# Patient Record
Sex: FEMALE | Race: WHITE | Employment: FULL TIME | URBAN - METROPOLITAN AREA
[De-identification: names, ages, dates, MRNs, and addresses within clinical notes are randomized per-mention and may not be internally consistent; named-entity substitution may affect disease eponyms.]

---

## 2019-09-17 ENCOUNTER — HOSPITAL ENCOUNTER (OUTPATIENT)
Dept: MRI IMAGING | Age: 49
Discharge: HOME | End: 2019-09-17
Attending: PODIATRIST
Payer: MEDICAID

## 2019-09-17 VITALS — WEIGHT: 172 LBS | BODY MASS INDEX: 27.76 KG/M2

## 2019-09-17 DIAGNOSIS — M79.672 LEFT FOOT PAIN: ICD-10-CM

## 2019-09-17 PROCEDURE — A9575 INJ GADOTERATE MEGLUMI 0.1ML: HCPCS | Performed by: PODIATRIST

## 2019-09-17 PROCEDURE — 74011250636 HC RX REV CODE- 250/636: Performed by: PODIATRIST

## 2019-09-17 PROCEDURE — 73720 MRI LWR EXTREMITY W/O&W/DYE: CPT

## 2019-09-17 RX ORDER — GADOTERATE MEGLUMINE 376.9 MG/ML
15 INJECTION INTRAVENOUS
Status: COMPLETED | OUTPATIENT
Start: 2019-09-17 | End: 2019-09-17

## 2019-09-17 RX ADMIN — GADOTERATE MEGLUMINE 15 ML: 376.9 INJECTION INTRAVENOUS at 20:06

## 2019-10-07 ENCOUNTER — APPOINTMENT (OUTPATIENT)
Dept: GENERAL RADIOLOGY | Age: 49
DRG: 314 | End: 2019-10-07
Attending: STUDENT IN AN ORGANIZED HEALTH CARE EDUCATION/TRAINING PROGRAM
Payer: MEDICAID

## 2019-10-07 ENCOUNTER — HOSPITAL ENCOUNTER (INPATIENT)
Age: 49
LOS: 5 days | Discharge: LEFT BEFORE DISCHARGE | DRG: 314 | End: 2019-10-12
Attending: EMERGENCY MEDICINE | Admitting: FAMILY MEDICINE
Payer: MEDICAID

## 2019-10-07 ENCOUNTER — APPOINTMENT (OUTPATIENT)
Dept: VASCULAR SURGERY | Age: 49
DRG: 314 | End: 2019-10-07
Attending: FAMILY MEDICINE
Payer: MEDICAID

## 2019-10-07 DIAGNOSIS — M86.372 CHRONIC MULTIFOCAL OSTEOMYELITIS OF LEFT FOOT (HCC): Primary | ICD-10-CM

## 2019-10-07 DIAGNOSIS — R73.9 HYPERGLYCEMIA: ICD-10-CM

## 2019-10-07 PROBLEM — M86.9 OSTEOMYELITIS OF SECOND TOE OF LEFT FOOT (HCC): Status: ACTIVE | Noted: 2019-10-07

## 2019-10-07 PROBLEM — E11.621: Status: ACTIVE | Noted: 2019-10-07

## 2019-10-07 PROBLEM — M86.172 ACUTE OSTEOMYELITIS OF METATARSAL BONE OF LEFT FOOT (HCC): Status: ACTIVE | Noted: 2019-10-07

## 2019-10-07 PROBLEM — L97.424: Status: ACTIVE | Noted: 2019-10-07

## 2019-10-07 PROBLEM — M86.172 ACUTE OSTEOMYELITIS OF LEFT FOOT (HCC): Status: ACTIVE | Noted: 2019-10-07

## 2019-10-07 PROBLEM — L02.612 CELLULITIS AND ABSCESS OF TOE OF LEFT FOOT: Status: ACTIVE | Noted: 2019-10-07

## 2019-10-07 PROBLEM — I10 HTN (HYPERTENSION): Status: ACTIVE | Noted: 2019-10-07

## 2019-10-07 PROBLEM — L03.032 CELLULITIS AND ABSCESS OF TOE OF LEFT FOOT: Status: ACTIVE | Noted: 2019-10-07

## 2019-10-07 PROBLEM — E11.9 DM TYPE 2 (DIABETES MELLITUS, TYPE 2) (HCC): Status: ACTIVE | Noted: 2019-10-07

## 2019-10-07 LAB
ALBUMIN SERPL-MCNC: 2.7 G/DL (ref 3.5–5)
ALBUMIN/GLOB SERPL: 0.5 {RATIO} (ref 1.1–2.2)
ALP SERPL-CCNC: 167 U/L (ref 45–117)
ALT SERPL-CCNC: 14 U/L (ref 12–78)
ANION GAP SERPL CALC-SCNC: 9 MMOL/L (ref 5–15)
APPEARANCE UR: CLEAR
AST SERPL-CCNC: 15 U/L (ref 15–37)
ATRIAL RATE: 88 BPM
BACTERIA URNS QL MICRO: NEGATIVE /HPF
BASOPHILS # BLD: 0.1 K/UL (ref 0–0.1)
BASOPHILS NFR BLD: 1 % (ref 0–1)
BILIRUB SERPL-MCNC: 0.2 MG/DL (ref 0.2–1)
BILIRUB UR QL: NEGATIVE
BUN SERPL-MCNC: 28 MG/DL (ref 6–20)
BUN/CREAT SERPL: 27 (ref 12–20)
CALCIUM SERPL-MCNC: 9.3 MG/DL (ref 8.5–10.1)
CALCULATED P AXIS, ECG09: 43 DEGREES
CALCULATED R AXIS, ECG10: -20 DEGREES
CALCULATED T AXIS, ECG11: 36 DEGREES
CHLORIDE SERPL-SCNC: 99 MMOL/L (ref 97–108)
CO2 SERPL-SCNC: 26 MMOL/L (ref 21–32)
COLOR UR: ABNORMAL
COMMENT, HOLDF: NORMAL
CREAT SERPL-MCNC: 1.05 MG/DL (ref 0.55–1.02)
DIAGNOSIS, 93000: NORMAL
DIFFERENTIAL METHOD BLD: ABNORMAL
EOSINOPHIL # BLD: 0.2 K/UL (ref 0–0.4)
EOSINOPHIL NFR BLD: 2 % (ref 0–7)
EPITH CASTS URNS QL MICRO: ABNORMAL /LPF
ERYTHROCYTE [DISTWIDTH] IN BLOOD BY AUTOMATED COUNT: 14.6 % (ref 11.5–14.5)
EST. AVERAGE GLUCOSE BLD GHB EST-MCNC: 303 MG/DL
GLOBULIN SER CALC-MCNC: 5.5 G/DL (ref 2–4)
GLUCOSE BLD STRIP.AUTO-MCNC: 239 MG/DL (ref 65–100)
GLUCOSE BLD STRIP.AUTO-MCNC: 353 MG/DL (ref 65–100)
GLUCOSE BLD STRIP.AUTO-MCNC: 408 MG/DL (ref 65–100)
GLUCOSE SERPL-MCNC: 312 MG/DL (ref 65–100)
GLUCOSE UR STRIP.AUTO-MCNC: >1000 MG/DL
HBA1C MFR BLD: 12.2 % (ref 4.2–6.3)
HCT VFR BLD AUTO: 37.6 % (ref 35–47)
HGB BLD-MCNC: 11.3 G/DL (ref 11.5–16)
HGB UR QL STRIP: ABNORMAL
HYALINE CASTS URNS QL MICRO: ABNORMAL /LPF (ref 0–5)
IMM GRANULOCYTES # BLD AUTO: 0 K/UL (ref 0–0.04)
IMM GRANULOCYTES NFR BLD AUTO: 0 % (ref 0–0.5)
KETONES UR QL STRIP.AUTO: NEGATIVE MG/DL
LEUKOCYTE ESTERASE UR QL STRIP.AUTO: NEGATIVE
LYMPHOCYTES # BLD: 1.4 K/UL (ref 0.8–3.5)
LYMPHOCYTES NFR BLD: 16 % (ref 12–49)
MCH RBC QN AUTO: 27 PG (ref 26–34)
MCHC RBC AUTO-ENTMCNC: 30.1 G/DL (ref 30–36.5)
MCV RBC AUTO: 89.7 FL (ref 80–99)
MONOCYTES # BLD: 0.8 K/UL (ref 0–1)
MONOCYTES NFR BLD: 9 % (ref 5–13)
NEUTS SEG # BLD: 6.1 K/UL (ref 1.8–8)
NEUTS SEG NFR BLD: 72 % (ref 32–75)
NITRITE UR QL STRIP.AUTO: NEGATIVE
NRBC # BLD: 0 K/UL (ref 0–0.01)
NRBC BLD-RTO: 0 PER 100 WBC
P-R INTERVAL, ECG05: 198 MS
PH UR STRIP: 6.5 [PH] (ref 5–8)
PLATELET # BLD AUTO: 390 K/UL (ref 150–400)
PMV BLD AUTO: 10.7 FL (ref 8.9–12.9)
POTASSIUM SERPL-SCNC: 4.4 MMOL/L (ref 3.5–5.1)
PROT SERPL-MCNC: 8.2 G/DL (ref 6.4–8.2)
PROT UR STRIP-MCNC: 300 MG/DL
Q-T INTERVAL, ECG07: 358 MS
QRS DURATION, ECG06: 80 MS
QTC CALCULATION (BEZET), ECG08: 433 MS
RBC # BLD AUTO: 4.19 M/UL (ref 3.8–5.2)
RBC #/AREA URNS HPF: ABNORMAL /HPF (ref 0–5)
SAMPLES BEING HELD,HOLD: NORMAL
SERVICE CMNT-IMP: ABNORMAL
SODIUM SERPL-SCNC: 134 MMOL/L (ref 136–145)
SP GR UR REFRACTOMETRY: 1.02 (ref 1–1.03)
UR CULT HOLD, URHOLD: NORMAL
UROBILINOGEN UR QL STRIP.AUTO: 1 EU/DL (ref 0.2–1)
VENTRICULAR RATE, ECG03: 88 BPM
WBC # BLD AUTO: 8.4 K/UL (ref 3.6–11)
WBC URNS QL MICRO: ABNORMAL /HPF (ref 0–4)

## 2019-10-07 PROCEDURE — 93971 EXTREMITY STUDY: CPT

## 2019-10-07 PROCEDURE — 74011636637 HC RX REV CODE- 636/637: Performed by: FAMILY MEDICINE

## 2019-10-07 PROCEDURE — 85025 COMPLETE CBC W/AUTO DIFF WBC: CPT

## 2019-10-07 PROCEDURE — 93005 ELECTROCARDIOGRAM TRACING: CPT

## 2019-10-07 PROCEDURE — 74011250637 HC RX REV CODE- 250/637: Performed by: FAMILY MEDICINE

## 2019-10-07 PROCEDURE — 81001 URINALYSIS AUTO W/SCOPE: CPT

## 2019-10-07 PROCEDURE — 65270000032 HC RM SEMIPRIVATE

## 2019-10-07 PROCEDURE — 80053 COMPREHEN METABOLIC PANEL: CPT

## 2019-10-07 PROCEDURE — 82962 GLUCOSE BLOOD TEST: CPT

## 2019-10-07 PROCEDURE — 74011250636 HC RX REV CODE- 250/636: Performed by: FAMILY MEDICINE

## 2019-10-07 PROCEDURE — 74011000258 HC RX REV CODE- 258: Performed by: FAMILY MEDICINE

## 2019-10-07 PROCEDURE — 83036 HEMOGLOBIN GLYCOSYLATED A1C: CPT

## 2019-10-07 PROCEDURE — 73630 X-RAY EXAM OF FOOT: CPT

## 2019-10-07 PROCEDURE — 99285 EMERGENCY DEPT VISIT HI MDM: CPT

## 2019-10-07 PROCEDURE — 74011250636 HC RX REV CODE- 250/636: Performed by: EMERGENCY MEDICINE

## 2019-10-07 RX ORDER — AMLODIPINE BESYLATE 5 MG/1
5 TABLET ORAL ONCE
Status: COMPLETED | OUTPATIENT
Start: 2019-10-07 | End: 2019-10-07

## 2019-10-07 RX ORDER — AMLODIPINE BESYLATE 5 MG/1
10 TABLET ORAL DAILY
Status: DISCONTINUED | OUTPATIENT
Start: 2019-10-08 | End: 2019-10-10

## 2019-10-07 RX ORDER — INSULIN LISPRO 100 [IU]/ML
20 INJECTION, SOLUTION INTRAVENOUS; SUBCUTANEOUS ONCE
Status: COMPLETED | OUTPATIENT
Start: 2019-10-07 | End: 2019-10-07

## 2019-10-07 RX ORDER — SODIUM CHLORIDE 0.9 % (FLUSH) 0.9 %
5-40 SYRINGE (ML) INJECTION EVERY 8 HOURS
Status: DISCONTINUED | OUTPATIENT
Start: 2019-10-07 | End: 2019-10-12 | Stop reason: HOSPADM

## 2019-10-07 RX ORDER — MORPHINE SULFATE 2 MG/ML
2 INJECTION, SOLUTION INTRAMUSCULAR; INTRAVENOUS
Status: DISCONTINUED | OUTPATIENT
Start: 2019-10-07 | End: 2019-10-12

## 2019-10-07 RX ORDER — OXYCODONE AND ACETAMINOPHEN 5; 325 MG/1; MG/1
1 TABLET ORAL
Status: DISCONTINUED | OUTPATIENT
Start: 2019-10-07 | End: 2019-10-09

## 2019-10-07 RX ORDER — INSULIN LISPRO 100 [IU]/ML
INJECTION, SOLUTION INTRAVENOUS; SUBCUTANEOUS
Status: DISCONTINUED | OUTPATIENT
Start: 2019-10-07 | End: 2019-10-12 | Stop reason: HOSPADM

## 2019-10-07 RX ORDER — SODIUM CHLORIDE 0.9 % (FLUSH) 0.9 %
5-40 SYRINGE (ML) INJECTION AS NEEDED
Status: DISCONTINUED | OUTPATIENT
Start: 2019-10-07 | End: 2019-10-12 | Stop reason: HOSPADM

## 2019-10-07 RX ORDER — HYDRALAZINE HYDROCHLORIDE 20 MG/ML
20 INJECTION INTRAMUSCULAR; INTRAVENOUS
Status: DISCONTINUED | OUTPATIENT
Start: 2019-10-07 | End: 2019-10-11

## 2019-10-07 RX ORDER — ACETAMINOPHEN 325 MG/1
650 TABLET ORAL
Status: DISCONTINUED | OUTPATIENT
Start: 2019-10-07 | End: 2019-10-12 | Stop reason: HOSPADM

## 2019-10-07 RX ORDER — VANCOMYCIN/0.9 % SOD CHLORIDE 1.5G/250ML
1500 PLASTIC BAG, INJECTION (ML) INTRAVENOUS
Status: COMPLETED | OUTPATIENT
Start: 2019-10-07 | End: 2019-10-07

## 2019-10-07 RX ORDER — HEPARIN SODIUM 5000 [USP'U]/ML
5000 INJECTION, SOLUTION INTRAVENOUS; SUBCUTANEOUS EVERY 12 HOURS
Status: DISCONTINUED | OUTPATIENT
Start: 2019-10-07 | End: 2019-10-12 | Stop reason: HOSPADM

## 2019-10-07 RX ORDER — MAGNESIUM SULFATE 100 %
4 CRYSTALS MISCELLANEOUS AS NEEDED
Status: DISCONTINUED | OUTPATIENT
Start: 2019-10-07 | End: 2019-10-12 | Stop reason: HOSPADM

## 2019-10-07 RX ORDER — INSULIN GLARGINE 100 [IU]/ML
25 INJECTION, SOLUTION SUBCUTANEOUS 2 TIMES DAILY
Status: DISCONTINUED | OUTPATIENT
Start: 2019-10-07 | End: 2019-10-08

## 2019-10-07 RX ADMIN — VANCOMYCIN HYDROCHLORIDE 1500 MG: 10 INJECTION, POWDER, LYOPHILIZED, FOR SOLUTION INTRAVENOUS at 11:03

## 2019-10-07 RX ADMIN — HYDRALAZINE HYDROCHLORIDE 20 MG: 20 INJECTION INTRAMUSCULAR; INTRAVENOUS at 12:51

## 2019-10-07 RX ADMIN — Medication 10 ML: at 19:12

## 2019-10-07 RX ADMIN — OXYCODONE HYDROCHLORIDE AND ACETAMINOPHEN 1 TABLET: 5; 325 TABLET ORAL at 18:21

## 2019-10-07 RX ADMIN — INSULIN LISPRO 10 UNITS: 100 INJECTION, SOLUTION INTRAVENOUS; SUBCUTANEOUS at 19:03

## 2019-10-07 RX ADMIN — INSULIN LISPRO 5 UNITS: 100 INJECTION, SOLUTION INTRAVENOUS; SUBCUTANEOUS at 22:06

## 2019-10-07 RX ADMIN — INSULIN LISPRO 4 UNITS: 100 INJECTION, SOLUTION INTRAVENOUS; SUBCUTANEOUS at 12:51

## 2019-10-07 RX ADMIN — VANCOMYCIN HYDROCHLORIDE 1000 MG: 1 INJECTION, POWDER, LYOPHILIZED, FOR SOLUTION INTRAVENOUS at 23:06

## 2019-10-07 RX ADMIN — AMLODIPINE BESYLATE 5 MG: 5 TABLET ORAL at 19:12

## 2019-10-07 RX ADMIN — PIPERACILLIN AND TAZOBACTAM 3.38 G: 3; .375 INJECTION, POWDER, FOR SOLUTION INTRAVENOUS at 19:16

## 2019-10-07 RX ADMIN — INSULIN GLARGINE 25 UNITS: 100 INJECTION, SOLUTION SUBCUTANEOUS at 22:12

## 2019-10-07 RX ADMIN — PIPERACILLIN AND TAZOBACTAM 3.38 G: 3; .375 INJECTION, POWDER, LYOPHILIZED, FOR SOLUTION INTRAVENOUS at 13:24

## 2019-10-07 RX ADMIN — HEPARIN SODIUM 5000 UNITS: 5000 INJECTION INTRAVENOUS; SUBCUTANEOUS at 12:50

## 2019-10-07 NOTE — PROGRESS NOTES
Pharmacist Note - Vancomycin Dosing    Consult provided for this 51 y.o. female for indication of diabetic foot infection with osteomyelitis. Antibiotic regimen(s): vancomycin + Zosyn     Recent Labs     10/07/19  0933   WBC 8.4   CREA 1.05*   BUN 28*     Frequency of BMP: daily x 3  Height: 167.6 cm  Weight: 78 kg  Est CrCl: 69 ml/min  Temp (24hrs), Av.7 °F (36.5 °C), Min:97.7 °F (36.5 °C), Max:97.7 °F (36.5 °C)    Goal trough = 15 - 20 mcg/mL    Therapy will be initiated with a loading dose of 1500 mg IV x 1 to be followed by a maintenance dose of 1000 mg IV every 12 hours. Pharmacy to follow patient daily and order levels / make dose adjustments as appropriate.

## 2019-10-07 NOTE — ROUTINE PROCESS
TRANSFER - OUT REPORT: 
 
Verbal report given to young(name) on Jonatan Mendez  being transferred to (unit) for routine progression of care Report consisted of patients Situation, Background, Assessment and  
Recommendations(SBAR). Information from the following report(s) SBAR was reviewed with the receiving nurse. Lines:  
Venous Access Device 10/07/14 Arm, right (Active) Peripheral IV 10/07/19 Right Hand (Active) Site Assessment Clean, dry, & intact 10/7/2019  9:35 AM  
Phlebitis Assessment 0 10/7/2019  9:35 AM  
Infiltration Assessment 0 10/7/2019  9:35 AM  
Dressing Status Clean, dry, & intact 10/7/2019  9:35 AM  
Dressing Type Transparent 10/7/2019  9:35 AM  
Hub Color/Line Status Patent; Flushed 10/7/2019  9:35 AM  
Action Taken Blood drawn 10/7/2019  9:35 AM  
  
 
Opportunity for questions and clarification was provided. Patient transported with: 
 [facility]

## 2019-10-07 NOTE — PROGRESS NOTES
Reason for Admission: Acute Osteomylitis of Left Foot; HTN; DM Type 2                   RRAT Score:  5- LOW                   Plan for utilizing home health: TBD                         Current Advanced Directive/Advance Care Plan: Pt does not have an AMD on file at this time. Transition of Care Plan: CM met with pt at bedside to discuss CM role and to assess pt needs. CM verified demographics including insurance and emergency contact information. Pt reports hse would liek to add daughterLeela, to emergency contact list; CM to update. Pt lives in a private residence with daughter and two grandchildren. There are 4 steps to enter home. Pt reports daughterLeo (ph#: [phone]), will provide transportation home after discharge. Pt reports a glucometer for DME use and IADLs prior to admission. Pt uses [address] [facility] and reports no concerns with getting medications. Pt verified she currently does not have a PCP; CM offered [facility] provider list- pt agreeable. Pt reports no concerns for discharge at this time. CM to follow and assist with disposition needs as they arise. Care Management Interventions  PCP Verified by CM: No  Palliative Care Criteria Met (RRAT>21 & CHF Dx)?: No  Mode of Transport at Discharge: Other (see comment)(DaughterLeo)  Transition of Care Consult (CM Consult):  Other(Initial Assessment)  MyChart Signup: No  Discharge Durable Medical Equipment: No  Physical Therapy Consult: No  Occupational Therapy Consult: No  Speech Therapy Consult: No  Current Support Network: Own Home, Other  Confirm Follow Up Transport: Family  Plan discussed with Pt/Family/Caregiver: Yes  Discharge Location  Discharge Placement: Home(Disposition TBD/subject to change pending care recommendations)    Melly Joy RN, BSN  Care Management Department

## 2019-10-07 NOTE — CONSULTS
Full consult to follow. Patient known to me from office. Recommended to come to hospital 2 weeks ago for I&D of abscess, 5th ray resection, and partial 2nd toe amputation--all of the left foot. Will plan for OR tomorrow pending preoperative clearance.

## 2019-10-07 NOTE — PROGRESS NOTES
Admission Medication Reconciliation:    Comments/Recommendations:   Updated PTA meds/reviewed patient's allergies. 1)  Medication history obtained from the patient - reliable historian. 2)  Medication changes (since last review): Added  - Novolin R sliding scale    Adjusted  - Levemir to BID    Removed  - Leonarda, Invanz, oxycodone  - Note: recently finished oxycodone and oral antibiotics. 3)  Allergy information confirmed. Please call  with any immediate questions regarding this medication reconciliation, or the main inpatient pharmacy at [facility] to be directed to the clinical pharmacist covering the patient's care after admitted. Thank you for allowing me to participate in this patient's care. Ubaldo Amanda, Pharmacist     [facility] benefit data reflects medications filled and processed through the patient's insurance, however   this data does NOT capture whether the medication was picked up or is currently being taken by the patient. Prior to Admission Medications:   Prior to Admission Medications   Prescriptions Last Dose Informant Patient Reported? Taking?   insulin detemir U-100 (LEVEMIR U-100 INSULIN) 100 unit/mL injection 10/7/2019 at AM  Yes Yes   Si Units by SubCUTAneous route two (2) times a day. insulin regular (NOVOLIN R REGULAR U-100 INSULN) 100 unit/mL injection 10/7/2019 at AM  Yes Yes   Sig: by SubCUTAneous route three (3) times daily (with meals). Sliding scale insulin      Facility-Administered Medications: None       Allergies:  Patient has no known allergies.   Chief Complaint for this Admission:    Chief Complaint   Patient presents with    Other     Significant PMH/Disease States:   Past Medical History:   Diagnosis Date    Cataract     Cataracts, bilateral     Diabetes ([facility] [address]     Osteomyelitis ([facility]

## 2019-10-07 NOTE — ED TRIAGE NOTES
Pt referred to ED by Dr. Ty Nuñez, [address] [facility]le for IV Abx for wound to left foot. Planned surgery for tomorrow r/t Osteomyelitis. Pt previously on Bactrim, stopped 3 weeks ago.

## 2019-10-07 NOTE — H&P
History and Physical  Primary Care Provider: None    Subjective:     51 y.o. female with past medical history significant for DM, HTN, previous osteomyelitis of left foot who was sent into the ER by Dr Tinnie Peabody- podiatry, for a non healing left foot diabetic wound with osteomyelitis. Pt has had issues with her L foot since 2011 including a chronic non healing diabetic ulcer and recurrent infections. She was admitted back in 10/2014 and had surgery on her left foot followed by a prolonged course of IV abx. She presents with a chief complaint of left foot pain with worsening L leg swelling and drainage from her left foot chronic ulcer. Pt was seen by Dr Tinnie Peabody outpatient and had an MRI of her left foot on 9/17/19 which showed \"Osteomyelitis of the postsurgical proximal fifth metatarsal. Osteomyelitis of the second distal phalanx. Surrounding cellulitis. 14 x 9 x 8 mm abscess plantar to the fifth metatarsal amputation stump. Adjacent skin breakdown and dorsal to plantar sinus tract. Patient was treated last month with a 2-3 week course of PO bactrim. She  is scheduled to have left foot surgery tomorrow with Dr. Tinnie Peabody at 3pm. She sent the patient to the ED to be admitted by the hospitalist service. Her only other symptoms are nausea, feeling tired, and a rash on the left lower leg. She reports no other acute medical concerns at this time. She reports that she had delayed addressing the left foot infection previously due to lack of insurance. Review of Systems:    A comprehensive review of systems was negative except for that written in the History of Present Illness.      Past Medical History:   Diagnosis Date    Cataract     Cataracts, bilateral     Diabetes ([facility]     Osteomyelitis ([facility] [address]       Past Surgical History:   Procedure Laterality Date    HX CATARACT REMOVAL  2/2011; 5-11    right eye; left    HX TONSIL AND ADENOIDECTOMY  1985    HX TONSILLECTOMY  1984    HX TUBAL LIGATION  1997     Prior to Admission medications    Medication Sig Start Date End Date Taking? Authorizing Provider   insulin regular (NOVOLIN R REGULAR U-100 INSULN) 100 unit/mL injection by SubCUTAneous route three (3) times daily (with meals). Sliding scale insulin   Yes Provider, Historical   insulin detemir U-100 (LEVEMIR U-100 INSULIN) 100 unit/mL injection 30 Units by SubCUTAneous route two (2) times a day. Yes Provider, Historical     No Known Allergies   Family History   Problem Relation Age of Onset    Hypertension Mother     Cancer Father         prostate    Diabetes Maternal Grandmother     Hypertension Maternal Grandmother         SOCIAL HISTORY:  Patient resides at Home with one of her daughters and 2 grandchildren  She is  with 4 children. Works in childcare. Patient ambulates independently  Smoking history: none  Alcohol history:none        Objective:       Physical Exam:   Visit Vitals  BP (!) 188/102   Pulse 93   Temp 97.7 °F (36.5 °C)   Resp 16   Ht 5' 6\" (1.676 m)   Wt 78 kg (172 lb)   LMP 05/01/2011   SpO2 97%   BMI 27.76 kg/m²     General:  Alert, cooperative, no distress, appears stated age. Head:  Normocephalic, without obvious abnormality, atraumatic. Eyes:  Conjunctivae/corneas clear. PERRL, EOMs intact. Fundi benign. Throat: Very poor dentition with multiple caries   Neck: Supple, symmetrical, trachea midline, no adenopathy,normal thyroid:    Back:   Symmetric, no curvature. ROM normal. No CVA tenderness. Lungs:   Clear to auscultation bilaterally. Chest wall:  No tenderness or deformity. Heart:  Regular rate and rhythm, S1, S2 normal, no murmur,    Abdomen:   Soft, non-tender. Bowel sounds normal. No masses,     Extremities: Extremities normal, atraumatic, no cyanosis or edema. Pulses: symmetric all extremities. Skin: Rash on left lower leg and foot- see pics below. Neurologic: CNII-XII intact. No focal motor or sensory losses.     pic of left leg and foot on admission 10/7/19 Data Review: All diagnostic labs and studies have been reviewed. Recent Results (from the past 24 hour(s))   SAMPLES BEING HELD    Collection Time: 10/07/19  9:33 AM   Result Value Ref Range    SAMPLES BEING HELD 1LAV 1BLU 1PST 1RED     COMMENT        Add-on orders for these samples will be processed based on acceptable specimen integrity and analyte stability, which may vary by analyte. CBC WITH AUTOMATED DIFF    Collection Time: 10/07/19  9:33 AM   Result Value Ref Range    WBC 8.4 3.6 - 11.0 K/uL    RBC 4.19 3.80 - 5.20 M/uL    HGB 11.3 (L) 11.5 - 16.0 g/dL    HCT 37.6 35.0 - 47.0 %    MCV 89.7 80.0 - 99.0 FL    MCH 27.0 26.0 - 34.0 PG    MCHC 30.1 30.0 - 36.5 g/dL    RDW 14.6 (H) 11.5 - 14.5 %    PLATELET 622 019 - 844 K/uL    MPV 10.7 8.9 - 12.9 FL    NRBC 0.0 0  WBC    ABSOLUTE NRBC 0.00 0.00 - 0.01 K/uL    NEUTROPHILS 72 32 - 75 %    LYMPHOCYTES 16 12 - 49 %    MONOCYTES 9 5 - 13 %    EOSINOPHILS 2 0 - 7 %    BASOPHILS 1 0 - 1 %    IMMATURE GRANULOCYTES 0 0.0 - 0.5 %    ABS. NEUTROPHILS 6.1 1.8 - 8.0 K/UL    ABS. LYMPHOCYTES 1.4 0.8 - 3.5 K/UL    ABS. MONOCYTES 0.8 0.0 - 1.0 K/UL    ABS. EOSINOPHILS 0.2 0.0 - 0.4 K/UL    ABS. BASOPHILS 0.1 0.0 - 0.1 K/UL    ABS. IMM. GRANS. 0.0 0.00 - 0.04 K/UL    DF AUTOMATED     METABOLIC PANEL, COMPREHENSIVE    Collection Time: 10/07/19  9:33 AM   Result Value Ref Range    Sodium 134 (L) 136 - 145 mmol/L    Potassium 4.4 3.5 - 5.1 mmol/L    Chloride 99 97 - 108 mmol/L    CO2 26 21 - 32 mmol/L    Anion gap 9 5 - 15 mmol/L    Glucose 312 (H) 65 - 100 mg/dL    BUN 28 (H) 6 - 20 MG/DL    Creatinine 1.05 (H) 0.55 - 1.02 MG/DL    BUN/Creatinine ratio 27 (H) 12 - 20      GFR est AA >60 >60 ml/min/1.73m2    GFR est non-AA 56 (L) >60 ml/min/1.73m2    Calcium 9.3 8.5 - 10.1 MG/DL    Bilirubin, total 0.2 0.2 - 1.0 MG/DL    ALT (SGPT) 14 12 - 78 U/L    AST (SGOT) 15 15 - 37 U/L    Alk.  phosphatase 167 (H) 45 - 117 U/L    Protein, total 8.2 6.4 - 8.2 g/dL Albumin 2.7 (L) 3.5 - 5.0 g/dL    Globulin 5.5 (H) 2.0 - 4.0 g/dL    A-G Ratio 0.5 (L) 1.1 - 2.2     HEMOGLOBIN A1C WITH EAG    Collection Time: 10/07/19  9:33 AM   Result Value Ref Range    Hemoglobin A1c 12.2 (H) 4.2 - 6.3 %    Est. average glucose 303 mg/dL   EKG, 12 LEAD, INITIAL    Collection Time: 10/07/19  9:51 AM   Result Value Ref Range    Ventricular Rate 88 BPM    Atrial Rate 88 BPM    P-R Interval 198 ms    QRS Duration 80 ms    Q-T Interval 358 ms    QTC Calculation (Bezet) 433 ms    Calculated P Axis 43 degrees    Calculated R Axis -20 degrees    Calculated T Axis 36 degrees    Diagnosis       Normal sinus rhythm  No previous ECGs available  Confirmed by Juan C Evans MD, [place] ([id number]) on 10/7/2019 12:26:41 PM     URINALYSIS W/ RFLX MICROSCOPIC    Collection Time: 10/07/19 10:47 AM   Result Value Ref Range    Color YELLOW/STRAW      Appearance CLEAR CLEAR      Specific gravity 1.017 1.003 - 1.030      pH (UA) 6.5 5.0 - 8.0      Protein 300 (A) NEG mg/dL    Glucose >1,000 (A) NEG mg/dL    Ketone NEGATIVE  NEG mg/dL    Bilirubin NEGATIVE  NEG      Blood LARGE (A) NEG      Urobilinogen 1.0 0.2 - 1.0 EU/dL    Nitrites NEGATIVE  NEG      Leukocyte Esterase NEGATIVE  NEG      WBC 0-4 0 - 4 /hpf    RBC 20-50 0 - 5 /hpf    Epithelial cells FEW FEW /lpf    Bacteria NEGATIVE  NEG /hpf    Hyaline cast 2-5 0 - 5 /lpf   URINE CULTURE HOLD SAMPLE    Collection Time: 10/07/19 10:47 AM   Result Value Ref Range    Urine culture hold        URINE ON HOLD IN MICROBIOLOGY DEPT FOR 3 DAYS. IF UNPRESERVED URINE IS SUBMITTED, IT CANNOT BE USED FOR ADDITIONAL TESTING AFTER 24 HRS, RECOLLECTION WILL BE REQUIRED.    GLUCOSE, POC    Collection Time: 10/07/19 11:24 AM   Result Value Ref Range    Glucose (POC) 239 (H) 65 - 100 mg/dL    Performed by Maxime Kilgore of left foot: INDICATION: infection.     COMPARISON: 2014     FINDINGS: Three views of the left foot demonstrate destruction of the majority  of the fifth metatarsal. There are calcifications in the associated soft  tissues. . There are inferior and posterior calcaneal spurs.     IMPRESSION  IMPRESSION: Destruction of the majority of the fifth metatarsal.  Assessment:     Active Problems:    HTN (hypertension) (10/7/2019)      DM type 2 (diabetes mellitus, type 2) ([facility] (10/7/2019)      Acute osteomyelitis of left foot ([facility] (10/7/2019)        Plan:     1. Chronic nonhealing left foot diabetic foot ulcer with osteomyelitis of the left foot (distal 2nd phalynx and 5th metatarsal  -plans for podiatry to take patient to the OR tomorrow. Start IV vanc and zosyn    2 DM type 2- accuchecks and ss insulin, Check HbA1c    3. CV- uncontrolled HTN- IV hydralazine and monitor w plans to start antihypertensive, EKG=NSR    4. Tired feeling- check TSH    5. Dental caries- many- pt to see dentistry on DC    6 Pt should be considered lo risk and cleared for surgery    Full Code  Surrogate decision maker- daughter Kali Contreras [phone]   FUNCTIONAL STATUS PRIOR TO HOSPITALIZATION independent    Signed By: Tony Bruno MD     October 7, 2019

## 2019-10-07 NOTE — CONSULTS
Podiatry Consult    Subjective:         Date of Consultation: October 7, 2019     Referring Physician: Emergency Room    Patient is a 51 y. o.female who presents with left lower extremity swelling and erythema in setting of chronic wound. Patient endorses she had original soft tissue infection with associated osteomyelitis in 2013 secondary to a large splinter. She had undergone partial 5th metatarsal resection at that time. She recently developed a draining sinus tract and saw Dr. Mikey Ha in the office who started her on a course of oral antibiotics, but patient was unable to present to the hospital for surgical management of the infection given her childcare responsibilities and job constraints. Obtained MRI on 9/17 showing OM of left 5th metatarsal and left 2nd distal phalanx. She reports her affairs are now in order and she would like to take care of her foot straight away. Patient endorses nausea and malaise for past week. Denies vomiting. Reports she has been taking her temperature and has not had any objective fevers. Endorses decreased appetite. No change in bathroom habits. Reports pain in left lower extremity and swelling she is unable to control with elevation alone. Reports typical blood glucose in 200s, recently in 500s. Presents to ED ambulating with boot to left foot.      Patient Active Problem List    Diagnosis Date Noted    Unspecified essential hypertension 09/01/2011    Diabetes mellitus type 1, uncontrolled, insulin dependent ([address] 04/20/2011    Anxiety state 04/20/2011     Past Medical History:   Diagnosis Date    Cataract     Cataracts, bilateral     Diabetes ([address]     Osteomyelitis ([address]       Past Surgical History:   Procedure Laterality Date    HX CATARACT REMOVAL  2/2011; 5-11    right eye; left    HX TONSIL AND ADENOIDECTOMY  1985    HX TONSILLECTOMY  1984    HX TUBAL LIGATION  1997      Family History   Problem Relation Age of Onset    Hypertension Mother     Cancer Father prostate    Diabetes Maternal Grandmother     Hypertension Maternal Grandmother       Social History     Tobacco Use    Smoking status: Never Smoker    Smokeless tobacco: Never Used   Substance Use Topics    Alcohol use: Yes     Comment: rarely     Current Outpatient Medications   Medication Sig Dispense Refill    insulin regular (NOVOLIN R REGULAR U-100 INSULN) 100 unit/mL injection by SubCUTAneous route three (3) times daily (with meals). Sliding scale insulin      insulin detemir U-100 (LEVEMIR U-100 INSULIN) 100 unit/mL injection 30 Units by SubCUTAneous route two (2) times a day. No Known Allergies     Review of Systems:    Comprehensive ROS obtained with pertinent positives as indicated in HPI. Objective:     Patient Vitals for the past 8 hrs:   BP Temp Pulse Resp SpO2 Height Weight   10/07/19 0929 (!) 204/110 97.7 °F (36.5 °C) 93 16 97 % 5' 6\" (1.676 m) 78 kg (172 lb)   10/07/19 0922   94  99 %       Temp (24hrs), Av.7 °F (36.5 °C), Min:97.7 °F (36.5 °C), Max:97.7 °F (36.5 °C)      Physical Exam:    Supine on stretcher, alert, oriented, NAD  RRR  Breathing non-labored on room air  Mood and affect appropriate    Lower extremity:  Vascular: palpable DP/PT pulses, capillary refill <3sec, pitting edema noted to left lower extremity extending up to proximal tibia  MSK: motor intact to all digits, ankle joint, STJ, shortened 5th digit, slight varus attitude   Neuro: sensation intact to light touch in all nerve distributions  Derm: ulceration to plantar lateral 5th ray measuring 0.4 x 0.8 cm and probing 0.4cm in to fascia. Surrounding hyperkeratotic tissue. Tender to touch. No purulence. No underlying fluctuance. No malodor. Mild calor. Erythema with associated lymphangitis extending to proximal tibia. Outlined in skin marker. 2nd digit is slightly edematous without ulceration. Non-tender. No fluctuance.                  Lab Review:   Recent Results (from the past 24 hour(s)) SAMPLES BEING HELD    Collection Time: 10/07/19  9:33 AM   Result Value Ref Range    SAMPLES BEING HELD 1LAV 1BLU 1PST 1RED     COMMENT        Add-on orders for these samples will be processed based on acceptable specimen integrity and analyte stability, which may vary by analyte. CBC WITH AUTOMATED DIFF    Collection Time: 10/07/19  9:33 AM   Result Value Ref Range    WBC 8.4 3.6 - 11.0 K/uL    RBC 4.19 3.80 - 5.20 M/uL    HGB 11.3 (L) 11.5 - 16.0 g/dL    HCT 37.6 35.0 - 47.0 %    MCV 89.7 80.0 - 99.0 FL    MCH 27.0 26.0 - 34.0 PG    MCHC 30.1 30.0 - 36.5 g/dL    RDW 14.6 (H) 11.5 - 14.5 %    PLATELET 134 067 - 214 K/uL    MPV 10.7 8.9 - 12.9 FL    NRBC 0.0 0  WBC    ABSOLUTE NRBC 0.00 0.00 - 0.01 K/uL    NEUTROPHILS 72 32 - 75 %    LYMPHOCYTES 16 12 - 49 %    MONOCYTES 9 5 - 13 %    EOSINOPHILS 2 0 - 7 %    BASOPHILS 1 0 - 1 %    IMMATURE GRANULOCYTES 0 0.0 - 0.5 %    ABS. NEUTROPHILS 6.1 1.8 - 8.0 K/UL    ABS. LYMPHOCYTES 1.4 0.8 - 3.5 K/UL    ABS. MONOCYTES 0.8 0.0 - 1.0 K/UL    ABS. EOSINOPHILS 0.2 0.0 - 0.4 K/UL    ABS. BASOPHILS 0.1 0.0 - 0.1 K/UL    ABS. IMM. GRANS. 0.0 0.00 - 0.04 K/UL    DF AUTOMATED     METABOLIC PANEL, COMPREHENSIVE    Collection Time: 10/07/19  9:33 AM   Result Value Ref Range    Sodium 134 (L) 136 - 145 mmol/L    Potassium 4.4 3.5 - 5.1 mmol/L    Chloride 99 97 - 108 mmol/L    CO2 26 21 - 32 mmol/L    Anion gap 9 5 - 15 mmol/L    Glucose 312 (H) 65 - 100 mg/dL    BUN 28 (H) 6 - 20 MG/DL    Creatinine 1.05 (H) 0.55 - 1.02 MG/DL    BUN/Creatinine ratio 27 (H) 12 - 20      GFR est AA >60 >60 ml/min/1.73m2    GFR est non-AA 56 (L) >60 ml/min/1.73m2    Calcium 9.3 8.5 - 10.1 MG/DL    Bilirubin, total 0.2 0.2 - 1.0 MG/DL    ALT (SGPT) 14 12 - 78 U/L    AST (SGOT) 15 15 - 37 U/L    Alk.  phosphatase 167 (H) 45 - 117 U/L    Protein, total 8.2 6.4 - 8.2 g/dL    Albumin 2.7 (L) 3.5 - 5.0 g/dL    Globulin 5.5 (H) 2.0 - 4.0 g/dL    A-G Ratio 0.5 (L) 1.1 - 2.2     EKG, 12 LEAD, INITIAL Collection Time: 10/07/19  9:51 AM   Result Value Ref Range    Ventricular Rate 88 BPM    Atrial Rate 88 BPM    P-R Interval 198 ms    QRS Duration 80 ms    Q-T Interval 358 ms    QTC Calculation (Bezet) 433 ms    Calculated P Axis 43 degrees    Calculated R Axis -20 degrees    Calculated T Axis 36 degrees    Diagnosis Normal sinus rhythm  No previous ECGs available          Impression:     49F with DM, hx of OM presents with left lower extremity cellulitis and osteomyelitis in setting of plantar lateral foot ulcer and worsening nausea and malaise. Recommendation:     --Afebrile without leukocytosis, but clinically infected with lymphangitis, pitting edema, and warmth and evidence of OM on recent MRI  --Obtain left foot radiographs  --Recommend broad-spectrum IV antibiotics  --Plan for I&D of left foot tomorrow with resection of 5th metatarsal and partial 2nd digit amputation, will obtain intraoperative cultures and specimen for pathology  --Please keep NPO after midnight except for meds  --WBS: [id number] LLE    Plan and findings discussed with attending, Dr. Sergio Silva, who is in agreement with this plan.

## 2019-10-07 NOTE — ED PROVIDER NOTES
51 y.o. female with past medical history significant for DM, osteomyelitis who presents via private vehicle with chief complaint of leg swelling. Pt has known L foot cellulitis, with worsening L leg swelling and nausea and malaise for the past week. Pt is scheduled to have surgery tomorrow with Dr. Lisbeth Gaines and reports that she was sent to the ED to be admitted. Pt has been off of abx for the infection x 3 weeks. Pt endorses hx of surgery on the same foot 5 years ago. She denies fever, chills. There are no other acute medical concerns at this time. Social hx: denies tobacco use, +rare EtOH conusmption     Old chart reviewed: Pt had a MRI of her L foot on 1. Osteomyelitis of the postsurgical proximal fifth metatarsal.  2. Osteomyelitis of the second distal phalanx. Surrounding cellulitis. 3. 14 x 9 x 8 mm abscess plantar to the fifth metatarsal amputation stump. Adjacent skin breakdown and dorsal to plantar sinus tract. Note written by Federico Quevedo, as dictated by Heidi Hanks MD 9:35 AM      The history is provided by the patient. No  was used.         Past Medical History:   Diagnosis Date    Cataract     Cataracts, bilateral     Diabetes ([address]     Osteomyel[facility] [address]        Past Surgical History:   Procedure Laterality Date    HX CATARACT REMOVAL  2/2011; 5-11    right eye; left    HX TONSIL AND ADENOIDECTOMY  1985    HX TONSILLECTOMY  1984    HX TUBAL LIGATION  1997         Family History:   Problem Relation Age of Onset    Hypertension Mother     Cancer Father         prostate    Diabetes Maternal Grandmother     Hypertension Maternal Grandmother        Social History     Socioeconomic History    Marital status:      Spouse name: Not on file    Number of children: Not on file    Years of education: Not on file    Highest education level: Not on file   Occupational History    Not on file   Social Needs    Financial resource strain: Not on file    Food insecurity:     Worry: Not on file     Inability: Not on file    Transportation needs:     Medical: Not on file     Non-medical: Not on file   Tobacco Use    Smoking status: Never Smoker    Smokeless tobacco: Never Used   Substance and Sexual Activity    Alcohol use: Yes     Comment: rarely    Drug use: No    Sexual activity: Not Currently   Lifestyle    Physical activity:     Days per week: Not on file     Minutes per session: Not on file    Stress: Not on file   Relationships    Social connections:     Talks on phone: Not on file     Gets together: Not on file     Attends Latter day service: Not on file     Active member of club or organization: Not on file     Attends meetings of clubs or organizations: Not on file     Relationship status: Not on file    Intimate partner violence:     Fear of current or ex partner: Not on file     Emotionally abused: Not on file     Physically abused: Not on file     Forced sexual activity: Not on file   Other Topics Concern    Not on file   Social History Narrative    ** Merged History Encounter **              ALLERGIES: Patient has no known allergies. Review of Systems   Constitutional: Negative for chills and fever. HENT: Negative for facial swelling. Eyes: Negative for pain. Respiratory: Negative for shortness of breath. Cardiovascular: Positive for leg swelling. Negative for chest pain. Gastrointestinal: Positive for nausea. Negative for abdominal pain, diarrhea and vomiting. Endocrine: Negative for polyuria. Genitourinary: Negative for difficulty urinating and flank pain. Musculoskeletal: Negative for arthralgias and back pain. Skin: Positive for color change. Allergic/Immunologic: Negative for immunocompromised state. Neurological: Negative for dizziness and headaches. Hematological: Does not bruise/bleed easily. Psychiatric/Behavioral: Negative for confusion.    All other systems reviewed and are negative. Vitals:    10/07/19 0922 10/07/19 0929   BP:  (!) 204/110   Pulse: 94 93   Resp:  16   Temp:  97.7 °F (36.5 °C)   SpO2: 99% 97%   Weight:  78 kg (172 lb)   Height:  5' 6\" (1.676 m)            Physical Exam   Constitutional: She is oriented to person, place, and time. She appears well-developed and well-nourished. No distress. HENT:   Head: Normocephalic and atraumatic. Nose: Nose normal.   Mouth/Throat: Oropharynx is clear and moist.   Eyes: Pupils are equal, round, and reactive to light. Conjunctivae and EOM are normal. No scleral icterus. Neck: Normal range of motion. Neck supple. No JVD present. No tracheal deviation present. No thyromegaly present. No carotid bruits noted. Cardiovascular: Normal rate, regular rhythm, normal heart sounds and intact distal pulses. Exam reveals no gallop and no friction rub. No murmur heard. Pulmonary/Chest: Effort normal and breath sounds normal. No respiratory distress. She has no wheezes. She has no rales. She exhibits no tenderness. Abdominal: Soft. Bowel sounds are normal. She exhibits no distension and no mass. There is no tenderness. There is no rebound and no guarding. Musculoskeletal: Normal range of motion. L foot swollen up to knee with tenderness   Erythema to distal third of lower L leg  Callus with small opening on plantar surface midway down 5th metatarsal    Lymphadenopathy:     She has no cervical adenopathy. Neurological: She is alert and oriented to person, place, and time. She has normal reflexes. No cranial nerve deficit. Coordination normal.   Skin: Skin is warm and dry. No rash noted. No erythema. Psychiatric: She has a normal mood and affect. Her behavior is normal. Judgment and thought content normal.   Nursing note and vitals reviewed.    Note written by Hedy Moritz, Scribe, as dictated by Fausto Rodríguez MD 9:50 AM        MDM  Number of Diagnoses or Management Options  Chronic multifocal osteomyelitis of left foot [facility]): new and requires workup  Hyperglycemia: new and requires workup     Amount and/or Complexity of Data Reviewed  Clinical lab tests: ordered  Decide to obtain previous medical records or to obtain history from someone other than the patient: yes  Review and summarize past medical records: yes  Discuss the patient with other providers: yes    Risk of Complications, Morbidity, and/or Mortality  Presenting problems: moderate  Diagnostic procedures: moderate  Management options: moderate    Patient Progress  Patient progress: stable         Procedures    ED MD EKG interpretatioN; NSR with rate 88 BPM. LAD noted. No ectopy and poor R wave progression. No ischemic changes noted. Teresa Lucas MD    CONSULT NOTE:  10:05 AM Kaylin Kennedy MD spoke with Dr. Love Restrepo, Consult for Podiatry. Discussed available diagnostic tests and clinical findings. Dr. Love Restrepo recommends admission to the hospitalist, starting vancomycin and then will schedule surgery    This is been noncompliant patient according to Dr. Elias Yousif. She attempted to get her admitted last week. The patient refused. He said that she wanted to keep so she would have a job to come back to after the surgery. The patient said it was her understanding checked in today or or yesterday, the surgeon would operate on her foot tomorrow. Patient has not been running any fever or having  sweats. We will consult the hospitalist for admission. In the meantime we will begin the patient on vancomycin. Hospitalist Prosper for Admission  10:22 AM    ED Room Number: ER16/16  Patient Name and age:  Dali Aranda 51 y.o.  female  Working Diagnosis: No diagnosis found. Readmission: no  Isolation Requirements:  no  Recommended Level of Care:  med/surg  Code Status:  Full Code  Department:[facility] Adult ED - 21   Other:      The patient's labs demonstrate a blood sugar in the 320 range.   White count is normal she does have some elevation of her creatinine and BUN. We will consult the hospitalist for admission and medical clearance for her surgery.

## 2019-10-08 ENCOUNTER — ANESTHESIA (OUTPATIENT)
Dept: SURGERY | Age: 49
DRG: 314 | End: 2019-10-08
Payer: MEDICAID

## 2019-10-08 ENCOUNTER — APPOINTMENT (OUTPATIENT)
Dept: GENERAL RADIOLOGY | Age: 49
DRG: 314 | End: 2019-10-08
Attending: PODIATRIST
Payer: MEDICAID

## 2019-10-08 ENCOUNTER — ANESTHESIA EVENT (OUTPATIENT)
Dept: SURGERY | Age: 49
DRG: 314 | End: 2019-10-08
Payer: MEDICAID

## 2019-10-08 LAB
ANION GAP SERPL CALC-SCNC: 7 MMOL/L (ref 5–15)
BUN SERPL-MCNC: 27 MG/DL (ref 6–20)
BUN/CREAT SERPL: 25 (ref 12–20)
CALCIUM SERPL-MCNC: 8.7 MG/DL (ref 8.5–10.1)
CHLORIDE SERPL-SCNC: 100 MMOL/L (ref 97–108)
CO2 SERPL-SCNC: 26 MMOL/L (ref 21–32)
CREAT SERPL-MCNC: 1.1 MG/DL (ref 0.55–1.02)
GLUCOSE BLD STRIP.AUTO-MCNC: 106 MG/DL (ref 65–100)
GLUCOSE BLD STRIP.AUTO-MCNC: 114 MG/DL (ref 65–100)
GLUCOSE BLD STRIP.AUTO-MCNC: 225 MG/DL (ref 65–100)
GLUCOSE BLD STRIP.AUTO-MCNC: 303 MG/DL (ref 65–100)
GLUCOSE SERPL-MCNC: 298 MG/DL (ref 65–100)
POTASSIUM SERPL-SCNC: 3.9 MMOL/L (ref 3.5–5.1)
SERVICE CMNT-IMP: ABNORMAL
SODIUM SERPL-SCNC: 133 MMOL/L (ref 136–145)
TSH SERPL DL<=0.05 MIU/L-ACNC: 5.82 UIU/ML (ref 0.36–3.74)

## 2019-10-08 PROCEDURE — 77030020754 HC CUF TRNQT 2BLA STRY -B: Performed by: PODIATRIST

## 2019-10-08 PROCEDURE — 74011000250 HC RX REV CODE- 250: Performed by: NURSE ANESTHETIST, CERTIFIED REGISTERED

## 2019-10-08 PROCEDURE — 74011250637 HC RX REV CODE- 250/637: Performed by: PODIATRIST

## 2019-10-08 PROCEDURE — 88311 DECALCIFY TISSUE: CPT

## 2019-10-08 PROCEDURE — 36415 COLL VENOUS BLD VENIPUNCTURE: CPT

## 2019-10-08 PROCEDURE — 74011000258 HC RX REV CODE- 258: Performed by: FAMILY MEDICINE

## 2019-10-08 PROCEDURE — 77030011640 HC PAD GRND REM COVD -A: Performed by: PODIATRIST

## 2019-10-08 PROCEDURE — 74011250636 HC RX REV CODE- 250/636: Performed by: NURSE ANESTHETIST, CERTIFIED REGISTERED

## 2019-10-08 PROCEDURE — 76210000016 HC OR PH I REC 1 TO 1.5 HR: Performed by: PODIATRIST

## 2019-10-08 PROCEDURE — 0QBP0ZX EXCISION OF LEFT METATARSAL, OPEN APPROACH, DIAGNOSTIC: ICD-10-PCS | Performed by: PODIATRIST

## 2019-10-08 PROCEDURE — 74011000250 HC RX REV CODE- 250: Performed by: PODIATRIST

## 2019-10-08 PROCEDURE — 0Y6Y0Z3 DETACHMENT AT LEFT 5TH TOE, LOW, OPEN APPROACH: ICD-10-PCS | Performed by: PODIATRIST

## 2019-10-08 PROCEDURE — 77030018836 HC SOL IRR NACL ICUM -A: Performed by: PODIATRIST

## 2019-10-08 PROCEDURE — 0Y6S0Z3 DETACHMENT AT LEFT 2ND TOE, LOW, OPEN APPROACH: ICD-10-PCS | Performed by: PODIATRIST

## 2019-10-08 PROCEDURE — 74011636637 HC RX REV CODE- 636/637: Performed by: PODIATRIST

## 2019-10-08 PROCEDURE — 65270000032 HC RM SEMIPRIVATE

## 2019-10-08 PROCEDURE — 84443 ASSAY THYROID STIM HORMONE: CPT

## 2019-10-08 PROCEDURE — 74011250636 HC RX REV CODE- 250/636: Performed by: PODIATRIST

## 2019-10-08 PROCEDURE — 74011636637 HC RX REV CODE- 636/637: Performed by: FAMILY MEDICINE

## 2019-10-08 PROCEDURE — 77030019895 HC PCKNG STRP IODO -A: Performed by: PODIATRIST

## 2019-10-08 PROCEDURE — 76060000034 HC ANESTHESIA 1.5 TO 2 HR: Performed by: PODIATRIST

## 2019-10-08 PROCEDURE — 82962 GLUCOSE BLOOD TEST: CPT

## 2019-10-08 PROCEDURE — 87077 CULTURE AEROBIC IDENTIFY: CPT

## 2019-10-08 PROCEDURE — 87176 TISSUE HOMOGENIZATION CULTR: CPT

## 2019-10-08 PROCEDURE — 77030002916 HC SUT ETHLN J&J -A: Performed by: PODIATRIST

## 2019-10-08 PROCEDURE — 74011250637 HC RX REV CODE- 250/637: Performed by: FAMILY MEDICINE

## 2019-10-08 PROCEDURE — 76010000149 HC OR TIME 1 TO 1.5 HR: Performed by: PODIATRIST

## 2019-10-08 PROCEDURE — 80048 BASIC METABOLIC PNL TOTAL CA: CPT

## 2019-10-08 PROCEDURE — 88305 TISSUE EXAM BY PATHOLOGIST: CPT

## 2019-10-08 PROCEDURE — 77030026438 HC STYL ET INTUB CARD -A: Performed by: ANESTHESIOLOGY

## 2019-10-08 PROCEDURE — 77030008684 HC TU ET CUF COVD -B: Performed by: ANESTHESIOLOGY

## 2019-10-08 PROCEDURE — 74011250636 HC RX REV CODE- 250/636: Performed by: FAMILY MEDICINE

## 2019-10-08 PROCEDURE — 73630 X-RAY EXAM OF FOOT: CPT

## 2019-10-08 PROCEDURE — 87205 SMEAR GRAM STAIN: CPT

## 2019-10-08 PROCEDURE — 87186 SC STD MICRODIL/AGAR DIL: CPT

## 2019-10-08 PROCEDURE — 0KBW0ZZ EXCISION OF LEFT FOOT MUSCLE, OPEN APPROACH: ICD-10-PCS | Performed by: PODIATRIST

## 2019-10-08 RX ORDER — GLYCOPYRROLATE 0.2 MG/ML
INJECTION INTRAMUSCULAR; INTRAVENOUS AS NEEDED
Status: DISCONTINUED | OUTPATIENT
Start: 2019-10-08 | End: 2019-10-08 | Stop reason: HOSPADM

## 2019-10-08 RX ORDER — ACETAMINOPHEN 325 MG/1
650 TABLET ORAL ONCE
Status: ACTIVE | OUTPATIENT
Start: 2019-10-08 | End: 2019-10-09

## 2019-10-08 RX ORDER — SODIUM CHLORIDE, SODIUM LACTATE, POTASSIUM CHLORIDE, CALCIUM CHLORIDE 600; 310; 30; 20 MG/100ML; MG/100ML; MG/100ML; MG/100ML
INJECTION, SOLUTION INTRAVENOUS
Status: DISCONTINUED | OUTPATIENT
Start: 2019-10-08 | End: 2019-10-08 | Stop reason: HOSPADM

## 2019-10-08 RX ORDER — ONDANSETRON 2 MG/ML
INJECTION INTRAMUSCULAR; INTRAVENOUS AS NEEDED
Status: DISCONTINUED | OUTPATIENT
Start: 2019-10-08 | End: 2019-10-08 | Stop reason: HOSPADM

## 2019-10-08 RX ORDER — MIDAZOLAM HYDROCHLORIDE 1 MG/ML
0.5 INJECTION, SOLUTION INTRAMUSCULAR; INTRAVENOUS
Status: DISCONTINUED | OUTPATIENT
Start: 2019-10-08 | End: 2019-10-08 | Stop reason: HOSPADM

## 2019-10-08 RX ORDER — FENTANYL CITRATE 50 UG/ML
50 INJECTION, SOLUTION INTRAMUSCULAR; INTRAVENOUS AS NEEDED
Status: DISCONTINUED | OUTPATIENT
Start: 2019-10-08 | End: 2019-10-08

## 2019-10-08 RX ORDER — LIDOCAINE HYDROCHLORIDE 10 MG/ML
0.1 INJECTION, SOLUTION EPIDURAL; INFILTRATION; INTRACAUDAL; PERINEURAL AS NEEDED
Status: DISCONTINUED | OUTPATIENT
Start: 2019-10-08 | End: 2019-10-08

## 2019-10-08 RX ORDER — SODIUM CHLORIDE 0.9 % (FLUSH) 0.9 %
5-40 SYRINGE (ML) INJECTION EVERY 8 HOURS
Status: DISCONTINUED | OUTPATIENT
Start: 2019-10-08 | End: 2019-10-08

## 2019-10-08 RX ORDER — SUCCINYLCHOLINE CHLORIDE 20 MG/ML
INJECTION INTRAMUSCULAR; INTRAVENOUS AS NEEDED
Status: DISCONTINUED | OUTPATIENT
Start: 2019-10-08 | End: 2019-10-08 | Stop reason: HOSPADM

## 2019-10-08 RX ORDER — PROPOFOL 10 MG/ML
INJECTION, EMULSION INTRAVENOUS AS NEEDED
Status: DISCONTINUED | OUTPATIENT
Start: 2019-10-08 | End: 2019-10-08 | Stop reason: HOSPADM

## 2019-10-08 RX ORDER — INSULIN GLARGINE 100 [IU]/ML
30 INJECTION, SOLUTION SUBCUTANEOUS 2 TIMES DAILY
Status: DISCONTINUED | OUTPATIENT
Start: 2019-10-08 | End: 2019-10-12 | Stop reason: HOSPADM

## 2019-10-08 RX ORDER — SODIUM CHLORIDE 0.9 % (FLUSH) 0.9 %
5-40 SYRINGE (ML) INJECTION AS NEEDED
Status: DISCONTINUED | OUTPATIENT
Start: 2019-10-08 | End: 2019-10-08 | Stop reason: HOSPADM

## 2019-10-08 RX ORDER — SODIUM CHLORIDE 0.9 % (FLUSH) 0.9 %
5-40 SYRINGE (ML) INJECTION EVERY 8 HOURS
Status: DISCONTINUED | OUTPATIENT
Start: 2019-10-08 | End: 2019-10-08 | Stop reason: HOSPADM

## 2019-10-08 RX ORDER — ROCURONIUM BROMIDE 10 MG/ML
INJECTION, SOLUTION INTRAVENOUS AS NEEDED
Status: DISCONTINUED | OUTPATIENT
Start: 2019-10-08 | End: 2019-10-08 | Stop reason: HOSPADM

## 2019-10-08 RX ORDER — MORPHINE SULFATE 10 MG/ML
2 INJECTION, SOLUTION INTRAMUSCULAR; INTRAVENOUS
Status: DISCONTINUED | OUTPATIENT
Start: 2019-10-08 | End: 2019-10-08 | Stop reason: HOSPADM

## 2019-10-08 RX ORDER — ACETAMINOPHEN 10 MG/ML
INJECTION, SOLUTION INTRAVENOUS AS NEEDED
Status: DISCONTINUED | OUTPATIENT
Start: 2019-10-08 | End: 2019-10-08 | Stop reason: HOSPADM

## 2019-10-08 RX ORDER — FENTANYL CITRATE 50 UG/ML
25 INJECTION, SOLUTION INTRAMUSCULAR; INTRAVENOUS
Status: DISCONTINUED | OUTPATIENT
Start: 2019-10-08 | End: 2019-10-08 | Stop reason: HOSPADM

## 2019-10-08 RX ORDER — SODIUM CHLORIDE, SODIUM LACTATE, POTASSIUM CHLORIDE, CALCIUM CHLORIDE 600; 310; 30; 20 MG/100ML; MG/100ML; MG/100ML; MG/100ML
125 INJECTION, SOLUTION INTRAVENOUS CONTINUOUS
Status: DISCONTINUED | OUTPATIENT
Start: 2019-10-08 | End: 2019-10-08

## 2019-10-08 RX ORDER — DIPHENHYDRAMINE HYDROCHLORIDE 50 MG/ML
12.5 INJECTION, SOLUTION INTRAMUSCULAR; INTRAVENOUS AS NEEDED
Status: DISCONTINUED | OUTPATIENT
Start: 2019-10-08 | End: 2019-10-08 | Stop reason: HOSPADM

## 2019-10-08 RX ORDER — SODIUM CHLORIDE 9 MG/ML
25 INJECTION, SOLUTION INTRAVENOUS CONTINUOUS
Status: DISCONTINUED | OUTPATIENT
Start: 2019-10-08 | End: 2019-10-08

## 2019-10-08 RX ORDER — ONDANSETRON 2 MG/ML
4 INJECTION INTRAMUSCULAR; INTRAVENOUS AS NEEDED
Status: DISCONTINUED | OUTPATIENT
Start: 2019-10-08 | End: 2019-10-08 | Stop reason: HOSPADM

## 2019-10-08 RX ORDER — DEXMEDETOMIDINE HYDROCHLORIDE 100 UG/ML
INJECTION, SOLUTION INTRAVENOUS AS NEEDED
Status: DISCONTINUED | OUTPATIENT
Start: 2019-10-08 | End: 2019-10-08 | Stop reason: HOSPADM

## 2019-10-08 RX ORDER — MIDAZOLAM HYDROCHLORIDE 1 MG/ML
INJECTION, SOLUTION INTRAMUSCULAR; INTRAVENOUS AS NEEDED
Status: DISCONTINUED | OUTPATIENT
Start: 2019-10-08 | End: 2019-10-08 | Stop reason: HOSPADM

## 2019-10-08 RX ORDER — LIDOCAINE HYDROCHLORIDE 20 MG/ML
INJECTION, SOLUTION EPIDURAL; INFILTRATION; INTRACAUDAL; PERINEURAL AS NEEDED
Status: DISCONTINUED | OUTPATIENT
Start: 2019-10-08 | End: 2019-10-08 | Stop reason: HOSPADM

## 2019-10-08 RX ORDER — SODIUM CHLORIDE, SODIUM LACTATE, POTASSIUM CHLORIDE, CALCIUM CHLORIDE 600; 310; 30; 20 MG/100ML; MG/100ML; MG/100ML; MG/100ML
125 INJECTION, SOLUTION INTRAVENOUS CONTINUOUS
Status: DISCONTINUED | OUTPATIENT
Start: 2019-10-08 | End: 2019-10-08 | Stop reason: HOSPADM

## 2019-10-08 RX ORDER — PHENYLEPHRINE HCL IN 0.9% NACL 0.4MG/10ML
SYRINGE (ML) INTRAVENOUS AS NEEDED
Status: DISCONTINUED | OUTPATIENT
Start: 2019-10-08 | End: 2019-10-08 | Stop reason: HOSPADM

## 2019-10-08 RX ORDER — BUPIVACAINE HYDROCHLORIDE 5 MG/ML
INJECTION, SOLUTION EPIDURAL; INTRACAUDAL AS NEEDED
Status: DISCONTINUED | OUTPATIENT
Start: 2019-10-08 | End: 2019-10-08 | Stop reason: HOSPADM

## 2019-10-08 RX ORDER — FENTANYL CITRATE 50 UG/ML
INJECTION, SOLUTION INTRAMUSCULAR; INTRAVENOUS AS NEEDED
Status: DISCONTINUED | OUTPATIENT
Start: 2019-10-08 | End: 2019-10-08 | Stop reason: HOSPADM

## 2019-10-08 RX ORDER — OXYCODONE AND ACETAMINOPHEN 5; 325 MG/1; MG/1
1 TABLET ORAL AS NEEDED
Status: DISCONTINUED | OUTPATIENT
Start: 2019-10-08 | End: 2019-10-08 | Stop reason: HOSPADM

## 2019-10-08 RX ORDER — HYDROCODONE BITARTRATE AND ACETAMINOPHEN 5; 325 MG/1; MG/1
1 TABLET ORAL
Status: DISCONTINUED | OUTPATIENT
Start: 2019-10-08 | End: 2019-10-12 | Stop reason: HOSPADM

## 2019-10-08 RX ORDER — SODIUM CHLORIDE 0.9 % (FLUSH) 0.9 %
5-40 SYRINGE (ML) INJECTION AS NEEDED
Status: DISCONTINUED | OUTPATIENT
Start: 2019-10-08 | End: 2019-10-08

## 2019-10-08 RX ORDER — HYDROMORPHONE HYDROCHLORIDE 1 MG/ML
0.2 INJECTION, SOLUTION INTRAMUSCULAR; INTRAVENOUS; SUBCUTANEOUS
Status: DISCONTINUED | OUTPATIENT
Start: 2019-10-08 | End: 2019-10-08 | Stop reason: HOSPADM

## 2019-10-08 RX ORDER — MIDAZOLAM HYDROCHLORIDE 1 MG/ML
1 INJECTION, SOLUTION INTRAMUSCULAR; INTRAVENOUS AS NEEDED
Status: DISCONTINUED | OUTPATIENT
Start: 2019-10-08 | End: 2019-10-08

## 2019-10-08 RX ADMIN — INSULIN GLARGINE 25 UNITS: 100 INJECTION, SOLUTION SUBCUTANEOUS at 07:15

## 2019-10-08 RX ADMIN — Medication 10 ML: at 14:00

## 2019-10-08 RX ADMIN — SUCCINYLCHOLINE CHLORIDE 160 MG: 20 INJECTION, SOLUTION INTRAMUSCULAR; INTRAVENOUS at 15:02

## 2019-10-08 RX ADMIN — ONDANSETRON HYDROCHLORIDE 4 MG: 2 INJECTION, SOLUTION INTRAMUSCULAR; INTRAVENOUS at 16:03

## 2019-10-08 RX ADMIN — OXYCODONE HYDROCHLORIDE AND ACETAMINOPHEN 1 TABLET: 5; 325 TABLET ORAL at 22:36

## 2019-10-08 RX ADMIN — GLYCOPYRROLATE 0.2 MG: 0.2 INJECTION, SOLUTION INTRAMUSCULAR; INTRAVENOUS at 15:47

## 2019-10-08 RX ADMIN — INSULIN LISPRO 10 UNITS: 100 INJECTION, SOLUTION INTRAVENOUS; SUBCUTANEOUS at 07:15

## 2019-10-08 RX ADMIN — ACETAMINOPHEN 1000 MG: 10 INJECTION, SOLUTION INTRAVENOUS at 15:10

## 2019-10-08 RX ADMIN — VANCOMYCIN HYDROCHLORIDE 1000 MG: 1 INJECTION, POWDER, LYOPHILIZED, FOR SOLUTION INTRAVENOUS at 10:59

## 2019-10-08 RX ADMIN — LIDOCAINE HYDROCHLORIDE 60 MG: 20 INJECTION, SOLUTION EPIDURAL; INFILTRATION; INTRACAUDAL; PERINEURAL at 15:02

## 2019-10-08 RX ADMIN — PIPERACILLIN AND TAZOBACTAM 3.38 G: 3; .375 INJECTION, POWDER, FOR SOLUTION INTRAVENOUS at 09:00

## 2019-10-08 RX ADMIN — Medication 10 ML: at 22:32

## 2019-10-08 RX ADMIN — INSULIN GLARGINE 30 UNITS: 100 INJECTION, SOLUTION SUBCUTANEOUS at 18:26

## 2019-10-08 RX ADMIN — DEXMEDETOMIDINE HYDROCHLORIDE 12 MCG: 100 INJECTION, SOLUTION, CONCENTRATE INTRAVENOUS at 15:32

## 2019-10-08 RX ADMIN — MORPHINE SULFATE 2 MG: 2 INJECTION, SOLUTION INTRAMUSCULAR; INTRAVENOUS at 09:20

## 2019-10-08 RX ADMIN — HYDRALAZINE HYDROCHLORIDE 20 MG: 20 INJECTION INTRAMUSCULAR; INTRAVENOUS at 18:44

## 2019-10-08 RX ADMIN — VANCOMYCIN HYDROCHLORIDE 1000 MG: 1 INJECTION, POWDER, LYOPHILIZED, FOR SOLUTION INTRAVENOUS at 22:32

## 2019-10-08 RX ADMIN — Medication 120 MCG: at 16:08

## 2019-10-08 RX ADMIN — AMLODIPINE BESYLATE 10 MG: 5 TABLET ORAL at 09:15

## 2019-10-08 RX ADMIN — INSULIN LISPRO 2 UNITS: 100 INJECTION, SOLUTION INTRAVENOUS; SUBCUTANEOUS at 22:31

## 2019-10-08 RX ADMIN — DEXMEDETOMIDINE HYDROCHLORIDE 8 MCG: 100 INJECTION, SOLUTION, CONCENTRATE INTRAVENOUS at 16:16

## 2019-10-08 RX ADMIN — Medication 80 MCG: at 15:53

## 2019-10-08 RX ADMIN — PIPERACILLIN AND TAZOBACTAM 3.38 G: 3; .375 INJECTION, POWDER, FOR SOLUTION INTRAVENOUS at 04:12

## 2019-10-08 RX ADMIN — MIDAZOLAM 2 MG: 1 INJECTION INTRAMUSCULAR; INTRAVENOUS at 14:53

## 2019-10-08 RX ADMIN — PIPERACILLIN AND TAZOBACTAM 3.38 G: 3; .375 INJECTION, POWDER, FOR SOLUTION INTRAVENOUS at 17:07

## 2019-10-08 RX ADMIN — ROCURONIUM BROMIDE 5 MG: 10 SOLUTION INTRAVENOUS at 15:02

## 2019-10-08 RX ADMIN — OXYCODONE HYDROCHLORIDE AND ACETAMINOPHEN 1 TABLET: 5; 325 TABLET ORAL at 18:26

## 2019-10-08 RX ADMIN — FENTANYL CITRATE 50 MCG: 50 INJECTION, SOLUTION INTRAMUSCULAR; INTRAVENOUS at 15:02

## 2019-10-08 RX ADMIN — SODIUM CHLORIDE, POTASSIUM CHLORIDE, SODIUM LACTATE AND CALCIUM CHLORIDE: 600; 310; 30; 20 INJECTION, SOLUTION INTRAVENOUS at 14:50

## 2019-10-08 RX ADMIN — FENTANYL CITRATE 50 MCG: 50 INJECTION, SOLUTION INTRAMUSCULAR; INTRAVENOUS at 16:03

## 2019-10-08 RX ADMIN — PROPOFOL 200 MG: 10 INJECTION, EMULSION INTRAVENOUS at 15:02

## 2019-10-08 NOTE — PROGRESS NOTES
Problem: Falls - Risk of  Goal: *Absence of Falls  Description  Document Bryn Steinberg Fall Risk and appropriate interventions in the flowsheet.   Outcome: Progressing Towards Goal  Note:   Fall Risk Interventions:  Rise slowly due to left foot wound

## 2019-10-08 NOTE — ROUTINE PROCESS
TRANSFER - IN REPORT: 
 
Verbal report received from [address] on Laura Barber  being received from 208 for ordered procedure Report consisted of patients Situation, Background, Assessment and  
Recommendations(SBAR). Information from the following report(s) SBAR was reviewed with the receiving nurse. Opportunity for questions and clarification was provided. Assessment completed upon patients arrival to unit and care assumed.

## 2019-10-08 NOTE — PROGRESS NOTES
Patient visited by [facility] Partner Volunteer on Medical Surgical Unit on 10/8/2019. Rev.  Reddy Maldonado MDiv, [facility], [facility]   paging service: [phone])

## 2019-10-08 NOTE — PROGRESS NOTES
Bedside shift change report given to Marco A Galvez (oncoming nurse) by Keke Au (offgoing nurse). Report included the following information SBAR, Kardex and MAR.

## 2019-10-08 NOTE — PROGRESS NOTES
Met with patient in the preoperative holding area. She states that it this time, she would like to keep the 5th toe, but is willing to have the remainder of the 5th metatarsal excised. Patient understand that she will be left with a \"floppy\" toe that will likely become contracted dorsally and at high risk of reulceration, infection, and amputation. Patient states that she understands these risks but is unwilling to part with the toe at this time for cosmetic purposes.

## 2019-10-08 NOTE — PROGRESS NOTES
Day #2 of Vancomycin  Indication:  diabetic foot infection with osteomyelitis  Current regimen:  1000mg IV [id number]  Abx regimen:   Vancomycin and Zosyn  ID Following ?: NO  Concomitant nephrotoxic drugs (requires more frequent monitoring): None  Frequency of BMP?: daily through 10/10/19    Recent Labs     10/08/19  0427 10/07/19  0933   WBC  --  8.4   CREA 1.10* 1.05*   BUN 27* 28*     Est CrCl: ~65-70 ml/min; UO: Being recorded as unmeasured occurrences. Temp (24hrs), Av.3 °F (36.8 °C), Min:97.8 °F (36.6 °C), Max:98.6 °F (37 °C)    Cultures:   No current microbiology (urine on hold)    Goal trough = 15 - 20 mcg/mL    Recent trough history (date/time/level/dose/action taken):  New start    Plan: Continue current regimen. Will order a trough prior to the dose due at 11am on [facility], 10/9/2019. Pharmacy to continue to follow the patient daily and order additional dose adjustments as appropriate.

## 2019-10-08 NOTE — BRIEF OP NOTE
BRIEF OPERATIVE NOTE    Date of Procedure: 10/8/2019   Preoperative Diagnosis: CELLULITIS AND ABSCESS OF LEFT FOOT AND LEFT 5TH METATARSAL OSTEO MYELITIS  Postoperative Diagnosis: CELLULITIS AND ABSCESS OF LEFT FOOT AND LEFT 5TH     Procedure(s):  LEFT FIFTH METATARSAL EXCISION, LEFT DIABETIC WOUND DEBRIDMENT AND LEFT SECOND PARTIAL TOE AMPUTATION, BONE BIOPSIES OF LEFT SECOND TOE AND FIFTH METATARSAL  Surgeon(s) and Role:     * Luciano Lisa DPM - Primary         Surgical Assistant: None    Surgical Staff:  Circ-1: Yrn Ramirez  Scrub RN-1: Satinder Morgan RN  Scrub RN-Relief: Jay Ramirez RN  Event Time In Time Out   Incision Start 10/08/2019 1519    Incision Close 10/08/2019 1619      Anesthesia: Other   Estimated Blood Loss: 100 cc  Specimens:   ID Type Source Tests Collected by Time Destination   1 : Left 2nd toe Fresh Toe  [facility] 10/8/2019 1528 Pathology   2 : Left 5th Metatarsal [facility] 10/8/2019 1538 Pathology   1 : Left 2nd toe Bone Toe AEROBIC/ANAEROBIC CULTURE [facility] 10/8/2019 1608 Microbiology   2 : Left 5th Metatarsal Base Bone Toe AEROBIC/ANAEROBIC CULTURE [facility] 10/8/2019 1609 Microbiology      Findings: No abscess noted intraoperatively in contrast what had previously been noted on MRI. Removal of necrotic 5th metatrsal and distal phalanx of second toe. Expected surgical cure of osteomyelitis. Debridement of plantar wound of plantar 5th metatarsal with predebridement measurements  0.5x0.5x2 with probe to bone. Post debridement measurements 1x1x2.5.   Complications: None  Implants: * No implants in log *

## 2019-10-08 NOTE — ANESTHESIA PREPROCEDURE EVALUATION
Relevant Problems   No relevant active problems       Anesthetic History   No history of anesthetic complications            Review of Systems / Medical History  Patient summary reviewed, nursing notes reviewed and pertinent labs reviewed    Pulmonary  Within defined limits                 Neuro/Psych   Within defined limits           Cardiovascular  Within defined limits  Hypertension                   GI/Hepatic/Renal  Within defined limits              Endo/Other  Within defined limits  Diabetes         Other Findings              Physical Exam    Airway  Mallampati: II  TM Distance: > 6 cm  Neck ROM: normal range of motion   Mouth opening: Normal     Cardiovascular  Regular rate and rhythm,  S1 and S2 normal,  no murmur, click, rub, or gallop             Dental    Dentition: Poor dentition     Pulmonary  Breath sounds clear to auscultation               Abdominal  GI exam deferred       Other Findings            Anesthetic Plan    ASA: 3  Anesthesia type: general          Induction: Intravenous  Anesthetic plan and risks discussed with: Patient

## 2019-10-08 NOTE — PROGRESS NOTES
Hospitalist Progress Note  Gildardo Delgado MD  Answering service: [phone] OR [phone] from in house phone      Date of Service:  10/8/2019  NAME:  Idalia Jaramillo                                                         :  1970                                               MRN:  [id number]    Admission summary     51 y.o. female with past medical history significant for DM, HTN, previous osteomyelitis of left foot who was sent into the ER by Dr Tinnie Peabody- podiatry, for a non healing left foot diabetic wound with osteomyelitis. Subjective/interval history    F/u: left foot infection,non healing ulcer.  -Ms Dariel Harada has no complaints. She is awaiting OR this afternoon     Assessment and plan  Chronic nonhealing left foot diabetic foot ulcer with osteomyelitis of the left foot (distal 2nd phalynx and 5th metatarsal  -plans for podiatry to take patient to the OR tomorrow  -Continue IV vanc and zosyn     DM type 2,insulin dependent,uncontrolled with hyperglycemia   - accuchecks and ss insulin  -Increase Lantus to 30 units BID  -DTC help appreciated. Uncontrolled HTN-EKG=NSR   -Started on amlodipine. Hydralazine prn   -If BP remains elevated,will add ACE-I/ARB    Mild hyponatremia,pseudohyponatremia, due to hyperglycemia. Corrected 136. Dental caries- many- pt to see dentistry on DC      Pt should be considered lo risk and cleared for surgery    Reported fatigue: TSh pending      Full Code  Surrogate decision maker- daughter Any Heading [phone]   DVT PPX: heparin. Current facility administered and prior to admit medications reviewed. x         Review of Systems:  A comprehensive review of systems was negative except for that written in the HPI.         PHYSICAL EXAM:  O:  Visit Vitals  /66 (BP 1 Location: Right arm, BP Patient Position: At rest)   Pulse 64   Temp 98.6 °F (37 °C)   Resp 16   Ht 5' 6\" (1.676 m)   Wt 82.6 kg (182 lb)   LMP 2011   SpO2 95%   Breastfeeding? No   BMI 29.38 kg/m²     GENERAL:  Alert, oriented, cooperative, no apparent distress  HEENT:  Normocephalic, atraumatic, non icteric sclerae, non pallor conjuctivae, EOMs intact, PERRLA. NECK: Supple, trachea midline, no adenopathy, no thyromegally or tenderness, no carotid bruit and no JVD. LUNGS:   Vesicular breath sounds bilaterally, no added sounds. HEART:   S1 and S2 well heard,RRR,  no murmur, click, rub or gallop. ABDOMEB:   Soft, non-tender. Normoactive bowel sounds. No masses,  No organomegaly. EXTREMETIES: Left foot bandaged. PULSES: 2+ and symmetric all extremities. SKIN:  No rashes or lesions  NEUROLOGY: Alert and oriented to PPT, CNII-XII intact. Motor and sensory exam grossly intact.        Recent labs & imaging reviewed:    Problem List as of 10/8/2019 Date Reviewed: 10/2/2014          Codes Class Noted - Resolved    HTN (hypertension) ICD-10-CM: I10  ICD-9-CM: 401.9  10/7/2019 - Present        DM type 2 (diabetes mellitus, type 2) ([facility] ICD-10-CM: E11.9  ICD-9-CM: 250.00  10/7/2019 - Present        * (Principal) Acute osteomyelitis of left foot ([facility] ICD-10-CM: F60.290  ICD-9-CM: 730.07  10/7/2019 - Present        Cellulitis and abscess of toe of left foot ICD-10-CM: R54.572, F32.210  ICD-9-CM: 681.10  10/7/2019 - Present        Uncontrolled type 2 diabetes mellitus with peripheral neuropathy [facility]) ICD-10-CM: E11.42, E11.65  ICD-9-CM: 250.62, 357.2  10/7/2019 - Present        Osteomyelitis of second toe of left foot (HCC) ICD-10-CM: M86.9  ICD-9-CM: 730.27  10/7/2019 - Present        Acute osteomyelitis of metatarsal bone of left foot ([facility] ICD-10-CM: T96.099  ICD-9-CM: 730.07  10/7/2019 - Present        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 ([facility] ICD-10-CM: O92.004, G75.759  ICD-9-CM: 250.80, 707.14  10/7/2019 - Present        Unspecified essential hypertension ICD-10-CM: I10  ICD-9-CM: 401.9  9/1/2011 - Present        Diabetes mellitus type 1, uncontrolled, insulin dependent ([facility] ICD-10-CM: E10.65  ICD-9-CM: 250.03  4/20/2011 - Present        Anxiety state ICD-10-CM: F41.1  ICD-9-CM: 300.00  4/20/2011 - Present        RESOLVED: Osteomyelitis ([facility] ICD-10-CM: M86.9  ICD-9-CM: 730.20  10/2/2014 - 10/12/2014                Roosevelt Duran MD  Internal Medicine  Date of Service: 10/8/2019

## 2019-10-08 NOTE — DIABETES MGMT
[facility]    DTC Progress Note    Recommendations/ Comments: Chart review for extreme hyperglycemia -   Today  mg/dL, then down to 106 mg/dL at 1108 after receiving lispro 10 unit correction. BG's 239 mg/dL - 408 mg/dL yesterday. NPO    If appropriate, please consider   Increase Lantus to 30 units BID  Once she is eating add Lispro 5 units AC TID    Dr Lendell Koyanagi regarding above      Current hospital DM medication:   Lantus 25 units BID  Lipsro resistant correction scale    Chart reviewed on [facility]. Patient is a 51 y.o. female with known DM on Levemir 30 units BID and Regular insulin AC TID per sliding scalePTA      A1c:   Lab Results   Component Value Date/Time    Hemoglobin A1c 12.2 (H) 10/07/2019 09:33 AM    Hemoglobin A1c 13.7 (H) 10/03/2014 03:56 AM       Recent Glucose Results:   Lab Results   Component Value Date/Time     (H) 10/08/2019 04:27 AM    GLUCPOC 106 (H) 10/08/2019 11:08 AM    GLUCPOC 303 (H) 10/08/2019 06:57 AM    GLUCPOC 353 (H) 10/07/2019 09:49 PM        Lab Results   Component Value Date/Time    Creatinine 1.10 (H) 10/08/2019 04:27 AM     Estimated Creatinine Clearance: 67.7 mL/min (A) (based on SCr of 1.1 mg/dL (H)). Active Orders   Diet    DIET NPO        PO intake: No data found. Will continue to follow as needed.     Thank you  Driss Neumann RN, CDE      Time spent: 6 min

## 2019-10-08 NOTE — ANESTHESIA POSTPROCEDURE EVALUATION
Post-Anesthesia Evaluation and Assessment    Patient: Virgen Jarquin MRN: [id number]  SSN: [id number]    YOB: 1970  Age: 51 y.o. Sex: female      I have evaluated the patient and they are stable and ready for discharge from the PACU. Cardiovascular Function/Vital Signs  Visit Vitals  /66   Pulse 76   Temp 36.5 °C (97.7 °F)   Resp 17   Ht 5' 6\" (1.676 m)   Wt 82.6 kg (182 lb)   SpO2 96%   Breastfeeding? No   BMI 29.38 kg/m²       Patient is status post Other anesthesia for Procedure(s):  LEFT FIFTH METATARSAL EXCISION, LEFT DIABETIC WOUND DEBREDMENT AND LEFT SECOND PARTIAL TOE AMPUTATION, BONE BIOPSIES. Nausea/Vomiting: None    Postoperative hydration reviewed and adequate. Pain:  Pain Scale 1: Numeric (0 - 10) (10/08/19 1631)  Pain Intensity 1: 0 (10/08/19 1631)   Managed    Neurological Status:   Neuro (WDL): (left foot surgical,) (10/08/19 1631)   At baseline    Mental Status, Level of Consciousness: Alert and  oriented to person, place, and time    Pulmonary Status:   O2 Device: Nasal cannula (10/08/19 1633)   Adequate oxygenation and airway patent    Complications related to anesthesia: None    Post-anesthesia assessment completed.  No concerns    Signed By: Raphael Fortune MD     October 8, 2019

## 2019-10-08 NOTE — PERIOP NOTES
TRANSFER - OUT REPORT:    Verbal report given to Bonita(name) on Macrina Hernandez  being transferred to 208(unit) for routine post - op       Report consisted of patients Situation, Background, Assessment and   Recommendations(SBAR). Time Pre op antibiotic given:none  Anesthesia Stop time: 9333  Green Present on Transfer to floor:no  Order for Green on Chart:no  Discharge Prescriptions with Chart:no    Information from the following report(s) Procedure Summary and MAR was reviewed with the receiving nurse. Opportunity for questions and clarification was provided. Is the patient on 02? NO           Is the patient on a monitor? NO    Is the nurse transporting with the patient? NO    Surgical Waiting Area notified of patient's transfer from PACU? YES      The following personal items collected during your admission accompanied patient upon transfer:   Dental Appliance: Dental Appliances: None  Vision: Visual Aid: Glasses, With patient  Hearing Aid:    Jewelry: Jewelry: Earrings, Ring  Clothing: Clothing: At bedside  Other Valuables:  Other Valuables: Cell Phone  Valuables sent   No belongigs with patient in pacu

## 2019-10-08 NOTE — PROGRESS NOTES
Bedside shift change report given to [address] [place] [address] (oncoming nurse) by Nga Schafer (offgoing nurse). Report included the following information SBAR and Kardex.

## 2019-10-09 LAB
ANION GAP SERPL CALC-SCNC: 7 MMOL/L (ref 5–15)
BUN SERPL-MCNC: 19 MG/DL (ref 6–20)
BUN/CREAT SERPL: 21 (ref 12–20)
CALCIUM SERPL-MCNC: 8.8 MG/DL (ref 8.5–10.1)
CHLORIDE SERPL-SCNC: 104 MMOL/L (ref 97–108)
CO2 SERPL-SCNC: 26 MMOL/L (ref 21–32)
CREAT SERPL-MCNC: 0.9 MG/DL (ref 0.55–1.02)
DATE LAST DOSE: ABNORMAL
GLUCOSE BLD STRIP.AUTO-MCNC: 107 MG/DL (ref 65–100)
GLUCOSE BLD STRIP.AUTO-MCNC: 119 MG/DL (ref 65–100)
GLUCOSE BLD STRIP.AUTO-MCNC: 124 MG/DL (ref 65–100)
GLUCOSE BLD STRIP.AUTO-MCNC: 129 MG/DL (ref 65–100)
GLUCOSE SERPL-MCNC: 136 MG/DL (ref 65–100)
POTASSIUM SERPL-SCNC: 3.7 MMOL/L (ref 3.5–5.1)
REPORTED DOSE,DOSE: ABNORMAL UNITS
REPORTED DOSE/TIME,TMG: ABNORMAL
SERVICE CMNT-IMP: ABNORMAL
SODIUM SERPL-SCNC: 137 MMOL/L (ref 136–145)
T3FREE SERPL-MCNC: 2.1 PG/ML (ref 2.2–4)
T4 FREE SERPL-MCNC: 1.3 NG/DL (ref 0.8–1.5)
VANCOMYCIN TROUGH SERPL-MCNC: 15.9 UG/ML (ref 5–10)

## 2019-10-09 PROCEDURE — 84439 ASSAY OF FREE THYROXINE: CPT

## 2019-10-09 PROCEDURE — 74011000258 HC RX REV CODE- 258: Performed by: PODIATRIST

## 2019-10-09 PROCEDURE — 74011250636 HC RX REV CODE- 250/636: Performed by: PODIATRIST

## 2019-10-09 PROCEDURE — 74011636637 HC RX REV CODE- 636/637: Performed by: PODIATRIST

## 2019-10-09 PROCEDURE — 84481 FREE ASSAY (FT-3): CPT

## 2019-10-09 PROCEDURE — 36415 COLL VENOUS BLD VENIPUNCTURE: CPT

## 2019-10-09 PROCEDURE — 74011250637 HC RX REV CODE- 250/637: Performed by: HOSPITALIST

## 2019-10-09 PROCEDURE — 65270000032 HC RM SEMIPRIVATE

## 2019-10-09 PROCEDURE — 74011250637 HC RX REV CODE- 250/637: Performed by: PODIATRIST

## 2019-10-09 PROCEDURE — 80048 BASIC METABOLIC PNL TOTAL CA: CPT

## 2019-10-09 PROCEDURE — 82962 GLUCOSE BLOOD TEST: CPT

## 2019-10-09 PROCEDURE — 80202 ASSAY OF VANCOMYCIN: CPT

## 2019-10-09 RX ORDER — OXYCODONE AND ACETAMINOPHEN 5; 325 MG/1; MG/1
1 TABLET ORAL
Status: DISCONTINUED | OUTPATIENT
Start: 2019-10-09 | End: 2019-10-12

## 2019-10-09 RX ADMIN — VANCOMYCIN HYDROCHLORIDE 1000 MG: 1 INJECTION, POWDER, LYOPHILIZED, FOR SOLUTION INTRAVENOUS at 11:54

## 2019-10-09 RX ADMIN — OXYCODONE HYDROCHLORIDE AND ACETAMINOPHEN 1 TABLET: 5; 325 TABLET ORAL at 16:30

## 2019-10-09 RX ADMIN — PIPERACILLIN AND TAZOBACTAM 3.38 G: 3; .375 INJECTION, POWDER, FOR SOLUTION INTRAVENOUS at 16:30

## 2019-10-09 RX ADMIN — Medication 10 ML: at 21:09

## 2019-10-09 RX ADMIN — VANCOMYCIN HYDROCHLORIDE 1000 MG: 1 INJECTION, POWDER, LYOPHILIZED, FOR SOLUTION INTRAVENOUS at 22:43

## 2019-10-09 RX ADMIN — Medication 10 ML: at 06:20

## 2019-10-09 RX ADMIN — AMLODIPINE BESYLATE 10 MG: 5 TABLET ORAL at 08:36

## 2019-10-09 RX ADMIN — HEPARIN SODIUM 5000 UNITS: 5000 INJECTION INTRAVENOUS; SUBCUTANEOUS at 12:14

## 2019-10-09 RX ADMIN — PIPERACILLIN AND TAZOBACTAM 3.38 G: 3; .375 INJECTION, POWDER, FOR SOLUTION INTRAVENOUS at 08:36

## 2019-10-09 RX ADMIN — PIPERACILLIN AND TAZOBACTAM 3.38 G: 3; .375 INJECTION, POWDER, FOR SOLUTION INTRAVENOUS at 00:57

## 2019-10-09 RX ADMIN — HEPARIN SODIUM 5000 UNITS: 5000 INJECTION INTRAVENOUS; SUBCUTANEOUS at 00:57

## 2019-10-09 RX ADMIN — INSULIN GLARGINE 30 UNITS: 100 INJECTION, SOLUTION SUBCUTANEOUS at 09:24

## 2019-10-09 RX ADMIN — INSULIN GLARGINE 30 UNITS: 100 INJECTION, SOLUTION SUBCUTANEOUS at 18:43

## 2019-10-09 RX ADMIN — OXYCODONE HYDROCHLORIDE AND ACETAMINOPHEN 1 TABLET: 5; 325 TABLET ORAL at 07:35

## 2019-10-09 RX ADMIN — Medication 10 ML: at 15:30

## 2019-10-09 RX ADMIN — OXYCODONE HYDROCHLORIDE AND ACETAMINOPHEN 1 TABLET: 5; 325 TABLET ORAL at 03:46

## 2019-10-09 RX ADMIN — OXYCODONE HYDROCHLORIDE AND ACETAMINOPHEN 1 TABLET: 5; 325 TABLET ORAL at 21:09

## 2019-10-09 RX ADMIN — OXYCODONE HYDROCHLORIDE AND ACETAMINOPHEN 1 TABLET: 5; 325 TABLET ORAL at 11:53

## 2019-10-09 NOTE — PROGRESS NOTES
Progress Note    Patient: Eunice Marsh MRN: [id number]  SSN: [id number]    YOB: 1970  Age: 51 y.o. Sex: female      Admit Date: 10/7/2019  1 Day Post-Op     Procedure:   Procedure(s):  LEFT FIFTH METATARSAL EXCISION, LEFT DIABETIC WOUND DEBRIDEMENT AND LEFT SECOND PARTIAL TOE AMPUTATION, BONE BIOPSIES    Subjective:     Patient seen resting quiet and comfortably and no apparent distress. Relates pain is well controlled on oral meds. States that she is no longer having the constant burning pain that she had in the foot prior to surgery. Notes that the swelling in the LLE has greatly resolved. Has been heel touch to LLE to bathroom without any issues. Relates she feeling much better. Denies f/c/n/v/d/sob/chest pain. Objective:     Visit Vitals  BP (!) 177/101 (BP 1 Location: Left arm, BP Patient Position: At rest)   Pulse 93   Temp 97.7 °F (36.5 °C)   Resp 16   Ht 5' 6\" (1.676 m)   Wt 82.6 kg (182 lb 1.6 oz)   SpO2 94%   Breastfeeding? No   BMI 29.39 kg/m²        Physical Exam:  Dressing to LLE clean, dry intact. Strikethrough noted to inner dressings. Swelling of LLE has resolved and size of leg is now equal to LLE. Proximal streaking of erythema has resolved and the erythema of the lateral left foot is significantly improved in comparison to yesterday in OR. The incision sites of the lateral left foot and left second toe have all sutures intact with skin edges well-approximated with no signs of dehiscence. The plantar foot wound has no associated erythema. Packing removed. Dried sanguineous drainaged noted. No active bleeding. No purulence or malodor noted. Patient did not experience any tenderness with dressing changed.     Labs/Radiology Review: images and reports reviewed    Assessment:     Hospital Problems  Date Reviewed: 10/8/2019          Codes Class Noted POA    HTN (hypertension) ICD-10-CM: I10  ICD-9-CM: 401.9  10/7/2019 Unknown        DM type 2 (diabetes mellitus, type 2) ([facility] ICD-10-CM: E11.9  ICD-9-CM: 250.00  10/7/2019 Unknown        * (Principal) Acute osteomyelitis of left foot ([facility] ICD-10-CM: N84.305  ICD-9-CM: 730.07  10/7/2019 Unknown        Cellulitis and abscess of toe of left foot ICD-10-CM: K57.420, L02.612  ICD-9-CM: 681.10  10/7/2019 Unknown        Uncontrolled type 2 diabetes mellitus with peripheral neuropathy [facility]) ICD-10-CM: E11.42, E11.65  ICD-9-CM: 250.62, 357.2  10/7/2019 Unknown        Osteomyelitis of second toe of left foot ([facility]) ICD-10-CM: M86.9  ICD-9-CM: 730.27  10/7/2019 Unknown        Acute osteomyelitis of metatarsal bone of left foot ([facility] ICD-10-CM: L82.990  ICD-9-CM: 730.07  10/7/2019 Unknown        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 Edema resolved. Erythema significantly improved and only remains to the dorsolateral aspect of the left foot. -Biopsies of the left 2nd toe distal phalanx and left 5th metatarsal sent for bone culture to identify causative organisms to guide antibiotics. Expected surgical cure as left 2nd toe disarticulated at the DIPJ and metatarsal removed at its articulation with the cuboid. All remaining bone was white, healthy, with no signs of degenerative changes indicative of osteomyelitis. -Fortunately, contrary to patient's prior MRI, no abscess was noted intraoperatively. -WBAT to LLE with surgical shoe.  -Continue broad spec IV abx.  -Will continue to follow.

## 2019-10-09 NOTE — PROGRESS NOTES
Bedside and Verbal shift change report given to Tita Lutz RN (oncoming nurse) by Vanessa Simon RN (offgoing nurse). Report included the following information SBAR, Kardex, Intake/Output and MAR.

## 2019-10-09 NOTE — PROGRESS NOTES
Bedside shift change report given to Diana Ramos RN (oncoming nurse) by Yung Monahan (offgoing nurse). Report included the following information SBAR, Kardex and MAR.

## 2019-10-09 NOTE — PROGRESS NOTES
Spiritual Care Assessment/Progress Note  [facility]      NAME: Zachary Torres      MRN: [id number]  AGE: 51 y.o. SEX: female  Mosque Affiliation: Non Mormonism   Language: English     10/9/2019     Total Time (in minutes): 5     Spiritual Assessment begun in [facility] SURG through conversation with:         []Patient        [] Family    [] Friend(s)        Reason for Consult: Initial/Spiritual assessment, patient floor     Spiritual beliefs: (Please include comment if needed)     [] Identifies with a noel tradition:         [] Supported by a noel community:            [] Claims no spiritual orientation:           [] Seeking spiritual identity:                [] Adheres to an individual form of spirituality:           [x] Not able to assess:                           Identified resources for coping:      [] Prayer                               [] Music                  [] Guided Imagery     [] Family/friends                 [] Pet visits     [] Devotional reading                         [x] Unknown     [] Other:                                               Interventions offered during this visit: (See comments for more details)    Patient Interventions: Spiritual care volunteer support           Plan of Care:     [] Support spiritual and/or cultural needs    [] Support AMD and/or advance care planning process      [] Support grieving process   [] Coordinate Rites and/or Rituals    [] Coordination with community clergy   [] No spiritual needs identified at this time   [] Detailed Plan of Care below (See Comments)  [] Make referral to Music Therapy  [] Make referral to Pet Therapy     [] Make referral to Addiction services  [] Make referral to [facility]  [] Make referral to Spiritual Care Partner  [] No future visits requested        [x] Follow up visits as needed     Comments:  visit for initial spiritual assessment. Staff with patient providing care at the time of this visit.   Will continue to follow up as needed and upon request as able. Visited by Rev. Yesy Ba, MDiv, ThM, [facility] paging service: [phone]

## 2019-10-09 NOTE — PROGRESS NOTES
Transition of Care Plan     Home once medically stable   Transport: daughter Remi Ao [phone]   [address] Follow up with PCP once established    Follow up with KRISHNA Keen     [phone]

## 2019-10-09 NOTE — PROGRESS NOTES
Day #3 of Vancomycin  Indication:  diabetic foot infection with osteomyelitis  - S/p left 5th metatarcal sxcision, I&D, left 2nd toe parital amputation on 10/8  Current regimen:  1000 mg IV every 12 hours  Abx regimen:  Vancomycin + Zosyn  ID Following ?: NO  Concomitant nephrotoxic drugs (requires more frequent monitoring): None  Frequency of BMP?: Daily    Recent Labs     10/09/19  0355 10/08/19  0427 10/07/19  0933   WBC  --   --  8.4   CREA 0.90 1.10* 1.05*   BUN 19 27* 28*     Est CrCl: 82.8 ml/min; UO: x 3 unmeasured  Temp (24hrs), Av °F (36.7 °C), Min:97.7 °F (36.5 °C), Max:98.3 °F (36.8 °C)    Cultures:   Urine on hold  10/8 Tissue, left 2nd toe: pending  10/8 Tissue, left 5th metatarsal base: pending    Goal trough = 15 - 20 mcg/mL    Recent trough history (date/time/level/dose/action taken):  Trough 10/9 @ 1103 = 15.9 mcg/mL (~12.5 hrs post dose, 1000 mg Q12H), extrapolated \"true\" trough = 16.47 mcg/mL    Plan: Trough level therapeutic. Continue current regimen of vancomcyin 1000 mg IV every 12 hours. Pharmacy will continue to monitor patient daily and will make dosage adjustments based upon changing clinical status and renal function.

## 2019-10-09 NOTE — OP NOTES
[address] [facility]  OPERATIVE REPORT    Name:  Orestes Monae  MR#:  [id number]  :  1970  ACCOUNT #:  [de-identified]  DATE OF SERVICE:  10/08/2019      PREOPERATIVE DIAGNOSES:  Cellulitis and abscess of left foot with left fifth metatarsal osteomyelitis and chronic osteomyelitis of the distal phalanx of the left second toe. POSTOPERATIVE DIAGNOSES:  Chronic osteomyelitis of the left second toe, left plantar foot ulcer, and osteomyelitis of the left fifth metatarsal.    PROCEDURES PERFORMED:  Excision of left fifth metatarsal, partial left second toe amputation, excisional debridement of left plantar foot wound, bone biopsy of left second toe distal phalanx, and bone biopsy of left fifth metatarsal.    SURGEON:  Aure Cramer DPM    ASSISTANT:  None. ANESTHESIA:  General anesthesia. COMPLICATIONS:  None. SPECIMENS REMOVED:  Bone biopsy of left second toe distal phalanx, sent for bone culture; left second toe sent for routine Pathology, bone biopsy left fifth metatarsal for bone culture and left fifth metatarsal, sent for routine Pathology. IMPLANTS:  None. ESTIMATED BLOOD LOSS:  100 mL. HEMOSTASIS:  Pneumatic ankle tourniquet utilized for 21 minutes at 250 mmHg. INJECTABLES GIVEN:  20 mL of 0.5% Marcaine plain. PROCEDURE IN DETAIL:  Under mild sedation, the patient was brought into the operating room and placed on the operating table in the supine position. The patient then underwent induction and intubation under general anesthesia. A pneumatic ankle tourniquet was then placed around the patient's right ankle with ample Webril padding underneath. The foot was then scrubbed, prepped, and draped in the usual aseptic manner. The right lower extremity was then elevated and exsanguinated utilizing an Esmarch bandage. The pneumatic ankle tourniquet was elevated to 250 mmHg. The Esmarch bandage was removed and the foot was placed back onto the table.     PROCEDURE #1: Attention was then directed to the distal aspect of the patient's left second toe, where she had a history of prior ulceration that healed, but on MRI, it was noted that there was residual chronic osteomyelitis of the left second toe. A fishmouth-type incision was then made at the level of the distal interphalangeal joint, where the incision made through the skin down to the level of the bone utilizing a #15 blade. The distal phalanx was then disarticulated at the level of the distal interphalangeal joint and passed off the table. At this point, the head of the middle phalanx was noted to be wide, hard, with articular cartilage and no degenerative changes noted. There were no significant degenerative changes noted to the soft tissues. At this point on the back table, the bone of the distal phalanx was noted to be mildly softened, but no significant distal changes were noted. PROCEDURE #2:  Attention was then directed to the lateral aspect of the patient's left foot. On the patient's prior MRI and preoperatively, it was noted that there was a small plantar foot wound at the distal aspect of the remainder of the fifth metatarsal.  The patient's prior MRI as an outpatient had shown that there was significant osteomyelitis of the fifth metatarsal as well as a small fluid collection consistent within the abscess in the area of the distal aspect of the metatarsal.  A dorsal linear incision was made over the remainder of the fifth metatarsal through the skin. A combination of sharp and blunt dissection was then made to the level of the bone. The fifth metatarsal was then freed up all soft tissue attachments including muscle and tendon insertions as well as all ligaments. The remainder of the metatarsal was disarticulated at the level of its articulation with the cuboid. The metatarsal was noted to be significantly deformed and softened with degenerative changes, it was passed off the table.   The articulating surface of the cuboid was noted to be wide, hard with no degenerative changes of the articular cartilage. At this point, the decision was made to deflate with tourniquet and a prompt hyperemic response was noted to the patient's left foot. At this point, it was noted in contrast to the patient's previous MRI reports that there was no significant abscess. There was a small amount of degenerative remainder of the tendons from the patient's prior partial metatarsal resection by another physician. This was area was then sharply rongeured out. There was no sinus tract noted. There was no collection of purulence or any significantly necrotic or dark eschar or necrotic tissue. Additionally, at this time, the second toe was noted and this area also had red granular tissue with no significant soft tissue degenerative changes. PROCEDURE #3:  Attention was then directed to the plantar aspect of the patient's foot, where the patient's prior plantar foot ulcer was noted. The ulcer, at this time, was measuring 0.5 x 0.5 and the depth of the wound had been 2 cm and contiguous with the metatarsal bone. At this point, the decision was made to excise the wound edges in order to promote healing as this ulcer had been present for several months. A #15 blade was used to excise the wound edges, full thickness, through the skin to the level of the patient's muscle with a #15 blade. This was then passed off the table and there was no remaining necrotic, fibrotic, or degenerative tissue associated with the wound edges. Through the dorsal incision and the metatarsectomy, the area was also observed in the cart that was contiguous with the cavity left by the metatarsal excision, confirmed that there were no associated degenerative changes of the soft tissues left behind after the excisional wound debridement.   At this time, the decision was made to thoroughly pulse lavage both these fifth metatarsal excision site as well as the left second toe with a 3 L of normal sterile saline. At this point, both wound sites were observed and all small bleeding vessels cauterized as needed. PROCEDURE #4:  Attention was then directed to the back table, where a small portion of the left second toe distal phalanx was observed and to be sent off to Pathology. Before it was placed in the pathology specimen cup, a small biopsy was made at the distal phalanx to be sent as a bone culture to see what the causative organism for the chronic osteomyelitis of the left second toe was and to determine what antibiotic the patient would need for coverage after the partial amputation of the toe. PROCEDURE #5:  Attention was then directed to the patient's left fifth metatarsal which was noted to have significant degenerative changes and it was very softened. A portion of this bone was resected with a rongeur and sent as a bone culture to determine the causative organism of the osteomyelitis of this bone. Both the incisions of the left second toe as well as the dorsal incision or the metatarsectomy were closed with combination of horizontal mattress and simple interrupted sutures utilizing 3-0 nylon. The cavity of the metatarsal resection was then packed with 1/4-inch iodoform packing through the small excised plantar wound. The foot was then dressed with copious amounts of 4 x 4s, ABDs, Kerlix, and an Ace wrap. The patient was then awoken from surgery and transferred to the postanesthesia care unit with all vital signs stable.       ANTONIO Rivas/[email or web address]  D:  10/08/2019 22:16  T:  10/09/2019 7:10  JOB #:  [id number]

## 2019-10-09 NOTE — PROGRESS NOTES
Hospitalist Progress Note  Melina Nieto MD  Answering service: [phone] OR [phone] from in house phone      Date of Service:  10/9/2019  NAME:  Devin Zamora                                                         :  1970                                               MRN:  [id number]    Admission summary     51 y.o. female with past medical history significant for DM, HTN, previous osteomyelitis of left foot who was sent into the ER by Dr Heron Irby- podiatry, for a non healing left foot diabetic wound with osteomyelitis. Subjective/interval history    F/u: left foot infection  ,non healing ulcer.  -Pain controlled. No concerns or complaints this morning. Assessment and plan  Chronic nonhealing left foot diabetic foot ulcer with osteomyelitis of the left foot (distal 2nd phalynx and 5th metatarsal  -S/p left 5th metatarsal excision,I and D,left 2nd toe partial amputation,biopy  -Continue IV vanc and zosyn     DM type 2,insulin dependent,uncontrolled with hyperglycemia   - accuchecks and ss insulin  -Increase Lantus to 30 units BID  -DTC help appreciated. Uncontrolled HTN-EKG=NSR   -Started on amlodipine. Hydralazine prn   -If BP remains elevated,will add ACE-I/ARB    Mild hyponatremia,pseudohyponatremia, due to hyperglycemia. Corrected 136. Dental caries- many- pt to see dentistry on DC         Reported fatigue: TSh high. Check the full thyroid panel. Full Code  Surrogate decision maker- daughter Caron Tovar [phone]   DVT PPX: heparin. Current facility administered and prior to admit medications reviewed. x         Review of Systems:  A comprehensive review of systems was negative except for that written in the HPI.         PHYSICAL EXAM:  O:  Visit Vitals  BP (!) 177/101 (BP 1 Location: Left arm, BP Patient Position: At rest)   Pulse 93   Temp 97.7 °F (36.5 °C)   Resp 16   Ht 5' 6\" (1.676 m)   Wt 82.6 kg (182 lb 1.6 oz)   LMP 2011 SpO2 94%   Breastfeeding? No   BMI 29.39 kg/m²     GENERAL:  Alert, oriented, cooperative, no apparent distress  HEENT:  Normocephalic, atraumatic, non icteric sclerae, non pallor conjuctivae, EOMs intact, PERRLA. NECK: Supple, trachea midline, no adenopathy, no thyromegally or tenderness, no carotid bruit and no JVD. LUNGS:   Vesicular breath sounds bilaterally, no added sounds. HEART:   S1 and S2 well heard,RRR,  no murmur, click, rub or gallop. ABDOMEB:   Soft, non-tender. Normoactive bowel sounds. No masses,  No organomegaly. EXTREMETIES: Left foot bandaged. PULSES: 2+ and symmetric all extremities. SKIN:  No rashes or lesions  NEUROLOGY: Alert and oriented to PPT, CNII-XII intact. Motor and sensory exam grossly intact.        Recent labs & imaging reviewed:    Problem List as of 10/9/2019 Date Reviewed: 10/8/2019          Codes Class Noted - Resolved    HTN (hypertension) ICD-10-CM: I10  ICD-9-CM: 401.9  10/7/2019 - Present        DM type 2 (diabetes mellitus, type 2) ([facility] ICD-10-CM: E11.9  ICD-9-CM: 250.00  10/7/2019 - Present        * (Principal) Acute osteomyelitis of left foot ([facility] ICD-10-CM: F10.117  ICD-9-CM: 730.07  10/7/2019 - Present        Cellulitis and abscess of toe of left foot ICD-10-CM: D93.508, R86.943  ICD-9-CM: 681.10  10/7/2019 - Present        Uncontrolled type 2 diabetes mellitus with peripheral neuropathy 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 ([facility] ICD-10-CM: W37.289, Y18.343  ICD-9-CM: 250.80, 707.14  10/7/2019 - Present        Unspecified essential hypertension ICD-10-CM: I10  ICD-9-CM: 401.9  9/1/2011 - Present        Diabetes mellitus type 1, uncontrolled, insulin dependent ([facility] ICD-10-CM: E10.65  ICD-9-CM: 250.03  4/20/2011 - Present        Anxiety state ICD-10-CM: F41.1  ICD-9-CM: 300.00  4/20/2011 - Present        RESOLVED: Osteomyelitis ([facility] ICD-10-CM: M86.9  ICD-9-CM: 730.20  10/2/2014 - 10/12/2014                Carolann Chow MD  Internal Medicine  Date of Service: 10/9/2019

## 2019-10-09 NOTE — PROGRESS NOTES
Problem: Falls - Risk of  Goal: *Absence of Falls  Description  Document Elsi Jimenez Fall Risk and appropriate interventions in the flowsheet.   Outcome: Progressing Towards Goal  Note:   Fall Risk Interventions:  Mobility Interventions: Bed/chair exit alarm, Patient to call before getting OOB         Medication Interventions: Evaluate medications/consider consulting pharmacy    Elimination Interventions: Bed/chair exit alarm, Call light in reach

## 2019-10-09 NOTE — DIABETES MGMT
[facility]    Elevated A1C Visit Note  Recommendations/ Comments: BG improved today with increase in Lantus (25 to 30 units BID). Will continue to follow trends. Pt seen at bedside this afternoon due to Elevated A1c. She reports she's been dealing with foot infection for ~7 years and has not had insurance so no ability to see HCT on regular basis for insulin adjustments. Admits her sugars have been \"out of control\" and endorses desire to improve BG control. Feels that now that her foot has been taken care of and she has Medicaid she can \"get back on track\". Current DM medications: Lantus, 30 units BID  Lispro correction scale - resistant sensitivity    Will continue to follow. Chart reviewed on [facility]. Patient is a 51 y.o. female with known DM on Levemir 30 units BID and Regular insulin AC TID per sliding scalePTA    A1c:   Lab Results   Component Value Date/Time    Hemoglobin A1c 12.2 (H) 10/07/2019 09:33 AM    Hemoglobin A1c 13.7 (H) 10/03/2014 03:56 AM       Recent Glucose Results:   Lab Results   Component Value Date/Time     (H) 10/09/2019 03:55 AM    GLUCPOC 129 (H) 10/09/2019 11:30 AM    GLUCPOC 124 (H) 10/09/2019 07:19 AM    GLUCPOC 225 (H) 10/08/2019 09:36 PM        Lab Results   Component Value Date/Time    Creatinine 0.90 10/09/2019 03:55 AM       Active Orders   Diet    DIET DIABETIC CONSISTENT CARB Regular          Assessed and instructed patient on the following:    interpretation of lab results, blood sugar goals, hypoglycemia prevention and treatment, nutrition and site rotation, foot checks    Pt knows A1c is elevated and can state goal. Reviewed BG goals. Pt reports she struggles with lows but only after aggressively trying to self manage high BGS. Reviewed how to treat lows (pt reports use of peanut butter in the past). Pt reports she eats fairly well, avoids sweets and SSB. Two meals and midday snack.  Discussed importance of pro-active foot care to prevent further complications. Provided patient with the following: [x]          Diabetes Self-Care Guide               []          Insulin education materials               []          Diabetes survival skills handout               []          BG guidelines for post-op patients               []          \"Decreasing  the Cost of Diabetes Care\"               [x]          Outpatient DTC contact number          Patient to follow up with HCT at discharge. Encouraged follow up with DTC but pt declines to make appt at bedside. Number provided. Will continue to follow as needed. Thank you.    Caroline Broussard RD, Diabetes Clinician     Time spent: 15 minutes

## 2019-10-10 LAB
ANION GAP SERPL CALC-SCNC: 6 MMOL/L (ref 5–15)
BACTERIA SPEC CULT: ABNORMAL
BUN SERPL-MCNC: 13 MG/DL (ref 6–20)
BUN/CREAT SERPL: 16 (ref 12–20)
CALCIUM SERPL-MCNC: 8.6 MG/DL (ref 8.5–10.1)
CHLORIDE SERPL-SCNC: 106 MMOL/L (ref 97–108)
CO2 SERPL-SCNC: 28 MMOL/L (ref 21–32)
CREAT SERPL-MCNC: 0.81 MG/DL (ref 0.55–1.02)
GLUCOSE BLD STRIP.AUTO-MCNC: 108 MG/DL (ref 65–100)
GLUCOSE BLD STRIP.AUTO-MCNC: 116 MG/DL (ref 65–100)
GLUCOSE BLD STRIP.AUTO-MCNC: 72 MG/DL (ref 65–100)
GLUCOSE BLD STRIP.AUTO-MCNC: 77 MG/DL (ref 65–100)
GLUCOSE BLD STRIP.AUTO-MCNC: 84 MG/DL (ref 65–100)
GLUCOSE BLD STRIP.AUTO-MCNC: 91 MG/DL (ref 65–100)
GLUCOSE BLD STRIP.AUTO-MCNC: 92 MG/DL (ref 65–100)
GLUCOSE SERPL-MCNC: 76 MG/DL (ref 65–100)
GRAM STN SPEC: ABNORMAL
LIPASE SERPL-CCNC: 61 U/L (ref 73–393)
POTASSIUM SERPL-SCNC: 3.5 MMOL/L (ref 3.5–5.1)
SERVICE CMNT-IMP: ABNORMAL
SERVICE CMNT-IMP: NORMAL
SODIUM SERPL-SCNC: 140 MMOL/L (ref 136–145)

## 2019-10-10 PROCEDURE — 82962 GLUCOSE BLOOD TEST: CPT

## 2019-10-10 PROCEDURE — 80048 BASIC METABOLIC PNL TOTAL CA: CPT

## 2019-10-10 PROCEDURE — 65270000029 HC RM PRIVATE

## 2019-10-10 PROCEDURE — 74011250636 HC RX REV CODE- 250/636: Performed by: PODIATRIST

## 2019-10-10 PROCEDURE — 83690 ASSAY OF LIPASE: CPT

## 2019-10-10 PROCEDURE — 74011250637 HC RX REV CODE- 250/637: Performed by: HOSPITALIST

## 2019-10-10 PROCEDURE — 74011000258 HC RX REV CODE- 258: Performed by: PODIATRIST

## 2019-10-10 PROCEDURE — 74011250637 HC RX REV CODE- 250/637: Performed by: PODIATRIST

## 2019-10-10 PROCEDURE — 36415 COLL VENOUS BLD VENIPUNCTURE: CPT

## 2019-10-10 RX ORDER — LORAZEPAM 0.5 MG/1
0.5 TABLET ORAL
Status: DISCONTINUED | OUTPATIENT
Start: 2019-10-10 | End: 2019-10-11

## 2019-10-10 RX ORDER — LISINOPRIL 10 MG/1
10 TABLET ORAL DAILY
Status: DISCONTINUED | OUTPATIENT
Start: 2019-10-10 | End: 2019-10-10

## 2019-10-10 RX ORDER — LISINOPRIL 10 MG/1
10 TABLET ORAL 2 TIMES DAILY
Status: DISCONTINUED | OUTPATIENT
Start: 2019-10-10 | End: 2019-10-11

## 2019-10-10 RX ADMIN — PIPERACILLIN AND TAZOBACTAM 3.38 G: 3; .375 INJECTION, POWDER, FOR SOLUTION INTRAVENOUS at 09:05

## 2019-10-10 RX ADMIN — Medication 10 ML: at 05:53

## 2019-10-10 RX ADMIN — HEPARIN SODIUM 5000 UNITS: 5000 INJECTION INTRAVENOUS; SUBCUTANEOUS at 00:00

## 2019-10-10 RX ADMIN — HYDRALAZINE HYDROCHLORIDE 20 MG: 20 INJECTION INTRAMUSCULAR; INTRAVENOUS at 06:42

## 2019-10-10 RX ADMIN — AMLODIPINE BESYLATE 10 MG: 5 TABLET ORAL at 09:05

## 2019-10-10 RX ADMIN — OXYCODONE HYDROCHLORIDE AND ACETAMINOPHEN 1 TABLET: 5; 325 TABLET ORAL at 05:53

## 2019-10-10 RX ADMIN — OXYCODONE HYDROCHLORIDE AND ACETAMINOPHEN 1 TABLET: 5; 325 TABLET ORAL at 10:16

## 2019-10-10 RX ADMIN — Medication 10 ML: at 22:24

## 2019-10-10 RX ADMIN — Medication 10 ML: at 14:00

## 2019-10-10 RX ADMIN — PIPERACILLIN AND TAZOBACTAM 3.38 G: 3; .375 INJECTION, POWDER, FOR SOLUTION INTRAVENOUS at 01:00

## 2019-10-10 RX ADMIN — OXYCODONE HYDROCHLORIDE AND ACETAMINOPHEN 1 TABLET: 5; 325 TABLET ORAL at 15:05

## 2019-10-10 RX ADMIN — OXYCODONE HYDROCHLORIDE AND ACETAMINOPHEN 1 TABLET: 5; 325 TABLET ORAL at 01:44

## 2019-10-10 RX ADMIN — VANCOMYCIN HYDROCHLORIDE 1000 MG: 1 INJECTION, POWDER, LYOPHILIZED, FOR SOLUTION INTRAVENOUS at 22:24

## 2019-10-10 RX ADMIN — LISINOPRIL 10 MG: 10 TABLET ORAL at 09:05

## 2019-10-10 RX ADMIN — LISINOPRIL 10 MG: 10 TABLET ORAL at 19:05

## 2019-10-10 RX ADMIN — VANCOMYCIN HYDROCHLORIDE 1000 MG: 1 INJECTION, POWDER, LYOPHILIZED, FOR SOLUTION INTRAVENOUS at 11:20

## 2019-10-10 RX ADMIN — PIPERACILLIN AND TAZOBACTAM 3.38 G: 3; .375 INJECTION, POWDER, FOR SOLUTION INTRAVENOUS at 17:47

## 2019-10-10 RX ADMIN — OXYCODONE HYDROCHLORIDE AND ACETAMINOPHEN 1 TABLET: 5; 325 TABLET ORAL at 20:00

## 2019-10-10 RX ADMIN — LORAZEPAM 0.5 MG: 0.5 TABLET ORAL at 11:01

## 2019-10-10 RX ADMIN — HEPARIN SODIUM 5000 UNITS: 5000 INJECTION INTRAVENOUS; SUBCUTANEOUS at 13:03

## 2019-10-10 NOTE — PROGRESS NOTES
Hospitalist Progress Note  Bronson Lara MD  Answering service: [phone] OR [phone] from in house phone      Date of Service:  10/10/2019  NAME:  Chan Degroot                                                         :  1970                                               MRN:  [id number]    Admission summary     51 y.o. female with past medical history significant for DM, HTN, previous osteomyelitis of left foot who was sent into the ER by Dr Gregg Cantor- podiatry, for a non healing left foot diabetic wound with osteomyelitis. Subjective/interval history    F/u: left foot infection  -She had rough night. She is emotional and tearful. Asked for something to help her calm. Assessment and plan  Chronic nonhealing left foot diabetic foot ulcer with osteomyelitis of the left foot (distal 2nd phalynx and 5th metatarsal  -S/p left 5th metatarsal excision,I and D,left 2nd toe partial amputation,biopy  -Wound cx grew MSSA  -D/c zosyn. Continue vancomycin  -D/c on doxycycline  -Podiatry feels surgical cure of osteomyelitis is achieved      DM type 2,insulin dependent,uncontrolled with hyperglycemia   -Hypoglycemia night of 10/9  -Holding Lantus   - accuchecks and ss insulin  -     Uncontrolled HTN-EKG=NSR   -Switch amlodipine to lisinopril. PRN hydralazine. Mild hyponatremia,pseudohyponatremia, due to hyperglycemia. Corrected 136. Dental caries- many- pt to see dentistry on DC         Reported fatigue: TSh high. Check the full thyroid panel. Full Code  Surrogate decision maker- daughter Mikhail Duran [phone]   DVT PPX: heparin. Dispo: home tomorrow          Current facility administered and prior to admit medications reviewed. x         Review of Systems:  A comprehensive review of systems was negative except for that written in the HPI.         PHYSICAL EXAM:  O:  Visit Vitals  BP (!) 162/93 (BP 1 Location: Right arm, BP Patient Position: At rest)   Pulse (!) 109 Temp 97.6 °F (36.4 °C)   Resp 16   Ht 5' 6\" (1.676 m)   Wt 82.6 kg (182 lb 1.6 oz)   LMP 05/01/2011   SpO2 96%   Breastfeeding? No   BMI 29.39 kg/m²     GENERAL:  Alert, oriented, cooperative, no apparent distress  HEENT:  Normocephalic, atraumatic, non icteric sclerae, non pallor conjuctivae, EOMs intact, PERRLA. NECK: Supple, trachea midline, no adenopathy, no thyromegally or tenderness, no carotid bruit and no JVD. LUNGS:   Vesicular breath sounds bilaterally, no added sounds. HEART:   S1 and S2 well heard,RRR,  no murmur, click, rub or gallop. ABDOMEB:   Soft, non-tender. Normoactive bowel sounds. No masses,  No organomegaly. EXTREMETIES: Left foot bandaged. PULSES: 2+ and symmetric all extremities. SKIN:  No rashes or lesions  NEUROLOGY: Alert and oriented to PPT, CNII-XII intact. Motor and sensory exam grossly intact.        Recent labs & imaging reviewed:    Problem List as of 10/10/2019 Date Reviewed: 10/8/2019          Codes Class Noted - Resolved    HTN (hypertension) ICD-10-CM: I10  ICD-9-CM: 401.9  10/7/2019 - Present        DM type 2 (diabetes mellitus, type 2) ([facility] ICD-10-CM: E11.9  ICD-9-CM: 250.00  10/7/2019 - Present        * (Principal) Acute osteomyelitis of left foot ([facility].) ICD-10-CM: O09.118  ICD-9-CM: 730.07  10/7/2019 - Present        Cellulitis and abscess of toe of left foot ICD-10-CM: V72.717, C11.150  ICD-9-CM: 681.10  10/7/2019 - Present        Uncontrolled type 2 diabetes mellitus with peripheral neuropathy [facility]) ICD-10-CM: E11.42, E11.65  ICD-9-CM: 250.62, 357.2  10/7/2019 - Present        Osteomyelitis of second toe of left foot (HCC) ICD-10-CM: M86.9  ICD-9-CM: 730.27  10/7/2019 - Present        Acute osteomyelitis of metatarsal bone of left foot ([facility].) ICD-10-CM: O29.749  ICD-9-CM: 730.07  10/7/2019 - Present        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 ([facility] ICD-10-CM: L99.693, J39.567  ICD-9-CM: 250.80, 707.14  10/7/2019 - Present        Unspecified essential hypertension ICD-10-CM: I10  ICD-9-CM: 401.9  9/1/2011 - Present        Diabetes mellitus type 1, uncontrolled, insulin dependent ([facility].) ICD-10-CM: E10.65  ICD-9-CM: 250.03  4/20/2011 - Present        Anxiety state ICD-10-CM: F41.1  ICD-9-CM: 300.00  4/20/2011 - Present        RESOLVED: Osteomyelitis ([facility].) ICD-10-CM: M86.9  ICD-9-CM: 730.20  10/2/2014 - 10/12/2014                Joseluis Magallon MD  Internal Medicine  Date of Service: 10/10/2019

## 2019-10-10 NOTE — PROGRESS NOTES
Problem: Falls - Risk of  Goal: *Absence of Falls  Description  Document Elsi Jimenez Fall Risk and appropriate interventions in the flowsheet.   Outcome: Progressing Towards Goal  Note:   Fall Risk Interventions:  Mobility Interventions: Strengthening exercises (ROM-active/passive)         Medication Interventions: Teach patient to arise slowly, Patient to call before getting OOB    Elimination Interventions: Call light in reach, Patient to call for help with toileting needs

## 2019-10-10 NOTE — PROGRESS NOTES
Progress Note    Patient: Marquis Marx MRN: [id number]  SSN: [id number]    YOB: 1970  Age: 51 y.o. Sex: female      Admit Date: 10/7/2019    2 days post-op     Procedure:   Procedure(s):  LEFT FIFTH METATARSAL EXCISION, LEFT DIABETIC WOUND DEBRIDEMENT AND LEFT SECOND PARTIAL TOE AMPUTATION, BONE BIOPSIES    Subjective:     Patient seen resting quiet and comfortably and no apparent distress. Relates pain is well controlled on oral meds. States that she is no longer having the constant burning pain that she had in the foot prior to surgery. Notes that the swelling in the LLE has greatly resolved. Has been heel touch to LLE to bathroom without any issues. Relates she feeling much better. Denies f/c/n/v/d/sob/chest pain. Objective:     Visit Vitals  BP (!) 162/93 (BP 1 Location: Right arm, BP Patient Position: At rest)   Pulse (!) 109   Temp 97.6 °F (36.4 °C)   Resp 16   Ht 5' 6\" (1.676 m)   Wt 82.6 kg (182 lb 1.6 oz)   SpO2 96%   Breastfeeding? No   BMI 29.39 kg/m²        Physical Exam:  Dressing to LLE clean, dry intact. Strikethrough noted to inner dressings. Swelling of LLE has resolved and size of leg is now equal to LLE. Erythema of foot has resolved. The incision sites of the lateral left foot and left second toe have all sutures intact with skin edges well-approximated with no signs of dehiscence. The plantar foot wound has no associated erythema. Packing removed. Dried sanguineous drainaged noted. No active bleeding. No purulence or malodor noted. Patient did not experience any tenderness with dressing changed.     Labs/Radiology Review: images and reports reviewed    Assessment:     Hospital Problems  Date Reviewed: 10/8/2019          Codes Class Noted POA    HTN (hypertension) ICD-10-CM: I10  ICD-9-CM: 401.9  10/7/2019 Unknown        DM type 2 (diabetes mellitus, type 2) ([facility] ICD-10-CM: E11.9  ICD-9-CM: 250.00  10/7/2019 Unknown        * (Principal) Acute osteomyelitis of left foot [facility]) ICD-10-CM: J32.223  ICD-9-CM: 730.07  10/7/2019 Unknown        Cellulitis and abscess of toe of left foot ICD-10-CM: M26.011, U62.545  ICD-9-CM: 681.10  10/7/2019 Unknown        Uncontrolled type 2 diabetes mellitus with peripheral neuropathy [facility]) ICD-10-CM: E11.42, E11.65  ICD-9-CM: 250.62, 357.2  10/7/2019 Unknown        Osteomyelitis of second toe of left foot (HCC) ICD-10-CM: M86.9  ICD-9-CM: 730.27  10/7/2019 Unknown        Acute osteomyelitis of metatarsal bone of left foot ([facility] ICD-10-CM: K21.483  ICD-9-CM: 730.07  10/7/2019 Unknown        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 ([facility] ICD-10-CM: E11.621, P10.174  ICD-9-CM: 250.80, 707.14  10/7/2019 Unknown              Plan/Recommendations/Medical Decision Making:   -Dressing changed at bedside. Erythema and edema resolved. -Biopsies of the left 2nd toe distal phalanx and left 5th metatarsal sent for bone culture to identify causative organisms to guide antibiotics. Final cultures of both the second toe distal phalanx and fifth metatarsal grew staph aureus, staph coagulans and diptheroids. Expected surgical cure as left 2nd toe disarticulated at the DIPJ and metatarsal removed at its articulation with the cuboid. All remaining bone was white, healthy, with no signs of degenerative changes indicative of osteomyelitis. -Recommend discharge on 1 week of po doxycycline. As surgical cure of osteomyelitis is expected and cellulitis is resolved, patient can follow up pathology as outpatinet. -WBAT to LLE with surgical shoe.  -Patient is stable for discharge from a podiatric standpoint.  -Discussed daily dressing instructions with patient and she relates that she will be able to do her own dressing at home.  -Patient may return to work on Monday. Discharge Instructions  1. Follow-up 1 week in office. 2. Pack plantar wound with 1/4\" packing daily and cover foot with a dry gauze dressing with ABD pads and ACE wrap.   3. WBAT to left foot with surgical shoe. 4.  Recommend 1 week of po doxycyline.

## 2019-10-10 NOTE — PROGRESS NOTES
Bedside shift change report given to Hamzah Valverde (oncoming nurse) by Colt Shipman RN (offgoing nurse). Report included the following information SBAR, Kardex and MAR.

## 2019-10-10 NOTE — PROGRESS NOTES
Bedside and Verbal shift change report given to [address] [place] [address], [address], RN (oncoming nurse) by Annabelle Casanova RN (offgoing nurse). Report included the following information SBAR, Kardex, Intake/Output, MAR and Recent Results.

## 2019-10-10 NOTE — PROGRESS NOTES
Spiritual Care Assessment/Progress Note  [facility]      NAME: Tulio Ash      MRN: [id number]  AGE: 51 y.o. SEX: female  Pentecostalism Affiliation: Non Jewish   Language: English     10/10/2019     Total Time (in minutes): 5     Spiritual Assessment begun in [facility] MED SURG through conversation with:         []Patient        [] Family    [] Friend(s)        Reason for Consult: Initial/Spiritual assessment, patient floor     Spiritual beliefs: (Please include comment if needed)     [] Identifies with a noel tradition:         [] Supported by a noel community:            [] Claims no spiritual orientation:           [] Seeking spiritual identity:                [] Adheres to an individual form of spirituality:           [x] Not able to assess:                           Identified resources for coping:      [] Prayer                               [] Music                  [] Guided Imagery     [] Family/friends                 [] Pet visits     [] Devotional reading                         [x] Unknown     [] Other:                                              Interventions offered during this visit: (See comments for more details)    Patient Interventions: Spiritual care volunteer support           Plan of Care:     [] Support spiritual and/or cultural needs    [] Support AMD and/or advance care planning process      [] Support grieving process   [] Coordinate Rites and/or Rituals    [] Coordination with community clergy   [] No spiritual needs identified at this time   [] Detailed Plan of Care below (See Comments)  [] Make referral to Music Therapy  [] Make referral to Pet Therapy     [] Make referral to Addiction services  [] Make referral to [facility]  [] Make referral to Spiritual Care Partner  [] No future visits requested        [x] Follow up visits as needed     Comments: Comments:  visit for initial spiritual assessment. Staff with patient providing care.   Will continue to follow up as needed and upon request as able. Visited by Rev. Anel Caballero MDiv, [facility], [facility] paging service: [phone])

## 2019-10-10 NOTE — PROGRESS NOTES
HYPOGLYCEMIC EPISODE DOCUMENTATION    Patient with hypoglycemic episode(s) at 0038(time) on 10/10/19(date). BG value(s) pre-treatment 67  Was patient symptomatic?  [] yes, [x] no  Patient was treated with the following rescue medications/treatments: [] D50                [] Glucose tablets                [] Glucagon                [x] 4oz juice                [] 6oz reg soda                [] 8oz low fat milk  BG value post-treatment: 77  Once BG treated and value greater than 80mg/dl, pt was provided with the following:  [] snack  [] meal  Name of MD notified:n/a  The following orders were received: none

## 2019-10-11 LAB
ANION GAP SERPL CALC-SCNC: 4 MMOL/L (ref 5–15)
BUN SERPL-MCNC: 17 MG/DL (ref 6–20)
BUN/CREAT SERPL: 17 (ref 12–20)
CALCIUM SERPL-MCNC: 8.3 MG/DL (ref 8.5–10.1)
CHLORIDE SERPL-SCNC: 107 MMOL/L (ref 97–108)
CO2 SERPL-SCNC: 29 MMOL/L (ref 21–32)
CREAT SERPL-MCNC: 1.01 MG/DL (ref 0.55–1.02)
GLUCOSE BLD STRIP.AUTO-MCNC: 152 MG/DL (ref 65–100)
GLUCOSE BLD STRIP.AUTO-MCNC: 245 MG/DL (ref 65–100)
GLUCOSE BLD STRIP.AUTO-MCNC: 62 MG/DL (ref 65–100)
GLUCOSE BLD STRIP.AUTO-MCNC: 63 MG/DL (ref 65–100)
GLUCOSE BLD STRIP.AUTO-MCNC: 87 MG/DL (ref 65–100)
GLUCOSE SERPL-MCNC: 85 MG/DL (ref 65–100)
POTASSIUM SERPL-SCNC: 3.8 MMOL/L (ref 3.5–5.1)
SERVICE CMNT-IMP: ABNORMAL
SERVICE CMNT-IMP: NORMAL
SODIUM SERPL-SCNC: 140 MMOL/L (ref 136–145)

## 2019-10-11 PROCEDURE — 74011250637 HC RX REV CODE- 250/637: Performed by: PODIATRIST

## 2019-10-11 PROCEDURE — 82962 GLUCOSE BLOOD TEST: CPT

## 2019-10-11 PROCEDURE — 74011250636 HC RX REV CODE- 250/636: Performed by: PODIATRIST

## 2019-10-11 PROCEDURE — 74011636637 HC RX REV CODE- 636/637: Performed by: PODIATRIST

## 2019-10-11 PROCEDURE — 36415 COLL VENOUS BLD VENIPUNCTURE: CPT

## 2019-10-11 PROCEDURE — 74011250637 HC RX REV CODE- 250/637: Performed by: HOSPITALIST

## 2019-10-11 PROCEDURE — 65270000029 HC RM PRIVATE

## 2019-10-11 PROCEDURE — 80048 BASIC METABOLIC PNL TOTAL CA: CPT

## 2019-10-11 RX ORDER — LISINOPRIL 10 MG/1
10 TABLET ORAL ONCE
Status: COMPLETED | OUTPATIENT
Start: 2019-10-11 | End: 2019-10-11

## 2019-10-11 RX ORDER — AMLODIPINE BESYLATE 5 MG/1
5 TABLET ORAL DAILY
Status: DISCONTINUED | OUTPATIENT
Start: 2019-10-11 | End: 2019-10-12

## 2019-10-11 RX ORDER — LISINOPRIL 20 MG/1
20 TABLET ORAL 2 TIMES DAILY
Status: DISCONTINUED | OUTPATIENT
Start: 2019-10-11 | End: 2019-10-12 | Stop reason: HOSPADM

## 2019-10-11 RX ORDER — HYDRALAZINE HYDROCHLORIDE 20 MG/ML
10 INJECTION INTRAMUSCULAR; INTRAVENOUS
Status: DISCONTINUED | OUTPATIENT
Start: 2019-10-11 | End: 2019-10-12

## 2019-10-11 RX ORDER — AMLODIPINE BESYLATE 5 MG/1
5 TABLET ORAL DAILY
Qty: 30 TAB | Refills: 0 | Status: SHIPPED | OUTPATIENT
Start: 2019-10-11 | End: 2019-11-18

## 2019-10-11 RX ORDER — LISINOPRIL 20 MG/1
20 TABLET ORAL DAILY
Status: DISCONTINUED | OUTPATIENT
Start: 2019-10-12 | End: 2019-10-11

## 2019-10-11 RX ORDER — LORAZEPAM 1 MG/1
1 TABLET ORAL
Status: DISCONTINUED | OUTPATIENT
Start: 2019-10-11 | End: 2019-10-12

## 2019-10-11 RX ORDER — LISINOPRIL 20 MG/1
20 TABLET ORAL DAILY
Qty: 30 TAB | Refills: 0 | Status: SHIPPED | OUTPATIENT
Start: 2019-10-11 | End: 2019-11-18

## 2019-10-11 RX ORDER — CLONIDINE HYDROCHLORIDE 0.1 MG/1
0.1 TABLET ORAL
Status: DISCONTINUED | OUTPATIENT
Start: 2019-10-11 | End: 2019-10-12 | Stop reason: HOSPADM

## 2019-10-11 RX ORDER — DOXYCYCLINE 100 MG/1
100 CAPSULE ORAL 2 TIMES DAILY
Qty: 14 CAP | Refills: 0 | Status: SHIPPED | OUTPATIENT
Start: 2019-10-11 | End: 2019-10-18

## 2019-10-11 RX ADMIN — INSULIN LISPRO 4 UNITS: 100 INJECTION, SOLUTION INTRAVENOUS; SUBCUTANEOUS at 17:14

## 2019-10-11 RX ADMIN — CLONIDINE HYDROCHLORIDE 0.1 MG: 0.1 TABLET ORAL at 13:16

## 2019-10-11 RX ADMIN — LISINOPRIL 10 MG: 10 TABLET ORAL at 08:57

## 2019-10-11 RX ADMIN — OXYCODONE HYDROCHLORIDE AND ACETAMINOPHEN 1 TABLET: 5; 325 TABLET ORAL at 00:35

## 2019-10-11 RX ADMIN — CLONIDINE HYDROCHLORIDE 0.1 MG: 0.1 TABLET ORAL at 22:36

## 2019-10-11 RX ADMIN — OXYCODONE HYDROCHLORIDE AND ACETAMINOPHEN 1 TABLET: 5; 325 TABLET ORAL at 22:39

## 2019-10-11 RX ADMIN — HYDROCODONE BITARTRATE AND ACETAMINOPHEN 1 TABLET: 5; 325 TABLET ORAL at 12:39

## 2019-10-11 RX ADMIN — Medication 10 ML: at 07:40

## 2019-10-11 RX ADMIN — LORAZEPAM 1 MG: 1 TABLET ORAL at 15:27

## 2019-10-11 RX ADMIN — LISINOPRIL 20 MG: 20 TABLET ORAL at 17:14

## 2019-10-11 RX ADMIN — LORAZEPAM 0.5 MG: 0.5 TABLET ORAL at 11:09

## 2019-10-11 RX ADMIN — OXYCODONE HYDROCHLORIDE AND ACETAMINOPHEN 1 TABLET: 5; 325 TABLET ORAL at 07:14

## 2019-10-11 RX ADMIN — HEPARIN SODIUM 5000 UNITS: 5000 INJECTION INTRAVENOUS; SUBCUTANEOUS at 12:00

## 2019-10-11 RX ADMIN — LISINOPRIL 10 MG: 10 TABLET ORAL at 09:39

## 2019-10-11 RX ADMIN — AMLODIPINE BESYLATE 5 MG: 5 TABLET ORAL at 09:39

## 2019-10-11 RX ADMIN — HEPARIN SODIUM 5000 UNITS: 5000 INJECTION INTRAVENOUS; SUBCUTANEOUS at 00:35

## 2019-10-11 NOTE — PROGRESS NOTES
Bedside shift change report given to Elena Thomas RN (oncoming nurse) by Brinda Santos (offgoing nurse). Report included the following information SBAR, Kardex and MAR.

## 2019-10-11 NOTE — DIABETES MGMT
[facility]    [facility] Progress Note    Recommendations/ Comments: Chart review for hypoglycemia. Pt with blood sugars in the 60's this morning. If appropriate, please consider:  1. Decreasing Lantus to 20 units BID   2. Change correction scale to normal sensitivity. Current hospital DM medication:   Lantus 25 units BID  Lipsro resistant correction scale    Chart reviewed on [facility]. Patient is a 51 y.o. female with known DM on Levemir 30 units BID and Regular insulin AC TID per sliding scalePTA      A1c:   Lab Results   Component Value Date/Time    Hemoglobin A1c 12.2 (H) 10/07/2019 09:33 AM    Hemoglobin A1c 13.7 (H) 10/03/2014 03:56 AM       Recent Glucose Results:   Lab Results   Component Value Date/Time    GLU 85 10/11/2019 12:44 AM    GLUCPOC 87 10/11/2019 07:59 AM    GLUCPOC 62 (L) 10/11/2019 07:30 AM    GLUCPOC 63 (L) 10/11/2019 07:12 AM        Lab Results   Component Value Date/Time    Creatinine 1.01 10/11/2019 12:44 AM     Estimated Creatinine Clearance: 73.9 mL/min (based on SCr of 1.01 mg/dL). Active Orders   Diet    DIET DIABETIC CONSISTENT CARB Regular        PO intake:   Patient Vitals for the past 72 hrs:   % Diet Eaten   10/10/19 1200 100 %       Will continue to follow as needed.     Thank you  Merlin Deluca RD, CDE        Time spent: 4 min

## 2019-10-11 NOTE — PROGRESS NOTES
Problem: Diabetes Self-Management  Goal: *Disease process and treatment process  Description  Define diabetes and identify own type of diabetes; list 3 options for treating diabetes. Outcome: Progressing Towards Goal     Problem: Falls - Risk of  Goal: *Absence of Falls  Description  Document Flory Fregoso Fall Risk and appropriate interventions in the flowsheet.   Outcome: Progressing Towards Goal  Note:   Fall Risk Interventions:  Mobility Interventions: Strengthening exercises (ROM-active/passive)         Medication Interventions: Teach patient to arise slowly    Elimination Interventions: Call light in reach    Problem: Hypertension  Goal: *Blood pressure within specified parameters  10/11/2019 1308 by Tammy Siu RN  Outcome: Not Progressing Towards Goal  10/11/2019 1307 by Tammy Siu, RN  Outcome: Progressing Towards Goal

## 2019-10-11 NOTE — PROGRESS NOTES
Hospitalist Progress Note  Cecile Jerry MD  Answering service: [phone] OR [phone] from in house phone      Date of Service:  10/11/2019  NAME:  Laura Barber                                                         :  1970                                               MRN:  [id number]    Admission summary     51 y.o. female with past medical history significant for DM, HTN, previous osteomyelitis of left foot who was sent into the ER by Dr Natali Romo- podiatry, for a non healing left foot diabetic wound with osteomyelitis. Subjective/interval history    F/u: left foot infection  -Bp elevated and discharge has held. She denies headache,chest pain or sob  -She is upset and anxious to leave. Assessment and plan  Accelerated HTN. She is anxious and emotional which is not helping the bP  -Asymptomatic(no chest pain,shortness of breath,headacher,diplopia). -Increased lisinopril+amlodipine. clonidine prn  -continue to monitor     Chronic nonhealing left foot diabetic foot ulcer with osteomyelitis of the left foot (distal 2nd phalynx and 5th metatarsal  -S/p left 5th metatarsal excision,I and D,left 2nd toe partial amputation,biopy  -Wound cx grew MSSA  -D/c zosyn. Continue vancomycin  -D/c on doxycycline  -Podiatry feels surgical cure of osteomyelitis is achieved      DM type 2,insulin dependent,uncontrolled with hyperglycemia   -Hypoglycemia night of 10/9  -Holding Lantus   -Accuchecks and ss insulin  -       -    Mild hyponatremia,pseudohyponatremia, due to hyperglycemia. Corrected 136. Dental caries- many- pt to see dentistry on DC         Subclinical hypothyroidism: f/u thyroid panel in 3-4 weeks     Full Code  Surrogate decision maker- daughter Kiet Celeste [phone]   DVT PPX: heparin. Dispo:when BP stable. Current facility administered and prior to admit medications reviewed.   x         Review of Systems:  A comprehensive review of systems was negative except for that written in the HPI. PHYSICAL EXAM:  O:  Visit Vitals  BP (!) 193/107 (BP 1 Location: Left arm, BP Patient Position: At rest)   Pulse 99   Temp 98.4 °F (36.9 °C)   Resp 16   Ht 5' 6\" (1.676 m)   Wt 82.8 kg (182 lb 8 oz)   LMP 05/01/2011   SpO2 97%   Breastfeeding? No   BMI 29.46 kg/m²     GENERAL:  Alert, oriented, emotional   HEENT:  Normocephalic, atraumatic, non icteric sclerae, non pallor conjuctivae, EOMs intact, PERRLA. NECK: Supple, trachea midline, no adenopathy, no thyromegally or tenderness, no carotid bruit and no JVD. LUNGS:   Vesicular breath sounds bilaterally, no added sounds. HEART:   S1 and S2 well heard,RRR,  no murmur, click, rub or gallop. ABDOMEB:   Soft, non-tender. Normoactive bowel sounds. No masses,  No organomegaly. EXTREMETIES: Left foot bandaged. PULSES: 2+ and symmetric all extremities. SKIN:  No rashes or lesions  NEUROLOGY: Alert and oriented to PPT, CNII-XII intact. Motor and sensory exam grossly intact.        Recent labs & imaging reviewed:    Problem List as of 10/11/2019 Date Reviewed: 10/8/2019          Codes Class Noted - Resolved    HTN (hypertension) ICD-10-CM: I10  ICD-9-CM: 401.9  10/7/2019 - Present        DM type 2 (diabetes mellitus, type 2) ([facility] ICD-10-CM: E11.9  ICD-9-CM: 250.00  10/7/2019 - Present        * (Principal) Acute osteomyelitis of left foot ([facility] ICD-10-CM: S30.745  ICD-9-CM: 730.07  10/7/2019 - Present        Cellulitis and abscess of toe of left foot ICD-10-CM: Z40.432, X70.434  ICD-9-CM: 681.10  10/7/2019 - Present        Uncontrolled type 2 diabetes mellitus with peripheral neuropathy [facility]) ICD-10-CM: E11.42, E11.65  ICD-9-CM: 250.62, 357.2  10/7/2019 - Present        Osteomyelitis of second toe of left foot ([facility]) ICD-10-CM: M86.9  ICD-9-CM: 730.27  10/7/2019 - Present        Acute osteomyelitis of metatarsal bone of left foot ([facility] ICD-10-CM: B34.651  ICD-9-CM: 730.07  10/7/2019 - Present        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 ([facility] ICD-10-CM: E11.621, B52.271  ICD-9-CM: 250.80, 707.14  10/7/2019 - Present        Unspecified essential hypertension ICD-10-CM: I10  ICD-9-CM: 401.9  9/1/2011 - Present        Diabetes mellitus type 1, uncontrolled, insulin dependent ([facility] ICD-10-CM: E10.65  ICD-9-CM: 250.03  4/20/2011 - Present        Anxiety state ICD-10-CM: F41.1  ICD-9-CM: 300.00  4/20/2011 - Present        RESOLVED: Osteomyelitis ([facility] ICD-10-CM: M86.9  ICD-9-CM: 730.20  10/2/2014 - 10/12/2014                Fernando Farmer MD  Internal Medicine  Date of Service: 10/11/2019

## 2019-10-11 NOTE — PROGRESS NOTES
BP high. She had 10 mg of lisinopril and later she had received another 10 mg and 5mg amlodipine. She wants to leave now. She says her getting anxious is not helping. I talked with her earlier at the bedside and now on the room phone. She needs to give the oral medications some time to work and she still could possibly be discharged later today if BP improves. A one time ativan has helped her yesterday,she is declining now. I told her we are holding discharge at this time . She said she understood,but wants to leave AMA. Communicated with RN via [employer].

## 2019-10-11 NOTE — PROGRESS NOTES
10/11/19 1037   Vital Signs   Pulse (Heart Rate) (!) 105   BP (!) 186/102   MAP (Calculated) 130   BP 1 Method Automatic   BP 1 Location Left arm   BP Patient Position At rest   MD aware and has discussed with patient. Pt refuses to remain and signed out AMA.

## 2019-10-11 NOTE — DISCHARGE INSTRUCTIONS
Dear Arden Seo,    Thank you for choosing [address] [place] [address] for your healthcare needs. We strive to provide EXCELLENT care to you and your family. In an effort to explain clearly why you were here in the hospital, I've also written a very brief summary below. Other details including formal diagnosis, medication changes, and follow up appointment recommendations can also be found in this packet. You were admitted for:    1. Diabetic left foot infection. You underwent incision and debridement. Dr Arelis Larson feels surgical cure of the osteomyelitis/bone infection is achieved and she recommended doxycycline for a week and she would like to see you in the office. 2. Hypertension. You blood pressure was very elevated. You are started on lisinopril 20 mg and amlodipine 5 mg daily. We recommend home blood pressure monitoring. The blood pressure medications may need to be adjusted(increased or decreased) based on how your blood pressure runs. 3. Uncontrolled diabetes: you blood sugar was low while you were receiving same dose of the Lantus as you do at home. Monitor your blood sugar before each meal and at bedtime as you have been doing. If you blood sugar is persistently high,resume the Lantus. You also received care from specialist physicians in the following specialties:  PODIATRY          Remember that it is important for you to take your medications exactly as they are prescribed. It is helpful to keep a list of your medication with the names, dosages, and times to be taken in your wallet. Make sure to also see your primary care doctor for follow-up. Bring these papers with you and be sure to review your medication list with your doctor. I cannot stress the importance of follow up enough. I've included the information for your follow-up appointments below:     Follow-up Information     Follow up With Specialties Details Why Contact Info    None    None ([phone]) Patient stated that they have no PCP Lynae Prader, DPM Foot and Ankle Surgery   [address] [facility]  [address]  [place] [address]  [phone]            At this time, the following test results are still pending: none  Again, please follow-up these results with your primary care provider. Should you have any fever over 101 degrees for 24 hours, chest pain, shortness of breath, fever, chills, nausea, vomiting, diarrhea, change in mentation, falling, weakness, bleeding, or worsening pain, please seek medical attention immediately. Finally, as your discharging physician, you may be receiving a survey regarding my care. I would greatly value and appreciate your input in the survey as we strive for excellence. If you have any questions, I can be reached at [phone].   Thank you so much again for allowing me to care for you at [address] [place] [address].    Respectfully yours,  Domingo Chan MD

## 2019-10-11 NOTE — PROGRESS NOTES
Transition of Care Plan     · Home with family assistance once medically stable  · Transport: daughter Carmita Quintero [phone]   · [address]   · Follow up with PCP once established   · Follow up with Podiatrist 1 week in office      Nadira Zhu  Clinical     [phone]

## 2019-10-11 NOTE — PROGRESS NOTES
Patient listed as not having a primary care physician. Hospital follow-up PCP transitional care appointment has been scheduled with Dr. Brittnee Ray for Thursday, 10/17/19 at 9:00 a.m. Pending patient discharge.   Bhargav Lux, Care Management Specialist.

## 2019-10-11 NOTE — PROGRESS NOTES
Bedside shift change report given to Oli Mathew (oncoming nurse) by Crystal Teran (offgoing nurse). Report included the following information SBAR, Kardex, MAR and Recent Results.

## 2019-10-11 NOTE — PROGRESS NOTES
Problem: Falls - Risk of  Goal: *Absence of Falls  Description  Document Bryn Steinberg Fall Risk and appropriate interventions in the flowsheet.   Outcome: Progressing Towards Goal  Note:   Fall Risk Interventions:  Mobility Interventions: Strengthening exercises (ROM-active/passive)         Medication Interventions: Teach patient to arise slowly    Elimination Interventions: Call light in reach

## 2019-10-11 NOTE — PROGRESS NOTES
Spiritual Care Assessment/Progress Note  [facility]      NAME: Marquis Marx      MRN: [id number]  AGE: 51 y.o.  SEX: female  Jewish Affiliation: Non Nondenominational   Language: English     10/11/2019     Total Time (in minutes): 10     Spiritual Assessment begun in [facility] SUR[facility] through conversation with:         [x]Patient        [] Family    [] Friend(s)        Reason for Consult: Initial/Spiritual assessment, patient floor     Spiritual beliefs: (Please include comment if needed)     [x] Identifies with a noel tradition:         [x] Supported by a noel community:            [] Claims no spiritual orientation:           [] Seeking spiritual identity:                [] Adheres to an individual form of spirituality:           [] Not able to assess:                           Identified resources for coping:      [x] Prayer                               [] Music                  [] Guided Imagery     [x] Family/friends                 [] Pet visits     [] Devotional reading                         [] Unknown     [] Other:                                             Interventions offered during this visit: (See comments for more details)    Patient Interventions: Affirmation of emotions/emotional suffering, Affirmation of noel, Iconic (affirming the presence of God/Higher Power), Initial/Spiritual assessment, patient floor, Normalization of emotional/spiritual concerns, Prayer (assurance of)           Plan of Care:     [x] Support spiritual and/or cultural needs    [] Support AMD and/or advance care planning process      [] Support grieving process   [] Coordinate Rites and/or Rituals    [] Coordination with community clergy   [] No spiritual needs identified at this time   [] Detailed Plan of Care below (See Comments)  [] Make referral to Music Therapy  [] Make referral to Pet Therapy     [] Make referral to Addiction services  [] Make referral to [facility]  [] Make referral to Spiritual Care Partner  [] No future visits requested        [x] Follow up visits as needed     Comments: Chaplain Sameer Intern, [id number] [facility] ([phone])

## 2019-10-12 VITALS
TEMPERATURE: 98.3 F | RESPIRATION RATE: 17 BRPM | HEIGHT: 66 IN | SYSTOLIC BLOOD PRESSURE: 201 MMHG | WEIGHT: 184 LBS | HEART RATE: 99 BPM | BODY MASS INDEX: 29.57 KG/M2 | DIASTOLIC BLOOD PRESSURE: 108 MMHG | OXYGEN SATURATION: 96 %

## 2019-10-12 LAB
GLUCOSE BLD STRIP.AUTO-MCNC: 221 MG/DL (ref 65–100)
SERVICE CMNT-IMP: ABNORMAL

## 2019-10-12 PROCEDURE — 74011636637 HC RX REV CODE- 636/637: Performed by: PODIATRIST

## 2019-10-12 PROCEDURE — 74011250637 HC RX REV CODE- 250/637: Performed by: PODIATRIST

## 2019-10-12 PROCEDURE — 74011250637 HC RX REV CODE- 250/637: Performed by: HOSPITALIST

## 2019-10-12 PROCEDURE — 82962 GLUCOSE BLOOD TEST: CPT

## 2019-10-12 PROCEDURE — 74011250637 HC RX REV CODE- 250/637: Performed by: NURSE PRACTITIONER

## 2019-10-12 RX ORDER — HYDRALAZINE HYDROCHLORIDE 25 MG/1
37.5 TABLET, FILM COATED ORAL
Status: DISCONTINUED | OUTPATIENT
Start: 2019-10-12 | End: 2019-10-12 | Stop reason: HOSPADM

## 2019-10-12 RX ORDER — AMLODIPINE BESYLATE 5 MG/1
10 TABLET ORAL DAILY
Status: DISCONTINUED | OUTPATIENT
Start: 2019-10-12 | End: 2019-10-12 | Stop reason: HOSPADM

## 2019-10-12 RX ADMIN — INSULIN LISPRO 4 UNITS: 100 INJECTION, SOLUTION INTRAVENOUS; SUBCUTANEOUS at 06:58

## 2019-10-12 RX ADMIN — LISINOPRIL 20 MG: 20 TABLET ORAL at 08:39

## 2019-10-12 RX ADMIN — CLONIDINE HYDROCHLORIDE 0.1 MG: 0.1 TABLET ORAL at 08:39

## 2019-10-12 RX ADMIN — HYDRALAZINE HYDROCHLORIDE 37.5 MG: 25 TABLET, FILM COATED ORAL at 04:48

## 2019-10-12 RX ADMIN — HYDROCODONE BITARTRATE AND ACETAMINOPHEN 1 TABLET: 5; 325 TABLET ORAL at 04:52

## 2019-10-12 RX ADMIN — HYDROCODONE BITARTRATE AND ACETAMINOPHEN 1 TABLET: 5; 325 TABLET ORAL at 08:39

## 2019-10-12 RX ADMIN — INSULIN GLARGINE 30 UNITS: 100 INJECTION, SOLUTION SUBCUTANEOUS at 08:40

## 2019-10-12 RX ADMIN — AMLODIPINE BESYLATE 10 MG: 5 TABLET ORAL at 08:39

## 2019-10-12 NOTE — PROGRESS NOTES
Pt left AMA as she had no one to care for her 11ear old granddaugther. (Neither parent involved at this time). She had made arrangements for the week, and her help unable to help further.

## 2019-10-12 NOTE — PROGRESS NOTES
Rima NP Progress note    Name: Sharon Loving  YOB: 1970  MRN: [id number]  Admission Date: 10/7/2019  9:23 AM    Date of service: 10/12/2019 12:30 AM    Subjective:   Pt wants to leave AMA    Objective:   Saw patient and allowed her to vent her frustrations which are that she is upset that she has been here since Monday and her blood pressure is not already controlled. She states that she understands that it is dangerous to have high blood pressure like that and that she specifically could have a stroke or heart attack and die. 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 She states that she needs a note saying it is ok to return to work. Explained that we would not be able to provide that if she leaves AMA and she stated \"I don't know it from you, I'll get one, I promise you. \"  Attempted to ask patient would I could do to help, she continues to cut me off and says there's nothing I can do. I did explain to her that she should stay to have her BP better controlled d/t risk of stroke and death but that she is within her rights to leave, however, it will be against medical advice. Patient verbalized understanding. Assessment & Plan:     Pt states she will leave AMA by 1000. Risks discussed.        Cate Reid NP  [phone] or [place]

## 2019-10-12 NOTE — PROGRESS NOTES
Hospitalist Progress Note  Capri Barth MD  Answering service: [phone] OR [phone] from in house phone      Date of Service:  10/12/2019  NAME:  Brenton Barker                                                         :  1970                                               MRN:  [id number]    Admission summary     51 y.o. female with past medical history significant for DM, HTN, previous osteomyelitis of left foot who was sent into the ER by Dr Monalisa Macias- podiatry, for a non healing left foot diabetic wound with osteomyelitis. Subjective/interval history    F/u: left foot infection  -Ms Sharad Guzman denied any complains specifically headache,diplopia,chest pressure this morning. He Blood pressure was significantly high last 2 days ,more so since last 24 hours,we have been adjusting her BP regimen. She has been anxious to leave. She says she is feeling fine and she is leaving this morning no matter what. She had refused IV line since yesterday. I explained to her its unsafe I cant discharge her with this dangerously high BP,that she needs ongoing monitoring and adjustment of her BP medications;I extensively discussed risk of stroke, heart attack,even death with her. She expressed understanding but still wanted to leave. She said she would reach out to her podiatry for antibiotics. Assessment and plan  Accelerated HTN,asymptomatic. She is anxious and emotional which is not helping the bP  -Asymptomatic(no chest pain,shortness of breath,headacher,diplopia). -Increased lisinopril+amlodipine. clonidine prn. PRN hydralazine,but she refused iv placement  -She needs ongoing monitoring and adjusting of BP regimen     Chronic nonhealing left foot diabetic foot ulcer with osteomyelitis of the left foot (distal 2nd phalynx and 5th metatarsal  -S/p left 5th metatarsal excision,I and D,left 2nd toe partial amputation,biopy  -Wound cx grew MSSA  -D/c zosyn. Continue vancomycin  -D/c on doxycycline  -Podiatry feels surgical cure of osteomyelitis is achieved      DM type 2,insulin dependent,uncontrolled with hyperglycemia   -Hypoglycemia night of 10/9. BG high,resume lantus  -Accuchecks and ss insulin         -    Mild hyponatremia,pseudohyponatremia, due to hyperglycemia. Corrected 136. Dental caries- many- pt to see dentistry on DC         Subclinical hypothyroidism: f/u thyroid panel in 3-4 weeks     Full Code  Surrogate decision maker- daughter Mikhail Duran [phone]   DVT PPX: heparin. Dispo:when BP stable. Current facility administered and prior to admit medications reviewed. x         Review of Systems:  A comprehensive review of systems was negative except for that written in the HPI. PHYSICAL EXAM:  O:  Visit Vitals  BP (!) 193/107 (BP 1 Location: Left arm, BP Patient Position: At rest)   Pulse 99   Temp 98.4 °F (36.9 °C)   Resp 16   Ht 5' 6\" (1.676 m)   Wt 82.8 kg (182 lb 8 oz)   LMP 05/01/2011   SpO2 97%   Breastfeeding? No   BMI 29.46 kg/m²     GENERAL:  Alert, oriented, emotional   HEENT:  Normocephalic, atraumatic, non icteric sclerae, non pallor conjuctivae, EOMs intact, PERRLA. NECK: Supple, trachea midline, no adenopathy, no thyromegally or tenderness, no carotid bruit and no JVD. LUNGS:   Vesicular breath sounds bilaterally, no added sounds. HEART:   S1 and S2 well heard,RRR,  no murmur, click, rub or gallop. ABDOMEB:   Soft, non-tender. Normoactive bowel sounds. No masses,  No organomegaly. EXTREMETIES: Left foot bandaged. PULSES: 2+ and symmetric all extremities. SKIN:  No rashes or lesions  NEUROLOGY: Alert and oriented to PPT, CNII-XII intact. Motor and sensory exam grossly intact.        Recent labs & imaging reviewed:    Problem List as of 10/12/2019 Date Reviewed: 10/8/2019          Codes Class Noted - Resolved    HTN (hypertension) ICD-10-CM: I10  ICD-9-CM: 401.9  10/7/2019 - Present        DM type 2 (diabetes mellitus, type 2) ([facility] [address] ICD-10-CM: E11.9  ICD-9-CM: 250.00  10/7/2019 - Present        * (Principal) Acute osteomyelitis of left foot ([facility] ICD-10-CM: U77.395  ICD-9-CM: 730.07  10/7/2019 - Present        Cellulitis and abscess of toe of left foot ICD-10-CM: L03.032, L02.612  ICD-9-CM: 681.10  10/7/2019 - Present        Uncontrolled type 2 diabetes mellitus with peripheral neuropathy [facility]) ICD-10-CM: E11.42, E11.65  ICD-9-CM: 250.62, 357.2  10/7/2019 - Present        Osteomyelitis of second toe of left foot (HCC) ICD-10-CM: M86.9  ICD-9-CM: 730.27  10/7/2019 - Present        Acute osteomyelitis of metatarsal bone of left foot ([facility] ICD-10-CM: Y95.113  ICD-9-CM: 730.07  10/7/2019 - Present        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 ([facility] ICD-10-CM: E11.621, H48.256  ICD-9-CM: 250.80, 707.14  10/7/2019 - Present        Unspecified essential hypertension ICD-10-CM: I10  ICD-9-CM: 401.9  9/1/2011 - Present        Diabetes mellitus type 1, uncontrolled, insulin dependent ([facility] ICD-10-CM: E10.65  ICD-9-CM: 250.03  4/20/2011 - Present        Anxiety state ICD-10-CM: F41.1  ICD-9-CM: 300.00  4/20/2011 - Present        RESOLVED: Osteomyelitis ([facility] ICD-10-CM: M86.9  ICD-9-CM: 730.20  10/2/2014 - 10/12/2014                Vic Carson MD  Internal Medicine  Date of Service: 10/12/2019

## 2019-10-12 NOTE — DISCHARGE SUMMARY
Discharge Summary/patient left AMA       PATIENT ID: Eunice Marsh  MRN: [id number]   YOB: 1970    DATE OF ADMISSION: 10/7/2019  9:23 AM    DATE OF DISCHARGE: 10/12/2019  PRIMARY CARE PROVIDER: None     ATTENDING PHYSICIAN: Brenda Lafleur MD  DISCHARGING PROVIDER: Brenda Lafleur MD    To contact this individual call [phone] and ask the  to page. If unavailable ask to be transferred the Adult Hospitalist Department. CONSULTATIONS: IP CONSULT TO PODIATRY    PROCEDURES/SURGERIES: Procedure(s):  LEFT FIFTH METATARSAL EXCISION, LEFT DIABETIC WOUND DEBREDMENT AND LEFT SECOND PARTIAL TOE AMPUTATION, BONE BIOPSIES    ADMITTING DIAGNOSES & HOSPITAL COURSE:     Admission summary      51 y.o. female with past medical history significant for DM, HTN, previous osteomyelitis of left foot who was sent into the ER by Dr Mele Schwartz- podiatry, for a non healing left foot diabetic wound with osteomyelitis. Assessment and plan  Accelerated HTN,asymptomatic. She is anxious and emotional which is not helping the bP  -Asymptomatic(no chest pain,shortness of breath,headacher,diplopia). -Increased lisinopril+amlodipine. clonidine prn. PRN hydralazine,but she refused iv placement  -She needed ongoing monitoring and adjusting of BP regimen however,patient refused to stay and left AMA     Chronic nonhealing left foot diabetic foot ulcer with osteomyelitis of the left foot (distal 2nd phalynx and 5th metatarsal  -S/p left 5th metatarsal excision,I and D,left 2nd toe partial amputation,biopy  -Wound cx grew MSSA  -D/c zosyn. Continue vancomycin  -D/c on doxycycline  -Podiatry feels surgical cure of osteomyelitis is achieved      DM type 2,insulin dependent,uncontrolled with hyperglycemia   -Hypoglycemia night of 10/9. BG high,resume lantus  -Accuchecks and ss insulin           -     Mild hyponatremia,pseudohyponatremia, due to hyperglycemia. Corrected 136.      Dental caries- many- pt to see dentistry on DC           Subclinical hypothyroidism: f/u thyroid panel in 3-4 weeks                               FOLLOW UP APPOINTMENTS:    Follow-up Information     Follow up With Specialties Details Why Contact Info    Kelsi Snell DPM Foot and Ankle Surgery   [address] [place] [address]  [address]      Wanda Yates MD Family Practice On 10/17/2019 Hospital f/u new PCP appointment Thursday, 10/17/19 @ 9:00 a.m. Please arrive 15 minutes early with photo ID, insurance card and a listing of all medications.   [address] [place] [address] Drive  [phone]             CHRONIC MEDICAL DIAGNOSES:  Problem List as of 10/12/2019 Date Reviewed: 10/8/2019          Codes Class Noted - Resolved    HTN (hypertension) ICD-10-CM: I10  ICD-9-CM: 401.9  10/7/2019 - Present        DM type 2 (diabetes mellitus, type 2) ([facility] ICD-10-CM: E11.9  ICD-9-CM: 250.00  10/7/2019 - Present        * (Principal) Acute osteomyelitis of left foot ([facility] ICD-10-CM: J16.886  ICD-9-CM: 730.07  10/7/2019 - Present        Cellulitis and abscess of toe of left foot ICD-10-CM: L03.032, L02.612  ICD-9-CM: 681.10  10/7/2019 - Present        Uncontrolled type 2 diabetes mellitus with peripheral neuropathy ([facility] ICD-10-CM: E11.42, E11.65  ICD-9-CM: 250.62, 357.2  10/7/2019 - Present        Osteomyelitis of second toe of left foot (HCC) ICD-10-CM: M86.9  ICD-9-CM: 730.27  10/7/2019 - Present        Acute osteomyelitis of metatarsal bone of left foot ([facility] ICD-10-CM: H78.993  ICD-9-CM: 730.07  10/7/2019 - Present        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 ([facility] ICD-10-CM: Y05.996, P26.802  ICD-9-CM: 250.80, 707.14  10/7/2019 - Present        Unspecified essential hypertension ICD-10-CM: I10  ICD-9-CM: 401.9  9/1/2011 - Present        Diabetes mellitus type 1, uncontrolled, insulin dependent ([facility] ICD-10-CM: E10.65  ICD-9-CM: 250.03  4/20/2011 - Present        Anxiety state ICD-10-CM: F41.1  ICD-9-CM: 300.00 4/20/2011 - Present        RESOLVED: Osteomyelitis ([facility] [address] [facility] ICD-10-CM: M86.9  ICD-9-CM: 730.20  10/2/2014 - 10/12/2014                Signed:    Bronson Lara MD  10/12/2019  3:12 PM

## 2019-10-12 NOTE — PROGRESS NOTES
Bedside shift change report given to Bernadette Fofana RN (oncoming nurse) by Patrick Langofrd RN (offgoing nurse). Report included the following information SBAR, MAR and Recent Results.

## 2019-10-12 NOTE — PROGRESS NOTES
Patient was agitated earlier stating that she wants to go home, and that she will go home by 10 am demarco whether or not she is discharged, she signed the AMA form earlier for day shift.

## 2019-11-08 ENCOUNTER — APPOINTMENT (OUTPATIENT)
Dept: GENERAL RADIOLOGY | Age: 49
DRG: 952 | End: 2019-11-08
Attending: EMERGENCY MEDICINE
Payer: MEDICAID

## 2019-11-08 ENCOUNTER — HOSPITAL ENCOUNTER (INPATIENT)
Age: 49
LOS: 10 days | Discharge: HOME | DRG: 952 | End: 2019-11-18
Attending: EMERGENCY MEDICINE | Admitting: INTERNAL MEDICINE
Payer: MEDICAID

## 2019-11-08 ENCOUNTER — APPOINTMENT (OUTPATIENT)
Dept: MRI IMAGING | Age: 49
DRG: 952 | End: 2019-11-08
Attending: INTERNAL MEDICINE
Payer: MEDICAID

## 2019-11-08 ENCOUNTER — APPOINTMENT (OUTPATIENT)
Dept: VASCULAR SURGERY | Age: 49
DRG: 952 | End: 2019-11-08
Attending: INTERNAL MEDICINE
Payer: MEDICAID

## 2019-11-08 DIAGNOSIS — L08.9 LEFT FOOT INFECTION: Primary | ICD-10-CM

## 2019-11-08 DIAGNOSIS — M14.672 CHARCOT'S JOINT OF LEFT FOOT: ICD-10-CM

## 2019-11-08 LAB
ALBUMIN SERPL-MCNC: 2.7 G/DL (ref 3.5–5)
ALBUMIN/GLOB SERPL: 0.6 {RATIO} (ref 1.1–2.2)
ALP SERPL-CCNC: 228 U/L (ref 45–117)
ALT SERPL-CCNC: 21 U/L (ref 12–78)
ANION GAP SERPL CALC-SCNC: 5 MMOL/L (ref 5–15)
AST SERPL-CCNC: 17 U/L (ref 15–37)
BASOPHILS # BLD: 0 K/UL (ref 0–0.1)
BASOPHILS NFR BLD: 1 % (ref 0–1)
BILIRUB SERPL-MCNC: 0.2 MG/DL (ref 0.2–1)
BUN SERPL-MCNC: 33 MG/DL (ref 6–20)
BUN/CREAT SERPL: 27 (ref 12–20)
CALCIUM SERPL-MCNC: 9 MG/DL (ref 8.5–10.1)
CHLORIDE SERPL-SCNC: 106 MMOL/L (ref 97–108)
CO2 SERPL-SCNC: 29 MMOL/L (ref 21–32)
COMMENT, HOLDF: NORMAL
CREAT SERPL-MCNC: 1.22 MG/DL (ref 0.55–1.02)
CRP SERPL-MCNC: 2.05 MG/DL (ref 0–0.6)
DIFFERENTIAL METHOD BLD: ABNORMAL
EOSINOPHIL # BLD: 0.1 K/UL (ref 0–0.4)
EOSINOPHIL NFR BLD: 1 % (ref 0–7)
ERYTHROCYTE [DISTWIDTH] IN BLOOD BY AUTOMATED COUNT: 15.8 % (ref 11.5–14.5)
ERYTHROCYTE [SEDIMENTATION RATE] IN BLOOD: 77 MM/HR (ref 0–20)
EST. AVERAGE GLUCOSE BLD GHB EST-MCNC: 258 MG/DL
GLOBULIN SER CALC-MCNC: 4.8 G/DL (ref 2–4)
GLUCOSE BLD STRIP.AUTO-MCNC: 223 MG/DL (ref 65–100)
GLUCOSE BLD STRIP.AUTO-MCNC: 483 MG/DL (ref 65–100)
GLUCOSE BLD STRIP.AUTO-MCNC: 492 MG/DL (ref 65–100)
GLUCOSE SERPL-MCNC: 199 MG/DL (ref 65–100)
HBA1C MFR BLD: 10.6 % (ref 4.2–6.3)
HCT VFR BLD AUTO: 31.8 % (ref 35–47)
HGB BLD-MCNC: 9.6 G/DL (ref 11.5–16)
IMM GRANULOCYTES # BLD AUTO: 0 K/UL (ref 0–0.04)
IMM GRANULOCYTES NFR BLD AUTO: 0 % (ref 0–0.5)
LACTATE BLD-SCNC: 1.33 MMOL/L (ref 0.4–2)
LYMPHOCYTES # BLD: 1.3 K/UL (ref 0.8–3.5)
LYMPHOCYTES NFR BLD: 20 % (ref 12–49)
MCH RBC QN AUTO: 27.1 PG (ref 26–34)
MCHC RBC AUTO-ENTMCNC: 30.2 G/DL (ref 30–36.5)
MCV RBC AUTO: 89.8 FL (ref 80–99)
MONOCYTES # BLD: 0.5 K/UL (ref 0–1)
MONOCYTES NFR BLD: 8 % (ref 5–13)
NEUTS SEG # BLD: 4.5 K/UL (ref 1.8–8)
NEUTS SEG NFR BLD: 70 % (ref 32–75)
NRBC # BLD: 0 K/UL (ref 0–0.01)
NRBC BLD-RTO: 0 PER 100 WBC
PLATELET # BLD AUTO: 371 K/UL (ref 150–400)
PMV BLD AUTO: 11.3 FL (ref 8.9–12.9)
POTASSIUM SERPL-SCNC: 4.2 MMOL/L (ref 3.5–5.1)
PROT SERPL-MCNC: 7.5 G/DL (ref 6.4–8.2)
RBC # BLD AUTO: 3.54 M/UL (ref 3.8–5.2)
SAMPLES BEING HELD,HOLD: NORMAL
SERVICE CMNT-IMP: ABNORMAL
SODIUM SERPL-SCNC: 140 MMOL/L (ref 136–145)
WBC # BLD AUTO: 6.4 K/UL (ref 3.6–11)

## 2019-11-08 PROCEDURE — 74011636637 HC RX REV CODE- 636/637: Performed by: INTERNAL MEDICINE

## 2019-11-08 PROCEDURE — 74011250636 HC RX REV CODE- 250/636: Performed by: EMERGENCY MEDICINE

## 2019-11-08 PROCEDURE — 82962 GLUCOSE BLOOD TEST: CPT

## 2019-11-08 PROCEDURE — 36415 COLL VENOUS BLD VENIPUNCTURE: CPT

## 2019-11-08 PROCEDURE — 83605 ASSAY OF LACTIC ACID: CPT

## 2019-11-08 PROCEDURE — 74011000258 HC RX REV CODE- 258: Performed by: INTERNAL MEDICINE

## 2019-11-08 PROCEDURE — 74011250636 HC RX REV CODE- 250/636: Performed by: INTERNAL MEDICINE

## 2019-11-08 PROCEDURE — 93971 EXTREMITY STUDY: CPT

## 2019-11-08 PROCEDURE — 65270000032 HC RM SEMIPRIVATE

## 2019-11-08 PROCEDURE — A9575 INJ GADOTERATE MEGLUMI 0.1ML: HCPCS | Performed by: INTERNAL MEDICINE

## 2019-11-08 PROCEDURE — 83036 HEMOGLOBIN GLYCOSYLATED A1C: CPT

## 2019-11-08 PROCEDURE — 87040 BLOOD CULTURE FOR BACTERIA: CPT

## 2019-11-08 PROCEDURE — 97161 PT EVAL LOW COMPLEX 20 MIN: CPT

## 2019-11-08 PROCEDURE — 86140 C-REACTIVE PROTEIN: CPT

## 2019-11-08 PROCEDURE — 85652 RBC SED RATE AUTOMATED: CPT

## 2019-11-08 PROCEDURE — 74011636637 HC RX REV CODE- 636/637: Performed by: NURSE PRACTITIONER

## 2019-11-08 PROCEDURE — 85025 COMPLETE CBC W/AUTO DIFF WBC: CPT

## 2019-11-08 PROCEDURE — 80053 COMPREHEN METABOLIC PANEL: CPT

## 2019-11-08 PROCEDURE — 74011250637 HC RX REV CODE- 250/637: Performed by: INTERNAL MEDICINE

## 2019-11-08 PROCEDURE — 73630 X-RAY EXAM OF FOOT: CPT

## 2019-11-08 PROCEDURE — 97116 GAIT TRAINING THERAPY: CPT

## 2019-11-08 PROCEDURE — 73720 MRI LWR EXTREMITY W/O&W/DYE: CPT

## 2019-11-08 PROCEDURE — 99282 EMERGENCY DEPT VISIT SF MDM: CPT

## 2019-11-08 PROCEDURE — 96374 THER/PROPH/DIAG INJ IV PUSH: CPT

## 2019-11-08 RX ORDER — INSULIN GLARGINE 100 [IU]/ML
22 INJECTION, SOLUTION SUBCUTANEOUS 2 TIMES DAILY
Status: DISCONTINUED | OUTPATIENT
Start: 2019-11-08 | End: 2019-11-10

## 2019-11-08 RX ORDER — HYDROCODONE BITARTRATE AND ACETAMINOPHEN 5; 325 MG/1; MG/1
1 TABLET ORAL
Status: DISCONTINUED | OUTPATIENT
Start: 2019-11-08 | End: 2019-11-18 | Stop reason: HOSPADM

## 2019-11-08 RX ORDER — INSULIN LISPRO 100 [IU]/ML
INJECTION, SOLUTION INTRAVENOUS; SUBCUTANEOUS
Status: DISCONTINUED | OUTPATIENT
Start: 2019-11-08 | End: 2019-11-18 | Stop reason: HOSPADM

## 2019-11-08 RX ORDER — AMLODIPINE BESYLATE 5 MG/1
10 TABLET ORAL DAILY
Status: DISCONTINUED | OUTPATIENT
Start: 2019-11-09 | End: 2019-11-18 | Stop reason: HOSPADM

## 2019-11-08 RX ORDER — VANCOMYCIN 1.75 GRAM/500 ML IN 0.9 % SODIUM CHLORIDE INTRAVENOUS
1750
Status: COMPLETED | OUTPATIENT
Start: 2019-11-08 | End: 2019-11-08

## 2019-11-08 RX ORDER — SODIUM CHLORIDE 0.9 % (FLUSH) 0.9 %
5-40 SYRINGE (ML) INJECTION AS NEEDED
Status: DISCONTINUED | OUTPATIENT
Start: 2019-11-08 | End: 2019-11-18 | Stop reason: HOSPADM

## 2019-11-08 RX ORDER — MAGNESIUM SULFATE 100 %
4 CRYSTALS MISCELLANEOUS AS NEEDED
Status: DISCONTINUED | OUTPATIENT
Start: 2019-11-08 | End: 2019-11-18 | Stop reason: HOSPADM

## 2019-11-08 RX ORDER — INSULIN LISPRO 100 [IU]/ML
8 INJECTION, SOLUTION INTRAVENOUS; SUBCUTANEOUS ONCE
Status: COMPLETED | OUTPATIENT
Start: 2019-11-08 | End: 2019-11-08

## 2019-11-08 RX ORDER — HYDROCODONE BITARTRATE AND ACETAMINOPHEN 5; 325 MG/1; MG/1
1 TABLET ORAL
COMMUNITY
End: 2019-11-18

## 2019-11-08 RX ORDER — HEPARIN SODIUM 5000 [USP'U]/ML
5000 INJECTION, SOLUTION INTRAVENOUS; SUBCUTANEOUS EVERY 12 HOURS
Status: DISCONTINUED | OUTPATIENT
Start: 2019-11-08 | End: 2019-11-11

## 2019-11-08 RX ORDER — SODIUM CHLORIDE 0.9 % (FLUSH) 0.9 %
10 SYRINGE (ML) INJECTION
Status: COMPLETED | OUTPATIENT
Start: 2019-11-08 | End: 2019-11-08

## 2019-11-08 RX ORDER — DEXTROSE MONOHYDRATE 100 MG/ML
0-250 INJECTION, SOLUTION INTRAVENOUS AS NEEDED
Status: DISCONTINUED | OUTPATIENT
Start: 2019-11-08 | End: 2019-11-18 | Stop reason: HOSPADM

## 2019-11-08 RX ORDER — ACETAMINOPHEN 325 MG/1
650 TABLET ORAL
Status: DISCONTINUED | OUTPATIENT
Start: 2019-11-08 | End: 2019-11-18 | Stop reason: HOSPADM

## 2019-11-08 RX ORDER — VANCOMYCIN HYDROCHLORIDE
1250
Status: DISCONTINUED | OUTPATIENT
Start: 2019-11-09 | End: 2019-11-12

## 2019-11-08 RX ORDER — GADOTERATE MEGLUMINE 376.9 MG/ML
15 INJECTION INTRAVENOUS
Status: COMPLETED | OUTPATIENT
Start: 2019-11-08 | End: 2019-11-08

## 2019-11-08 RX ORDER — VANCOMYCIN HYDROCHLORIDE
1250
Status: DISCONTINUED | OUTPATIENT
Start: 2019-11-08 | End: 2019-11-08 | Stop reason: DRUGHIGH

## 2019-11-08 RX ORDER — SODIUM CHLORIDE 0.9 % (FLUSH) 0.9 %
5-40 SYRINGE (ML) INJECTION EVERY 8 HOURS
Status: DISCONTINUED | OUTPATIENT
Start: 2019-11-08 | End: 2019-11-18 | Stop reason: HOSPADM

## 2019-11-08 RX ORDER — HYDRALAZINE HYDROCHLORIDE 20 MG/ML
10 INJECTION INTRAMUSCULAR; INTRAVENOUS
Status: DISCONTINUED | OUTPATIENT
Start: 2019-11-08 | End: 2019-11-11 | Stop reason: HOSPADM

## 2019-11-08 RX ADMIN — PIPERACILLIN SODIUM AND TAZOBACTAM SODIUM 3.38 G: 3; .375 INJECTION, POWDER, LYOPHILIZED, FOR SOLUTION INTRAVENOUS at 22:12

## 2019-11-08 RX ADMIN — Medication 10 ML: at 18:53

## 2019-11-08 RX ADMIN — HYDROCODONE BITARTRATE AND ACETAMINOPHEN 1 TABLET: 5; 325 TABLET ORAL at 13:58

## 2019-11-08 RX ADMIN — HEPARIN SODIUM 5000 UNITS: 5000 INJECTION INTRAVENOUS; SUBCUTANEOUS at 16:45

## 2019-11-08 RX ADMIN — INSULIN GLARGINE 22 UNITS: 100 INJECTION, SOLUTION SUBCUTANEOUS at 20:26

## 2019-11-08 RX ADMIN — HYDROCODONE BITARTRATE AND ACETAMINOPHEN 1 TABLET: 5; 325 TABLET ORAL at 20:26

## 2019-11-08 RX ADMIN — VANCOMYCIN HYDROCHLORIDE 1750 MG: 10 INJECTION, POWDER, LYOPHILIZED, FOR SOLUTION INTRAVENOUS at 11:11

## 2019-11-08 RX ADMIN — GADOTERATE MEGLUMINE 15 ML: 376.9 INJECTION INTRAVENOUS at 18:53

## 2019-11-08 RX ADMIN — Medication 10 ML: at 16:49

## 2019-11-08 RX ADMIN — HYDRALAZINE HYDROCHLORIDE 10 MG: 20 INJECTION INTRAMUSCULAR; INTRAVENOUS at 12:23

## 2019-11-08 RX ADMIN — Medication 10 ML: at 22:13

## 2019-11-08 RX ADMIN — PIPERACILLIN SODIUM AND TAZOBACTAM SODIUM 3.38 G: 3; .375 INJECTION, POWDER, LYOPHILIZED, FOR SOLUTION INTRAVENOUS at 13:57

## 2019-11-08 RX ADMIN — INSULIN LISPRO 8 UNITS: 100 INJECTION, SOLUTION INTRAVENOUS; SUBCUTANEOUS at 22:12

## 2019-11-08 NOTE — CONSULTS
Patient seen at bedside. Symptoms worsened since yesterday. Patient has been having increasing pain and swelling to the left foot accompanied by nausea and chills since last Sunday, but did not call my office for several days. A doxycycline prescription was called in but patient never picked it up. Presented to office yesterday. Recommended to go to ED due to worsening symptoms. Patient has previous history of noncompliance including delaying going to hospital for > 2 weeks last time an admission for osteomyelitis was recommended. Additionally, patient has left hospital AMA on previous admission. Patient has also admitted to myself and my office staff that she has been ambulating on foot more than previous noted. Reviewed patient x-rays both yesterday and today. New fragments noted on x-ray that were not noted on previous x-rays where prior calcified tissues were noted. Increased swelling and redness to RLE. Some new changes noted to base of fourth metatarsal.  Increased joint space widening noted to the lateral aspect of the 4th tarsometatarsal joint. Will obtain MRI to determine whether current symptoms are of an infectious etiology (abscess vs. Osteomyelitis) or could be indicative of Charcot neuroarthropathy. My partner Dr. Michael Velasquez to see patient over the weekend. Patient to be NWB to LLE.

## 2019-11-08 NOTE — ED TRIAGE NOTES
Patient had surgery to the left foot on 10/8/2019. Patient say Foot Doctor yesterdays and told to come to ED for further evaluation of possible infection to the left foot. She has note from MD stating MRI and IV antibiotics recommended  Patient reports redness and chills for 3 days. Patient states MD did \"shaving to foot\" yesterday and it is bleeding some today.

## 2019-11-08 NOTE — PROGRESS NOTES
Date of previous inpatient admission/ ED visit? Last admission was 10/07/2019-10/12/2019 for acute osteomyelitis of left foot. Pt RRAT score is 7- LOW. What brought the patient back to ED? Extremity Pain/ Injury    Did patient decline recommended services during last admission/ ED visit (if yes, what)? No    Has patient seen a provider since their last inpatient admission/ED visit (if yes, when)? No    CM Interventions:  From previous inpatient admission/ED visit: No CM needs noted at discharge. From current inpatient admission/ED visit: Pt being evaluated in the ED at this time. Disposition needs TBD/subject to change pending care recommendations. CM will assist with disposition needs as they arise. CM met with pt at bedside to discuss CM role and to assess pt needs. Pt reports no updates/changes to demographic information and reports no concerns for discharge at this time. Pt reports she has seen a PCP recently but reports she plans to change PCP to [address] [place] [address]. CM contacted [address] [place] [address] to verify; no upcoming appointment noted. Care Management Interventions  Palliative Care Criteria Met (RRAT>21 & CHF Dx)?: No  Mode of Transport at Discharge: Self  Transition of Care Consult (CM Consult): Other  MyChart Signup: No  Discharge Durable Medical Equipment: No  Physical Therapy Consult: No  Occupational Therapy Consult: No  Speech Therapy Consult: No  Current Support Network:  Other, Own Home  Confirm Follow Up Transport: Self  Plan discussed with Pt/Family/Caregiver: Yes  Discharge Location  Discharge Placement: Home(Disposition TBD/subject to change pending care recommendations)    Jordin Duran RN, BSN  Care Management Department

## 2019-11-08 NOTE — PROGRESS NOTES
PHYSICAL THERAPY EVALUATION/DISCHARGE  Patient: Tulio Ash (47 y.o. female)  Date: 11/8/2019  Primary Diagnosis: Left foot infection [L08.9]       Precautions:   NWB(LLE)      ASSESSMENT  Based on the objective data described below, the patient presents with new NWB status s/p L foot infection following left foot metatarsal excision, I&D and left second toe partial amputation 10/18. Foot is red and swollen. Patient has post op shoe. Educated on NWB status and the need for DME to maintain status. Patient admits to poor balance and requesting a RW instead of using crutches. Patient is mod I with mobility and using the RW well short distances in the room. Reviewed that she can not use the device on her front stairs and confirms that daughter will be present to assist. RW order placed and sent. Awaiting if there is a co-pay. Of note, if patient can not afford the co-pay she will reach out to her [employer] to see if anyone has one she can borrow. She can NOT go home without a device as she can not maintain NWB status without. Patient is aware. Further skilled acute physical therapy is not indicated at this time. PLAN :  Recommendation for discharge: (in order for the patient to meet his/her long term goals)  No skilled physical therapy/ follow up rehabilitation needs identified at this time. This discharge recommendation:  Has been made in collaboration with the attending provider and/or case management    IF patient discharges home will need the following DME: rolling walker, order placed and sent to [facility]       SUBJECTIVE:   Patient stated My balance hasn't been great. Crutches hurt my arms. 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, driving  Personal factors and/or comorbidities impacting plan of care: DM    Home Situation  Home Environment: Private residence  # Steps to Enter: 4  Rails to Enter: No  One/Two Story Residence: One story  Living Alone: No  Support Systems: Child(césar)  Patient Expects to be Discharged to[de-identified] Private residence  Current DME Used/Available at Home: (LLE post op shoe)    EXAMINATION/PRESENTATION/DECISION MAKING:   Hearing: Auditory  Auditory Impairment: None    Strength:    Strength: Within functional limits    Tone & Sensation:   Tone: Normal  Sensation: Impaired    Coordination:  Coordination: Within functional limits  Vision:    Decreased, cataracts  Functional Mobility:  Bed Mobility:  Supine to Sit: Independent  Sit to Supine: Independent     Transfers:  Sit to Stand: Supervision; Adaptive equipment  Stand to Sit: Modified independent  Bed to Chair: Supervision; Adaptive equipment     Balance:   Sitting: Intact  Standing: Intact; With support     Ambulation/Gait Training:  Distance (ft): 60 Feet (ft)  Assistive Device: Gait belt;Walker, rolling  Ambulation - Level of Assistance: Supervision; Adaptive equipment; Additional time  Gait Abnormalities: Antalgic;Decreased step clearance  Speed/Marian: Slow  Step Length: Right shortened       Physical Therapy Evaluation Charge Determination   History Examination Presentation Decision-Making   MEDIUM  Complexity : 1-2 comorbidities / personal factors will impact the outcome/ POC  LOW Complexity : 1-2 Standardized tests and measures addressing body structure, function, activity limitation and / or participation in recreation  LOW Complexity : Stable, uncomplicated  LOW Complexity : FOTO score of       Based on the above components, the patient evaluation is determined to be of the following complexity level: LOW Pain Rating:  Some pain reported to L foot    Activity Tolerance:   Good and SpO2 stable on RA  Please refer to the flowsheet for vital signs taken during this treatment. After treatment patient left in no apparent distress:   Supine in bed and Call bell within reach, discussed elevated LLE in bed    COMMUNICATION/EDUCATION:   The patients plan of care was discussed with: Registered Nurse. Fall prevention education was provided and the patient/caregiver indicated understanding., Patient/family have participated as able in goal setting and plan of care. and Patient/family agree to work toward stated goals and plan of care.     Thank you for this referral.  Kurtis Hightower, PT, DPT  Geriatric Clinical Specialist    Time Calculation: 15 mins

## 2019-11-08 NOTE — H&P
History & Physical    Primary Care Provider: None  Source of Information: Patient     History of Presenting Illness:   Ms Emma Celeste is a 56-year-old female with PMhx significant for uncontrolled hypertension, diabetes, recurrent foot infection with osteomyelitis who was sent from her podiatrist office for worsening left foot swelling, pain and redness. Patient was recently discharged from hospital after left foot metatarsal excision, I&D and left second toe partial amputation on oral doxycycline. Wound culture grew MSSA and biopsy of the bone showed surgical viable margin from the second toe and focal extension of osteo to inked margin on the fifth toe. At the time it was felt that surgical cure of osteomyelitis was achieved by podiatry. Since her discharge patient was seen by podiatry twice, last one being yesterday. But due to worsening of left foot swelling, pain, redness she was sent to the ER for further evaluation. Patient denies fever, abdominal pain, nausea, vomiting, no bowel or bladder abnormality. She has pain on foot and chill. Review of Systems:  Review of Systems   Constitutional: Negative for activity change, appetite change and fatigue. HENT: Negative for ear pain, facial swelling, sore throat and trouble swallowing.    Eyes: No visual disturbance. Negative for pain and discharge. Respiratory: Negative for chest tightness, shortness of breath and wheezing.    Cardiovascular: Negative for chest pain and palpitations. Gastrointestinal: Negative for abdominal pain, blood in stool, nausea and vomiting. Genitourinary: Negative for difficulty urinating, flank pain and hematuria. Musculoskeletal: Negative for arthralgias, joint swelling, myalgias and neck pain. Skin: Negative for color change and rash. Neurological: Negative for  dizziness, headache, weakness or numbness. Hematological: Negative for adenopathy. Does not bruise/bleed easily. Psychiatric/Behavioral: Negative for behavioral problems, confusion and sleep disturbance.   All other systems reviewed and are negative. Past Medical History:   Diagnosis Date    Cataract     Cataracts, bilateral     Diabetes ([facility] [address]     Osteomyelitis ([facility] [address]       Past Surgical History:   Procedure Laterality Date    HX CATARACT REMOVAL  2/2011; 5-11    right eye; left    HX TONSIL AND ADENOIDECTOMY  1985    HX TONSILLECTOMY  1984    HX TUBAL LIGATION  1997     Prior to Admission medications    Medication Sig Start Date End Date Taking? Authorizing Provider   HYDROcodone-acetaminophen (NORCO) 5-325 mg per tablet Take 1 Tab by mouth nightly as needed for Pain. Yes Provider, Historical   lisinopril (PRINIVIL, ZESTRIL) 20 mg tablet Take 1 Tab by mouth daily for 30 days. Patient taking differently: Take 20 mg by mouth daily. Every evening 10/11/19 11/10/19 Yes Mitchell Hamilton MD   amLODIPine (NORVASC) 5 mg tablet Take 1 Tab by mouth daily for 30 days. 10/11/19 11/10/19 Yes Mitchell Hamilton MD   insulin regular (NOVOLIN R REGULAR U-100 INSULN) 100 unit/mL injection by SubCUTAneous route three (3) times daily (with meals). Sliding scale insulin   Yes Provider, Historical   insulin detemir U-100 (LEVEMIR U-100 INSULIN) 100 unit/mL injection 22 Units by SubCUTAneous route two (2) times a day.    Yes Provider, Historical     No Known Allergies   Family History   Problem Relation Age of Onset    Hypertension Mother     Cancer Father         prostate    Diabetes Maternal Grandmother     Hypertension Maternal Grandmother         SOCIAL HISTORY:  Patient resides:  Independently x   Assisted Living    SNF    With family care       Smoking history:   None x   Former    Chronic      Alcohol history:   None x   Social    Chronic      Ambulates:   Independently x   w/cane    w/walker    w/wc    CODE STATUS:  DNR    Full x   Other      Objective:     Physical Exam:     Visit Vitals  BP (!) 218/107 (BP 1 Location: Left arm, BP Patient Position: At rest)   Pulse 93   Temp 98.1 °F (36.7 °C)   Resp 18   LMP 05/01/2011   SpO2 99%      O2 Device: Room air    General:  Alert, cooperative, no distress, appears stated age. Head:  Normocephalic, without obvious abnormality, atraumatic. Eyes:  Conjunctivae/corneas clear. PERRL, EOMs intact. Nose: Nares normal. Septum midline. Mucosa normal. No drainage or sinus tenderness. Throat: Lips, mucosa, and tongue normal. Teeth and gums normal.   Neck: Supple, symmetrical, trachea midline, no adenopathy, thyroid: no enlargement/tenderness/nodules, no carotid bruit and no JVD. Back:   Symmetric, no curvature. ROM normal. No CVA tenderness. Lungs:   Clear to auscultation bilaterally. Chest wall:  No tenderness or deformity. Heart:  Regular rate and rhythm, S1, S2 normal, no murmur, click, rub or gallop. Abdomen:   Soft, non-tender. Bowel sounds normal. No masses,  No organomegaly. Extremities: Left Lower ext red all the way to the knee with +2 pitting edema. The left foot with redness, warm, with +2 pitting edema and superficial healing wound the sole side at the lateral metatarsal region. Pulses: 2+ and symmetric all extremities. Skin: Skin color, texture, turgor normal. No rashes or lesions   Neurologic: CNII-XII intact. Data Review:     Recent Days:  Recent Labs     11/08/19  1005   WBC 6.4   HGB 9.6*   HCT 31.8*        Recent Labs     11/08/19  1005      K 4.2      CO2 29   *   BUN 33*   CREA 1.22*   CA 9.0   ALB 2.7*   TBILI 0.2   SGOT 17   ALT 21     No results for input(s): PH, PCO2, PO2, HCO3, FIO2 in the last 72 hours.     24 Hour Results:  Recent Results (from the past 24 hour(s))   CBC WITH AUTOMATED DIFF    Collection Time: 11/08/19 10:05 AM   Result Value Ref Range    WBC 6.4 3.6 - 11.0 K/uL    RBC 3.54 (L) 3.80 - 5.20 M/uL    HGB 9.6 (L) 11.5 - 16.0 g/dL    HCT 31.8 (L) 35.0 - 47.0 %    MCV 89.8 80.0 - 99.0 FL    MCH 27.1 26.0 - 34.0 PG    MCHC 30.2 30.0 - 36.5 g/dL    RDW 15.8 (H) 11.5 - 14.5 %    PLATELET 742 640 - 955 K/uL    MPV 11.3 8.9 - 12.9 FL    NRBC 0.0 0  WBC    ABSOLUTE NRBC 0.00 0.00 - 0.01 K/uL    NEUTROPHILS 70 32 - 75 %    LYMPHOCYTES 20 12 - 49 %    MONOCYTES 8 5 - 13 %    EOSINOPHILS 1 0 - 7 %    BASOPHILS 1 0 - 1 %    IMMATURE GRANULOCYTES 0 0.0 - 0.5 %    ABS. NEUTROPHILS 4.5 1.8 - 8.0 K/UL    ABS. LYMPHOCYTES 1.3 0.8 - 3.5 K/UL    ABS. MONOCYTES 0.5 0.0 - 1.0 K/UL    ABS. EOSINOPHILS 0.1 0.0 - 0.4 K/UL    ABS. BASOPHILS 0.0 0.0 - 0.1 K/UL    ABS. IMM. GRANS. 0.0 0.00 - 0.04 K/UL    DF AUTOMATED     METABOLIC PANEL, COMPREHENSIVE    Collection Time: 11/08/19 10:05 AM   Result Value Ref Range    Sodium 140 136 - 145 mmol/L    Potassium 4.2 3.5 - 5.1 mmol/L    Chloride 106 97 - 108 mmol/L    CO2 29 21 - 32 mmol/L    Anion gap 5 5 - 15 mmol/L    Glucose 199 (H) 65 - 100 mg/dL    BUN 33 (H) 6 - 20 MG/DL    Creatinine 1.22 (H) 0.55 - 1.02 MG/DL    BUN/Creatinine ratio 27 (H) 12 - 20      GFR est AA 57 (L) >60 ml/min/1.73m2    GFR est non-AA 47 (L) >60 ml/min/1.73m2    Calcium 9.0 8.5 - 10.1 MG/DL    Bilirubin, total 0.2 0.2 - 1.0 MG/DL    ALT (SGPT) 21 12 - 78 U/L    AST (SGOT) 17 15 - 37 U/L    Alk. phosphatase 228 (H) 45 - 117 U/L    Protein, total 7.5 6.4 - 8.2 g/dL    Albumin 2.7 (L) 3.5 - 5.0 g/dL    Globulin 4.8 (H) 2.0 - 4.0 g/dL    A-G Ratio 0.6 (L) 1.1 - 2.2     SAMPLES BEING HELD    Collection Time: 11/08/19 10:05 AM   Result Value Ref Range    SAMPLES BEING HELD 1RED,1BLUE     COMMENT        Add-on orders for these samples will be processed based on acceptable specimen integrity and analyte stability, which may vary by analyte.    SED RATE (ESR)    Collection Time: 11/08/19 10:05 AM   Result Value Ref Range    Sed rate, automated 77 (H) 0 - 20 mm/hr   C REACTIVE PROTEIN, QT    Collection Time: 11/08/19 10:05 AM   Result Value Ref Range    C-Reactive protein 2.05 (H) 0.00 - 0.60 mg/dL POC LACTIC ACID    Collection Time: 11/08/19 10:09 AM   Result Value Ref Range    Lactic Acid (POC) 1.33 0.40 - 2.00 mmol/L         Imaging:     Assessment:     # Acute on chronic nonhealing left foot diabetic infection r/o osteomyelitis of the left foot  # POOJA   # DM type 2 insulin-dependent uncontrolled with hyperglycemia  # Uncontrolled hypertension       Plan:     # Acute on chronic nonhealing left foot diabetic infection r/o osteomyelitis of the left foot  - Recurrent surgeries to the left foot with last one being a month ago  - Elevated CRP  - obtain MRI left foot w/wo contrast  - Start on Zosyn and vancomycin  - Podiatry consult  - Left Doppler ultrasound to rule out DVT  - elevate the foot       # POOJA :   - Start on gentle hydration since patient also going to receive contrast.   - hold lisinopril  - Avoid nephrotoxin and renally dose medications     # DM type 2 insulin-dependent uncontrolled with hyperglycemia:   - SSI, Lantus, Accu-Chek, diabetic diet    # Uncontrolled hypertension: Resume amlodipine- increase dose, hold Lisinopril on the base of POOJA, PRN hydralazine     DVT prophylaxis: Heparin  Code: Full  Patient walks independently    Dispo: home          Signed By: Torrey Wu MD     November 8, 2019

## 2019-11-08 NOTE — PROGRESS NOTES
Pharmacist Note - Vancomycin Dosing    Consult provided for this 51 y.o. female for indication of foot infection. -(abscess vs. Osteomyelitis) or could be indicative of Charcot neuroarthropathy. Antibiotic regimen(s): zosyn    Recent Labs     19  1005   WBC 6.4   CREA 1.22*   BUN 33*     Frequency of BMP: x1 for tomorrow  Height: 167.6cm  Weight: 78 kg  Est CrCl: 59.5 ml/min   Temp (24hrs), Av.1 °F (36.7 °C), Min:98.1 °F (36.7 °C), Max:98.1 °F (36.7 °C)    Cultures:   blood - NGTD    Goal trough = 15 - 20 mcg/mL (until osteo ruled out)    Therapy will be initiated with a loading dose of 1750 mg IV x 1. Will follow with a maintenance regimen of 1250mg Q18h. Will monitor renal function closely as pt currently has POOJA. Pharmacy to follow patient daily and order levels / make dose adjustments as appropriate.

## 2019-11-08 NOTE — ED PROVIDER NOTES
51 y.o. female with past medical history significant for DM type 2, and osteomyelitis who presents from home with chief complaint of post-op complication. Pt complains of redness and pain near the surgical site on her left foot with associated nausea, and chills for the past 3 days. Pt was operated on by Dr. Velia Vences, on 10/8/2019. Pt states she was seen by Dr. Angela Valerio yesterday and referred to the ED for MRI and Iv antibiotics. There are no other acute medical concerns at this time. Social hx: Never Smoker. Rare EtOH Use. PCP: None    Note written by Rashawn Kruse, as dictated by Kayleen Hadley MD 10:32 AM      The history is provided by the patient and medical records.         Past Medical History:   Diagnosis Date    Cataract     Cataracts, bilateral     Diabetes ([facility] [address]     Osteomyelitis ([facility] [address]        Past Surgical History:   Procedure Laterality Date    HX CATARACT REMOVAL  2/2011; 5-11    right eye; left    HX TONSIL AND ADENOIDECTOMY  1985    HX TONSILLECTOMY  1984    HX TUBAL LIGATION  1997         Family History:   Problem Relation Age of Onset    Hypertension Mother     Cancer Father         prostate    Diabetes Maternal Grandmother     Hypertension Maternal Grandmother        Social History     Socioeconomic History    Marital status:      Spouse name: Not on file    Number of children: Not on file    Years of education: Not on file    Highest education level: Not on file   Occupational History    Not on file   Social Needs    Financial resource strain: Not on file    Food insecurity:     Worry: Not on file     Inability: Not on file    Transportation needs:     Medical: Not on file     Non-medical: Not on file   Tobacco Use    Smoking status: Never Smoker    Smokeless tobacco: Never Used   Substance and Sexual Activity    Alcohol use: Yes     Comment: rarely    Drug use: No    Sexual activity: Not Currently   Lifestyle    Physical activity: Days per week: Not on file     Minutes per session: Not on file    Stress: Not on file   Relationships    Social connections:     Talks on phone: Not on file     Gets together: Not on file     Attends Mandaen service: Not on file     Active member of club or organization: Not on file     Attends meetings of clubs or organizations: Not on file     Relationship status: Not on file    Intimate partner violence:     Fear of current or ex partner: Not on file     Emotionally abused: Not on file     Physically abused: Not on file     Forced sexual activity: Not on file   Other Topics Concern    Not on file   Social History Narrative    ** Merged History Encounter **              ALLERGIES: Patient has no known allergies. Review of Systems   Constitutional: Positive for chills. Negative for fever. HENT: Negative for congestion. Eyes: Negative for visual disturbance. Respiratory: Negative for cough and shortness of breath. Cardiovascular: Negative for chest pain and palpitations. Gastrointestinal: Positive for nausea. Negative for abdominal pain. Genitourinary: Negative for dysuria, frequency, hematuria and urgency. Musculoskeletal: Positive for arthralgias. Negative for gait problem, neck pain and neck stiffness. Skin: Positive for color change. Neurological: Negative for dizziness, weakness, numbness and headaches. All other systems reviewed and are negative. Vitals:    11/08/19 0955   BP: (!) 218/107   Pulse: 93   Resp: 18   Temp: 98.1 °F (36.7 °C)   SpO2: 99%            Physical Exam   Constitutional: She appears well-developed and well-nourished. No distress. HENT:   Head: Normocephalic and atraumatic. Eyes: Conjunctivae are normal.   Neck: Neck supple. Cardiovascular: Normal rate and regular rhythm. Pulmonary/Chest: Effort normal. No respiratory distress. Abdominal: She exhibits no distension. Musculoskeletal: Normal range of motion. She exhibits no deformity.    Left foot mild erythema, slight warmth, no tenderness or decreased range of motion. Well-healed surgical incision distally over toe. Neurological: She is alert. No cranial nerve deficit. Skin: Skin is warm and dry. Psychiatric: Her behavior is normal.   Nursing note and vitals reviewed. MDM     67-year-old female presents after being evaluated by podiatry yesterday in clinic with concern for possible left foot infection. She has notable warmth and soft tissue swelling with mild erythema of the left foot. They were concerned about imaging. I discussed with the podiatrist Dr. Salma Oates who recommended MRI with and without contrast of the left foot and empiric IV antibiotics. They will consult for further management. Labs reassuring with only mild infiltrate marker elevation. Not septic appearing. Procedures    CONSULT NOTE:  10:46 AM Cynda Peabody, MD spoke with Dr. Latia Choi, Consult for Podiatry. Discussed available diagnostic tests and clinical findings. Dr. Latia Choi states the patient has been complaining of redness and tenderness to the left foot for the past couple days with nausea and chills prior to that. Patient had an x-ray yesterday that showed the 4th metatarsal was dislocated and there were some possible bone fragments around the area. Recommended labs and further imaging. Hospitalist [employer] Message for Admission  11:02 AM    ED Room Number: ER05/05  Patient Name and age:  Travon Goldstein 51 y.o.  female  Working Diagnosis:   1.  Left foot infection      Readmission: no  Isolation Requirements:  no  Recommended Level of Care:  med/surg  Code Status:  Full

## 2019-11-08 NOTE — ROUTINE PROCESS
TRANSFER - OUT REPORT: 
 
Verbal report given to RN (name) on Jonatan Mendez  being transferred to [facility] (unit) for routine progression of care Report consisted of patients Situation, Background, Assessment and  
Recommendations(SBAR). Information from the following report(s) SBAR and ED Summary was reviewed with the receiving nurse. Lines:  
Peripheral IV 11/08/19 Right Wrist (Active) Site Assessment Clean, dry, & intact 11/8/2019 10:07 AM  
Phlebitis Assessment 0 11/8/2019 10:07 AM  
Infiltration Assessment 0 11/8/2019 10:07 AM  
Dressing Status Clean, dry, & intact 11/8/2019 10:07 AM  
Dressing Type Transparent 11/8/2019 10:07 AM  
Hub Color/Line Status Pink;Flushed;Patent 11/8/2019 10:07 AM  
Action Taken Blood drawn 11/8/2019 10:07 AM  
   
Peripheral IV 11/08/19 Left Antecubital (Active) Site Assessment Clean, dry, & intact 11/8/2019 10:15 AM  
Phlebitis Assessment 0 11/8/2019 10:15 AM  
Infiltration Assessment 0 11/8/2019 10:15 AM  
Dressing Status Clean, dry, & intact 11/8/2019 10:15 AM  
Dressing Type Transparent 11/8/2019 10:15 AM  
Hub Color/Line Status Pink;Flushed;Patent 11/8/2019 10:15 AM  
Action Taken Blood drawn 11/8/2019 10:15 AM  
  
 
Opportunity for questions and clarification was provided. Patient transported with: 
 tech

## 2019-11-08 NOTE — ROUTINE PROCESS
The Rehabilitation department at [facility] has ordered the following durable medical equipment (DME):  
rolling walker From: 
[employer] [phone] If the rehab department or DME company is waiting for insurance approval for the equipment and the patient decides to discharge from the hospital before the medical equipment arrives, the patient may contact the company above to work out the delivery. Please keep in mind that some DME companies WILL NOT deliver to the home. Insurance companies and DME companies are not open on the weekends to approve authorization and deliver to the hospital. Therefore it is the patient's responsibility to figure out a way to access the DME medical equipment. Thank you so much for your help as we provide the equipment the patient requires.

## 2019-11-08 NOTE — PROGRESS NOTES
Admission Medication Reconciliation:    Information obtained from:  Patient  RxQuery data available¹:  YES    Comments/Recommendations: Spoke with patient regarding use of PTA medications including prescription/OTC, vitamins/supplements, inhaled, topical, nasal, injectable, otic and ophthalmic medications. Patient was a good historian. Updated PTA meds/reviewed patient's allergies. Of note, patient states she has not needed as much Novolin sliding scale, meal-time insulin here recently as blood sugars have been more controlled since surgery. States last time she needed it was yesterday morning and administered 3 units. Medication changes (since last review): Added  - Hydrocodone-APAP    Adjusted  - Insulin detemir (from 30 units to 22 units BID)    Removed  - None     ¹RxQuery pharmacy benefit data reflects medications filled and processed through the patient's insurance, however   this data does NOT capture whether the medication was picked up or is currently being taken by the patient. Allergies:  Patient has no known allergies. Significant PMH/Disease States:   Past Medical History:   Diagnosis Date    Cataract     Cataracts, bilateral     Diabetes ([facility] [address]     Osteomyelitis ([facility] [address]      Chief Complaint for this Admission:    Chief Complaint   Patient presents with    Foot Pain    Chills    Post-Op Problem     Prior to Admission Medications:   Prior to Admission Medications   Prescriptions Last Dose Informant Patient Reported? Taking? HYDROcodone-acetaminophen (NORCO) 5-325 mg per tablet 2019  Yes Yes   Sig: Take 1 Tab by mouth nightly as needed for Pain. amLODIPine (NORVASC) 5 mg tablet 2019 at AM  No Yes   Sig: Take 1 Tab by mouth daily for 30 days. insulin detemir U-100 (LEVEMIR U-100 INSULIN) 100 unit/mL injection 2019 at PM  Yes Yes   Si Units by SubCUTAneous route two (2) times a day.    insulin regular (NOVOLIN R REGULAR U-100 INSULN) 100 unit/mL injection 2019 at Unknown time  Yes Yes   Sig: by SubCUTAneous route three (3) times daily (with meals). Sliding scale insulin   lisinopril (PRINIVIL, ZESTRIL) 20 mg tablet 11/7/2019 at PM  No Yes   Sig: Take 1 Tab by mouth daily for 30 days. Patient taking differently: Take 20 mg by mouth daily. Every evening      Facility-Administered Medications: None       Please contact the main inpatient pharmacy with any questions or concerns at [phone] and we will direct you to the clinical pharmacist covering this patient's care while in-house.    SHERYL Mcmillan

## 2019-11-09 LAB
ERYTHROCYTE [DISTWIDTH] IN BLOOD BY AUTOMATED COUNT: 15.8 % (ref 11.5–14.5)
GLUCOSE BLD STRIP.AUTO-MCNC: 135 MG/DL (ref 65–100)
GLUCOSE BLD STRIP.AUTO-MCNC: 240 MG/DL (ref 65–100)
GLUCOSE BLD STRIP.AUTO-MCNC: 49 MG/DL (ref 65–100)
GLUCOSE BLD STRIP.AUTO-MCNC: 50 MG/DL (ref 65–100)
GLUCOSE BLD STRIP.AUTO-MCNC: 50 MG/DL (ref 65–100)
GLUCOSE BLD STRIP.AUTO-MCNC: 85 MG/DL (ref 65–100)
GLUCOSE BLD STRIP.AUTO-MCNC: 93 MG/DL (ref 65–100)
HCT VFR BLD AUTO: 24.9 % (ref 35–47)
HGB BLD-MCNC: 7.6 G/DL (ref 11.5–16)
MCH RBC QN AUTO: 26.9 PG (ref 26–34)
MCHC RBC AUTO-ENTMCNC: 30.5 G/DL (ref 30–36.5)
MCV RBC AUTO: 88 FL (ref 80–99)
NRBC # BLD: 0 K/UL (ref 0–0.01)
NRBC BLD-RTO: 0 PER 100 WBC
PLATELET # BLD AUTO: 292 K/UL (ref 150–400)
PMV BLD AUTO: 10.8 FL (ref 8.9–12.9)
RBC # BLD AUTO: 2.83 M/UL (ref 3.8–5.2)
SERVICE CMNT-IMP: ABNORMAL
SERVICE CMNT-IMP: NORMAL
SERVICE CMNT-IMP: NORMAL
WBC # BLD AUTO: 5.3 K/UL (ref 3.6–11)

## 2019-11-09 PROCEDURE — 85027 COMPLETE CBC AUTOMATED: CPT

## 2019-11-09 PROCEDURE — 74011250636 HC RX REV CODE- 250/636: Performed by: HOSPITALIST

## 2019-11-09 PROCEDURE — 36415 COLL VENOUS BLD VENIPUNCTURE: CPT

## 2019-11-09 PROCEDURE — 74011636637 HC RX REV CODE- 636/637: Performed by: INTERNAL MEDICINE

## 2019-11-09 PROCEDURE — 74011250636 HC RX REV CODE- 250/636: Performed by: INTERNAL MEDICINE

## 2019-11-09 PROCEDURE — 74011250637 HC RX REV CODE- 250/637: Performed by: HOSPITALIST

## 2019-11-09 PROCEDURE — 74011250637 HC RX REV CODE- 250/637: Performed by: INTERNAL MEDICINE

## 2019-11-09 PROCEDURE — 82962 GLUCOSE BLOOD TEST: CPT

## 2019-11-09 PROCEDURE — 65270000032 HC RM SEMIPRIVATE

## 2019-11-09 PROCEDURE — 74011000258 HC RX REV CODE- 258: Performed by: INTERNAL MEDICINE

## 2019-11-09 RX ORDER — CLONIDINE HYDROCHLORIDE 0.1 MG/1
0.1 TABLET ORAL
Status: DISCONTINUED | OUTPATIENT
Start: 2019-11-09 | End: 2019-11-18 | Stop reason: HOSPADM

## 2019-11-09 RX ORDER — LISINOPRIL 20 MG/1
20 TABLET ORAL EVERY EVENING
Status: DISCONTINUED | OUTPATIENT
Start: 2019-11-09 | End: 2019-11-18 | Stop reason: HOSPADM

## 2019-11-09 RX ORDER — SODIUM CHLORIDE 9 MG/ML
75 INJECTION, SOLUTION INTRAVENOUS CONTINUOUS
Status: DISCONTINUED | OUTPATIENT
Start: 2019-11-09 | End: 2019-11-09

## 2019-11-09 RX ORDER — HYDRALAZINE HYDROCHLORIDE 50 MG/1
50 TABLET, FILM COATED ORAL 3 TIMES DAILY
Status: DISCONTINUED | OUTPATIENT
Start: 2019-11-09 | End: 2019-11-18 | Stop reason: HOSPADM

## 2019-11-09 RX ADMIN — PIPERACILLIN SODIUM AND TAZOBACTAM SODIUM 3.38 G: 3; .375 INJECTION, POWDER, LYOPHILIZED, FOR SOLUTION INTRAVENOUS at 20:48

## 2019-11-09 RX ADMIN — Medication 10 ML: at 12:35

## 2019-11-09 RX ADMIN — Medication 10 ML: at 07:38

## 2019-11-09 RX ADMIN — SODIUM CHLORIDE 75 ML/HR: 900 INJECTION, SOLUTION INTRAVENOUS at 08:42

## 2019-11-09 RX ADMIN — HYDRALAZINE HYDROCHLORIDE 50 MG: 50 TABLET, FILM COATED ORAL at 22:19

## 2019-11-09 RX ADMIN — CLONIDINE HYDROCHLORIDE 0.1 MG: 0.1 TABLET ORAL at 13:33

## 2019-11-09 RX ADMIN — LISINOPRIL 20 MG: 20 TABLET ORAL at 17:32

## 2019-11-09 RX ADMIN — PIPERACILLIN SODIUM AND TAZOBACTAM SODIUM 3.38 G: 3; .375 INJECTION, POWDER, LYOPHILIZED, FOR SOLUTION INTRAVENOUS at 04:57

## 2019-11-09 RX ADMIN — HUMAN INSULIN 3 UNITS: 100 INJECTION, SOLUTION SUBCUTANEOUS at 07:37

## 2019-11-09 RX ADMIN — HYDRALAZINE HYDROCHLORIDE 10 MG: 20 INJECTION INTRAMUSCULAR; INTRAVENOUS at 08:36

## 2019-11-09 RX ADMIN — INSULIN LISPRO 2 UNITS: 100 INJECTION, SOLUTION INTRAVENOUS; SUBCUTANEOUS at 22:19

## 2019-11-09 RX ADMIN — AMLODIPINE BESYLATE 10 MG: 5 TABLET ORAL at 08:36

## 2019-11-09 RX ADMIN — Medication 10 ML: at 22:20

## 2019-11-09 RX ADMIN — INSULIN GLARGINE 22 UNITS: 100 INJECTION, SOLUTION SUBCUTANEOUS at 19:37

## 2019-11-09 RX ADMIN — HYDROCODONE BITARTRATE AND ACETAMINOPHEN 1 TABLET: 5; 325 TABLET ORAL at 08:10

## 2019-11-09 RX ADMIN — PIPERACILLIN SODIUM AND TAZOBACTAM SODIUM 3.38 G: 3; .375 INJECTION, POWDER, LYOPHILIZED, FOR SOLUTION INTRAVENOUS at 12:33

## 2019-11-09 RX ADMIN — HYDROCODONE BITARTRATE AND ACETAMINOPHEN 1 TABLET: 5; 325 TABLET ORAL at 17:32

## 2019-11-09 RX ADMIN — Medication 10 ML: at 12:34

## 2019-11-09 RX ADMIN — HEPARIN SODIUM 5000 UNITS: 5000 INJECTION INTRAVENOUS; SUBCUTANEOUS at 17:32

## 2019-11-09 RX ADMIN — HUMAN INSULIN 3 UNITS: 100 INJECTION, SOLUTION SUBCUTANEOUS at 12:33

## 2019-11-09 RX ADMIN — VANCOMYCIN HYDROCHLORIDE 1250 MG: 10 INJECTION, POWDER, LYOPHILIZED, FOR SOLUTION INTRAVENOUS at 04:51

## 2019-11-09 RX ADMIN — VANCOMYCIN HYDROCHLORIDE 1250 MG: 10 INJECTION, POWDER, LYOPHILIZED, FOR SOLUTION INTRAVENOUS at 22:45

## 2019-11-09 RX ADMIN — HYDROCODONE BITARTRATE AND ACETAMINOPHEN 1 TABLET: 5; 325 TABLET ORAL at 04:57

## 2019-11-09 RX ADMIN — HYDRALAZINE HYDROCHLORIDE 10 MG: 20 INJECTION INTRAMUSCULAR; INTRAVENOUS at 20:48

## 2019-11-09 RX ADMIN — INSULIN GLARGINE 22 UNITS: 100 INJECTION, SOLUTION SUBCUTANEOUS at 08:37

## 2019-11-09 NOTE — PROGRESS NOTES
Bedside shift change report given to brianne (oncoming nurse) by korina (offgoing nurse). Report included the following information SBAR and Kardex.

## 2019-11-09 NOTE — PROGRESS NOTES
HYPOGLYCEMIC EPISODE DOCUMENTATION    Patient with hypoglycemic episode(s) at 1710(time) on 11/9/2019(date). BG value(s) pre-treatment 48    Was patient symptomatic?  [] yes, [] no  Patient was treated with the following rescue medications/treatments: [] D50                [] Glucose tablets                [] Glucagon                [] 4oz juice                [] 6oz reg soda                [] 8oz low fat milk  BG value post-treatment: 85  Once BG treated and value greater than 80mg/dl, pt was provided with the following:  [] snack  [x] meal  Name of MD notified:Sven  The following orders were received: none since pt came up after eating dinner

## 2019-11-09 NOTE — CONSULTS
[address]    Name:  Annabel Solano  MR#:  [id number]  :  1970  ACCOUNT #:  [de-identified]  DATE OF SERVICE:  2019      HISTORY OF PRESENT ILLNESS:  The patient is a 67-year-old female who is well known to me from my office and prior admission. The patient previously has an extensive history of left diabetic foot wounds and had previously been sent to me for evaluation of left foot pain. MRI was done several months ago and it was recommended that MRI had found the abscess and osteomyelitis. The patient was recommended at that time to present to the [facility] Emergency Department for admission as well as for an I and D, and recommended transmetatarsal amputation of the left foot. The patient declined to present to the hospital for several weeks. When she did present to the hospital, she would only consent to a partial left second toe amputation and excision of the left fifth metatarsal.  She would not consent to the excision of any other associated bone fragments or toes of the foot due to cosmetic concerns. The patient was taken to the surgery at that time, a surgical cure had been obtained; however, the patient did have a plantar wound to the foot, which she was told that she was going to be needed to pack. Before the patient was able to be discharged, she chose to leave the hospital against the medical advice and had left on p.o. doxycycline. She had been dressing the foot herself with packing to the plantar foot wound. The foot wound did seem to be healing uneventfully the last time I had seen her. The patient called the office earlier this week noting that she had been having extensive chills and increased swelling and pain to the left foot accompanied by nausea. She noted that she has waited several days before calling our office. At that time, we had called in an oral prescription for doxycycline and the patient neglected to take it up.   When she presented to my office yesterday, she noted that she was continuing to be worsened. She also noted to being on the foot much more than she had previously been recommended to do. The patient also has a significant history of uncontrolled diabetes. At that point, I have recommended that the patient present to the Emergency Department for further evaluation to determine whether it is of an infectious etiology such as a recurrent abscess from the previous plantar wounds or some recurrent osteomyelitis versus possible Charcot neuroarthropathy as the patient office x-rays as well as her current x-rays in the ED shows some mild dislocation of the fourth tarsometatarsal joint as well as increased swelling and redness which could indicate Charcot arthropathy. The patient stated that she would immediately present to the Emergency Department, but however, did not present until today. The patient relates that her nausea and chills have increased and that her retained redness and swelling to the lower extremity has increased as well. ACTIVE PROBLEM LIST:  1. Previous history of extensive left foot infections with osteomyelitis. 2.  Diabetes type 2 uncontrolled. 3.  Uncontrolled hypertension. 4.  Anxiety. 5.  Past medical history also includes cataract. PAST SURGICAL HISTORY:  Left fifth metatarsal excision and left partial second toe amputation, history of cataract removal, history of tonsil and adenoidectomy, history of tubal ligation. MEDICATIONS:  No current facility-administered medications on file prior to encounter. Current Outpatient Medications on File Prior to Encounter   Medication Sig Dispense Refill    HYDROcodone-acetaminophen (NORCO) 5-325 mg per tablet Take 1 Tab by mouth nightly as needed for Pain.  [] lisinopril (PRINIVIL, ZESTRIL) 20 mg tablet Take 1 Tab by mouth daily for 30 days. (Patient taking differently: Take 20 mg by mouth daily.  Every evening) 30 Tab 0    [] amLODIPine (NORVASC) 5 mg tablet Take 1 Tab by mouth daily for 30 days. 30 Tab 0    insulin regular (NOVOLIN R REGULAR U-100 INSULN) 100 unit/mL injection by SubCUTAneous route three (3) times daily (with meals). Sliding scale insulin      insulin detemir U-100 (LEVEMIR U-100 INSULIN) 100 unit/mL injection 22 Units by SubCUTAneous route two (2) times a day. SOCIAL HISTORY:  The patient has never smoked. She does have occasional social alcohol use. FAMILY HISTORY:  Mother hypertension. Father cancer of prostate. Maternal grandmother diabetes as well as hypertension. ALLERGIES:   NO KNOWN ALLERGIES. REVIEW OF SYSTEMS:  A complete review of systems was performed and noted as above. PHYSICAL EXAMINATION:  GENERAL:  The patient is alert and oriented to person, time, and place. VITAL SIGNS:  The patient's current vital signs are blood pressure 175/55, O2 sat 99, pulse 93, respiration rate 16, however, it was noted that when the patient originally presented to the Emergency Department, her blood pressure was very elevated and uncontrolled. HEENT:  Head normocephalic, atraumatic. LUNGS:  Normal respiratory effort. ABDOMEN:  Soft, nontender. VASCULAR:  Palpable dorsalis pedis and posterior tibial pulses are 2/4. Capillary refill time less than 3 seconds. Significant redness, erythema, and edema to the left lower extremity in comparison to the right with edema and mild erythema extending up to the calf. The left lower extremity is notably warmer than the other foot. SENSATION:  The patient has protective sensation absent to the right foot. The patient is able to note pain to the left foot at this time, but does not have intact fine touch sensation. MUSCULOSKELETAL:  Previous prior partial left second toe amputation and fifth metatarsal excision noted. Decreased range of motion of the left foot, increased tenderness to touch.   DERMATOLOGICAL:  No open wounds at this time, however, the area of the patient's prior plantar lateral left foot wound does have recurrent callus that occurred yesterday and is extremely red and tender to that area. No fluctuant mass noted. LABORATORY DATA:  All pertinent laboratory studies reviewed, imaging reviewed, and reviewed the patient's prior x-rays and reviewed the radiology report. When reviewing by myself as well as I have noticed that there is increased joint space widening to the lateral aspect of the fourth tarsometatarsal joint in comparison to the prior x-rays from last month indicating that there is some slight dislocation of that joint as well as questionable bony fragments few months earlier more consistent with the appearance of calcified tissues, however, there is an increased opacity on this one indicating bone fragments . IMPRESSION:  1. Left foot cellulitis with possible underlying osteomyelitis or abscess versus Charcot neuroarthropathy of the left lower extremity. 2.  Uncontrolled diabetes mellitus type 2.  3.  History of noncompliance. RECOMMENDATION:  The patient to be admitted for further workup and to start on IV antibiotics due to her previously noted subjective findings of increasing chills and nausea. The patient will be started on Unasyn by internal medicine team, also to be admitted under Medicine for further control of her diabetes and blood pressure. We will obtain an MRI to determine whether there is an underlying abscess of the foot or residual osteomyelitis that may need to be surgically resected. We will continue to follow the patient. My partner Dr. Evert Boxer will round on the patient tomorrow. We will defer surgical planning right now until we have results of the MRI to guide treatment and for further planning. The patient is to be to be nonweightbearing to left lower extremity at this time. I have ordered a physical therapy consult.       ANTONIO [email or web address]  D:  11/08/2019 12:55  T: 11/08/2019 19:05  JOB #:  [id number]

## 2019-11-09 NOTE — PROGRESS NOTES
Progress Note    Patient: Eva Salcido MRN: [id number]  SSN: [id number]    YOB: 1970  Age: 51 y.o. Sex: female      Admit Date: 2019    Subjective:     Patient seen & examined at bedside. Denies any n/v/f/ns/c. States the swelling and pain in her left foot is better compared to when she arrived at the hospital.      Objective:     Visit Vitals  BP (!) 180/94 (BP 1 Location: Right arm, BP Patient Position: At rest)   Pulse 86   Temp 97.8 °F (36.6 °C)   Resp 16   Ht 5' 6\" (1.676 m)   Wt 78 kg (172 lb)   SpO2 95%   BMI 27.76 kg/m²      Left Foot Exam: no gross erythema noted in foot. Mild edema in midfoot. Foot warm to touch. No open wounds noted. Protective sensation diminished. Labs/Radiology Review: images and reports reviewed    Assessment:     Hospital Problems  Date Reviewed: 10/8/2019          Codes Class Noted POA    * (Principal) Left foot infection ICD-10-CM: L08.9  ICD-9-CM: 686.9  2019 Unknown              Plan/Recommendations/Medical Decision Makin.) Had long discussion with Pt. Reviewed MRI findings with her. MRI shows fluid collection along 4th met base-cuboid joint as well as abnormal signal in lateral & intermediate cuneforms and in cuboid as well as in 4th met base. 2.) At minimum, informed Pt that she will need an I&D and bone biopsies of the affected bones in the midfoot. Informed her that if the bone biopsies are positive, she may need extended IV antibxs as well as additional surgical intervention/possible amputation. Pt understands. 3.) Continue antibxs. 4.) Continue strict nwb to left foot for now  5.) Informed Dr. Sparkle Carpenter of MRI findings - she will plan for an I&D of left foot with bone biopsy tentatively on Monday. Will pre-op & consent Pt tomorrow for OR Monday.    6.) Will follow

## 2019-11-09 NOTE — PROGRESS NOTES
Hospitalist Progress Note  Vic Carson MD  Answering service: [phone] from in house phone      Date of Service:  2019  NAME:  Radha Camarena                                                         :  1970                                               MRN:  [id number]      Subjective/interval history    Patient seen and examined. She is admitted for left foot cellulitis/probable osteomyelitis. Patient is familiar to me from previous admission. He does not have any complaints currently, she said she had pain earlier but has improved with pain medications. She denies headache, diplopia, nausea or vomiting. Assessment and plan   Ms Tania Stack is a 52-year-old female with PMhx significant for uncontrolled hypertension, diabetes, recurrent foot infection with osteomyelitis who was sent from her podiatrist office for worsening left foot swelling, pain and redness. #Left foot cellulitis with probable osteomyelitis and abscess collection in the setting of poorly controlled diabetes.  ESR and C-reactive protein are elevated   A preliminary report of the MRI of the foot reported a 3.3 cm x 2.3 cm focal fluid collection, normal signal within the cuboid, lateral and intermediate cuneiforms and the base of of the fifth metatarsal worrisome for osteomyelitis there is also abnormal signal within the bases of the second and third metatarsals   Venous Doppler was negative for DVT   Continue Zosyn and vancomycin   She need this I and D and bone biopsy possibly on Monday or early next week. I have discussed with the on-call podiatrist.       #Poorly controlled type II DM, insulin-dependent with hyperglycemia:   - SSI, Lantus, Accu-Chek, diabetic diet     # Uncontrolled hypertension:  -No headache, diplopia or chest pain  - Currently on amlodipine 10 mg and lisinopril 20 mg. As needed antihypertensives ordered.   The blood pressure remains elevated, may add on another agent as she is on maximum dose on the amlodipine and will not be able to increase the lisinopril dose as she has elevated creatinine. #Chronic normocytic anemia, mild, most probably anemia of chronic disease. Hemoglobin is down to 7.6 today from 9.6 on admission yesterday, she does not have any acute blood loss for the hemoglobin to drop by 2 g, this could probably be dilutional with IV fluids. Will monitor. #Dehydration, BUN/creatinine slightly elevated. Continue IV hydration. BMP tomorrow  #Subclinical hypothyroidism which was detected last admission. We will repeat thyroid panel tomorrow    Body mass index is 27.76 kg/m². DVT prophylaxis: Heparin  Code: Full  Patient walks independently     Dispo: home         Current facility administered and prior to admit medications reviewed. x         Review of Systems:  A comprehensive review of systems was negative except for that written in the HPI. PHYSICAL EXAM:    GENERAL:  Alert, oriented, cooperative, no apparent distress  HEENT:  Normocephalic, atraumatic, non icteric sclerae, non pallor conjuctivae, EOMs intact, PERRLA. NECK: Supple, trachea midline, no adenopathy, no thyromegally or tenderness, no carotid bruit and no JVD. LUNGS:   Vesicular breath sounds bilaterally, no added sounds. HEART:   S1 and S2 well heard,RRR,  no murmur, click, rub or gallop. No JVD. No edema   ABDOMEB:   Soft, non-tender. Normoactive bowel sounds. No masses,  No organomegaly. EXTREMETIES: Left foot is not swollen, warm and tender. PULSES: 2+ and symmetric all extremities. SKIN:  No rashes or lesions  NEUROLOGY: Alert and oriented to PPT, CNII-XII intact. Motor and sensory exam grossly intact.        Recent labs & imaging reviewed:    Problem List as of 11/9/2019 Date Reviewed: 10/8/2019          Codes Class Noted - Resolved    * (Principal) Left foot infection ICD-10-CM: L08.9  ICD-9-CM: 686.9  11/8/2019 - Present        HTN (hypertension) ICD-10-CM: I10  ICD-9-CM: 401.9  10/7/2019 - Present        DM type 2 (diabetes mellitus, type 2) ([facility] ICD-10-CM: E11.9  ICD-9-CM: 250.00  10/7/2019 - Present        Acute osteomyelitis of left foot ([facility] ICD-10-CM: Y01.114  ICD-9-CM: 730.07  10/7/2019 - Present        Cellulitis and abscess of toe of left foot ICD-10-CM: L03.032, L24.754  ICD-9-CM: 681.10  10/7/2019 - Present        Uncontrolled type 2 diabetes mellitus with peripheral neuropathy [facility]) ICD-10-CM: E11.42, E11.65  ICD-9-CM: 250.62, 357.2  10/7/2019 - Present        Osteomyelitis of second toe of left foot (HCC) ICD-10-CM: M86.9  ICD-9-CM: 730.27  10/7/2019 - Present        Acute osteomyelitis of metatarsal bone of left foot ([facility] ICD-10-CM: I43.325  ICD-9-CM: 730.07  10/7/2019 - Present        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 ([facility] ICD-10-CM: E11.621, I35.257  ICD-9-CM: 250.80, 707.14  10/7/2019 - Present        Unspecified essential hypertension ICD-10-CM: I10  ICD-9-CM: 401.9  9/1/2011 - Present        Diabetes mellitus type 1, uncontrolled, insulin dependent ([facility] ICD-10-CM: E10.65  ICD-9-CM: 250.03  4/20/2011 - Present        Anxiety state ICD-10-CM: F41.1  ICD-9-CM: 300.00  4/20/2011 - Present        RESOLVED: Osteomyelitis ([facility] ICD-10-CM: M86.9  ICD-9-CM: 730.20  10/2/2014 - 10/12/2014                Carolann Chow MD  Internal Medicine  Date of Service: 11/9/2019

## 2019-11-09 NOTE — PROGRESS NOTES
Problem: Falls - Risk of  Goal: *Absence of Falls  Description  Document [facility] Fall Risk and appropriate interventions in the flowsheet.   Outcome: Progressing Towards Goal  Note:   Fall Risk Interventions:            Medication Interventions: Teach patient to arise slowly                   Problem: Patient Education: Go to Patient Education Activity  Goal: Patient/Family Education  Outcome: Progressing Towards Goal

## 2019-11-10 ENCOUNTER — APPOINTMENT (OUTPATIENT)
Dept: ULTRASOUND IMAGING | Age: 49
DRG: 952 | End: 2019-11-10
Attending: HOSPITALIST
Payer: MEDICAID

## 2019-11-10 LAB
ANION GAP SERPL CALC-SCNC: 5 MMOL/L (ref 5–15)
BASOPHILS # BLD: 0.1 K/UL (ref 0–0.1)
BASOPHILS NFR BLD: 1 % (ref 0–1)
BUN SERPL-MCNC: 27 MG/DL (ref 6–20)
BUN/CREAT SERPL: 24 (ref 12–20)
CALCIUM SERPL-MCNC: 8.4 MG/DL (ref 8.5–10.1)
CHLORIDE SERPL-SCNC: 108 MMOL/L (ref 97–108)
CO2 SERPL-SCNC: 26 MMOL/L (ref 21–32)
CORTIS AM PEAK SERPL-MCNC: 8.5 UG/DL (ref 4.3–22.45)
CREAT SERPL-MCNC: 1.12 MG/DL (ref 0.55–1.02)
DIFFERENTIAL METHOD BLD: ABNORMAL
EOSINOPHIL # BLD: 0.3 K/UL (ref 0–0.4)
EOSINOPHIL NFR BLD: 5 % (ref 0–7)
ERYTHROCYTE [DISTWIDTH] IN BLOOD BY AUTOMATED COUNT: 15.9 % (ref 11.5–14.5)
GLUCOSE BLD STRIP.AUTO-MCNC: 174 MG/DL (ref 65–100)
GLUCOSE BLD STRIP.AUTO-MCNC: 253 MG/DL (ref 65–100)
GLUCOSE BLD STRIP.AUTO-MCNC: 260 MG/DL (ref 65–100)
GLUCOSE BLD STRIP.AUTO-MCNC: 65 MG/DL (ref 65–100)
GLUCOSE BLD STRIP.AUTO-MCNC: 75 MG/DL (ref 65–100)
GLUCOSE SERPL-MCNC: 101 MG/DL (ref 65–100)
HCT VFR BLD AUTO: 26.8 % (ref 35–47)
HGB BLD-MCNC: 8.1 G/DL (ref 11.5–16)
IMM GRANULOCYTES # BLD AUTO: 0 K/UL (ref 0–0.04)
IMM GRANULOCYTES NFR BLD AUTO: 0 % (ref 0–0.5)
LYMPHOCYTES # BLD: 1.8 K/UL (ref 0.8–3.5)
LYMPHOCYTES NFR BLD: 33 % (ref 12–49)
MCH RBC QN AUTO: 26.6 PG (ref 26–34)
MCHC RBC AUTO-ENTMCNC: 30.2 G/DL (ref 30–36.5)
MCV RBC AUTO: 88.2 FL (ref 80–99)
MONOCYTES # BLD: 0.5 K/UL (ref 0–1)
MONOCYTES NFR BLD: 8 % (ref 5–13)
NEUTS SEG # BLD: 3 K/UL (ref 1.8–8)
NEUTS SEG NFR BLD: 53 % (ref 32–75)
NRBC # BLD: 0 K/UL (ref 0–0.01)
NRBC BLD-RTO: 0 PER 100 WBC
PLATELET # BLD AUTO: 328 K/UL (ref 150–400)
PMV BLD AUTO: 11 FL (ref 8.9–12.9)
POTASSIUM SERPL-SCNC: 3.7 MMOL/L (ref 3.5–5.1)
RBC # BLD AUTO: 3.04 M/UL (ref 3.8–5.2)
SERVICE CMNT-IMP: ABNORMAL
SERVICE CMNT-IMP: NORMAL
SERVICE CMNT-IMP: NORMAL
SODIUM SERPL-SCNC: 139 MMOL/L (ref 136–145)
T3FREE SERPL-MCNC: 2.4 PG/ML (ref 2.2–4)
T4 FREE SERPL-MCNC: 1 NG/DL (ref 0.8–1.5)
TSH SERPL DL<=0.05 MIU/L-ACNC: 12.7 UIU/ML (ref 0.36–3.74)
WBC # BLD AUTO: 5.6 K/UL (ref 3.6–11)

## 2019-11-10 PROCEDURE — 85025 COMPLETE CBC W/AUTO DIFF WBC: CPT

## 2019-11-10 PROCEDURE — 36415 COLL VENOUS BLD VENIPUNCTURE: CPT

## 2019-11-10 PROCEDURE — 84481 FREE ASSAY (FT-3): CPT

## 2019-11-10 PROCEDURE — 74011250636 HC RX REV CODE- 250/636: Performed by: INTERNAL MEDICINE

## 2019-11-10 PROCEDURE — 80048 BASIC METABOLIC PNL TOTAL CA: CPT

## 2019-11-10 PROCEDURE — 76770 US EXAM ABDO BACK WALL COMP: CPT

## 2019-11-10 PROCEDURE — 74011250637 HC RX REV CODE- 250/637: Performed by: INTERNAL MEDICINE

## 2019-11-10 PROCEDURE — 74011636637 HC RX REV CODE- 636/637: Performed by: HOSPITALIST

## 2019-11-10 PROCEDURE — 82962 GLUCOSE BLOOD TEST: CPT

## 2019-11-10 PROCEDURE — 82533 TOTAL CORTISOL: CPT

## 2019-11-10 PROCEDURE — 74011250637 HC RX REV CODE- 250/637: Performed by: HOSPITALIST

## 2019-11-10 PROCEDURE — 84443 ASSAY THYROID STIM HORMONE: CPT

## 2019-11-10 PROCEDURE — 84439 ASSAY OF FREE THYROXINE: CPT

## 2019-11-10 PROCEDURE — 65270000029 HC RM PRIVATE

## 2019-11-10 PROCEDURE — 74011000258 HC RX REV CODE- 258: Performed by: INTERNAL MEDICINE

## 2019-11-10 PROCEDURE — 74011636637 HC RX REV CODE- 636/637: Performed by: INTERNAL MEDICINE

## 2019-11-10 PROCEDURE — 82088 ASSAY OF ALDOSTERONE: CPT

## 2019-11-10 RX ORDER — SODIUM CHLORIDE 9 MG/ML
100 INJECTION, SOLUTION INTRAVENOUS CONTINUOUS
Status: DISCONTINUED | OUTPATIENT
Start: 2019-11-11 | End: 2019-11-14

## 2019-11-10 RX ORDER — ALPRAZOLAM 0.5 MG/1
1 TABLET ORAL
Status: DISCONTINUED | OUTPATIENT
Start: 2019-11-10 | End: 2019-11-18 | Stop reason: HOSPADM

## 2019-11-10 RX ORDER — INSULIN GLARGINE 100 [IU]/ML
10 INJECTION, SOLUTION SUBCUTANEOUS DAILY
Status: DISCONTINUED | OUTPATIENT
Start: 2019-11-10 | End: 2019-11-12

## 2019-11-10 RX ORDER — LEVOTHYROXINE SODIUM 25 UG/1
25 TABLET ORAL
Status: DISCONTINUED | OUTPATIENT
Start: 2019-11-10 | End: 2019-11-18 | Stop reason: HOSPADM

## 2019-11-10 RX ADMIN — Medication 10 ML: at 12:27

## 2019-11-10 RX ADMIN — INSULIN LISPRO 5 UNITS: 100 INJECTION, SOLUTION INTRAVENOUS; SUBCUTANEOUS at 17:03

## 2019-11-10 RX ADMIN — PIPERACILLIN SODIUM AND TAZOBACTAM SODIUM 3.38 G: 3; .375 INJECTION, POWDER, LYOPHILIZED, FOR SOLUTION INTRAVENOUS at 12:20

## 2019-11-10 RX ADMIN — Medication 10 ML: at 06:36

## 2019-11-10 RX ADMIN — LISINOPRIL 20 MG: 20 TABLET ORAL at 17:09

## 2019-11-10 RX ADMIN — HYDRALAZINE HYDROCHLORIDE 50 MG: 50 TABLET, FILM COATED ORAL at 17:09

## 2019-11-10 RX ADMIN — AMLODIPINE BESYLATE 10 MG: 5 TABLET ORAL at 08:30

## 2019-11-10 RX ADMIN — PIPERACILLIN SODIUM AND TAZOBACTAM SODIUM 3.38 G: 3; .375 INJECTION, POWDER, LYOPHILIZED, FOR SOLUTION INTRAVENOUS at 06:35

## 2019-11-10 RX ADMIN — LEVOTHYROXINE SODIUM 25 MCG: 25 TABLET ORAL at 08:38

## 2019-11-10 RX ADMIN — Medication 10 ML: at 22:02

## 2019-11-10 RX ADMIN — ALPRAZOLAM 1 MG: 0.5 TABLET ORAL at 12:21

## 2019-11-10 RX ADMIN — INSULIN GLARGINE 10 UNITS: 100 INJECTION, SOLUTION SUBCUTANEOUS at 12:24

## 2019-11-10 RX ADMIN — INSULIN LISPRO 5 UNITS: 100 INJECTION, SOLUTION INTRAVENOUS; SUBCUTANEOUS at 12:20

## 2019-11-10 RX ADMIN — VANCOMYCIN HYDROCHLORIDE 1250 MG: 10 INJECTION, POWDER, LYOPHILIZED, FOR SOLUTION INTRAVENOUS at 17:20

## 2019-11-10 RX ADMIN — HYDROCODONE BITARTRATE AND ACETAMINOPHEN 1 TABLET: 5; 325 TABLET ORAL at 07:31

## 2019-11-10 RX ADMIN — HEPARIN SODIUM 5000 UNITS: 5000 INJECTION INTRAVENOUS; SUBCUTANEOUS at 06:35

## 2019-11-10 RX ADMIN — PIPERACILLIN SODIUM AND TAZOBACTAM SODIUM 3.38 G: 3; .375 INJECTION, POWDER, LYOPHILIZED, FOR SOLUTION INTRAVENOUS at 22:01

## 2019-11-10 RX ADMIN — HYDRALAZINE HYDROCHLORIDE 50 MG: 50 TABLET, FILM COATED ORAL at 08:30

## 2019-11-10 RX ADMIN — HYDRALAZINE HYDROCHLORIDE 50 MG: 50 TABLET, FILM COATED ORAL at 22:02

## 2019-11-10 NOTE — PROGRESS NOTES
11/10/19 0812   Vital Signs   Temp 98.2 °F (36.8 °C)   Temp Source Oral   Pulse (Heart Rate) 85   Heart Rate Source Monitor   Resp Rate 16   Level of Consciousness Alert   BP (!) 182/100   MAP (Calculated) 127   BP 1 Method Automatic   BP 1 Location Right arm   BP Patient Position At rest   MEWS Score 1   Oxygen Therapy   O2 Device Nasal cannula;Room air

## 2019-11-10 NOTE — PROGRESS NOTES
Podiatry  -Pt seen & examined at bedside. Denies any n/v/f/ns/c.  -Pt to OR tomorrow for an I&D of left foot with bone biopsy by Dr. Maeve Joe. Will pre-op and consent Pt today for OR tomorrow.   -Continue antibxs.    -Continue nwb to left foot

## 2019-11-10 NOTE — PROGRESS NOTES
At 9 am B/p 185/100, after her AM medication recheck at 10:15 am was 178/85, Dr. Lavonne Parra paged and notified, renal US done this morning, she had her first dose of synthroid.

## 2019-11-10 NOTE — PROGRESS NOTES
Problem: Falls - Risk of  Goal: *Absence of Falls  Description  Document [facility] Fall Risk and appropriate interventions in the flowsheet.   Outcome: Progressing Towards Goal  Note:   Fall Risk Interventions:            Medication Interventions: Patient to call before getting OOB

## 2019-11-10 NOTE — PROGRESS NOTES
Hospitalist Progress Note  Theresa Fragoso MD  Answering service: [phone] from in house phone      Date of Service:  11/10/2019  NAME:  Seth Sandhoff                                                         :  1970                                               MRN:  [id number]      Subjective/interval history    Patient seen and examined. She is admitted for left foot cellulitis/probable osteomyelitis. Patient is familiar to me from previous admission. He does not have any complaints currently, she said she had pain earlier but has improved with pain medications. She denies headache, diplopia, nausea or vomiting. Assessment and plan   Ms Lisbeth Yeung is in good spirits. She denied headache, diplopia or chest pain. She said the alprazolam helped her settle down, and her blood pressure is coming down. #Left foot cellulitis with probable osteomyelitis and abscess collection in the setting of poorly controlled diabetes.  ESR and C-reactive protein are elevated   A preliminary report of the MRI of the foot reported a 3.3 cm x 2.3 cm focal fluid collection, normal signal within the cuboid, lateral and intermediate cuneiforms and the base of of the fifth metatarsal worrisome for osteomyelitis there is also abnormal signal within the bases of the second and third metatarsals   Venous Doppler was negative for DVT   Continue Zosyn and vancomycin   She need this I and D and bone biopsy tomorrow. #Poorly controlled type II DM, insulin-dependent with hyperglycemia:   -She is noted to be hypoglycemic since last night, she was actually just continued on her home regimen, hoping that she was compliant with it. This probably has to do with her restricted calorie intake in the hospital.  - SSI, Accu-Chek, diabetic diet  -Decrease Lantus, restarted at 10 units of Lantus nightly.   At home she reports using Lantus 22 units twice daily plus pre-meal scheduled short-acting insulin. # Uncontrolled hypertension:  -No headache, diplopia or chest pain  - Currently on amlodipine 10 mg and lisinopril 20 mg. Hydralazine 50 mg 3 times daily started on 11/9. This morning she had amlodipine and hydralazine, her blood pressure is still very high however after 1 mg alprazolam around noontime blood pressure came significantly down. Patient recognizes anxiety contributes to her blood pressure. Will closely monitor and continually adjust the antihypertensive medications. #Chronic normocytic anemia, mild, most probably anemia of chronic disease. Hemoglobin is down to 7.6 today from 9.6 on admission yesterday, she does not have any acute blood loss for the hemoglobin to drop by 2 g, this could probably be dilutional with IV fluids. Will monitor. #Dehydration, BUN/creatinine slightly elevated. Continue IV hydration. #Subclinical hypothyroidism which was detected last admission. TSH is greater than 10 now although T4 is low normal so Synthroid 25 mcg daily is started. TSH and T4 needed to be repeated in 4 to 6 weeks time. Body mass index is 29.54 kg/m². DVT prophylaxis: Heparin  Code: Full  Patient walks independently     Dispo: home         Current facility administered and prior to admit medications reviewed. x         Review of Systems:  A comprehensive review of systems was negative except for that written in the HPI. PHYSICAL EXAM:    GENERAL:  Alert, oriented, cooperative, no apparent distress  HEENT:  Normocephalic, atraumatic, non icteric sclerae, non pallor conjuctivae, EOMs intact, PERRLA. NECK: Supple, trachea midline, no adenopathy, no thyromegally or tenderness, no carotid bruit and no JVD. LUNGS:   Vesicular breath sounds bilaterally, no added sounds. HEART:   S1 and S2 well heard,RRR,  no murmur, click, rub or gallop. No JVD. No edema   ABDOMEB:   Soft, non-tender. Normoactive bowel sounds. No masses,  No organomegaly.   EXTREMETIES: Left foot is not swollen, warm and tender. PULSES: 2+ and symmetric all extremities. SKIN:  No rashes or lesions  NEUROLOGY: Alert and oriented to PPT, CNII-XII intact. Motor and sensory exam grossly intact.        Recent labs & imaging reviewed:    Problem List as of 11/10/2019 Date Reviewed: 10/8/2019          Codes Class Noted - Resolved    * (Principal) Left foot infection ICD-10-CM: L08.9  ICD-9-CM: 686.9  11/8/2019 - Present        HTN (hypertension) ICD-10-CM: I10  ICD-9-CM: 401.9  10/7/2019 - Present        DM type 2 (diabetes mellitus, type 2) ([facility] ICD-10-CM: E11.9  ICD-9-CM: 250.00  10/7/2019 - Present        Acute osteomyelitis of left foot ([facility] ICD-10-CM: T39.079  ICD-9-CM: 730.07  10/7/2019 - Present        Cellulitis and abscess of toe of left foot ICD-10-CM: L03.032, L02.612  ICD-9-CM: 681.10  10/7/2019 - Present        Uncontrolled type 2 diabetes mellitus with peripheral neuropathy ([facility] ICD-10-CM: E11.42, E11.65  ICD-9-CM: 250.62, 357.2  10/7/2019 - Present        Osteomyelitis of second toe of left foot (HCC) ICD-10-CM: M86.9  ICD-9-CM: 730.27  10/7/2019 - Present        Acute osteomyelitis of metatarsal bone of left foot ([facility] ICD-10-CM: A84.715  ICD-9-CM: 730.07  10/7/2019 - Present        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 ([facility] ICD-10-CM: E11.621, B92.855  ICD-9-CM: 250.80, 707.14  10/7/2019 - Present        Unspecified essential hypertension ICD-10-CM: I10  ICD-9-CM: 401.9  9/1/2011 - Present        Diabetes mellitus type 1, uncontrolled, insulin dependent ([facility] ICD-10-CM: E10.65  ICD-9-CM: 250.03  4/20/2011 - Present        Anxiety state ICD-10-CM: F41.1  ICD-9-CM: 300.00  4/20/2011 - Present        RESOLVED: Osteomyelitis ([address] ICD-10-CM: M86.9  ICD-9-CM: 730.20  10/2/2014 - 10/12/2014                Vic Carson MD  Internal Medicine  Date of Service: 11/10/2019

## 2019-11-10 NOTE — PROGRESS NOTES
New order for xanax, administered the dose, pt felt better, calm and relax. B/P improved this afternoon

## 2019-11-11 ENCOUNTER — ANESTHESIA (OUTPATIENT)
Dept: SURGERY | Age: 49
DRG: 952 | End: 2019-11-11
Payer: MEDICAID

## 2019-11-11 ENCOUNTER — ANESTHESIA EVENT (OUTPATIENT)
Dept: SURGERY | Age: 49
DRG: 952 | End: 2019-11-11
Payer: MEDICAID

## 2019-11-11 LAB
ANION GAP SERPL CALC-SCNC: 6 MMOL/L (ref 5–15)
BUN SERPL-MCNC: 26 MG/DL (ref 6–20)
BUN/CREAT SERPL: 18 (ref 12–20)
CALCIUM SERPL-MCNC: 8.6 MG/DL (ref 8.5–10.1)
CHLORIDE SERPL-SCNC: 107 MMOL/L (ref 97–108)
CO2 SERPL-SCNC: 25 MMOL/L (ref 21–32)
CREAT SERPL-MCNC: 1.47 MG/DL (ref 0.55–1.02)
GLUCOSE BLD STRIP.AUTO-MCNC: 165 MG/DL (ref 65–100)
GLUCOSE BLD STRIP.AUTO-MCNC: 182 MG/DL (ref 65–100)
GLUCOSE BLD STRIP.AUTO-MCNC: 212 MG/DL (ref 65–100)
GLUCOSE BLD STRIP.AUTO-MCNC: 215 MG/DL (ref 65–100)
GLUCOSE BLD STRIP.AUTO-MCNC: 284 MG/DL (ref 65–100)
GLUCOSE SERPL-MCNC: 255 MG/DL (ref 65–100)
POTASSIUM SERPL-SCNC: 4.1 MMOL/L (ref 3.5–5.1)
SERVICE CMNT-IMP: ABNORMAL
SODIUM SERPL-SCNC: 138 MMOL/L (ref 136–145)

## 2019-11-11 PROCEDURE — 74011636637 HC RX REV CODE- 636/637: Performed by: PODIATRIST

## 2019-11-11 PROCEDURE — 0QBP3ZX EXCISION OF LEFT METATARSAL, PERCUTANEOUS APPROACH, DIAGNOSTIC: ICD-10-PCS | Performed by: ANESTHESIOLOGY

## 2019-11-11 PROCEDURE — 74011000258 HC RX REV CODE- 258: Performed by: NURSE ANESTHETIST, CERTIFIED REGISTERED

## 2019-11-11 PROCEDURE — 74011636637 HC RX REV CODE- 636/637: Performed by: HOSPITALIST

## 2019-11-11 PROCEDURE — 82962 GLUCOSE BLOOD TEST: CPT

## 2019-11-11 PROCEDURE — 77030003445 HC NDL BIOP BN BD -B: Performed by: PODIATRIST

## 2019-11-11 PROCEDURE — 77030002916 HC SUT ETHLN J&J -A: Performed by: PODIATRIST

## 2019-11-11 PROCEDURE — 76210000016 HC OR PH I REC 1 TO 1.5 HR: Performed by: PODIATRIST

## 2019-11-11 PROCEDURE — 80048 BASIC METABOLIC PNL TOTAL CA: CPT

## 2019-11-11 PROCEDURE — 87186 SC STD MICRODIL/AGAR DIL: CPT

## 2019-11-11 PROCEDURE — 74011250637 HC RX REV CODE- 250/637: Performed by: PODIATRIST

## 2019-11-11 PROCEDURE — 77030011640 HC PAD GRND REM COVD -A: Performed by: PODIATRIST

## 2019-11-11 PROCEDURE — 77030019895 HC PCKNG STRP IODO -A: Performed by: PODIATRIST

## 2019-11-11 PROCEDURE — 77030018836 HC SOL IRR NACL ICUM -A: Performed by: PODIATRIST

## 2019-11-11 PROCEDURE — 77030020754 HC CUF TRNQT 2BLA STRY -B: Performed by: PODIATRIST

## 2019-11-11 PROCEDURE — 76060000035 HC ANESTHESIA 2 TO 2.5 HR: Performed by: PODIATRIST

## 2019-11-11 PROCEDURE — 87205 SMEAR GRAM STAIN: CPT

## 2019-11-11 PROCEDURE — 74011000250 HC RX REV CODE- 250: Performed by: NURSE ANESTHETIST, CERTIFIED REGISTERED

## 2019-11-11 PROCEDURE — 93005 ELECTROCARDIOGRAM TRACING: CPT

## 2019-11-11 PROCEDURE — 76010000131 HC OR TIME 2 TO 2.5 HR: Performed by: PODIATRIST

## 2019-11-11 PROCEDURE — 65270000029 HC RM PRIVATE

## 2019-11-11 PROCEDURE — 74011000258 HC RX REV CODE- 258: Performed by: PODIATRIST

## 2019-11-11 PROCEDURE — 77030040922 HC BLNKT HYPOTHRM STRY -A

## 2019-11-11 PROCEDURE — 36415 COLL VENOUS BLD VENIPUNCTURE: CPT

## 2019-11-11 PROCEDURE — 0J9R0ZX DRAINAGE OF LEFT FOOT SUBCUTANEOUS TISSUE AND FASCIA, OPEN APPROACH, DIAGNOSTIC: ICD-10-PCS | Performed by: ANESTHESIOLOGY

## 2019-11-11 PROCEDURE — 74011250636 HC RX REV CODE- 250/636: Performed by: PODIATRIST

## 2019-11-11 PROCEDURE — 74011250637 HC RX REV CODE- 250/637: Performed by: INTERNAL MEDICINE

## 2019-11-11 PROCEDURE — 74011250637 HC RX REV CODE- 250/637: Performed by: HOSPITALIST

## 2019-11-11 PROCEDURE — 74011636637 HC RX REV CODE- 636/637: Performed by: INTERNAL MEDICINE

## 2019-11-11 PROCEDURE — 87015 SPECIMEN INFECT AGNT CONCNTJ: CPT

## 2019-11-11 PROCEDURE — 87176 TISSUE HOMOGENIZATION CULTR: CPT

## 2019-11-11 PROCEDURE — 88307 TISSUE EXAM BY PATHOLOGIST: CPT

## 2019-11-11 PROCEDURE — 74011250636 HC RX REV CODE- 250/636: Performed by: INTERNAL MEDICINE

## 2019-11-11 PROCEDURE — 74011250636 HC RX REV CODE- 250/636: Performed by: NURSE ANESTHETIST, CERTIFIED REGISTERED

## 2019-11-11 PROCEDURE — 74011000250 HC RX REV CODE- 250: Performed by: PODIATRIST

## 2019-11-11 PROCEDURE — 77030031139 HC SUT VCRL2 J&J -A: Performed by: PODIATRIST

## 2019-11-11 PROCEDURE — 0S9L0ZZ DRAINAGE OF LEFT TARSOMETATARSAL JOINT, OPEN APPROACH: ICD-10-PCS | Performed by: ANESTHESIOLOGY

## 2019-11-11 PROCEDURE — 88311 DECALCIFY TISSUE: CPT

## 2019-11-11 PROCEDURE — 77030014008 HC SPNG HEMSTAT J&J -C: Performed by: PODIATRIST

## 2019-11-11 PROCEDURE — 74011250636 HC RX REV CODE- 250/636: Performed by: HOSPITALIST

## 2019-11-11 PROCEDURE — 0QBM3ZX EXCISION OF LEFT TARSAL, PERCUTANEOUS APPROACH, DIAGNOSTIC: ICD-10-PCS | Performed by: ANESTHESIOLOGY

## 2019-11-11 PROCEDURE — 74011000258 HC RX REV CODE- 258: Performed by: INTERNAL MEDICINE

## 2019-11-11 PROCEDURE — 77030010509 HC AIRWY LMA MSK TELE -A: Performed by: ANESTHESIOLOGY

## 2019-11-11 PROCEDURE — 87075 CULTR BACTERIA EXCEPT BLOOD: CPT

## 2019-11-11 PROCEDURE — 87077 CULTURE AEROBIC IDENTIFY: CPT

## 2019-11-11 RX ORDER — FENTANYL CITRATE 50 UG/ML
25 INJECTION, SOLUTION INTRAMUSCULAR; INTRAVENOUS
Status: DISCONTINUED | OUTPATIENT
Start: 2019-11-11 | End: 2019-11-11 | Stop reason: HOSPADM

## 2019-11-11 RX ORDER — ONDANSETRON 2 MG/ML
4 INJECTION INTRAMUSCULAR; INTRAVENOUS AS NEEDED
Status: DISCONTINUED | OUTPATIENT
Start: 2019-11-11 | End: 2019-11-11 | Stop reason: HOSPADM

## 2019-11-11 RX ORDER — SODIUM CHLORIDE 9 MG/ML
25 INJECTION, SOLUTION INTRAVENOUS CONTINUOUS
Status: DISCONTINUED | OUTPATIENT
Start: 2019-11-11 | End: 2019-11-11 | Stop reason: HOSPADM

## 2019-11-11 RX ORDER — SODIUM CHLORIDE 0.9 % (FLUSH) 0.9 %
5-40 SYRINGE (ML) INJECTION EVERY 8 HOURS
Status: DISCONTINUED | OUTPATIENT
Start: 2019-11-11 | End: 2019-11-11 | Stop reason: HOSPADM

## 2019-11-11 RX ORDER — DIPHENHYDRAMINE HYDROCHLORIDE 50 MG/ML
12.5 INJECTION, SOLUTION INTRAMUSCULAR; INTRAVENOUS AS NEEDED
Status: DISCONTINUED | OUTPATIENT
Start: 2019-11-11 | End: 2019-11-11 | Stop reason: HOSPADM

## 2019-11-11 RX ORDER — EPHEDRINE SULFATE/0.9% NACL/PF 50 MG/5 ML
SYRINGE (ML) INTRAVENOUS AS NEEDED
Status: DISCONTINUED | OUTPATIENT
Start: 2019-11-11 | End: 2019-11-11 | Stop reason: HOSPADM

## 2019-11-11 RX ORDER — HYDRALAZINE HYDROCHLORIDE 20 MG/ML
10 INJECTION INTRAMUSCULAR; INTRAVENOUS
Status: DISCONTINUED | OUTPATIENT
Start: 2019-11-11 | End: 2019-11-18 | Stop reason: HOSPADM

## 2019-11-11 RX ORDER — MIDAZOLAM HYDROCHLORIDE 1 MG/ML
0.5 INJECTION, SOLUTION INTRAMUSCULAR; INTRAVENOUS
Status: DISCONTINUED | OUTPATIENT
Start: 2019-11-11 | End: 2019-11-11 | Stop reason: HOSPADM

## 2019-11-11 RX ORDER — SODIUM CHLORIDE 0.9 % (FLUSH) 0.9 %
5-40 SYRINGE (ML) INJECTION AS NEEDED
Status: DISCONTINUED | OUTPATIENT
Start: 2019-11-11 | End: 2019-11-11 | Stop reason: HOSPADM

## 2019-11-11 RX ORDER — HYDROMORPHONE HYDROCHLORIDE 1 MG/ML
0.2 INJECTION, SOLUTION INTRAMUSCULAR; INTRAVENOUS; SUBCUTANEOUS
Status: DISCONTINUED | OUTPATIENT
Start: 2019-11-11 | End: 2019-11-11 | Stop reason: HOSPADM

## 2019-11-11 RX ORDER — SODIUM CHLORIDE, SODIUM LACTATE, POTASSIUM CHLORIDE, CALCIUM CHLORIDE 600; 310; 30; 20 MG/100ML; MG/100ML; MG/100ML; MG/100ML
125 INJECTION, SOLUTION INTRAVENOUS CONTINUOUS
Status: DISCONTINUED | OUTPATIENT
Start: 2019-11-11 | End: 2019-11-11 | Stop reason: HOSPADM

## 2019-11-11 RX ORDER — HYDROCODONE BITARTRATE AND ACETAMINOPHEN 5; 325 MG/1; MG/1
1 TABLET ORAL AS NEEDED
Status: DISCONTINUED | OUTPATIENT
Start: 2019-11-11 | End: 2019-11-11 | Stop reason: HOSPADM

## 2019-11-11 RX ORDER — MORPHINE SULFATE 10 MG/ML
2 INJECTION, SOLUTION INTRAMUSCULAR; INTRAVENOUS
Status: DISCONTINUED | OUTPATIENT
Start: 2019-11-11 | End: 2019-11-11 | Stop reason: HOSPADM

## 2019-11-11 RX ORDER — SODIUM CHLORIDE, SODIUM LACTATE, POTASSIUM CHLORIDE, CALCIUM CHLORIDE 600; 310; 30; 20 MG/100ML; MG/100ML; MG/100ML; MG/100ML
INJECTION, SOLUTION INTRAVENOUS
Status: DISCONTINUED | OUTPATIENT
Start: 2019-11-11 | End: 2019-11-11 | Stop reason: HOSPADM

## 2019-11-11 RX ORDER — MIDAZOLAM HYDROCHLORIDE 1 MG/ML
1 INJECTION, SOLUTION INTRAMUSCULAR; INTRAVENOUS AS NEEDED
Status: DISCONTINUED | OUTPATIENT
Start: 2019-11-11 | End: 2019-11-11 | Stop reason: HOSPADM

## 2019-11-11 RX ORDER — LIDOCAINE HYDROCHLORIDE 10 MG/ML
0.1 INJECTION, SOLUTION EPIDURAL; INFILTRATION; INTRACAUDAL; PERINEURAL AS NEEDED
Status: DISCONTINUED | OUTPATIENT
Start: 2019-11-11 | End: 2019-11-11 | Stop reason: HOSPADM

## 2019-11-11 RX ORDER — SODIUM CHLORIDE 9 MG/ML
50 INJECTION, SOLUTION INTRAVENOUS CONTINUOUS
Status: DISCONTINUED | OUTPATIENT
Start: 2019-11-11 | End: 2019-11-11 | Stop reason: HOSPADM

## 2019-11-11 RX ORDER — SODIUM CHLORIDE, SODIUM LACTATE, POTASSIUM CHLORIDE, CALCIUM CHLORIDE 600; 310; 30; 20 MG/100ML; MG/100ML; MG/100ML; MG/100ML
75 INJECTION, SOLUTION INTRAVENOUS CONTINUOUS
Status: DISCONTINUED | OUTPATIENT
Start: 2019-11-11 | End: 2019-11-11 | Stop reason: HOSPADM

## 2019-11-11 RX ORDER — FENTANYL CITRATE 50 UG/ML
50 INJECTION, SOLUTION INTRAMUSCULAR; INTRAVENOUS AS NEEDED
Status: DISCONTINUED | OUTPATIENT
Start: 2019-11-11 | End: 2019-11-11 | Stop reason: HOSPADM

## 2019-11-11 RX ORDER — ENOXAPARIN SODIUM 100 MG/ML
40 INJECTION SUBCUTANEOUS EVERY 24 HOURS
Status: DISCONTINUED | OUTPATIENT
Start: 2019-11-11 | End: 2019-11-18 | Stop reason: HOSPADM

## 2019-11-11 RX ORDER — PROPOFOL 10 MG/ML
INJECTION, EMULSION INTRAVENOUS AS NEEDED
Status: DISCONTINUED | OUTPATIENT
Start: 2019-11-11 | End: 2019-11-11 | Stop reason: HOSPADM

## 2019-11-11 RX ADMIN — PROPOFOL 100 MG: 10 INJECTION, EMULSION INTRAVENOUS at 12:26

## 2019-11-11 RX ADMIN — PIPERACILLIN SODIUM AND TAZOBACTAM SODIUM 3.38 G: 3; .375 INJECTION, POWDER, LYOPHILIZED, FOR SOLUTION INTRAVENOUS at 23:26

## 2019-11-11 RX ADMIN — HYDROCODONE BITARTRATE AND ACETAMINOPHEN 1 TABLET: 5; 325 TABLET ORAL at 04:11

## 2019-11-11 RX ADMIN — PIPERACILLIN SODIUM AND TAZOBACTAM SODIUM 3.38 G: 3; .375 INJECTION, POWDER, LYOPHILIZED, FOR SOLUTION INTRAVENOUS at 14:52

## 2019-11-11 RX ADMIN — HYDROCODONE BITARTRATE AND ACETAMINOPHEN 1 TABLET: 5; 325 TABLET ORAL at 23:26

## 2019-11-11 RX ADMIN — Medication 10 ML: at 07:02

## 2019-11-11 RX ADMIN — INSULIN LISPRO 2 UNITS: 100 INJECTION, SOLUTION INTRAVENOUS; SUBCUTANEOUS at 18:05

## 2019-11-11 RX ADMIN — ENOXAPARIN SODIUM 40 MG: 40 INJECTION SUBCUTANEOUS at 17:49

## 2019-11-11 RX ADMIN — PIPERACILLIN SODIUM AND TAZOBACTAM SODIUM 3.38 G: 3; .375 INJECTION, POWDER, LYOPHILIZED, FOR SOLUTION INTRAVENOUS at 04:01

## 2019-11-11 RX ADMIN — AMLODIPINE BESYLATE 10 MG: 5 TABLET ORAL at 18:04

## 2019-11-11 RX ADMIN — PROPOFOL 50 MG: 10 INJECTION, EMULSION INTRAVENOUS at 12:13

## 2019-11-11 RX ADMIN — SODIUM CHLORIDE 75 ML/HR: 900 INJECTION, SOLUTION INTRAVENOUS at 14:45

## 2019-11-11 RX ADMIN — LISINOPRIL 20 MG: 20 TABLET ORAL at 17:48

## 2019-11-11 RX ADMIN — Medication 10 ML: at 23:26

## 2019-11-11 RX ADMIN — MEPERIDINE HYDROCHLORIDE 10 MG: 50 INJECTION INTRAMUSCULAR; INTRAVENOUS; SUBCUTANEOUS at 12:44

## 2019-11-11 RX ADMIN — INSULIN GLARGINE 10 UNITS: 100 INJECTION, SOLUTION SUBCUTANEOUS at 10:45

## 2019-11-11 RX ADMIN — HYDROCODONE BITARTRATE AND ACETAMINOPHEN 1 TABLET: 5; 325 TABLET ORAL at 17:47

## 2019-11-11 RX ADMIN — VANCOMYCIN HYDROCHLORIDE 1250 MG: 10 INJECTION, POWDER, LYOPHILIZED, FOR SOLUTION INTRAVENOUS at 10:50

## 2019-11-11 RX ADMIN — HYDRALAZINE HYDROCHLORIDE 50 MG: 50 TABLET, FILM COATED ORAL at 17:46

## 2019-11-11 RX ADMIN — INSULIN LISPRO 5 UNITS: 100 INJECTION, SOLUTION INTRAVENOUS; SUBCUTANEOUS at 07:01

## 2019-11-11 RX ADMIN — INSULIN LISPRO 3 UNITS: 100 INJECTION, SOLUTION INTRAVENOUS; SUBCUTANEOUS at 15:05

## 2019-11-11 RX ADMIN — SODIUM CHLORIDE, POTASSIUM CHLORIDE, SODIUM LACTATE AND CALCIUM CHLORIDE: 600; 310; 30; 20 INJECTION, SOLUTION INTRAVENOUS at 12:42

## 2019-11-11 RX ADMIN — DEXMEDETOMIDINE HYDROCHLORIDE 10 MCG: 100 INJECTION, SOLUTION, CONCENTRATE INTRAVENOUS at 12:13

## 2019-11-11 RX ADMIN — SODIUM CHLORIDE 75 ML/HR: 900 INJECTION, SOLUTION INTRAVENOUS at 01:16

## 2019-11-11 RX ADMIN — HYDRALAZINE HYDROCHLORIDE 50 MG: 50 TABLET, FILM COATED ORAL at 23:26

## 2019-11-11 RX ADMIN — Medication 10 MG: at 13:10

## 2019-11-11 NOTE — ANESTHESIA PREPROCEDURE EVALUATION
Relevant Problems   No relevant active problems       Anesthetic History   No history of anesthetic complications            Review of Systems / Medical History  Patient summary reviewed, nursing notes reviewed and pertinent labs reviewed    Pulmonary  Within defined limits                 Neuro/Psych   Within defined limits           Cardiovascular  Within defined limits  Hypertension                   GI/Hepatic/Renal  Within defined limits              Endo/Other  Within defined limits  Diabetes         Other Findings              Physical Exam    Airway  Mallampati: II  TM Distance: > 6 cm  Neck ROM: normal range of motion   Mouth opening: Normal     Cardiovascular  Regular rate and rhythm,  S1 and S2 normal,  no murmur, click, rub, or gallop             Dental  No notable dental hx       Pulmonary  Breath sounds clear to auscultation               Abdominal  GI exam deferred       Other Findings            Anesthetic Plan    ASA: 2  Anesthesia type: general          Induction: Intravenous  Anesthetic plan and risks discussed with: Patient

## 2019-11-11 NOTE — PROGRESS NOTES
Hospitalist Progress Note  Leeanne Osler, MD  Answering service: [phone] from in house phone      Date of Service:  2019  NAME:  Marco Antonio Alegria                                                         :  1970                                               MRN:  [id number]      Subjective/interval history    Patient seen and examined. She is admitted for left foot cellulitis/probable osteomyelitis. Patient is familiar to me from previous admission. He does not have any complaints currently, she said she had pain earlier but has improved with pain medications. She denies headache, diplopia, nausea or vomiting. Assessment and plan   Ms Regina Pop just returned from the OR for the left foot I&D. She is a bit sleepy from anesthesia, no complaints. #Left foot cellulitis with probable osteomyelitis and abscess collection in the setting of poorly controlled diabetes.  ESR and C-reactive protein are elevated   A preliminary report of the MRI of the foot reported a 3.3 cm x 2.3 cm focal fluid collection, normal signal within the cuboid, lateral and intermediate cuneiforms and the base of of the fifth metatarsal worrisome for osteomyelitis there is also abnormal signal within the bases of the second and third metatarsals   Venous Doppler was negative for DVT   Continue Zosyn and vancomycin   Status post I and D and bone biopsy today       #Poorly controlled type II DM, insulin-dependent with hyperglycemia:   -She is noted to be hypoglycemic since last night, she was actually just continued on her home regimen, hoping that she was compliant with it. This probably has to do with her restricted calorie intake in the hospital.  - SSI, Accu-Chek, diabetic diet  -Decrease Lantus, restarted at 10 units of Lantus nightly. At home she reports using Lantus 22 units twice daily plus pre-meal scheduled short-acting insulin.      # Uncontrolled hypertension:  -No headache, diplopia or chest pain  - Currently on amlodipine 10 mg and lisinopril 20 mg. Hydralazine 50 mg 3 times daily started on 11/9. This morning she had amlodipine and hydralazine, her blood pressure is still very high however after 1 mg alprazolam around noontime blood pressure came significantly down. Patient recognizes anxiety contributes to her blood pressure. Will closely monitor and continually adjust the antihypertensive medications. #Chronic normocytic anemia, mild, most probably anemia of chronic disease. Hemoglobin is down to 7.6 today from 9.6 on admission yesterday, she does not have any acute blood loss for the hemoglobin to drop by 2 g, this could probably be dilutional with IV fluids. Will monitor. #Dehydration, BUN/creatinine slightly elevated. Continue IV hydration. #Subclinical hypothyroidism which was detected last admission. TSH is greater than 10 now although T4 is low normal so Synthroid 25 mcg daily is started. TSH and T4 needed to be repeated in 4 to 6 weeks time. Body mass index is 31.01 kg/m². DVT prophylaxis: Heparin  Code: Full  Patient walks independently     Dispo: home, she may need long-term intravenous antibiotics. Current facility administered and prior to admit medications reviewed. x         Review of Systems:  A comprehensive review of systems was negative except for that written in the HPI. PHYSICAL EXAM:    GENERAL:  Alert, oriented, cooperative, no apparent distress  HEENT:  Normocephalic, atraumatic, non icteric sclerae, non pallor conjuctivae, EOMs intact, PERRLA. NECK: Supple, trachea midline, no adenopathy, no thyromegally or tenderness, no carotid bruit and no JVD. LUNGS:   Vesicular breath sounds bilaterally, no added sounds. HEART:   S1 and S2 well heard,RRR,  no murmur, click, rub or gallop. No JVD. No edema   ABDOMEB:   Soft, non-tender. Normoactive bowel sounds. No masses,  No organomegaly.   EXTREMETIES: Left foot bandaged. Sensation intact of the toes. PULSES: 2+ and symmetric all extremities. SKIN:  No rashes or lesions  NEUROLOGY: Alert and oriented to PPT, CNII-XII intact. Motor and sensory exam grossly intact.        Recent labs & imaging reviewed:    Problem List as of 11/11/2019 Date Reviewed: 11/11/2019          Codes Class Noted - Resolved    * (Principal) Left foot infection ICD-10-CM: L08.9  ICD-9-CM: 686.9  11/8/2019 - Present        HTN (hypertension) ICD-10-CM: I10  ICD-9-CM: 401.9  10/7/2019 - Present        DM type 2 (diabetes mellitus, type 2) ([facility] ICD-10-CM: E11.9  ICD-9-CM: 250.00  10/7/2019 - Present        Acute osteomyelitis of left foot ([facility] ICD-10-CM: B10.496  ICD-9-CM: 730.07  10/7/2019 - Present        Cellulitis and abscess of toe of left foot ICD-10-CM: L03.032, L02.612  ICD-9-CM: 681.10  10/7/2019 - Present        Uncontrolled type 2 diabetes mellitus with peripheral neuropathy ([facility] ICD-10-CM: E11.42, E11.65  ICD-9-CM: 250.62, 357.2  10/7/2019 - Present        Osteomyelitis of second toe of left foot (H[facility]) ICD-10-CM: M86.9  ICD-9-CM: 730.27  10/7/2019 - Present        Acute osteomyelitis of metatarsal bone of left foot ([facility] ICD-10-CM: L13.718  ICD-9-CM: 730.07  10/7/2019 - Present        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 ([facility] ICD-10-CM: E11.621, A61.348  ICD-9-CM: 250.80, 707.14  10/7/2019 - Present        Unspecified essential hypertension ICD-10-CM: I10  ICD-9-CM: 401.9  9/1/2011 - Present        Diabetes mellitus type 1, uncontrolled, insulin dependent ([facility] ICD-10-CM: E10.65  ICD-9-CM: 250.03  4/20/2011 - Present        Anxiety state ICD-10-CM: F41.1  ICD-9-CM: 300.00  4/20/2011 - Present        RESOLVED: Osteomyelitis ([facility] ICD-10-CM: M86.9  ICD-9-CM: 730.20  10/2/2014 - 10/12/2014                Jerome Queen MD  Internal Medicine  Date of Service: 11/11/2019

## 2019-11-11 NOTE — PROGRESS NOTES
11/10/19 0812   Vital Signs   Temp 98.2 °F (36.8 °C)   Temp Source Oral   Pulse (Heart Rate) 85   Heart Rate Source Monitor   Resp Rate 16   Level of Consciousness Alert   BP (!) 182/100   MAP (Calculated) 127   BP 1 Method Automatic   BP 1 Location Right arm   BP Patient Position At rest   MEWS Score 1   Pain 1   Pain Scale 1 Numeric (0 - 10)   Pain Intensity 1 0   Patient Stated Pain Goal 0   Pain Reassessment 1 Yes   Oxygen Therapy   O2 Device Room air   Patient Observation   Repositioned Head of bed elevated (degrees); Heels off loaded   Patient Turned Turn on right side   Ambulate Ambulate to bathroom   Activity Eating; In bed

## 2019-11-11 NOTE — PROGRESS NOTES
Bedside shift change report given to [address] (oncoming nurse) by Feli Pérez RN (offgoing nurse). Report included the following information SBAR, Kardex, MAR and Recent Results.

## 2019-11-11 NOTE — PROGRESS NOTES
Problem: Diabetes Self-Management  Goal: *Disease process and treatment process  Description  Define diabetes and identify own type of diabetes; list 3 options for treating diabetes. Outcome: Progressing Towards Goal  Goal: *Incorporating nutritional management into lifestyle  Description  Describe effect of type, amount and timing of food on blood glucose; list 3 methods for planning meals. Outcome: Progressing Towards Goal  Goal: *Incorporating physical activity into lifestyle  Description  State effect of exercise on blood glucose levels. Outcome: Progressing Towards Goal  Goal: *Developing strategies to promote health/change behavior  Description  Define the ABC's of diabetes; identify appropriate screenings, schedule and personal plan for screenings. Outcome: Progressing Towards Goal  Goal: *Using medications safely  Description  State effect of diabetes medications on diabetes; name diabetes medication taking, action and side effects. Outcome: Progressing Towards Goal  Goal: *Monitoring blood glucose, interpreting and using results  Description  Identify recommended blood glucose targets  and personal targets. Outcome: Progressing Towards Goal  Goal: *Prevention, detection, treatment of acute complications  Description  List symptoms of hyper- and hypoglycemia; describe how to treat low blood sugar and actions for lowering  high blood glucose level. Outcome: Progressing Towards Goal  Goal: *Prevention, detection and treatment of chronic complications  Description  Define the natural course of diabetes and describe the relationship of blood glucose levels to long term complications of diabetes.   Outcome: Progressing Towards Goal  Goal: *Developing strategies to address psychosocial issues  Description  Describe feelings about living with diabetes; identify support needed and support network  Outcome: Progressing Towards Goal  Goal: *Insulin pump training  Outcome: Progressing Towards Goal  Goal: *Sick day guidelines  Outcome: Progressing Towards Goal  Goal: *Patient Specific Goal (EDIT GOAL, INSERT TEXT)  Outcome: Progressing Towards Goal     Problem: Falls - Risk of  Goal: *Absence of Falls  Description  Document [facility] Fall Risk and appropriate interventions in the flowsheet.   Outcome: Progressing Towards Goal  Note:   Fall Risk Interventions:            Medication Interventions: Patient to call before getting OOB

## 2019-11-11 NOTE — PROGRESS NOTES
Bedside shift change report given to Derick Valdez RN (oncoming nurse) by Tayla Rincon RN (offgoing nurse). Report included the following information SBAR, MAR and Recent Results.

## 2019-11-11 NOTE — PERIOP NOTES
TRANSFER - OUT REPORT:    Verbal report given to [facility] RN(name) on Parminder Diggs  being transferred to (unit) for routine post - op       Report consisted of patients Situation, Background, Assessment and   Recommendations(SBAR). Time Pre op antibiotic given:SCHEDULED ANTIBIOTICS  Anesthesia Stop time: 2038  Green Present on Transfer to floor:N/A  Order for Green on Chart:N/A  Discharge Prescriptions with Chart:N/A    Information from the following report(s) SBAR, Kardex, OR Summary, Procedure Summary, Intake/Output, MAR, Recent Results and Cardiac Rhythm SR was reviewed with the receiving nurse. Opportunity for questions and clarification was provided. Is the patient on 02? YES       L/Min 2    Is the patient on a monitor? NO    Is the nurse transporting with the patient? NO    Surgical Waiting Area notified of patient's transfer from PACU? YES      The following personal items collected during your admission accompanied patient upon transfer:   Dental Appliance: Dental Appliances: None  Vision: Visual Aid: None  Hearing Aid:    Jewelry:    Clothing: Clothing: None  Other Valuables:    Valuables sent to safe:        SPOKE WITH DR. Ruby Peralta, PT OKAY TO GO TO FLOOR - WILL WRITE NOTE WHEN ABLE.

## 2019-11-11 NOTE — BRIEF OP NOTE
BRIEF OPERATIVE NOTE    Date of Procedure: 11/11/2019   Preoperative Diagnosis: Left foot abscess with possible osteomyelitis  Postoperative Diagnosis: Left foot abscess fourth tarsal metatarsal joint septic arthritis, possible osteomyelitis of the 2nd, 3rd, and 4th metatarsal, cuboid bone, cuneiform bone, and navicular bone     Procedure(s):  INCISION AND DRAINAGE LEFT FOOT Abscess;  ARTHROTOMY 4th TMTJ;  WITH BONE BIOPSY of metatarsal 2-4, cuboid, cuneiforms (medial, intermediate, lateral) and navicular  Surgeon(s) and Role:     * Taryn Scott, [facility] - Primary         Surgical Assistant: Tanner Campos DPM PGY-2    Surgical Staff:  Circ-1: Bhargav Maria RN  Circ-Relief: Aislinn Chen RN  Circ-Intern: Silva Duran RN  Scrub Tech-1: Maria E Agrawal RN-Relief: Tammie Murphy RN  Event Time In Time Out   Incision Start 11/11/2019 1252    Incision Close       Anesthesia: General   Estimated Blood Loss: 100  Specimens:   ID Type Source Tests Collected by Time Destination   1 : 4th metatarsal bone biopsy  Fresh Bone  Taryn Standing, [facility] 11/11/2019 1316 Pathology   2 : cuboid bone biopsy  Fresh Bone  Taryn Standing, [facility] 11/11/2019 1318 Pathology   3 : 3rd metatarsal bone biopsy  Fresh Bone  Taryn Standing, [place] 11/11/2019 1321 Pathology   4 : Second metatarsal bone biopsy  Fresh Bone  Taryn Standing, [facility] 11/11/2019 1329 Pathology   5 : medial cuneiform bone biopsy  Fresh Bone  Taryn Standing, [facility] 11/11/2019 1332 Pathology   6 : navicular bone biopsy  Fresh Bone  Taryn Standing, [facility] 11/11/2019 1333 Pathology   7 : intermediate cuneiform bone biopsy  Fresh Bone  Taryn Standing, [facility] 11/11/2019 1334 Pathology   8 : lateral cuneiform bone biopsy  Fresh Bone  Taryn Standing, [facility] 11/11/2019 1335 Pathology   9 : left lateral foot phelgmon  Fresh Bone  Taryn Standing, [place] 11/11/2019 1344 Pathology   1 : left fourth tarsal metatarsal joint  Foot Foot, left CULTURE, AEROBIC AND ANAEROBIC Taryn Standing, [facility] 11/11/2019 1305 Microbiology   2 : 4th met bone culture  Foot Foot, left CULTURE, AEROBIC AND ANAEROBIC THERON [facility] 11/11/2019 1315 Microbiology      Findings: phlegmon/abscess of left lateral foot with septic arthritis of the 4th metatarsal joint with the cuboid; degenerative changes to the 4th metatarsal and articular surface of the cuboid; all bones questionable for osteomyelitis on MRI were biopsied. Will follow bone cultures and bone biopsies.   Complications:Non  Implants: * No implants in log *

## 2019-11-11 NOTE — ANESTHESIA POSTPROCEDURE EVALUATION
Procedure(s):  INCISION AND DRAINAGE LEFT FOOT ABSCESS WITH BONE BIOPSY. ... Lexus Brown Request noon to 1pm or after 4.    general    <BSHSIANPOST>    Vitals Value Taken Time   /80 11/11/2019  3:15 PM   Temp 36.5 °C (97.7 °F) 11/11/2019  3:10 PM   Pulse 83 11/11/2019  3:21 PM   Resp 14 11/11/2019  3:21 PM   SpO2 96 % 11/11/2019  3:21 PM   Vitals shown include unvalidated device data.

## 2019-11-12 LAB
ALDOST SERPL-MCNC: 2 NG/DL (ref 0–30)
ANION GAP SERPL CALC-SCNC: 5 MMOL/L (ref 5–15)
BASOPHILS # BLD: 0.1 K/UL (ref 0–0.1)
BASOPHILS # BLD: NORMAL K/UL (ref 0–0.1)
BASOPHILS NFR BLD: 1 % (ref 0–1)
BASOPHILS NFR BLD: NORMAL % (ref 0–1)
BUN SERPL-MCNC: 25 MG/DL (ref 6–20)
BUN/CREAT SERPL: 15 (ref 12–20)
CALCIUM SERPL-MCNC: 8 MG/DL (ref 8.5–10.1)
CHLORIDE SERPL-SCNC: 111 MMOL/L (ref 97–108)
CO2 SERPL-SCNC: 22 MMOL/L (ref 21–32)
CREAT SERPL-MCNC: 1.63 MG/DL (ref 0.55–1.02)
DATE LAST DOSE: ABNORMAL
DIFFERENTIAL METHOD BLD: ABNORMAL
DIFFERENTIAL METHOD BLD: NORMAL
EOSINOPHIL # BLD: 0.2 K/UL (ref 0–0.4)
EOSINOPHIL # BLD: NORMAL K/UL (ref 0–0.4)
EOSINOPHIL NFR BLD: 3 % (ref 0–7)
EOSINOPHIL NFR BLD: NORMAL % (ref 0–7)
ERYTHROCYTE [DISTWIDTH] IN BLOOD BY AUTOMATED COUNT: 16.3 % (ref 11.5–14.5)
ERYTHROCYTE [DISTWIDTH] IN BLOOD BY AUTOMATED COUNT: NORMAL % (ref 11.5–14.5)
GLUCOSE BLD STRIP.AUTO-MCNC: 187 MG/DL (ref 65–100)
GLUCOSE BLD STRIP.AUTO-MCNC: 205 MG/DL (ref 65–100)
GLUCOSE BLD STRIP.AUTO-MCNC: 218 MG/DL (ref 65–100)
GLUCOSE BLD STRIP.AUTO-MCNC: 294 MG/DL (ref 65–100)
GLUCOSE SERPL-MCNC: 182 MG/DL (ref 65–100)
HCT VFR BLD AUTO: 26.1 % (ref 35–47)
HCT VFR BLD AUTO: NORMAL % (ref 35–47)
HGB BLD-MCNC: 7.8 G/DL (ref 11.5–16)
HGB BLD-MCNC: NORMAL G/DL (ref 11.5–16)
IMM GRANULOCYTES # BLD AUTO: 0 K/UL (ref 0–0.04)
IMM GRANULOCYTES # BLD AUTO: NORMAL K/UL (ref 0–0.04)
IMM GRANULOCYTES NFR BLD AUTO: 0 % (ref 0–0.5)
IMM GRANULOCYTES NFR BLD AUTO: NORMAL % (ref 0–0.5)
LYMPHOCYTES # BLD: 1 K/UL (ref 0.8–3.5)
LYMPHOCYTES # BLD: NORMAL K/UL (ref 0.8–3.5)
LYMPHOCYTES NFR BLD: 13 % (ref 12–49)
LYMPHOCYTES NFR BLD: NORMAL % (ref 12–49)
MCH RBC QN AUTO: 26.9 PG (ref 26–34)
MCH RBC QN AUTO: NORMAL PG (ref 26–34)
MCHC RBC AUTO-ENTMCNC: 29.9 G/DL (ref 30–36.5)
MCHC RBC AUTO-ENTMCNC: NORMAL G/DL (ref 30–36.5)
MCV RBC AUTO: 90 FL (ref 80–99)
MCV RBC AUTO: NORMAL FL (ref 80–99)
MONOCYTES # BLD: 0.6 K/UL (ref 0–1)
MONOCYTES # BLD: NORMAL K/UL (ref 0–1)
MONOCYTES NFR BLD: 8 % (ref 5–13)
MONOCYTES NFR BLD: NORMAL % (ref 5–13)
NEUTS SEG # BLD: 5.7 K/UL (ref 1.8–8)
NEUTS SEG # BLD: NORMAL K/UL (ref 1.8–8)
NEUTS SEG NFR BLD: 75 % (ref 32–75)
NEUTS SEG NFR BLD: NORMAL % (ref 32–75)
NRBC # BLD: 0 K/UL (ref 0–0.01)
NRBC # BLD: NORMAL K/UL (ref 0–0.01)
NRBC BLD-RTO: 0 PER 100 WBC
NRBC BLD-RTO: NORMAL PER 100 WBC
PLATELET # BLD AUTO: 320 K/UL (ref 150–400)
PLATELET # BLD AUTO: NORMAL K/UL (ref 150–400)
PMV BLD AUTO: 10.8 FL (ref 8.9–12.9)
PMV BLD AUTO: NORMAL FL (ref 8.9–12.9)
POTASSIUM SERPL-SCNC: 4 MMOL/L (ref 3.5–5.1)
RBC # BLD AUTO: 2.9 M/UL (ref 3.8–5.2)
RBC # BLD AUTO: NORMAL M/UL (ref 3.8–5.2)
REPORTED DOSE,DOSE: ABNORMAL UNITS
REPORTED DOSE/TIME,TMG: ABNORMAL
SERVICE CMNT-IMP: ABNORMAL
SODIUM SERPL-SCNC: 138 MMOL/L (ref 136–145)
VANCOMYCIN TROUGH SERPL-MCNC: 18.9 UG/ML (ref 5–10)
WBC # BLD AUTO: 7.6 K/UL (ref 3.6–11)
WBC # BLD AUTO: NORMAL K/UL (ref 3.6–11)

## 2019-11-12 PROCEDURE — 74011636637 HC RX REV CODE- 636/637: Performed by: PODIATRIST

## 2019-11-12 PROCEDURE — 80048 BASIC METABOLIC PNL TOTAL CA: CPT

## 2019-11-12 PROCEDURE — 74011000258 HC RX REV CODE- 258: Performed by: HOSPITALIST

## 2019-11-12 PROCEDURE — 82962 GLUCOSE BLOOD TEST: CPT

## 2019-11-12 PROCEDURE — 74011250636 HC RX REV CODE- 250/636: Performed by: HOSPITALIST

## 2019-11-12 PROCEDURE — 74011250636 HC RX REV CODE- 250/636: Performed by: NURSE PRACTITIONER

## 2019-11-12 PROCEDURE — 74011250636 HC RX REV CODE- 250/636: Performed by: PODIATRIST

## 2019-11-12 PROCEDURE — 36415 COLL VENOUS BLD VENIPUNCTURE: CPT

## 2019-11-12 PROCEDURE — 74011250637 HC RX REV CODE- 250/637: Performed by: PODIATRIST

## 2019-11-12 PROCEDURE — 80202 ASSAY OF VANCOMYCIN: CPT

## 2019-11-12 PROCEDURE — 74011636637 HC RX REV CODE- 636/637: Performed by: HOSPITALIST

## 2019-11-12 PROCEDURE — 74011000258 HC RX REV CODE- 258: Performed by: PODIATRIST

## 2019-11-12 PROCEDURE — 85025 COMPLETE CBC W/AUTO DIFF WBC: CPT

## 2019-11-12 PROCEDURE — 74011250637 HC RX REV CODE- 250/637: Performed by: HOSPITALIST

## 2019-11-12 PROCEDURE — 65270000029 HC RM PRIVATE

## 2019-11-12 RX ORDER — VANCOMYCIN HYDROCHLORIDE
1250 EVERY 24 HOURS
Status: DISCONTINUED | OUTPATIENT
Start: 2019-11-13 | End: 2019-11-18

## 2019-11-12 RX ORDER — MORPHINE SULFATE 2 MG/ML
2 INJECTION, SOLUTION INTRAMUSCULAR; INTRAVENOUS ONCE
Status: COMPLETED | OUTPATIENT
Start: 2019-11-12 | End: 2019-11-12

## 2019-11-12 RX ORDER — MORPHINE SULFATE 2 MG/ML
2 INJECTION, SOLUTION INTRAMUSCULAR; INTRAVENOUS
Status: DISCONTINUED | OUTPATIENT
Start: 2019-11-12 | End: 2019-11-18 | Stop reason: HOSPADM

## 2019-11-12 RX ORDER — INSULIN GLARGINE 100 [IU]/ML
10 INJECTION, SOLUTION SUBCUTANEOUS 2 TIMES DAILY
Status: DISCONTINUED | OUTPATIENT
Start: 2019-11-12 | End: 2019-11-13

## 2019-11-12 RX ORDER — METOPROLOL TARTRATE 50 MG/1
50 TABLET ORAL EVERY 12 HOURS
Status: DISCONTINUED | OUTPATIENT
Start: 2019-11-12 | End: 2019-11-14

## 2019-11-12 RX ADMIN — METOPROLOL TARTRATE 50 MG: 50 TABLET, FILM COATED ORAL at 10:07

## 2019-11-12 RX ADMIN — Medication 10 ML: at 21:27

## 2019-11-12 RX ADMIN — INSULIN LISPRO 2 UNITS: 100 INJECTION, SOLUTION INTRAVENOUS; SUBCUTANEOUS at 07:06

## 2019-11-12 RX ADMIN — Medication 10 ML: at 16:55

## 2019-11-12 RX ADMIN — HYDRALAZINE HYDROCHLORIDE 50 MG: 50 TABLET, FILM COATED ORAL at 21:25

## 2019-11-12 RX ADMIN — MORPHINE SULFATE 2 MG: 2 INJECTION, SOLUTION INTRAMUSCULAR; INTRAVENOUS at 03:03

## 2019-11-12 RX ADMIN — HYDROCODONE BITARTRATE AND ACETAMINOPHEN 1 TABLET: 5; 325 TABLET ORAL at 16:53

## 2019-11-12 RX ADMIN — Medication 10 ML: at 05:44

## 2019-11-12 RX ADMIN — PIPERACILLIN SODIUM AND TAZOBACTAM SODIUM 3.38 G: 3; .375 INJECTION, POWDER, LYOPHILIZED, FOR SOLUTION INTRAVENOUS at 06:57

## 2019-11-12 RX ADMIN — INSULIN GLARGINE 10 UNITS: 100 INJECTION, SOLUTION SUBCUTANEOUS at 19:26

## 2019-11-12 RX ADMIN — SODIUM CHLORIDE 75 ML/HR: 900 INJECTION, SOLUTION INTRAVENOUS at 11:51

## 2019-11-12 RX ADMIN — HYDRALAZINE HYDROCHLORIDE 50 MG: 50 TABLET, FILM COATED ORAL at 10:07

## 2019-11-12 RX ADMIN — INSULIN LISPRO 5 UNITS: 100 INJECTION, SOLUTION INTRAVENOUS; SUBCUTANEOUS at 17:09

## 2019-11-12 RX ADMIN — LEVOTHYROXINE SODIUM 25 MCG: 25 TABLET ORAL at 06:57

## 2019-11-12 RX ADMIN — METOPROLOL TARTRATE 50 MG: 50 TABLET, FILM COATED ORAL at 21:25

## 2019-11-12 RX ADMIN — AMLODIPINE BESYLATE 10 MG: 5 TABLET ORAL at 10:06

## 2019-11-12 RX ADMIN — HYDROCODONE BITARTRATE AND ACETAMINOPHEN 1 TABLET: 5; 325 TABLET ORAL at 07:06

## 2019-11-12 RX ADMIN — ENOXAPARIN SODIUM 40 MG: 40 INJECTION SUBCUTANEOUS at 16:54

## 2019-11-12 RX ADMIN — ALPRAZOLAM 1 MG: 0.5 TABLET ORAL at 03:03

## 2019-11-12 RX ADMIN — INSULIN LISPRO 2 UNITS: 100 INJECTION, SOLUTION INTRAVENOUS; SUBCUTANEOUS at 21:25

## 2019-11-12 RX ADMIN — INSULIN GLARGINE 10 UNITS: 100 INJECTION, SOLUTION SUBCUTANEOUS at 12:39

## 2019-11-12 RX ADMIN — INSULIN LISPRO 3 UNITS: 100 INJECTION, SOLUTION INTRAVENOUS; SUBCUTANEOUS at 12:39

## 2019-11-12 RX ADMIN — HYDROCODONE BITARTRATE AND ACETAMINOPHEN 1 TABLET: 5; 325 TABLET ORAL at 23:31

## 2019-11-12 RX ADMIN — HYDRALAZINE HYDROCHLORIDE 50 MG: 50 TABLET, FILM COATED ORAL at 16:53

## 2019-11-12 RX ADMIN — VANCOMYCIN HYDROCHLORIDE 1250 MG: 10 INJECTION, POWDER, LYOPHILIZED, FOR SOLUTION INTRAVENOUS at 05:00

## 2019-11-12 RX ADMIN — CEFEPIME HYDROCHLORIDE 2 G: 2 INJECTION, POWDER, FOR SOLUTION INTRAVENOUS at 16:54

## 2019-11-12 NOTE — CDMP QUERY
#1 
 
Pt admitted with diabetic foot ulcer/Pt noted to have abnormal renal function labs If possible, please document in the progress notes and d/c summary if you are evaluating and / or treating any of the following: ? Acute Kidney Injury ? Acute Kidney Injury ruled out ? Acute Kidney Insufficiency ? Other, please specify ? Clinically unable to determine The medical record reflects the following: 
  Risk Factors: dehydration H/P- 
# POOJA  
 
11/11-progress note Dehydration, BUN/creatinine slightly elevated. Continue IV hydration Clinical Indicators:  
11/8/2019 10:05 GFR 47 BUN: 33 (H) Creatinine: 1.22 (H) 
 
11/12/2019 05:51 GFR 34 BUN: 25 (H) Creatinine: 1.63 (H) 
 
baseline 10/10/2019 06:00 
GFR >60 BUN: 13 Creatinine: 0.81 Treatment: IV fluids @ 75, lab monitoring Thank you, 
       Usman Chavez [facility], [address] [facility]

## 2019-11-12 NOTE — PROGRESS NOTES
Progress Note    Patient: Alicia Stark MRN: [id number]  SSN: [id number]    YOB: 1970  Age: 51 y.o. Sex: female      Admit Date: 11/8/2019  1 Day Post-Op     Subjective:     Patient seen resting quiet and comfortably and no apparent distress. No issues overnight. Objective:     Visit Vitals  /81 (BP 1 Location: Left arm, BP Patient Position: At rest)   Pulse 75   Temp 98.6 °F (37 °C)   Resp 16   Ht 5' 6\" (1.676 m)   Wt 90.6 kg (199 lb 11.2 oz)   SpO2 95%   BMI 32.23 kg/m²        Physical Exam:  Dressing clean, dry and intact to the left foot. Minimal strikethrough to inner dressings. All incisions with sutures intact. Mild post-op erythema and edema. Labs/Radiology Review: images and reports reviewed    Assessment:     Hospital Problems  Date Reviewed: 11/11/2019          Codes Class Noted POA    * (Principal) Left foot infection ICD-10-CM: L08.9  ICD-9-CM: 686.9  11/8/2019 Unknown              Plan/Recommendations/Medical Decision Making:   -Await cultures of the 4th met base and 4th TMTJ joint fluid. Await pathology/bone biopsies to determine final treatment plan. -Continue IV abx  -Dressing changed at bedside. Packing advance. Activity: non-weight bearing to LLE.   Discontinue bedrest

## 2019-11-12 NOTE — PROGRESS NOTES
Bedside shift change report given to Delmer Giron RN (oncoming nurse) by Rosie Evans RN (offgoing nurse). Report included the following information SBAR, MAR and Recent Results.

## 2019-11-12 NOTE — PROGRESS NOTES
Pharmacist Note - Vancomycin Dosing  Therapy day 5  Indication: diabetic foot infection with abscess, septic arthritis and osteomyelitis   Current regimen: 1250 mg IV Q18H    A Trough Level resulted at 18.9 mcg/mL which was obtained 19 hrs post-dose. The extrapolated \"true\" trough is approximately 19.8 mcg/mL based on the patient's known kinetics. Goal trough: 15 - 20 mcg/mL     Plan: Change to 1250 mg IV Q24H . Pharmacy will continue to monitor this patient daily for changes in clinical status and renal function.

## 2019-11-12 NOTE — PROGRESS NOTES
Bedside shift change report given to Hamzah Valverde (oncoming nurse) by Soren Lopez (offgoing nurse). Report included the following information SBAR, Kardex and MAR.

## 2019-11-12 NOTE — DIABETES MGMT
[facility]    [facility] Progress Note    Recommendations/ Comments: Chart review for hyperglycemia, BG's 165 mg/dL - 284 mg/dL and received 10 units of lispro correction yesterday.  mg/dL today  PO intake varies 25 % this AM but 100% yesterday     If appropriate, please consider   Increase Lantus to 14 units daily  Continue to observe po intake and BG response to evaluate need for meal time insulin (pt is on Regular Insulin scale AC TID at home)    Current hospital DM medication:   Lantus 10 units Q AM  Lispro normal sensitivity correction scale     Chart reviewed on [facility]. Patient is a 51 y.o. female with known DM on Levemir 22 units daily and Regular scale AC TID at home  Pt seen by DTC 10-9-2019 to address elevated A1c which at that time was 12.7%      A1c:   Lab Results   Component Value Date/Time    Hemoglobin A1c 10.6 (H) 11/08/2019 10:05 AM    Hemoglobin A1c 12.2 (H) 10/07/2019 09:33 AM       Recent Glucose Results:   Lab Results   Component Value Date/Time     (H) 11/12/2019 05:51 AM    GLUCPOC 218 (H) 11/12/2019 11:20 AM    GLUCPOC 187 (H) 11/12/2019 07:02 AM    GLUCPOC 165 (H) 11/11/2019 11:14 PM        Lab Results   Component Value Date/Time    Creatinine 1.63 (H) 11/12/2019 05:51 AM     Estimated Creatinine Clearance: 47.8 mL/min (A) (based on SCr of 1.63 mg/dL (H)). Active Orders   Diet    DIET DIABETIC CONSISTENT CARB Regular        PO intake:   Patient Vitals for the past 72 hrs:   % Diet Eaten   11/12/19 1008 25 %   11/10/19 0950 100 %       Will continue to follow as needed.     Thank you  Jose Godwin RN, CDE        Time spent: 5 min

## 2019-11-12 NOTE — PROGRESS NOTES
Transition of [address] with family assistance once medically stable   Podiatry following; awaiting cultures of the 4th met base and 4th TMTJ joint fluid    Continue iv abx    Transport: self   [address]    Follow up with PCP/Specialist     Nadira Zhu  Clinical     [phone]

## 2019-11-12 NOTE — PROGRESS NOTES
Hospitalist Progress Note  Capri Barth MD  Answering service: [phone] from in house phone      Date of Service:  2019  NAME:  Brenton Barker                                                         :  1970                                               MRN:  [id number]      Subjective/interval history    Patient seen and examined. She is admitted for left foot cellulitis/probable osteomyelitis. Patient is familiar to me from previous admission. He does not have any complaints currently, she said she had pain earlier but has improved with pain medications. She denies headache, diplopia, nausea or vomiting. Assessment and plan   Ms Sharad Guzman complained of severe pain and she is asking for stronger intravenous pain medicine for her to use as needed. #Left foot cellulitis with probable osteomyelitis and abscess collection in the setting of poorly controlled diabetes.  ESR and C-reactive protein are elevated   A preliminary report of the MRI of the foot reported a 3.3 cm x 2.3 cm focal fluid collection, normal signal within the cuboid, lateral and intermediate cuneiforms and the base of of the fifth metatarsal worrisome for osteomyelitis there is also abnormal signal within the bases of the second and third metatarsals   Venous Doppler was negative for DVT   Continue Zosyn and vancomycin   Status post I and D and bone biopsy. We need to wait for fluid microbiology and bone biopsy results to determine course of antibiotic treatment. #Poorly controlled type II DM, insulin-dependent with hyperglycemia:   -She is noted to be hypoglycemic since last night, she was actually just continued on her home regimen, hoping that she was compliant with it. This probably has to do with her restricted calorie intake in the hospital.  - SSI, Accu-Chek, diabetic diet  -Decrease Lantus, restarted at 10 units of Lantus nightly.   At home she reports using Lantus 22 units twice daily plus pre-meal scheduled short-acting insulin.  -Blood sugar is now above 200, increase Lantus from 10 units daily to 10 units twice daily and adjust based on blood sugar for inpatient target of under 180. # Uncontrolled hypertension:  -No headache, diplopia or chest pain  - Currently on amlodipine 10 mg and lisinopril 20 mg. Hydralazine 50 mg 3 times daily started on 11/9. This morning she had amlodipine and hydralazine, her blood pressure is still very high however after 1 mg alprazolam around noontime blood pressure came significantly down. Patient recognizes anxiety contributes to her blood pressure. Will closely monitor and continually adjust the antihypertensive medications.  -Blood pressure is better, continue current regimen     #Chronic normocytic anemia, mild, most probably anemia of chronic disease. Hemoglobin is down to 7.6 today from 9.6 on admission yesterday, she does not have any acute blood loss for the hemoglobin to drop by 2 g, this could probably be dilutional with IV fluids. Will monitor. #Dehydration, BUN/creatinine slightly elevated. Creatinine is rising, continue IV hydration. Monitor BMP  #Subclinical hypothyroidism which was detected last admission. TSH is greater than 10 now although T4 is low normal so Synthroid 25 mcg daily is started. TSH and T4 needed to be repeated in 4 to 6 weeks time. Body mass index is 32.23 kg/m². DVT prophylaxis: Heparin  Code: Full  Patient walks independently     Dispo: home, she may need long-term intravenous antibiotics. Current facility administered and prior to admit medications reviewed. x         Review of Systems:  A comprehensive review of systems was negative except for that written in the HPI. PHYSICAL EXAM:    GENERAL:  Alert, oriented, cooperative, no apparent distress  HEENT:  Normocephalic, atraumatic, non icteric sclerae, non pallor conjuctivae, EOMs intact, PERRLA.   NECK: Supple, trachea midline, no adenopathy, no thyromegally or tenderness, no carotid bruit and no JVD. LUNGS:   Vesicular breath sounds bilaterally, no added sounds. HEART:   S1 and S2 well heard,RRR,  no murmur, click, rub or gallop. No JVD. No edema   ABDOMEB:   Soft, non-tender. Normoactive bowel sounds. No masses,  No organomegaly. EXTREMETIES: Left foot bandaged. Sensation intact of the toes. PULSES: 2+ and symmetric all extremities. SKIN:  No rashes or lesions  NEUROLOGY: Alert and oriented to PPT, CNII-XII intact. Motor and sensory exam grossly intact.        Recent labs & imaging reviewed:    Problem List as of 11/12/2019 Date Reviewed: 11/11/2019          Codes Class Noted - Resolved    * (Principal) Left foot infection ICD-10-CM: L08.9  ICD-9-CM: 686.9  11/8/2019 - Present        HTN (hypertension) ICD-10-CM: I10  ICD-9-CM: 401.9  10/7/2019 - Present        DM type 2 (diabetes mellitus, type 2) ([address] ICD-10-CM: E11.9  ICD-9-CM: 250.00  10/7/2019 - Present        Acute osteomyelitis of left foot ([address] ICD-10-CM: C95.849  ICD-9-CM: 730.07  10/7/2019 - Present        Cellulitis and abscess of toe of left foot ICD-10-CM: L03.032, L02.612  ICD-9-CM: 681.10  10/7/2019 - Present        Uncontrolled type 2 diabetes mellitus with peripheral neuropathy ([address] ICD-10-CM: E11.42, E11.65  ICD-9-CM: 250.62, 357.2  10/7/2019 - Present        Osteomyelitis of second toe of left foot (HCC) ICD-10-CM: M86.9  ICD-9-CM: 730.27  10/7/2019 - Present        Acute osteomyelitis of metatarsal bone of left foot ([facility] ICD-10-CM: I35.837  ICD-9-CM: 730.07  10/7/2019 - Present        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 ([address] ICD-10-CM: H43.867, R59.702  ICD-9-CM: 250.80, 707.14  10/7/2019 - Present        Unspecified essential hypertension ICD-10-CM: I10  ICD-9-CM: 401.9  9/1/2011 - Present        Diabetes mellitus type 1, uncontrolled, insulin dependent ([facility] ICD-10-CM: E10.65  ICD-9-CM: 250.03  4/20/2011 - Present        Anxiety state ICD-10-CM: F41.1  ICD-9-CM: 300.00  4/20/2011 - Present        RESOLVED: Osteomyelitis ([facility] ICD-10-CM: M86.9  ICD-9-CM: 730.20  10/2/2014 - 10/12/2014                Carolann Chow MD  Internal Medicine  Date of Service: 11/12/2019

## 2019-11-13 LAB
ANION GAP SERPL CALC-SCNC: 6 MMOL/L (ref 5–15)
ANION GAP SERPL CALC-SCNC: 7 MMOL/L (ref 5–15)
APPEARANCE UR: CLEAR
ATRIAL RATE: 81 BPM
BACTERIA SPEC CULT: NORMAL
BACTERIA URNS QL MICRO: NEGATIVE /HPF
BASOPHILS # BLD: 0 K/UL (ref 0–0.1)
BASOPHILS NFR BLD: 1 % (ref 0–1)
BILIRUB UR QL: NEGATIVE
BUN SERPL-MCNC: 27 MG/DL (ref 6–20)
BUN SERPL-MCNC: 27 MG/DL (ref 6–20)
BUN/CREAT SERPL: 14 (ref 12–20)
BUN/CREAT SERPL: 15 (ref 12–20)
CALCIUM SERPL-MCNC: 7.9 MG/DL (ref 8.5–10.1)
CALCIUM SERPL-MCNC: 8.5 MG/DL (ref 8.5–10.1)
CALCULATED P AXIS, ECG09: 76 DEGREES
CALCULATED R AXIS, ECG10: -52 DEGREES
CALCULATED T AXIS, ECG11: 84 DEGREES
CHLORIDE SERPL-SCNC: 108 MMOL/L (ref 97–108)
CHLORIDE SERPL-SCNC: 109 MMOL/L (ref 97–108)
CHLORIDE UR-SCNC: 79 MMOL/L
CO2 SERPL-SCNC: 22 MMOL/L (ref 21–32)
CO2 SERPL-SCNC: 23 MMOL/L (ref 21–32)
COLOR UR: ABNORMAL
CREAT SERPL-MCNC: 1.86 MG/DL (ref 0.55–1.02)
CREAT SERPL-MCNC: 1.98 MG/DL (ref 0.55–1.02)
CREAT UR-MCNC: 83 MG/DL
CREAT UR-MCNC: 85.6 MG/DL
DIAGNOSIS, 93000: NORMAL
DIFFERENTIAL METHOD BLD: ABNORMAL
EOSINOPHIL # BLD: 0.3 K/UL (ref 0–0.4)
EOSINOPHIL #/AREA URNS HPF: NEGATIVE /[HPF]
EOSINOPHIL NFR BLD: 5 % (ref 0–7)
EPITH CASTS URNS QL MICRO: ABNORMAL /LPF
ERYTHROCYTE [DISTWIDTH] IN BLOOD BY AUTOMATED COUNT: 16.5 % (ref 11.5–14.5)
FERRITIN SERPL-MCNC: 82 NG/ML (ref 8–252)
FOLATE SERPL-MCNC: 16.2 NG/ML (ref 5–21)
GLUCOSE BLD STRIP.AUTO-MCNC: 220 MG/DL (ref 65–100)
GLUCOSE BLD STRIP.AUTO-MCNC: 224 MG/DL (ref 65–100)
GLUCOSE BLD STRIP.AUTO-MCNC: 230 MG/DL (ref 65–100)
GLUCOSE BLD STRIP.AUTO-MCNC: 231 MG/DL (ref 65–100)
GLUCOSE SERPL-MCNC: 202 MG/DL (ref 65–100)
GLUCOSE SERPL-MCNC: 218 MG/DL (ref 65–100)
GLUCOSE UR STRIP.AUTO-MCNC: 250 MG/DL
HCT VFR BLD AUTO: 25.4 % (ref 35–47)
HGB BLD-MCNC: 7.5 G/DL (ref 11.5–16)
HGB UR QL STRIP: NEGATIVE
HYALINE CASTS URNS QL MICRO: ABNORMAL /LPF (ref 0–5)
IMM GRANULOCYTES # BLD AUTO: 0 K/UL (ref 0–0.04)
IMM GRANULOCYTES NFR BLD AUTO: 1 % (ref 0–0.5)
IRON SATN MFR SERPL: 9 % (ref 20–50)
IRON SERPL-MCNC: 24 UG/DL (ref 35–150)
KETONES UR QL STRIP.AUTO: NEGATIVE MG/DL
LEUKOCYTE ESTERASE UR QL STRIP.AUTO: NEGATIVE
LYMPHOCYTES # BLD: 1.4 K/UL (ref 0.8–3.5)
LYMPHOCYTES NFR BLD: 21 % (ref 12–49)
MCH RBC QN AUTO: 26.9 PG (ref 26–34)
MCHC RBC AUTO-ENTMCNC: 29.5 G/DL (ref 30–36.5)
MCV RBC AUTO: 91 FL (ref 80–99)
MONOCYTES # BLD: 0.6 K/UL (ref 0–1)
MONOCYTES NFR BLD: 10 % (ref 5–13)
NEUTS SEG # BLD: 4.2 K/UL (ref 1.8–8)
NEUTS SEG NFR BLD: 62 % (ref 32–75)
NITRITE UR QL STRIP.AUTO: NEGATIVE
NRBC # BLD: 0 K/UL (ref 0–0.01)
NRBC BLD-RTO: 0 PER 100 WBC
P-R INTERVAL, ECG05: 194 MS
PH UR STRIP: 5.5 [PH] (ref 5–8)
PLATELET # BLD AUTO: 301 K/UL (ref 150–400)
PMV BLD AUTO: 11.3 FL (ref 8.9–12.9)
POTASSIUM SERPL-SCNC: 4.1 MMOL/L (ref 3.5–5.1)
POTASSIUM SERPL-SCNC: 4.2 MMOL/L (ref 3.5–5.1)
PROT UR STRIP-MCNC: 100 MG/DL
PROT UR-MCNC: 187 MG/DL (ref 0–11.9)
PROT/CREAT UR-RTO: 2.2
Q-T INTERVAL, ECG07: 392 MS
QRS DURATION, ECG06: 84 MS
QTC CALCULATION (BEZET), ECG08: 455 MS
RBC # BLD AUTO: 2.79 M/UL (ref 3.8–5.2)
RBC #/AREA URNS HPF: ABNORMAL /HPF (ref 0–5)
SERVICE CMNT-IMP: ABNORMAL
SERVICE CMNT-IMP: NORMAL
SODIUM SERPL-SCNC: 137 MMOL/L (ref 136–145)
SODIUM SERPL-SCNC: 138 MMOL/L (ref 136–145)
SODIUM UR-SCNC: 67 MMOL/L
SP GR UR REFRACTOMETRY: 1.01 (ref 1–1.03)
TIBC SERPL-MCNC: 255 UG/DL (ref 250–450)
UROBILINOGEN UR QL STRIP.AUTO: 0.2 EU/DL (ref 0.2–1)
VENTRICULAR RATE, ECG03: 81 BPM
VIT B12 SERPL-MCNC: 372 PG/ML (ref 193–986)
WBC # BLD AUTO: 6.6 K/UL (ref 3.6–11)
WBC URNS QL MICRO: ABNORMAL /HPF (ref 0–4)

## 2019-11-13 PROCEDURE — 36415 COLL VENOUS BLD VENIPUNCTURE: CPT

## 2019-11-13 PROCEDURE — 81001 URINALYSIS AUTO W/SCOPE: CPT

## 2019-11-13 PROCEDURE — 83540 ASSAY OF IRON: CPT

## 2019-11-13 PROCEDURE — 82728 ASSAY OF FERRITIN: CPT

## 2019-11-13 PROCEDURE — 74011636637 HC RX REV CODE- 636/637: Performed by: HOSPITALIST

## 2019-11-13 PROCEDURE — 74011250637 HC RX REV CODE- 250/637: Performed by: PODIATRIST

## 2019-11-13 PROCEDURE — 74011250636 HC RX REV CODE- 250/636: Performed by: HOSPITALIST

## 2019-11-13 PROCEDURE — 87205 SMEAR GRAM STAIN: CPT

## 2019-11-13 PROCEDURE — 74011250637 HC RX REV CODE- 250/637: Performed by: HOSPITALIST

## 2019-11-13 PROCEDURE — 82436 ASSAY OF URINE CHLORIDE: CPT

## 2019-11-13 PROCEDURE — 65270000029 HC RM PRIVATE

## 2019-11-13 PROCEDURE — 84156 ASSAY OF PROTEIN URINE: CPT

## 2019-11-13 PROCEDURE — 80048 BASIC METABOLIC PNL TOTAL CA: CPT

## 2019-11-13 PROCEDURE — 82570 ASSAY OF URINE CREATININE: CPT

## 2019-11-13 PROCEDURE — 84300 ASSAY OF URINE SODIUM: CPT

## 2019-11-13 PROCEDURE — 74011636637 HC RX REV CODE- 636/637: Performed by: PODIATRIST

## 2019-11-13 PROCEDURE — 74011000258 HC RX REV CODE- 258: Performed by: HOSPITALIST

## 2019-11-13 PROCEDURE — 82962 GLUCOSE BLOOD TEST: CPT

## 2019-11-13 PROCEDURE — 82746 ASSAY OF FOLIC ACID SERUM: CPT

## 2019-11-13 PROCEDURE — 85025 COMPLETE CBC W/AUTO DIFF WBC: CPT

## 2019-11-13 PROCEDURE — 82607 VITAMIN B-12: CPT

## 2019-11-13 RX ORDER — INSULIN GLARGINE 100 [IU]/ML
12 INJECTION, SOLUTION SUBCUTANEOUS 2 TIMES DAILY
Status: DISCONTINUED | OUTPATIENT
Start: 2019-11-13 | End: 2019-11-16

## 2019-11-13 RX ADMIN — Medication 10 ML: at 05:48

## 2019-11-13 RX ADMIN — METOPROLOL TARTRATE 50 MG: 50 TABLET, FILM COATED ORAL at 23:23

## 2019-11-13 RX ADMIN — INSULIN LISPRO 3 UNITS: 100 INJECTION, SOLUTION INTRAVENOUS; SUBCUTANEOUS at 11:53

## 2019-11-13 RX ADMIN — AMLODIPINE BESYLATE 10 MG: 5 TABLET ORAL at 09:50

## 2019-11-13 RX ADMIN — CEFEPIME HYDROCHLORIDE 2 G: 2 INJECTION, POWDER, FOR SOLUTION INTRAVENOUS at 02:45

## 2019-11-13 RX ADMIN — INSULIN LISPRO 2 UNITS: 100 INJECTION, SOLUTION INTRAVENOUS; SUBCUTANEOUS at 21:46

## 2019-11-13 RX ADMIN — HYDROCODONE BITARTRATE AND ACETAMINOPHEN 1 TABLET: 5; 325 TABLET ORAL at 18:51

## 2019-11-13 RX ADMIN — HYDRALAZINE HYDROCHLORIDE 50 MG: 50 TABLET, FILM COATED ORAL at 09:49

## 2019-11-13 RX ADMIN — ALPRAZOLAM 1 MG: 0.5 TABLET ORAL at 09:49

## 2019-11-13 RX ADMIN — INSULIN LISPRO 3 UNITS: 100 INJECTION, SOLUTION INTRAVENOUS; SUBCUTANEOUS at 18:50

## 2019-11-13 RX ADMIN — CEFEPIME HYDROCHLORIDE 2 G: 2 INJECTION, POWDER, FOR SOLUTION INTRAVENOUS at 16:28

## 2019-11-13 RX ADMIN — HYDROCODONE BITARTRATE AND ACETAMINOPHEN 1 TABLET: 5; 325 TABLET ORAL at 11:54

## 2019-11-13 RX ADMIN — Medication 10 ML: at 16:29

## 2019-11-13 RX ADMIN — Medication 10 ML: at 23:24

## 2019-11-13 RX ADMIN — HYDRALAZINE HYDROCHLORIDE 50 MG: 50 TABLET, FILM COATED ORAL at 23:23

## 2019-11-13 RX ADMIN — Medication 20 ML: at 16:29

## 2019-11-13 RX ADMIN — LEVOTHYROXINE SODIUM 25 MCG: 25 TABLET ORAL at 07:01

## 2019-11-13 RX ADMIN — VANCOMYCIN HYDROCHLORIDE 1250 MG: 10 INJECTION, POWDER, LYOPHILIZED, FOR SOLUTION INTRAVENOUS at 05:47

## 2019-11-13 RX ADMIN — INSULIN GLARGINE 12 UNITS: 100 INJECTION, SOLUTION SUBCUTANEOUS at 11:54

## 2019-11-13 RX ADMIN — INSULIN LISPRO 3 UNITS: 100 INJECTION, SOLUTION INTRAVENOUS; SUBCUTANEOUS at 07:01

## 2019-11-13 RX ADMIN — ENOXAPARIN SODIUM 40 MG: 40 INJECTION SUBCUTANEOUS at 16:28

## 2019-11-13 RX ADMIN — HYDRALAZINE HYDROCHLORIDE 50 MG: 50 TABLET, FILM COATED ORAL at 18:51

## 2019-11-13 RX ADMIN — METOPROLOL TARTRATE 50 MG: 50 TABLET, FILM COATED ORAL at 09:50

## 2019-11-13 NOTE — CONSULTS
[facility]   [address] [facility], [facility], [facility]  Phone: [phone] NOTE     Patient: Idalia Jaramillo MRN: [id number]  PCP: None   :     1970  Age:   51 y.o. Sex:  female      Referring physician: Tracy Boo MD  Reason for consultation: 51 y.o. female with Left foot infection [P40.8] complicated by POOJA   Admission Date: 2019 10:19 AM  LOS: 5 days      ASSESSMENT and PLAN :   POOJA:  - Suspect 2/2 poorly controlled HTN, DM and possible antibiotic toxicity   - creatinine still on the rise , ACE (I) on hold   - vanc trough level 18.9 yesterday and received a dose today   - renal US unremarkable except a benign looking renal cysts and possible angiolipoma on lower pole   - ordered urine chemistries, Urine Prot. Cr ratio and Urine Eosinophils  - continue w/ IVF as ordered. She has some peripheral edema -- can watch for now     Possible CKD   - 2/2 Chronic NSAID use, > 10 years of poorly controlled DM and HTN   - follow up UPCR     Diabetic Left Foot Infection w/ Osteo   -Bone Bx pending   - On Vanc + Zosyn     Uncontrolled HTN :  - renal US symmetric Kidneys : less likely [place] but can be screened if BP remains labile   - anxiety possibly contributing   - improved on current regimen   - hold Lisinopril till ARF resolves     Poorly controlled Type II DM   - per primary team   - she should be considered for SGLT-2 Inhibitors for longterm benefits   - encouraged to f/u w/ DTC or Endo on d/c     Severe Anemia :  - per primary team     Care Plan discussed with:  Pt and Dr Mathew Schreiber       Thank you for consulting [facility] in the care of your patient. Subjective:   HPI: Idalia Jaramillo is a 51 y.o.  female who has been admitted to the hospital for worsening Left foot infection. She has been a diabetic for over 12 years and poorly controlled due to lack of insurance and unable to afford insulin.  Her A1C was > 12 on admission. She had [address] but could not get much care at [address] [place] [address]. She was treated w/ Osteo of Left foot with 6 weeks abx some 5 years ago   She started having problems again a 1.5 years ago and on admission was found to have Osteo on MRI. She underwent I+D  And Bone bx, results of which are pending   Her Creatinine which was 1.2 mg/dl on admission has steadily increased to 1.8 mg /dl . Her BP was uncontrolled and has improved in the last 48 hrs. Due to rise in Creatinine , Lisinopril has been held yesterday. She has been on vanc and zosyn since admission. She reports heavy use of advil for foot pain PTA. She was taking 3-4 tabs at a time for foot pain  Her brother needed dialysis in the past but no longer on HD. Her paternal aunt had ESRD for unknown etiology     Past Medical Hx:   Past Medical History:   Diagnosis Date    Cataract     Cataracts, bilateral     Diabetes ([address]     Osteomyelitis ([facility]         Past Surgical Hx:     Past Surgical History:   Procedure Laterality Date    HX CATARACT REMOVAL  2/2011; 5-11    right eye; left    HX TONSIL AND ADENOIDECTOMY  1985    HX TONSILLECTOMY  1984    HX TUBAL LIGATION  1997         No Known Allergies    Social Hx:  reports that she has never smoked. She has never used smokeless tobacco. She reports that she drinks alcohol. She reports that she does not use drugs. Family History   Problem Relation Age of Onset    Hypertension Mother     Cancer Father         prostate    Diabetes Maternal Grandmother     Hypertension Maternal Grandmother        Review of Systems:  A thorough twelve point review of system was performed today. Pertinent positives and negatives are mentioned in the HPI. The reminder of the ROS is negative and noncontributory.      Objective:    Vitals:    Vitals:    11/13/19 0244 11/13/19 0642 11/13/19 0821 11/13/19 0949   BP: 150/83  167/88 (!) 183/99   Pulse: 69  79 76   Resp: 18  18    Temp: 98.6 °F (37 °C)  97.5 °F (36.4 °C)    SpO2: 91% 93%    Weight:  91 kg (200 lb 9.6 oz)     Height:         I&O's:  11/12 0701 - 11/13 0700  In: 360 [P.O.:360]  Out: -   Visit Vitals  BP (!) 183/99   Pulse 76   Temp 97.5 °F (36.4 °C)   Resp 18   Ht 5' 6\" (1.676 m)   Wt 91 kg (200 lb 9.6 oz)   LMP 05/01/2011   SpO2 93%   BMI 32.38 kg/m²       Physical Exam:  General:  No apparent Distress  HEENT: PERRL,  + Pallor , No Icterus  Neck: Supple,no mass palpable  Lungs : CTA  CVS: RRR, S1 S2 normal, No murmur   Abdomen: Soft, NT, BS +  Extremities: R foot 1+ Edema, Left foot in dressing   Skin: No rash or lesions. MS: No joint swelling, erythema, warmth  Neurologic: non focal, AAO x 3  Psych: normal affect    Laboratory Results:    Recent Labs     11/13/19  0551 11/12/19  0551 11/11/19  0345    138 138   K 4.2 4.0 4.1   * 111* 107   CO2 23 22 25   * 182* 255*   BUN 27* 25* 26*   CREA 1.86* 1.63* 1.47*   CA 7.9* 8.0* 8.6     Recent Labs     11/13/19  0551 11/12/19  1015 11/12/19  0551   WBC 6.6 7.6 PLEASE DISREGARD RESULTS   HGB 7.5* 7.8* PLEASE DISREGARD RESULTS   HCT 25.4* 26.1* PLEASE DISREGARD RESULTS    320 PLEASE DISREGARD RESULTS     No results found for: SDES  Lab Results   Component Value Date/Time    Culture result:  11/11/2019 01:15 PM     Specimen not collected anaerobically, recovery of anaerobes may be compromised. Anaerobic screening performed based on source. Culture result: NO GROWTH AFTER 21 HOURS 11/11/2019 01:15 PM    Culture result:  11/11/2019 01:05 PM     Specimen not collected anaerobically, recovery of anaerobes may be compromised. Anaerobic screening performed based on source.     Culture result: NO GROWTH AFTER 21 HOURS 11/11/2019 01:05 PM     Recent Results (from the past 24 hour(s))   GLUCOSE, POC    Collection Time: 11/12/19  4:51 PM   Result Value Ref Range    Glucose (POC) 294 (H) 65 - 100 mg/dL    Performed by My [address] 26, POC    Collection Time: 11/12/19  9:18 PM   Result Value Ref Range Glucose (POC) 205 (H) 65 - 100 mg/dL    Performed by WHITE NAVI    METABOLIC PANEL, BASIC    Collection Time: 11/13/19  5:51 AM   Result Value Ref Range    Sodium 138 136 - 145 mmol/L    Potassium 4.2 3.5 - 5.1 mmol/L    Chloride 109 (H) 97 - 108 mmol/L    CO2 23 21 - 32 mmol/L    Anion gap 6 5 - 15 mmol/L    Glucose 202 (H) 65 - 100 mg/dL    BUN 27 (H) 6 - 20 MG/DL    Creatinine 1.86 (H) 0.55 - 1.02 MG/DL    BUN/Creatinine ratio 15 12 - 20      GFR est AA 35 (L) >60 ml/min/1.73m2    GFR est non-AA 29 (L) >60 ml/min/1.73m2    Calcium 7.9 (L) 8.5 - 10.1 MG/DL   CBC WITH AUTOMATED DIFF    Collection Time: 11/13/19  5:51 AM   Result Value Ref Range    WBC 6.6 3.6 - 11.0 K/uL    RBC 2.79 (L) 3.80 - 5.20 M/uL    HGB 7.5 (L) 11.5 - 16.0 g/dL    HCT 25.4 (L) 35.0 - 47.0 %    MCV 91.0 80.0 - 99.0 FL    MCH 26.9 26.0 - 34.0 PG    MCHC 29.5 (L) 30.0 - 36.5 g/dL    RDW 16.5 (H) 11.5 - 14.5 %    PLATELET 651 205 - 888 K/uL    MPV 11.3 8.9 - 12.9 FL    NRBC 0.0 0  WBC    ABSOLUTE NRBC 0.00 0.00 - 0.01 K/uL    NEUTROPHILS 62 32 - 75 %    LYMPHOCYTES 21 12 - 49 %    MONOCYTES 10 5 - 13 %    EOSINOPHILS 5 0 - 7 %    BASOPHILS 1 0 - 1 %    IMMATURE GRANULOCYTES 1 (H) 0.0 - 0.5 %    ABS. NEUTROPHILS 4.2 1.8 - 8.0 K/UL    ABS. LYMPHOCYTES 1.4 0.8 - 3.5 K/UL    ABS. MONOCYTES 0.6 0.0 - 1.0 K/UL    ABS. EOSINOPHILS 0.3 0.0 - 0.4 K/UL    ABS. BASOPHILS 0.0 0.0 - 0.1 K/UL    ABS. IMM.  GRANS. 0.0 0.00 - 0.04 K/UL    DF AUTOMATED     FERRITIN    Collection Time: 11/13/19  5:51 AM   Result Value Ref Range    Ferritin 82 8 - 252 NG/ML   GLUCOSE, POC    Collection Time: 11/13/19  6:40 AM   Result Value Ref Range    Glucose (POC) 231 (H) 65 - 100 mg/dL    Performed by PATTY MCELROY    GLUCOSE, POC    Collection Time: 11/13/19 11:24 AM   Result Value Ref Range    Glucose (POC) 220 (H) 65 - 100 mg/dL    Performed by Sharon Nixon          Urine dipstick:   Lab Results   Component Value Date/Time    Color YELLOW/STRAW 10/07/2019 10:47 AM Appearance CLEAR 10/07/2019 10:47 AM    Specific gravity 1.017 10/07/2019 10:47 AM    pH (UA) 6.5 10/07/2019 10:47 AM    Protein 300 (A) 10/07/2019 10:47 AM    Glucose >1,000 (A) 10/07/2019 10:47 AM    Ketone NEGATIVE  10/07/2019 10:47 AM    Bilirubin NEGATIVE  10/07/2019 10:47 AM    Urobilinogen 1.0 10/07/2019 10:47 AM    Nitrites NEGATIVE  10/07/2019 10:47 AM    Leukocyte Esterase NEGATIVE  10/07/2019 10:47 AM    Epithelial cells FEW 10/07/2019 10:47 AM    Bacteria NEGATIVE  10/07/2019 10:47 AM    WBC 0-4 10/07/2019 10:47 AM    RBC 20-50 10/07/2019 10:47 AM       I have reviewed the following: All pertinent labs, microbiology data, radiology imaging for my assessment     Medications list Personally Reviewed   [x]      Yes     []               No       Medications:  Prior to Admission medications    Medication Sig Start Date End Date Taking? Authorizing Provider   HYDROcodone-acetaminophen (NORCO) 5-325 mg per tablet Take 1 Tab by mouth nightly as needed for Pain. Yes Provider, Historical   insulin regular (NOVOLIN R REGULAR U-100 INSULN) 100 unit/mL injection by SubCUTAneous route three (3) times daily (with meals). Sliding scale insulin   Yes Provider, Historical   insulin detemir U-100 (LEVEMIR U-100 INSULIN) 100 unit/mL injection 22 Units by SubCUTAneous route two (2) times a day. Yes Provider, Historical        Thank you for allowing us to participate in the care of this patient. We will follow patient. Please dont hesitate to call with any questions    Arlene Cleaning MD  [facility]   [address] [facility] [address] [place] [address]  Phone - [phone]   Fax - [phone]  www. [email or web address] Rifampin Counseling: I discussed with the patient the risks of rifampin including but not limited to liver damage, kidney damage, red-orange body fluids, nausea/vomiting and severe allergy.

## 2019-11-13 NOTE — OP NOTES
[address] [facility]  OPERATIVE REPORT    Name:  Maritza Garcia  MR#:  [id number]  :  1970  ACCOUNT #:  [de-identified]  DATE OF SERVICE:  2019    PREOPERATIVE DIAGNOSES:  Left foot possible abscess versus septic arthritis of the fourth tarsometatarsal joint with possible osteomyelitis of the second, third, and fourth metatarsals; cuboid bone; medial, intermediate, and lateral cuneiform bones; and navicular bone. POSTOPERATIVE DIAGNOSES:  Incision and drainage of left foot phlegmon, septic arthritis of the fourth tarsometatarsal joint and possible osteomyelitis of the second, third, and fourth metatarsal bones; cuboid bone; medial, intermediate, and lateral cuneiform bones; and navicular bone. PROCEDURES PERFORMED:  Incision and drainage of left foot abscess, arthrotomy of fourth tarsometatarsal joint and bone biopsy of the second, third, and fourth metatarsals, the cuboid bone, the medial, intermediate, and lateral cuneiforms, and the navicular bone. SURGEON:  [address] [place], [place] [address]ANTONIO    ASSISTANT:  Nieves Ibarra DPM, PGY-2    ANESTHESIA:  General anesthesia. COMPLICATIONS:  None. SPECIMENS REMOVED:  Corn, midline. IMPLANTS:  None. ESTIMATED BLOOD LOSS:  100 mL. HEMOSTASIS:  Pneumatic ankle tourniquet at 250 mmHg for 23 minutes. MATERIALS USED:  3-0 Vicryl, 4-0 nylon, and Surgicel. PROCEDURE IN DETAIL:  Under mild sedation, the patient was brought into the operating room and placed on the operating table in the supine position. The patient was then intubated and placed under general anesthesia. A pneumatic ankle tourniquet was then placed around the patient's right ankle with ample Webril padding underneath. The foot was then scrubbed, prepped, and draped in the usual aseptic manner.   A full time-out was performed and the left lower extremity was elevated and exsanguinated, and then, 10 mL of local was then used to provide local anesthesia to the dorsal mid foot and lateral foot. It was difficult to inject local anesthesia this time to the patient's excessive brawny edema. An intraoperative image of C-arm was then brought in and the calcific fragments of the prior fifth metatarsal space was then marked under C-arm. Additionally, the biopsy sites of the second, third, and fourth metatarsals, the cuboid bone, the medial, intermediate, lateral cuneiforms, and the navicular were all then marked on intraoperative fluoroscopy in order to allow percutaneous bone biopsies. Attention was then directed to the patient's lateral foot where the previous calcific fragments of the prior fifth metatarsal space was noted. A dorsal linear incision was made over this area. Significant scar tissue was noted. Careful dissection was made down to the level where there was significant phlegmon and the calcific pieces noted. There was notable degenerative fatty tissue noted that was then evacuated and passed off the table. Part of this degenerative tissue and calcific fragments were then sent for routine pathology. A portion of it was also sent as a culture. The area was then thoroughly evacuated and then was flushed with copious amounts of sterile saline. Attention was then identified to the patient's fourth tarsometatarsal joint, which was noticeably distended with fluid. An arthrotomy was then performed of the fourth tarsometatarsal joint and the area was drained off the excess fluid. A culture of this joint fluid was also obtained. The area was then flushed out with copious amounts of sterile saline. A Jamshidi needle was then used to obtain a sample from the base of the fourth metatarsal.  At this time, the fourth metatarsal base was inspected and was noted to have significant degenerative changes.   Additionally, attention was directed into the fourth tarsometatarsal joint and the articular surfaces of both the cuboid and the fourth metatarsal base were noted to have degenerative changes. The quality of the fourth metatarsal base was noticeably soft. A portion of the fourth metatarsal base was then collected and sent off for a bone culture. Attention was then directed to the patient's cuboid bone and the Jamshidi needle was then utilized to obtain a bone biopsy from the patient's cuboid bone utilizing a Jamshidi needle. The left lower extremity was then elevated for two minutes. The pneumatic ankle tourniquet was then inflated to 250 mmHg. Attention was then directed to the small incision overlying the lateral cuneiform that had been previously marked with fluoroscopy. A #15 blade was used to make a small stab incision and a hemostat was used to carefully dissect down to the level of the lateral cuneiform. A Jamshidi needle was then utilized to obtain a fragment of the lateral cuneiform, which was then sent off for bone biopsy. Additional bone biopsies of the medial cuneiform, navicular, and third and second metatarsal bases were then obtained in a similar fashion to the way that the sample of the lateral cuneiform was obtained. All areas were then flushed with copious amounts of sterile saline. The pneumatic ankle tourniquet was then deflated after 23 minutes. Prompt hyperemic response was noted to the foot. The soft tissues were then closed with 3-0 Vicryl used for closure of the subcutaneous tissues of the lateral foot incision. The skin was then closed utilizing 4-0 nylon in a series of horizontal mattress and simple interrupted sutures. The foot was then dressed with Xeroform, 4x4's, ABDs, Kerlix, and an Ace wrap.       ANTONIO [email or web address]  D:  11/12/2019 8:42  T:  11/12/2019 19:46  JOB #:  [id number]

## 2019-11-13 NOTE — DIABETES MGMT
[facility]    [facility] Progress Note    Recommendations/ Comments: Chart review for hyperglycemia. Noted Lantus dose has been increased. If appropriate, please consider:   - addition of Lispro pre-meal, 2 units tid ac     Current hospital DM medication:   Lantus 14 units BID  Lispro normal sensitivity correction scale     Chart reviewed on [facility]. Patient is a 51 y.o. female with known DM on Levemir 22 units daily and Regular scale AC TID at home  Pt seen by DTC 10-9-2019 to address elevated A1c which at that time was 12.7%      A1c:   Lab Results   Component Value Date/Time    Hemoglobin A1c 10.6 (H) 11/08/2019 10:05 AM    Hemoglobin A1c 12.2 (H) 10/07/2019 09:33 AM       Recent Glucose Results:   Lab Results   Component Value Date/Time     (H) 11/13/2019 05:51 AM    GLUCPOC 220 (H) 11/13/2019 11:24 AM    GLUCPOC 231 (H) 11/13/2019 06:40 AM    GLUCPOC 205 (H) 11/12/2019 09:18 PM        Lab Results   Component Value Date/Time    Creatinine 1.86 (H) 11/13/2019 05:51 AM     Estimated Creatinine Clearance: 42 mL/min (A) (based on SCr of 1.86 mg/dL (H)). Active Orders   Diet    DIET DIABETIC CONSISTENT CARB Regular        PO intake:   Patient Vitals for the past 72 hrs:   % Diet Eaten   11/12/19 1008 25 %       Will continue to follow as needed.     Thank you  Edmund Oliva MS, RN, CDE    Time spent: 5 min

## 2019-11-13 NOTE — PROGRESS NOTES
Hospitalist Progress Note  Sari Tovar MD  Answering service: [phone] from in house phone      Date of Service:  2019  NAME:  Arden Seo                                                         :  1970                                               MRN:  [id number]      Subjective/interval history    Patient is in good spirits today. She did not require intravenous pain medications. Assessment and plan   Ms Jamison Duque complained of severe pain and she is asking for stronger intravenous pain medicine for her to use as needed. #Left foot cellulitis with probable osteomyelitis and abscess collection in the setting of poorly controlled diabetes.  ESR and C-reactive protein are elevated   A preliminary report of the MRI of the foot reported a 3.3 cm x 2.3 cm focal fluid collection, normal signal within the cuboid, lateral and intermediate cuneiforms and the base of of the fifth metatarsal worrisome for osteomyelitis there is also abnormal signal within the bases of the second and third metatarsals   Venous Doppler was negative for DVT   Continue Zosyn and vancomycin   Status post I and D and bone biopsy. We need to wait for fluid microbiology and bone biopsy results to determine course of antibiotic treatment. #Poorly controlled type II DM, insulin-dependent with hyperglycemia:   -She is noted to be hypoglycemic since last night, she was actually just continued on her home regimen, hoping that she was compliant with it. This probably has to do with her restricted calorie intake in the hospital.  - SSI, Accu-Chek, diabetic diet  -Decrease Lantus, restarted at 10 units of Lantus nightly. At home she reports using Lantus 22 units twice daily plus pre-meal scheduled short-acting insulin.  -Blood sugar is now above 200, increase Lantus from 10 units daily to 12 units twice daily and adjust based on blood sugar for inpatient target of under 180. # Uncontrolled hypertension, blood pressure is better although is not where we would have like it to be ideally.  -No headache, diplopia or chest pain  - Currently on amlodipine 10 mg and lisinopril 20 mg. Hydralazine 50 mg 3 times daily started on 11/9. This morning she had amlodipine and hydralazine, her blood pressure is still very high however after 1 mg alprazolam around noontime blood pressure came significantly down. Patient recognizes anxiety contributes to her blood pressure. Will closely monitor and continually adjust the antihypertensive medications.  -Blood pressure is better, continue current regimen     #Chronic normocytic anemia, mild, most probably anemia of chronic disease. Hemoglobin is down to 7.6 today from 9.6 on admission yesterday, she does not have any acute blood loss for the hemoglobin to drop by 2 g, this could probably be dilutional with IV fluids. Globin is 7.5 today, it was 7.8 yesterday. Check iron profile, ferritin and stool occult. No clinically overt blood loss. Transfuse if hemoglobin drops below 7. #Dehydration, BUN/creatinine slightly elevated. Creatinine is rising, continue IV hydration. Monitor BMP    Acute kidney injury due to multiple risk factors including uncontrolled hypertension underlying diabetes and may be antibiotic toxicity, she is on vancomycin. Creatinine is rising. ACEI is on hold. She is on IV fluids. Renal ultrasound was unremarkable. Nephrology is consulted      #Subclinical hypothyroidism which was detected last admission. TSH is greater than 10 now although T4 is low normal so Synthroid 25 mcg daily is started. TSH and T4 needed to be repeated in 4 to 6 weeks time. Body mass index is 32.38 kg/m². DVT prophylaxis: Heparin  Code: Full  Patient walks independently     Dispo: home, she may need long-term intravenous antibiotics. Current facility administered and prior to admit medications reviewed.   x         Review of Systems:  A comprehensive review of systems was negative except for that written in the HPI. PHYSICAL EXAM:    GENERAL:  Alert, oriented, cooperative, no apparent distress  HEENT:  Normocephalic, atraumatic, non icteric sclerae, non pallor conjuctivae, EOMs intact, PERRLA. NECK: Supple, trachea midline, no adenopathy, no thyromegally or tenderness, no carotid bruit and no JVD. LUNGS:   Vesicular breath sounds bilaterally, no added sounds. HEART:   S1 and S2 well heard,RRR,  no murmur, click, rub or gallop. No JVD. No edema   ABDOMEB:   Soft, non-tender. Normoactive bowel sounds. No masses,  No organomegaly. EXTREMETIES: Left foot bandaged. Sensation intact of the toes. PULSES: 2+ and symmetric all extremities. SKIN:  No rashes or lesions  NEUROLOGY: Alert and oriented to PPT, CNII-XII intact. Motor and sensory exam grossly intact.        Recent labs & imaging reviewed:    Problem List as of 11/13/2019 Date Reviewed: 11/11/2019          Codes Class Noted - Resolved    * (Principal) Left foot infection ICD-10-CM: L08.9  ICD-9-CM: 686.9  11/8/2019 - Present        HTN (hypertension) ICD-10-CM: I10  ICD-9-CM: 401.9  10/7/2019 - Present        DM type 2 (diabetes mellitus, type 2) ([facility] ICD-10-CM: E11.9  ICD-9-CM: 250.00  10/7/2019 - Present        Acute osteomyelitis of left foot ([facility] ICD-10-CM: K61.032  ICD-9-CM: 730.07  10/7/2019 - Present        Cellulitis and abscess of toe of left foot ICD-10-CM: L03.032, U32.986  ICD-9-CM: 681.10  10/7/2019 - Present        Uncontrolled type 2 diabetes mellitus with peripheral neuropathy [facility]) ICD-10-CM: E11.42, E11.65  ICD-9-CM: 250.62, 357.2  10/7/2019 - Present        Osteomyelitis of second toe of left foot ([facility]) ICD-10-CM: M86.9  ICD-9-CM: 730.27  10/7/2019 - Present        Acute osteomyelitis of metatarsal bone of left foot ([facility] ICD-10-CM: L53.908  ICD-9-CM: 730.07  10/7/2019 - Present        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 ([facility] ICD-10-CM: E11.621, L97.424  ICD-9-CM: 250.80, 707.14  10/7/2019 - Present        Unspecified essential hypertension ICD-10-CM: I10  ICD-9-CM: 401.9  9/1/2011 - Present        Diabetes mellitus type 1, uncontrolled, insulin dependent ([facility] ICD-10-CM: E10.65  ICD-9-CM: 250.03  4/20/2011 - Present        Anxiety state ICD-10-CM: F41.1  ICD-9-CM: 300.00  4/20/2011 - Present        RESOLVED: Osteomyelitis ([facility] ICD-10-CM: M86.9  ICD-9-CM: 730.20  10/2/2014 - 10/12/2014                Theresa Fragoso MD  Internal Medicine  Date of Service: 11/13/2019

## 2019-11-14 PROBLEM — M14.672 CHARCOT'S JOINT OF LEFT FOOT: Status: ACTIVE | Noted: 2019-11-14

## 2019-11-14 PROBLEM — E11.610 TYPE 2 DIABETES MELLITUS WITH CHARCOT'S JOINT OF LEFT FOOT (HCC): Status: ACTIVE | Noted: 2019-11-14

## 2019-11-14 LAB
ALBUMIN SERPL-MCNC: 2.5 G/DL (ref 3.5–5)
ALBUMIN/GLOB SERPL: 0.6 {RATIO} (ref 1.1–2.2)
ALP SERPL-CCNC: 159 U/L (ref 45–117)
ALT SERPL-CCNC: 26 U/L (ref 12–78)
ANION GAP SERPL CALC-SCNC: 5 MMOL/L (ref 5–15)
ANION GAP SERPL CALC-SCNC: 8 MMOL/L (ref 5–15)
AST SERPL-CCNC: 22 U/L (ref 15–37)
BASOPHILS # BLD: 0.1 K/UL (ref 0–0.1)
BASOPHILS NFR BLD: 1 % (ref 0–1)
BILIRUB SERPL-MCNC: 0.2 MG/DL (ref 0.2–1)
BUN SERPL-MCNC: 27 MG/DL (ref 6–20)
BUN SERPL-MCNC: 28 MG/DL (ref 6–20)
BUN/CREAT SERPL: 14 (ref 12–20)
BUN/CREAT SERPL: 15 (ref 12–20)
CALCIUM SERPL-MCNC: 8.4 MG/DL (ref 8.5–10.1)
CALCIUM SERPL-MCNC: 8.7 MG/DL (ref 8.5–10.1)
CHLORIDE SERPL-SCNC: 110 MMOL/L (ref 97–108)
CHLORIDE SERPL-SCNC: 110 MMOL/L (ref 97–108)
CO2 SERPL-SCNC: 21 MMOL/L (ref 21–32)
CO2 SERPL-SCNC: 24 MMOL/L (ref 21–32)
CREAT SERPL-MCNC: 1.91 MG/DL (ref 0.55–1.02)
CREAT SERPL-MCNC: 1.91 MG/DL (ref 0.55–1.02)
DATE LAST DOSE: ABNORMAL
DIFFERENTIAL METHOD BLD: ABNORMAL
EOSINOPHIL # BLD: 0.4 K/UL (ref 0–0.4)
EOSINOPHIL NFR BLD: 5 % (ref 0–7)
ERYTHROCYTE [DISTWIDTH] IN BLOOD BY AUTOMATED COUNT: 16.7 % (ref 11.5–14.5)
GLOBULIN SER CALC-MCNC: 4.5 G/DL (ref 2–4)
GLUCOSE BLD STRIP.AUTO-MCNC: 141 MG/DL (ref 65–100)
GLUCOSE BLD STRIP.AUTO-MCNC: 143 MG/DL (ref 65–100)
GLUCOSE BLD STRIP.AUTO-MCNC: 184 MG/DL (ref 65–100)
GLUCOSE BLD STRIP.AUTO-MCNC: 267 MG/DL (ref 65–100)
GLUCOSE SERPL-MCNC: 120 MG/DL (ref 65–100)
GLUCOSE SERPL-MCNC: 132 MG/DL (ref 65–100)
HCT VFR BLD AUTO: 28.4 % (ref 35–47)
HGB BLD-MCNC: 8.6 G/DL (ref 11.5–16)
IMM GRANULOCYTES # BLD AUTO: 0 K/UL (ref 0–0.04)
IMM GRANULOCYTES NFR BLD AUTO: 0 % (ref 0–0.5)
LYMPHOCYTES # BLD: 1.4 K/UL (ref 0.8–3.5)
LYMPHOCYTES NFR BLD: 21 % (ref 12–49)
MCH RBC QN AUTO: 26.9 PG (ref 26–34)
MCHC RBC AUTO-ENTMCNC: 30.3 G/DL (ref 30–36.5)
MCV RBC AUTO: 88.8 FL (ref 80–99)
MONOCYTES # BLD: 0.7 K/UL (ref 0–1)
MONOCYTES NFR BLD: 10 % (ref 5–13)
NEUTS SEG # BLD: 4.2 K/UL (ref 1.8–8)
NEUTS SEG NFR BLD: 63 % (ref 32–75)
NRBC # BLD: 0 K/UL (ref 0–0.01)
NRBC BLD-RTO: 0 PER 100 WBC
PLATELET # BLD AUTO: 361 K/UL (ref 150–400)
PMV BLD AUTO: 11 FL (ref 8.9–12.9)
POTASSIUM SERPL-SCNC: 4.1 MMOL/L (ref 3.5–5.1)
POTASSIUM SERPL-SCNC: 4.2 MMOL/L (ref 3.5–5.1)
PROT SERPL-MCNC: 7 G/DL (ref 6.4–8.2)
RBC # BLD AUTO: 3.2 M/UL (ref 3.8–5.2)
REPORTED DOSE,DOSE: ABNORMAL UNITS
REPORTED DOSE/TIME,TMG: ABNORMAL
SERVICE CMNT-IMP: ABNORMAL
SODIUM SERPL-SCNC: 139 MMOL/L (ref 136–145)
SODIUM SERPL-SCNC: 139 MMOL/L (ref 136–145)
VANCOMYCIN TROUGH SERPL-MCNC: 18.6 UG/ML (ref 5–10)
WBC # BLD AUTO: 6.8 K/UL (ref 3.6–11)

## 2019-11-14 PROCEDURE — 74011636637 HC RX REV CODE- 636/637: Performed by: PODIATRIST

## 2019-11-14 PROCEDURE — 74011000258 HC RX REV CODE- 258: Performed by: HOSPITALIST

## 2019-11-14 PROCEDURE — 74011250637 HC RX REV CODE- 250/637: Performed by: PODIATRIST

## 2019-11-14 PROCEDURE — 65270000029 HC RM PRIVATE

## 2019-11-14 PROCEDURE — 80053 COMPREHEN METABOLIC PANEL: CPT

## 2019-11-14 PROCEDURE — 82962 GLUCOSE BLOOD TEST: CPT

## 2019-11-14 PROCEDURE — 85025 COMPLETE CBC W/AUTO DIFF WBC: CPT

## 2019-11-14 PROCEDURE — 74011250637 HC RX REV CODE- 250/637: Performed by: INTERNAL MEDICINE

## 2019-11-14 PROCEDURE — 74011636637 HC RX REV CODE- 636/637: Performed by: HOSPITALIST

## 2019-11-14 PROCEDURE — 36415 COLL VENOUS BLD VENIPUNCTURE: CPT

## 2019-11-14 PROCEDURE — 74011250636 HC RX REV CODE- 250/636: Performed by: HOSPITALIST

## 2019-11-14 PROCEDURE — 80202 ASSAY OF VANCOMYCIN: CPT

## 2019-11-14 PROCEDURE — 74011250637 HC RX REV CODE- 250/637: Performed by: HOSPITALIST

## 2019-11-14 RX ORDER — METOPROLOL TARTRATE 50 MG/1
100 TABLET ORAL EVERY 12 HOURS
Status: DISCONTINUED | OUTPATIENT
Start: 2019-11-14 | End: 2019-11-18 | Stop reason: HOSPADM

## 2019-11-14 RX ORDER — BISACODYL 5 MG
10 TABLET, DELAYED RELEASE (ENTERIC COATED) ORAL DAILY PRN
Status: DISCONTINUED | OUTPATIENT
Start: 2019-11-14 | End: 2019-11-18 | Stop reason: HOSPADM

## 2019-11-14 RX ADMIN — ENOXAPARIN SODIUM 40 MG: 40 INJECTION SUBCUTANEOUS at 15:21

## 2019-11-14 RX ADMIN — Medication 10 ML: at 06:28

## 2019-11-14 RX ADMIN — INSULIN GLARGINE 12 UNITS: 100 INJECTION, SOLUTION SUBCUTANEOUS at 09:18

## 2019-11-14 RX ADMIN — BISACODYL 10 MG: 5 TABLET, COATED ORAL at 12:04

## 2019-11-14 RX ADMIN — Medication 10 ML: at 22:07

## 2019-11-14 RX ADMIN — HYDRALAZINE HYDROCHLORIDE 50 MG: 50 TABLET, FILM COATED ORAL at 22:00

## 2019-11-14 RX ADMIN — INSULIN LISPRO 2 UNITS: 100 INJECTION, SOLUTION INTRAVENOUS; SUBCUTANEOUS at 07:08

## 2019-11-14 RX ADMIN — INSULIN LISPRO 2 UNITS: 100 INJECTION, SOLUTION INTRAVENOUS; SUBCUTANEOUS at 12:04

## 2019-11-14 RX ADMIN — INSULIN GLARGINE 12 UNITS: 100 INJECTION, SOLUTION SUBCUTANEOUS at 16:59

## 2019-11-14 RX ADMIN — AMLODIPINE BESYLATE 10 MG: 5 TABLET ORAL at 09:18

## 2019-11-14 RX ADMIN — HYDRALAZINE HYDROCHLORIDE 50 MG: 50 TABLET, FILM COATED ORAL at 09:17

## 2019-11-14 RX ADMIN — LEVOTHYROXINE SODIUM 25 MCG: 25 TABLET ORAL at 07:08

## 2019-11-14 RX ADMIN — CEFEPIME HYDROCHLORIDE 2 G: 2 INJECTION, POWDER, FOR SOLUTION INTRAVENOUS at 03:23

## 2019-11-14 RX ADMIN — HYDRALAZINE HYDROCHLORIDE 50 MG: 50 TABLET, FILM COATED ORAL at 15:21

## 2019-11-14 RX ADMIN — HYDROCODONE BITARTRATE AND ACETAMINOPHEN 1 TABLET: 5; 325 TABLET ORAL at 22:21

## 2019-11-14 RX ADMIN — VANCOMYCIN HYDROCHLORIDE 1250 MG: 10 INJECTION, POWDER, LYOPHILIZED, FOR SOLUTION INTRAVENOUS at 12:04

## 2019-11-14 RX ADMIN — INSULIN LISPRO 2 UNITS: 100 INJECTION, SOLUTION INTRAVENOUS; SUBCUTANEOUS at 17:00

## 2019-11-14 RX ADMIN — CEFEPIME HYDROCHLORIDE 2 G: 2 INJECTION, POWDER, FOR SOLUTION INTRAVENOUS at 15:21

## 2019-11-14 RX ADMIN — Medication 10 ML: at 12:05

## 2019-11-14 RX ADMIN — METOPROLOL TARTRATE 100 MG: 50 TABLET, FILM COATED ORAL at 22:00

## 2019-11-14 RX ADMIN — METOPROLOL TARTRATE 50 MG: 50 TABLET, FILM COATED ORAL at 09:18

## 2019-11-14 RX ADMIN — INSULIN LISPRO 3 UNITS: 100 INJECTION, SOLUTION INTRAVENOUS; SUBCUTANEOUS at 03:00

## 2019-11-14 NOTE — PROGRESS NOTES
CM received a request from the patient to speak to her. Patient stated that she has a new issue on her foot which could prevent her from going back to her current job or even have a job for unknown period of time. Patient wanted some advise or resources to help prevent losing housing. CRM educated on available community resources and provided phone number for [place] for crisis assistance. CRM also advised the patient to contact her  at local department of  and seek assistance. CRM has provided contact information if additional assistance is needed to get in touch with above mentioned resources.      Mary Humphries  Clinical     [phone]

## 2019-11-14 NOTE — PROGRESS NOTES
Bedside shift change report given to [address] (oncoming nurse) by Dorothy Gamino (offgoing nurse). Report included the following information SBAR, Kardex, MAR and Recent Results.

## 2019-11-14 NOTE — PROGRESS NOTES
Pharmacist Note - Vancomycin Dosing  Therapy day 7  Indication: diabetic foot infection with abscess, septic arthritis and osteomyelitis  Current regimen: 1250 mg IV Q24H    A Trough Level resulted at 18.6 mcg/mL which was obtained 23.5 hrs post-dose. Goal trough: 15 - 20 mcg/mL     Plan: Continue current regimen. Pharmacy will continue to monitor this patient daily for changes in clinical status and renal function.

## 2019-11-14 NOTE — PROGRESS NOTES
Hospitalist Progress Note  Cecile Jerry MD  Answering service: [phone] from in house phone      Date of Service:  2019  NAME:  Laura Barber                                                         :  1970                                               MRN:  [id number]      Subjective/interval history    Patient states pain is controlled. She is relieved to hear  that  bone biopsy is negative for osteomyelitis. She denied headache, diplopia or chest pain. She is aware we are asking infectious disease for their opinion ,that she still may need outpatient IV antibiotics for at least for a couple weeks. Assessment and plan   Ms Oren Sears complained of severe pain and she is asking for stronger intravenous pain medicine for her to use as needed. #Left foot cellulitis with probable osteomyelitis and abscess collection in the setting of poorly controlled diabetes.  ESR and C-reactive protein are elevated   A preliminary report of the MRI of the foot reported a 3.3 cm x 2.3 cm focal fluid collection, normal signal within the cuboid, lateral and intermediate cuneiforms and the base of of the fifth metatarsal worrisome for osteomyelitis there is also abnormal signal within the bases of the second and third metatarsals   Venous Doppler was negative for DVT   Continue cefepime and vancomycin   Status post I and D and bone biopsy. Bone biopsies negative for acute osteomyelitis  -Coag negative growing from thio broth from the fourth metatarsal bone specimen. We are asking ID for their opinion. She may need a couple weeks of intravenous antibiotics.   -Complete nonweightbearing recommended by podiatry       #Poorly controlled type II DM, insulin-dependent with hyperglycemia:   -She is noted to be hypoglycemic since last night, she was actually just continued on her home regimen, hoping that she was compliant with it.   This probably has to do with her restricted calorie intake in the hospital.  - SSI, Accu-Chek, diabetic diet  - At home she reports using Lantus 22 units twice daily plus pre-meal scheduled short-acting insulin.  -We are titrating her insulin, currently on Lantus 12 units twice daily. Blood sugar in the 140s today     # Uncontrolled hypertension, blood pressure is better although is not where we would have like it to be ideally.  -No headache, diplopia or chest pain  - Currently on amlodipine 10 mg. She was on lisinopril 20 mg daily, on hold now due to POOJA; hydralazine 50 mg 3 times daily started on 11/9. This morning she had amlodipine and hydralazine, her blood pressure is still very high however after 1 mg alprazolam around noontime blood pressure came significantly down. Patient recognizes anxiety contributes to her blood pressure. Will closely monitor and continually adjust the antihypertensive medications.  -Blood pressure is better, continue current regimen     #Chronic normocytic anemia, mild, most probably anemia of chronic disease. Hemoglobin is down to 7.6 today from 9.6 on admission yesterday, she does not have any acute blood loss for the hemoglobin to drop by 2 g, this could probably be dilutional with IV fluids. Globin is 7.5 today, it was 7.8 yesterday. Check iron profile, ferritin and stool occult. No clinically overt blood loss. Transfuse if hemoglobin drops below 7. #Dehydration, BUN/creatinine slightly elevated. Creatinine plateau. IV fluids stopped per renal     Acute kidney injury due to multiple risk factors including uncontrolled hypertension underlying diabetes and may be antibiotic toxicity, she is on vancomycin. ACEI is on hold. Renal ultrasound was unremarkable. Nephrology is consulted  -Creatinine stabilized and IV fluid is stopped. #Subclinical hypothyroidism which was detected last admission. TSH is greater than 10 now although T4 is low normal so Synthroid 25 mcg daily is started.   TSH and T4 needed to be repeated in 4 to 6 weeks time. Body mass index is 32.67 kg/m². DVT prophylaxis: Heparin  Code: Full  Patient walks independently     Dispo: home, she may need outpatient intravenous antibiotics. Current facility administered and prior to admit medications reviewed. x         Review of Systems:  A comprehensive review of systems was negative except for that written in the HPI. PHYSICAL EXAM:    GENERAL:  Alert, oriented, cooperative, no apparent distress  HEENT:  Normocephalic, atraumatic, non icteric sclerae, non pallor conjuctivae, EOMs intact, PERRLA. NECK: Supple, trachea midline, no adenopathy, no thyromegally or tenderness, no carotid bruit and no JVD. LUNGS:   Vesicular breath sounds bilaterally, no added sounds. HEART:   S1 and S2 well heard,RRR,  no murmur, click, rub or gallop. No JVD. No edema   ABDOMEB:   Soft, non-tender. Normoactive bowel sounds. No masses,  No organomegaly. EXTREMETIES: Left foot bandaged. Sensation intact of the toes. PULSES: 2+ and symmetric all extremities. SKIN:  No rashes or lesions  NEUROLOGY: Alert and oriented to PPT, CNII-XII intact. Motor and sensory exam grossly intact.        Recent labs & imaging reviewed:    Problem List as of 11/14/2019 Date Reviewed: 11/11/2019          Codes Class Noted - Resolved    Type 2 diabetes mellitus with Charcot's joint of left foot ([facility] ICD-10-CM: E11.610  ICD-9-CM: 250.60, 713.5  11/14/2019 - Present        Charcot's joint of left foot ICD-10-CM: G01.329  ICD-9-CM: 094.0, 713.5  11/14/2019 - Present        * (Principal) Left foot infection ICD-10-CM: L08.9  ICD-9-CM: 686.9  11/8/2019 - Present        HTN (hypertension) ICD-10-CM: I10  ICD-9-CM: 401.9  10/7/2019 - Present        DM type 2 (diabetes mellitus, type 2) ([facility] ICD-10-CM: E11.9  ICD-9-CM: 250.00  10/7/2019 - Present        Acute osteomyelitis of left foot ([facility] ICD-10-CM: O27.780  ICD-9-CM: 730.07  10/7/2019 - Present        Cellulitis and abscess of toe of left foot ICD-10-CM: U04.825, L02.612  ICD-9-CM: 681.10  10/7/2019 - Present        Uncontrolled type 2 diabetes mellitus with peripheral neuropathy [facility]) ICD-10-CM: E11.42, E11.65  ICD-9-CM: 250.62, 357.2  10/7/2019 - Present        Osteomyelitis of second toe of left foot ([facility]) ICD-10-CM: M86.9  ICD-9-CM: 730.27  10/7/2019 - Present        Acute osteomyelitis of metatarsal bone of left foot ([facility] ICD-10-CM: F05.724  ICD-9-CM: 730.07  10/7/2019 - Present        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 ([facility] ICD-10-CM: E11.621, N87.613  ICD-9-CM: 250.80, 707.14  10/7/2019 - Present        Unspecified essential hypertension ICD-10-CM: I10  ICD-9-CM: 401.9  9/1/2011 - Present        Diabetes mellitus type 1, uncontrolled, insulin dependent ([facility] ICD-10-CM: E10.65  ICD-9-CM: 250.03  4/20/2011 - Present        Anxiety state ICD-10-CM: F41.1  ICD-9-CM: 300.00  4/20/2011 - Present        RESOLVED: Osteomyelitis ([facility] ICD-10-CM: M86.9  ICD-9-CM: 730.20  10/2/2014 - 10/12/2014                Vic Carson MD  Internal Medicine  Date of Service: 11/14/2019

## 2019-11-14 NOTE — PROGRESS NOTES
Nephrology Progress Note  Emmit Fitting  Date of Admission : 11/8/2019    CC:  Follow up for POOJA       Assessment and Plan     POOJA:  - Suspect 2/2 poorly controlled HTN, DM and possible antibiotic toxicity   - creatinine stable, urine eos neg  - renal US unremarkable except a benign looking renal cysts and possible angiolipoma on lower pole   - d/c IVF and monitor  - daily labs     Possible CKD   - 2/2 Chronic NSAID use, > 10 years of poorly controlled DM and HTN   -UPCR pending     Diabetic Left Foot Infection w/ Osteo   -Bone Bx pending   - On Vanc + Zosyn      Uncontrolled HTN :  - renal US symmetric Kidneys : less likely [place] but can be screened if BP remains labile   - increase lopressor, cont amlodipine  - hold ACE for now     Poorly controlled Type II DM   - per primary team      Severe Anemia :  - per primary team           Interval History:  Seen and examined. Feeling ok. No cp, sob, n/v/d. Refusing IVF overnight. Cr stable. Current Medications: all current  Medications have been eviewed in EPIC  Review of Systems: Pertinent items are noted in HPI. Objective:  Vitals:    Vitals:    11/13/19 2142 11/13/19 2321 11/14/19 0352 11/14/19 0835   BP: 129/70 134/82 (!) 164/94 (!) 188/94   Pulse: (!) 41 72 68 74   Resp: 18 18 18 18   Temp: 98.7 °F (37.1 °C) 98.2 °F (36.8 °C) 98 °F (36.7 °C) 98.1 °F (36.7 °C)   SpO2:  93% 92%    Weight:   91.8 kg (202 lb 6.1 oz)    Height:         Intake and Output:  No intake/output data recorded. No intake/output data recorded.     Physical Examination:  General: NAD,Conversant   Neck:  Supple, no mass  Resp:  Lungs CTA B/L, no wheezing , normal respiratory effort  CV:  RRR,  no murmur or rub, 2+ b/l LE edema  GI:  Soft, NT, + Bowel sounds, no hepatosplenomegaly  Neurologic:  Non focal  Psych:             AAO x 3 appropriate affect   Skin:  No Rash  :  No cheney    []    High complexity decision making was performed  []    Patient is at high-risk of decompensation with multiple organ involvement    Lab Data Personally Reviewed: I have reviewed all the pertinent labs, microbiology data and radiology studies during assessment. Recent Labs     11/14/19  0929 11/14/19  0620 11/13/19  1627    139 137   K 4.1 4.2 4.1   * 110* 108   CO2 24 21 22   * 132* 218*   BUN 27* 28* 27*   CREA 1.91* 1.91* 1.98*   CA 8.7 8.4* 8.5   ALB 2.5*  --   --    SGOT 22  --   --    ALT 26  --   --      Recent Labs     11/14/19  0929 11/13/19  0551 11/12/19  1015   WBC 6.8 6.6 7.6   HGB 8.6* 7.5* 7.8*   HCT 28.4* 25.4* 26.1*    301 320     No results found for: SDES  Lab Results   Component Value Date/Time    Culture result: NO GROWTH ON SOLID MEDIA 2 DAYS 11/11/2019 01:15 PM    Culture result: (A) 11/11/2019 01:15 PM     CHECKING FOR POSSIBLE GRAM POSITIVE COCCI SEEN ON GRAM STAIN OF THE THIO BROTH    Culture result:  11/11/2019 01:15 PM     Specimen not collected anaerobically, recovery of anaerobes may be compromised. Anaerobic screening performed based on source. Recent Results (from the past 24 hour(s))   GLUCOSE, POC    Collection Time: 11/13/19 11:24 AM   Result Value Ref Range    Glucose (POC) 220 (H) 65 - 100 mg/dL    Performed by [address], URINE    Collection Time: 11/13/19  3:08 PM   Result Value Ref Range    Eosinophils,urine NEGATIVE      CHLORIDE, URINE RANDOM    Collection Time: 11/13/19  3:08 PM   Result Value Ref Range    Chloride,urine random 79 MMOL/L   PROTEIN/CREATININE RATIO, URINE    Collection Time: 11/13/19  3:08 PM   Result Value Ref Range    Protein, urine random 187 (H) 0.0 - 11.9 mg/dL    Creatinine, urine 85.60 mg/dL    Protein/Creat.  urine Ratio 2.2     SODIUM, UR, RANDOM    Collection Time: 11/13/19  3:09 PM   Result Value Ref Range    Sodium,urine random 67 MMOL/L   CREATININE, UR, RANDOM    Collection Time: 11/13/19  3:09 PM   Result Value Ref Range    Creatinine, urine 01.62 mg/dL   METABOLIC PANEL, BASIC    Collection Time: 11/13/19  4:27 PM   Result Value Ref Range    Sodium 137 136 - 145 mmol/L    Potassium 4.1 3.5 - 5.1 mmol/L    Chloride 108 97 - 108 mmol/L    CO2 22 21 - 32 mmol/L    Anion gap 7 5 - 15 mmol/L    Glucose 218 (H) 65 - 100 mg/dL    BUN 27 (H) 6 - 20 MG/DL    Creatinine 1.98 (H) 0.55 - 1.02 MG/DL    BUN/Creatinine ratio 14 12 - 20      GFR est AA 33 (L) >60 ml/min/1.73m2    GFR est non-AA 27 (L) >60 ml/min/1.73m2    Calcium 8.5 8.5 - 10.1 MG/DL   FOLATE    Collection Time: 11/13/19  4:28 PM   Result Value Ref Range    Folate 16.2 5.0 - 21.0 ng/mL   VITAMIN B12    Collection Time: 11/13/19  4:28 PM   Result Value Ref Range    Vitamin B12 372 193 - 986 pg/mL   URINALYSIS W/MICROSCOPIC    Collection Time: 11/13/19  4:33 PM   Result Value Ref Range    Color YELLOW/STRAW      Appearance CLEAR CLEAR      Specific gravity 1.014 1.003 - 1.030      pH (UA) 5.5 5.0 - 8.0      Protein 100 (A) NEG mg/dL    Glucose 250 (A) NEG mg/dL    Ketone NEGATIVE  NEG mg/dL    Bilirubin NEGATIVE  NEG      Blood NEGATIVE  NEG      Urobilinogen 0.2 0.2 - 1.0 EU/dL    Nitrites NEGATIVE  NEG      Leukocyte Esterase NEGATIVE  NEG      WBC 0-4 0 - 4 /hpf    RBC 0-5 0 - 5 /hpf    Epithelial cells MODERATE (A) FEW /lpf    Bacteria NEGATIVE  NEG /hpf    Hyaline cast 2-5 0 - 5 /lpf   GLUCOSE, POC    Collection Time: 11/13/19  5:00 PM   Result Value Ref Range    Glucose (POC) 224 (H) 65 - 100 mg/dL    Performed by Rusty Ly, POC    Collection Time: 11/13/19  9:23 PM   Result Value Ref Range    Glucose (POC) 230 (H) 65 - 100 mg/dL    Performed by WHITE NAVI    METABOLIC PANEL, BASIC    Collection Time: 11/14/19  6:20 AM   Result Value Ref Range    Sodium 139 136 - 145 mmol/L    Potassium 4.2 3.5 - 5.1 mmol/L    Chloride 110 (H) 97 - 108 mmol/L    CO2 21 21 - 32 mmol/L    Anion gap 8 5 - 15 mmol/L    Glucose 132 (H) 65 - 100 mg/dL    BUN 28 (H) 6 - 20 MG/DL    Creatinine 1.91 (H) 0.55 - 1.02 MG/DL    BUN/Creatinine ratio 15 12 - 20      GFR est AA 34 (L) >60 ml/min/1.73m2    GFR est non-AA 28 (L) >60 ml/min/1.73m2    Calcium 8.4 (L) 8.5 - 10.1 MG/DL   VANCOMYCIN, TROUGH    Collection Time: 11/14/19  6:20 AM   Result Value Ref Range    Vancomycin,trough 18.6 (H) 5.0 - 10.0 ug/mL    Reported dose date: NOT PROVIDED      Reported dose time: NOT PROVIDED      Reported dose: NOT PROVIDED UNITS   GLUCOSE, POC    Collection Time: 11/14/19  6:27 AM   Result Value Ref Range    Glucose (POC) 141 (H) 65 - 100 mg/dL    Performed by Lalita Mike    CBC WITH AUTOMATED DIFF    Collection Time: 11/14/19  9:29 AM   Result Value Ref Range    WBC 6.8 3.6 - 11.0 K/uL    RBC 3.20 (L) 3.80 - 5.20 M/uL    HGB 8.6 (L) 11.5 - 16.0 g/dL    HCT 28.4 (L) 35.0 - 47.0 %    MCV 88.8 80.0 - 99.0 FL    MCH 26.9 26.0 - 34.0 PG    MCHC 30.3 30.0 - 36.5 g/dL    RDW 16.7 (H) 11.5 - 14.5 %    PLATELET 702 523 - 191 K/uL    MPV 11.0 8.9 - 12.9 FL    NRBC 0.0 0  WBC    ABSOLUTE NRBC 0.00 0.00 - 0.01 K/uL    NEUTROPHILS 63 32 - 75 %    LYMPHOCYTES 21 12 - 49 %    MONOCYTES 10 5 - 13 %    EOSINOPHILS 5 0 - 7 %    BASOPHILS 1 0 - 1 %    IMMATURE GRANULOCYTES 0 0.0 - 0.5 %    ABS. NEUTROPHILS 4.2 1.8 - 8.0 K/UL    ABS. LYMPHOCYTES 1.4 0.8 - 3.5 K/UL    ABS. MONOCYTES 0.7 0.0 - 1.0 K/UL    ABS. EOSINOPHILS 0.4 0.0 - 0.4 K/UL    ABS. BASOPHILS 0.1 0.0 - 0.1 K/UL    ABS. IMM.  GRANS. 0.0 0.00 - 0.04 K/UL    DF AUTOMATED     METABOLIC PANEL, COMPREHENSIVE    Collection Time: 11/14/19  9:29 AM   Result Value Ref Range    Sodium 139 136 - 145 mmol/L    Potassium 4.1 3.5 - 5.1 mmol/L    Chloride 110 (H) 97 - 108 mmol/L    CO2 24 21 - 32 mmol/L    Anion gap 5 5 - 15 mmol/L    Glucose 120 (H) 65 - 100 mg/dL    BUN 27 (H) 6 - 20 MG/DL    Creatinine 1.91 (H) 0.55 - 1.02 MG/DL    BUN/Creatinine ratio 14 12 - 20      GFR est AA 34 (L) >60 ml/min/1.73m2    GFR est non-AA 28 (L) >60 ml/min/1.73m2    Calcium 8.7 8.5 - 10.1 MG/DL    Bilirubin, total 0.2 0.2 - 1.0 MG/DL    ALT (SGPT) 26 12 - 78 U/L    AST (SGOT) 22 15 - 37 U/L    Alk. phosphatase 159 (H) 45 - 117 U/L    Protein, total 7.0 6.4 - 8.2 g/dL    Albumin 2.5 (L) 3.5 - 5.0 g/dL    Globulin 4.5 (H) 2.0 - 4.0 g/dL    A-G Ratio 0.6 (L) 1.1 - 2.2                     Lisbeth Garza MD  [address]  Phone - [phone]   Fax - [phone]  [email or web address]

## 2019-11-14 NOTE — PROGRESS NOTES
Progress Note    Patient: Natan Lincoln MRN: [id number]  SSN: [id number]    YOB: 1970  Age: 51 y.o. Sex: female      Admit Date: 11/8/2019  3 Day Post-Op     Subjective:     Patient seen resting quiet and comfortably and no apparent distress. No issues overnight. Objective:     Visit Vitals  BP (!) 188/94 (BP 1 Location: Right arm, BP Patient Position: At rest)   Pulse 74   Temp 98.1 °F (36.7 °C)   Resp 18   Ht 5' 6\" (1.676 m)   Wt 91.8 kg (202 lb 6.1 oz)   SpO2 92%   BMI 32.67 kg/m²        Physical Exam:  Dressing clean, dry and intact to the left foot. Minimal strikethrough to inner dressings. All incisions with sutures intact. Mild post-op erythema and edema. No tenderness to foot. Labs/Radiology Review: images and reports reviewed    Assessment:   1. Charcot neuroarthropathy of the left foot  2. Uncontrolled diabetes mellitus    Plan/Recommendations/Medical Decision Making:   -Reviewed patient's biopsies and cultures. Findings are most consistent with new onset acute stage midfoot Charcot neuroarthropathy of the midfoot due to benign bone with fibrous changes and new bone formation with fibrosis in the 4th metatarsal base. Patient's WBC has been within normal limits throughout hospital stay and her symptoms (pain, erythema, LLE swelling) have resolved with NWB. Pathology of tissue from the area of possible abscess/serous noted on MRI and intraoperatively shows fibrous tissue with foreign body reaction, serum and fibrin compatible with seroma. This could possibly be due to packing that the patient may not have fully removed when changing her dressings at home.   -Patient's 4th TMTJ joint cultures have not show any growth to date. The cultures of the base of the 4th metatarsal has only shown staphylococcus, coagulase negative from thio broth only  -Will consult ID regarding the 4th met base culture for ID recommendations.  -Dressing changed at bedside. Packing removed.   -Discussed with patient that it is imperative that she remain completely NWB to the LLE while she is still experiencing an active episode of acute Charcot. Will order CAM boot. Discussed with patient that as the bone is softer and undergoing changes at this time, that she is at higher risk of displacing her midfoot joints. Discussed with patient that as long as she remains completely NWB to the LLE, she may be able to avoid surgery if the points continue to remain nondisplaced.   -Discussed with patient the importance of stricter glucose control and the effect that continued elevated blood sugars may have on her foot.  -Patient given handout discussing Charcot foot.  -Plan discussed with Dr. Miranda Contreras. -Dressing changed at bedside. Will repack tomorrow. Activity: non-weight bearing to LLE.

## 2019-11-14 NOTE — DIABETES MGMT
[facility]    [facility] Progress Note    Recommendations/ Comments: Chart review for variable BG's. FBG today 141 mg/dL with Lantus 12 units BID. Noted BG's > 200 mg/dL during the day  PO intake not documented     If appropriate, please consider:   - addition of Lispro pre-meal, 2 units tid ac     Current hospital DM medication:   Lantus 12 units BID  Lispro normal sensitivity correction scale     Chart reviewed on [facility]. Patient is a 51 y.o. female with known DM on Levemir 22 units daily and Regular scale AC TID at home  Pt seen by DTC 10-9-2019 to address elevated A1c which at that time was 12.7%      A1c:   Lab Results   Component Value Date/Time    Hemoglobin A1c 10.6 (H) 11/08/2019 10:05 AM    Hemoglobin A1c 12.2 (H) 10/07/2019 09:33 AM       Recent Glucose Results:   Lab Results   Component Value Date/Time     (H) 11/14/2019 09:29 AM     (H) 11/14/2019 06:20 AM     (H) 11/13/2019 04:27 PM    GLUCPOC 141 (H) 11/14/2019 06:27 AM    GLUCPOC 230 (H) 11/13/2019 09:23 PM    GLUCPOC 224 (H) 11/13/2019 05:00 PM        Lab Results   Component Value Date/Time    Creatinine 1.91 (H) 11/14/2019 09:29 AM     Estimated Creatinine Clearance: 41.1 mL/min (A) (based on SCr of 1.91 mg/dL (H)). Active Orders   Diet    DIET DIABETIC CONSISTENT CARB Regular        PO intake:   Patient Vitals for the past 72 hrs:   % Diet Eaten   11/12/19 1008 25 %       Will continue to follow as needed.     Thank you  Viridiana Wade RN, CDE  Time spent: 5 min

## 2019-11-14 NOTE — PROGRESS NOTES
Patient visited by [facility] Partner Volunteer on Medical Surgical Unit on 11/14/2019. Rev.  John Gutierrez MDiv, [facility], [facility]   paging service: [phone])

## 2019-11-15 LAB
ANION GAP SERPL CALC-SCNC: 7 MMOL/L (ref 5–15)
BACTERIA SPEC CULT: NORMAL
BACTERIA SPEC CULT: NORMAL
BASOPHILS # BLD: 0.1 K/UL (ref 0–0.1)
BASOPHILS NFR BLD: 1 % (ref 0–1)
BUN SERPL-MCNC: 28 MG/DL (ref 6–20)
BUN/CREAT SERPL: 16 (ref 12–20)
CALCIUM SERPL-MCNC: 8.6 MG/DL (ref 8.5–10.1)
CHLORIDE SERPL-SCNC: 110 MMOL/L (ref 97–108)
CO2 SERPL-SCNC: 22 MMOL/L (ref 21–32)
CREAT SERPL-MCNC: 1.71 MG/DL (ref 0.55–1.02)
DIFFERENTIAL METHOD BLD: ABNORMAL
EOSINOPHIL # BLD: 0.3 K/UL (ref 0–0.4)
EOSINOPHIL NFR BLD: 5 % (ref 0–7)
ERYTHROCYTE [DISTWIDTH] IN BLOOD BY AUTOMATED COUNT: 16.5 % (ref 11.5–14.5)
GLUCOSE BLD STRIP.AUTO-MCNC: 173 MG/DL (ref 65–100)
GLUCOSE BLD STRIP.AUTO-MCNC: 180 MG/DL (ref 65–100)
GLUCOSE BLD STRIP.AUTO-MCNC: 210 MG/DL (ref 65–100)
GLUCOSE BLD STRIP.AUTO-MCNC: 251 MG/DL (ref 65–100)
GLUCOSE SERPL-MCNC: 209 MG/DL (ref 65–100)
GRAM STN SPEC: NORMAL
GRAM STN SPEC: NORMAL
HCT VFR BLD AUTO: 24.9 % (ref 35–47)
HGB BLD-MCNC: 7.4 G/DL (ref 11.5–16)
IMM GRANULOCYTES # BLD AUTO: 0 K/UL (ref 0–0.04)
IMM GRANULOCYTES NFR BLD AUTO: 0 % (ref 0–0.5)
LYMPHOCYTES # BLD: 1.3 K/UL (ref 0.8–3.5)
LYMPHOCYTES NFR BLD: 20 % (ref 12–49)
MCH RBC QN AUTO: 26.6 PG (ref 26–34)
MCHC RBC AUTO-ENTMCNC: 29.7 G/DL (ref 30–36.5)
MCV RBC AUTO: 89.6 FL (ref 80–99)
MONOCYTES # BLD: 0.7 K/UL (ref 0–1)
MONOCYTES NFR BLD: 10 % (ref 5–13)
NEUTS SEG # BLD: 4.2 K/UL (ref 1.8–8)
NEUTS SEG NFR BLD: 64 % (ref 32–75)
NRBC # BLD: 0 K/UL (ref 0–0.01)
NRBC BLD-RTO: 0 PER 100 WBC
PLATELET # BLD AUTO: 308 K/UL (ref 150–400)
PMV BLD AUTO: 11.2 FL (ref 8.9–12.9)
POTASSIUM SERPL-SCNC: 4 MMOL/L (ref 3.5–5.1)
RBC # BLD AUTO: 2.78 M/UL (ref 3.8–5.2)
SERVICE CMNT-IMP: ABNORMAL
SERVICE CMNT-IMP: NORMAL
SERVICE CMNT-IMP: NORMAL
SODIUM SERPL-SCNC: 139 MMOL/L (ref 136–145)
WBC # BLD AUTO: 6.5 K/UL (ref 3.6–11)

## 2019-11-15 PROCEDURE — 74011250637 HC RX REV CODE- 250/637: Performed by: INTERNAL MEDICINE

## 2019-11-15 PROCEDURE — 74011250637 HC RX REV CODE- 250/637: Performed by: PODIATRIST

## 2019-11-15 PROCEDURE — 74011636637 HC RX REV CODE- 636/637: Performed by: PODIATRIST

## 2019-11-15 PROCEDURE — 74011000258 HC RX REV CODE- 258: Performed by: HOSPITALIST

## 2019-11-15 PROCEDURE — 74011636637 HC RX REV CODE- 636/637: Performed by: HOSPITALIST

## 2019-11-15 PROCEDURE — 65270000029 HC RM PRIVATE

## 2019-11-15 PROCEDURE — 36415 COLL VENOUS BLD VENIPUNCTURE: CPT

## 2019-11-15 PROCEDURE — 82962 GLUCOSE BLOOD TEST: CPT

## 2019-11-15 PROCEDURE — 74011250636 HC RX REV CODE- 250/636: Performed by: HOSPITALIST

## 2019-11-15 PROCEDURE — 80048 BASIC METABOLIC PNL TOTAL CA: CPT

## 2019-11-15 PROCEDURE — 85025 COMPLETE CBC W/AUTO DIFF WBC: CPT

## 2019-11-15 RX ORDER — INSULIN LISPRO 100 [IU]/ML
2 INJECTION, SOLUTION INTRAVENOUS; SUBCUTANEOUS
Status: DISCONTINUED | OUTPATIENT
Start: 2019-11-16 | End: 2019-11-18 | Stop reason: HOSPADM

## 2019-11-15 RX ADMIN — INSULIN LISPRO 2 UNITS: 100 INJECTION, SOLUTION INTRAVENOUS; SUBCUTANEOUS at 17:00

## 2019-11-15 RX ADMIN — Medication 10 ML: at 21:53

## 2019-11-15 RX ADMIN — INSULIN GLARGINE 12 UNITS: 100 INJECTION, SOLUTION SUBCUTANEOUS at 08:49

## 2019-11-15 RX ADMIN — METOPROLOL TARTRATE 100 MG: 50 TABLET, FILM COATED ORAL at 08:48

## 2019-11-15 RX ADMIN — VANCOMYCIN HYDROCHLORIDE 1250 MG: 10 INJECTION, POWDER, LYOPHILIZED, FOR SOLUTION INTRAVENOUS at 11:56

## 2019-11-15 RX ADMIN — Medication 10 ML: at 07:00

## 2019-11-15 RX ADMIN — INSULIN LISPRO 3 UNITS: 100 INJECTION, SOLUTION INTRAVENOUS; SUBCUTANEOUS at 22:00

## 2019-11-15 RX ADMIN — AMLODIPINE BESYLATE 10 MG: 5 TABLET ORAL at 08:48

## 2019-11-15 RX ADMIN — CEFEPIME HYDROCHLORIDE 2 G: 2 INJECTION, POWDER, FOR SOLUTION INTRAVENOUS at 15:32

## 2019-11-15 RX ADMIN — INSULIN LISPRO 3 UNITS: 100 INJECTION, SOLUTION INTRAVENOUS; SUBCUTANEOUS at 06:56

## 2019-11-15 RX ADMIN — METOPROLOL TARTRATE 100 MG: 50 TABLET, FILM COATED ORAL at 21:53

## 2019-11-15 RX ADMIN — CEFEPIME HYDROCHLORIDE 2 G: 2 INJECTION, POWDER, FOR SOLUTION INTRAVENOUS at 03:00

## 2019-11-15 RX ADMIN — LEVOTHYROXINE SODIUM 25 MCG: 25 TABLET ORAL at 06:56

## 2019-11-15 RX ADMIN — HYDROCODONE BITARTRATE AND ACETAMINOPHEN 1 TABLET: 5; 325 TABLET ORAL at 21:53

## 2019-11-15 RX ADMIN — Medication 10 ML: at 06:59

## 2019-11-15 RX ADMIN — ENOXAPARIN SODIUM 40 MG: 40 INJECTION SUBCUTANEOUS at 15:32

## 2019-11-15 RX ADMIN — Medication 10 ML: at 15:33

## 2019-11-15 RX ADMIN — INSULIN LISPRO 2 UNITS: 100 INJECTION, SOLUTION INTRAVENOUS; SUBCUTANEOUS at 11:56

## 2019-11-15 RX ADMIN — INSULIN GLARGINE 12 UNITS: 100 INJECTION, SOLUTION SUBCUTANEOUS at 16:59

## 2019-11-15 RX ADMIN — Medication 10 ML: at 15:32

## 2019-11-15 RX ADMIN — HYDRALAZINE HYDROCHLORIDE 50 MG: 50 TABLET, FILM COATED ORAL at 15:39

## 2019-11-15 RX ADMIN — HYDRALAZINE HYDROCHLORIDE 50 MG: 50 TABLET, FILM COATED ORAL at 08:47

## 2019-11-15 RX ADMIN — HYDRALAZINE HYDROCHLORIDE 50 MG: 50 TABLET, FILM COATED ORAL at 21:53

## 2019-11-15 NOTE — PROGRESS NOTES
Bedside shift change report given to [address] (oncoming nurse) by Jenniffer Barbosa (offgoing nurse). Report included the following information SBAR, Kardex, MAR and Recent Results.

## 2019-11-15 NOTE — DIABETES MGMT
[facility]    D[facility] Progress Note    Recommendations/ Comments: Chart review for variable BG's. Yesterday FBG today 141 mg/dL then kaci to 267 mg/dL at bedtime. FBG today 210 mg/dL and 173 mg/dL pre lunch  Received 9 units of lispro correction in past 24 hours  PO intake 100%. Pt is on Regular insulin AC TID PTA  Creatinine elevated but improving    If appropriate, please consider:   - addition of Lispro pre-meal, 2 units tid ac     Current hospital DM medication:   Lantus 12 units BID  Lispro normal sensitivity correction scale     Chart reviewed on [facility]. Patient is a 51 y.o. female with known DM on Levemir 22 units daily and Regular scale AC TID at home  Pt seen by DTC 10-9-2019 to address elevated A1c which at that time was 12.7%      A1c:   Lab Results   Component Value Date/Time    Hemoglobin A1c 10.6 (H) 11/08/2019 10:05 AM    Hemoglobin A1c 12.2 (H) 10/07/2019 09:33 AM       Recent Glucose Results:   Lab Results   Component Value Date/Time     (H) 11/15/2019 02:59 AM    GLUCPOC 173 (H) 11/15/2019 10:58 AM    GLUCPOC 210 (H) 11/15/2019 06:38 AM    GLUCPOC 267 (H) 11/14/2019 09:07 PM        Lab Results   Component Value Date/Time    Creatinine 1.71 (H) 11/15/2019 02:59 AM     Estimated Creatinine Clearance: 45.4 mL/min (A) (based on SCr of 1.71 mg/dL (H)). Active Orders   Diet    DIET DIABETIC CONSISTENT CARB Regular        PO intake:   Patient Vitals for the past 72 hrs:   % Diet Eaten   11/15/19 0941 100 %   11/14/19 1847 100 %   11/14/19 1200 100 %   11/14/19 0900 100 %       Will continue to follow as needed.     Thank you  Francois Hackett RN, CDE    Time spent: 5 min

## 2019-11-15 NOTE — ROUTINE PROCESS
Bedside shift change report given to lakisha tilley rn (oncoming nurse) by Destini Biswas (offgoing nurse). Report included the following information SBAR and Kardex.

## 2019-11-15 NOTE — PROGRESS NOTES
Bedside shift change report given to Sweetie Anthony RN (oncoming nurse) by Tracy Naqvi RN (offgoing nurse). Report included the following information SBAR, MAR and Recent Results.

## 2019-11-15 NOTE — PROGRESS NOTES
NUTRITION  Pt seen for:     Reason for Rescreen: LOS          RECOMMENDATIONS:   Continue current diet  Interventions   - none at this time       Information obtained from:   Rounds discussion, chart review      Past Medical History:   Diagnosis Date    Cataract     Cataracts, bilateral     [facility] [address]     Osteomyelitis ([facility] [address]      Pt admitted for foot wound now deemed to be Charcot foot. Cleared for discharge for podiatry. ID following for abx. DTC following for insulin mgmt. Appetite has been good. Diet:  consistent CHO  Supplements: None  Intake: Good  Patient Vitals for the past 100 hrs:   % Diet Eaten   11/15/19 1338 80 %   11/15/19 0941 100 %   11/14/19 1847 100 %   11/14/19 1200 100 %   11/14/19 0900 100 %   11/12/19 1008 25 %     Weight Changes:   Unsure - wt fluctuating significantly with bedscale. Please weigh on standing scale.    Last 3 Recorded Weights in this Encounter    11/13/19 0642 11/14/19 0352 11/15/19 0311   Weight: 91 kg (200 lb 9.6 oz) 91.8 kg (202 lb 6.1 oz) 89.9 kg (198 lb 1.9 oz)     Wt Readings:   11/15/19 89.9 kg (198 lb 1.9 oz)   10/12/19 83.5 kg (183 lb 15.9 oz)   09/17/19 78 kg (172 lb)   10/04/14 86.1 kg (189 lb 12.8 oz)     Nutrition-Related Concerns Identified:  None    PLAN:   - Continue current diet    Rescreen:  []            At Nutrition Risk - will follow          [x]            Rescreen per screening protocol    Sid Oshea, RD [address] , Pager #[phone] or [phone]

## 2019-11-15 NOTE — PROGRESS NOTES
Hospitalist Progress Note    NAME: Zachary Torres   :  1970   MRN:  [id number]     Hospitalist Progress Note  Anay Greer MD  Answering service: [phone] OR [phone] from in house phone      Date of Service:  2019  NAME:  Zachary Torres                                                         :  1970                                               MRN:  [id number]        Assessment and plan   Ms Liliana Carvajal complained of severe pain and she is asking for stronger intravenous pain medicine for her to use as needed. #Left foot cellulitis - Charcot neuropathy of the left foot   ESR and C-reactive protein are elevated   A preliminary report of the MRI of the foot reported a 3.3 cm x 2.3 cm focal fluid collection, normal signal within the cuboid, lateral and intermediate cuneiforms and the base of of the fifth metatarsal worrisome for osteomyelitis there is also abnormal signal within the bases of the second and third metatarsals   Venous Doppler was negative for DVT   has been on cefepime and vancomycin -recommended for zyvox for homw   Status post I and D and bone biopsy. Bone biopsies negative for acute osteomyelitis  - Coag negative growing from thio broth from the fourth metatarsal bone specimen.      -per ortho: acute stage midfoot Charcot neuroarthropathy of the midfoot due to benign bone with fibrous changes and new bone formation with fibrosis in the 4th metatarsal base. -Pathology of tissue from the area of possible abscess/serous noted on MRI and intraoperatively shows fibrous tissue with foreign body reaction, serum and fibrin compatible with seroma.     Discharge Instructions:  -NWB to LLE. Patient is to wear CAM boot to LLE, but may remove it in bed.  -Change dressing every other day. Pack lateral wound with 1/4\" plain packing.   Dress foot with 4x4s, ABDs, kerlix, ACE wrap.  -Call office [phone] upon discharge and schedule a follow-up for next Thursday 11/21.     Activity: non-weight bearing to LLE. #Poorly controlled type II DM, insulin-dependent with hyperglycemia:   - SSI, Accu-Chek, diabetic diet  - At home she reports using Lantus 22 units twice daily plus pre-meal scheduled short-acting insulin. Apparently hypoglycemic on home regimen in the hosp when first admitted.  -We are titrating her insulin, currently on Lantus 12 units twice daily. Add lsipro 2 units AC     # Uncontrolled hypertension, blood pressure is better although is not where we would have like it to be ideally.  -No headache, diplopia or chest pain  - Currently on amlodipine 10 mg. She was on lisinopril 20 mg daily, on hold now due to POOJA;  - hydralazine 50 mg 3 times daily started on 11/9.    -11/14 after 1 mg alprazolam around noontime blood pressure came significantly down. Patient recognizes anxiety contributes to her blood pressure. #Chronic normocytic anemia, Check  stool occult. No clinically overt blood loss. Transfuse if hemoglobin drops below 7. +iron deficiency    Acute kidney injury due to multiple risk factors including uncontrolled hypertension, underlying diabetes, and may be antibiotic toxicity, she is on vancomycin. ACEI is on hold. Renal ultrasound was unremarkable. Nephrology is consulted  -Creatinine stabilized and IV fluid is stopped. Possible CKD   - 2/2 Chronic NSAID use, > 10 years of poorly controlled DM and HTN   -UPCR pending      #Subclinical hypothyroidism which was detected last admission. TSH is greater than 10 now although T4 is low normal so Synthroid 25 mcg daily is started. TSH and T4 needed to be repeated in 4 to 6 weeks time. Body mass index is 32.67 kg/m². DVT prophylaxis: Heparin  Code: Full  Patient walks independently     Dispo: home     Subjective:     Chief Complaint / Reason for Physician Visit  Patient said the day has been ok. Discussed with RN events overnight.      Review of Systems:  Symptom Y/N Comments Symptom Y/N Comments   Fever/Chills    Chest Pain n    Poor Appetite    Edema     Cough    Abdominal Pain     Sputum    Joint Pain     SOB/OCHOA n   Pruritis/Rash     Nausea/vomit n   Tolerating PT/OT     Diarrhea n   Tolerating Diet y    Constipation    Other       Could NOT obtain due to:      Objective:     VITALS:   Last 24hrs VS reviewed since prior progress note. Most recent are:  Patient Vitals for the past 24 hrs:   Temp Pulse Resp BP SpO2   11/14/19 1408 98.4 °F (36.9 °C) 75 18 160/86    11/14/19 0835 98.1 °F (36.7 °C) 74 18 (!) 188/94    11/14/19 0352 98 °F (36.7 °C) 68 18 (!) 164/94 92 %   11/13/19 2321 98.2 °F (36.8 °C) 72 18 134/82 93 %   11/13/19 2142 98.7 °F (37.1 °C) (!) 41 18 129/70        Intake/Output Summary (Last 24 hours) at 11/14/2019 2007  Last data filed at 11/14/2019 1847  Gross per 24 hour   Intake 720 ml   Output    Net 720 ml        PHYSICAL EXAM:  General: WD, WN. Alert, cooperative, no acute distress    EENT:  EOMI. Anicteric sclerae. MMM  Resp:  CTA bilaterally, no wheezing or rales. No accessory muscle use  CV:  Regular  rhythm,  + edema, L>R  GI:  Soft, Non distended, Non tender.  +Bowel sounds  Neurologic:  Alert and oriented X 3, normal speech,   Psych:   Good insight. Not anxious nor agitated  Skin:    No jaundice, L foot bandaged    Reviewed most current lab test results and cultures  YES  Reviewed most current radiology test results   YES  Review and summation of old records today    NO  Reviewed patient's current orders and MAR    YES  PMH/SH reviewed - no change compared to H&P  ________________________________________________________________________  Care Plan discussed with:    Comments   Patient x    Family      RN x    Care Manager     Consultant                        Multidiciplinary team rounds were held today with , nursing, pharmacist and clinical coordinator.   Patient's plan of care was discussed; medications were reviewed and discharge planning was addressed. ________________________________________________________________________  Total NON critical care TIME:   Minutes    Total CRITICAL CARE TIME Spent:   Minutes non procedure based      Comments   >50% of visit spent in counseling and coordination of care     ________________________________________________________________________  Annel Rausch MD     Procedures: see electronic medical records for all procedures/Xrays and details which were not copied into this note but were reviewed prior to creation of Plan. LABS:  I reviewed today's most current labs and imaging studies.   Pertinent labs include:  Recent Labs     11/14/19  0929 11/13/19  0551 11/12/19  1015   WBC 6.8 6.6 7.6   HGB 8.6* 7.5* 7.8*   HCT 28.4* 25.4* 26.1*    301 320     Recent Labs     11/14/19  0929 11/14/19  0620 11/13/19  1627    139 137   K 4.1 4.2 4.1   * 110* 108   CO2 24 21 22   * 132* 218*   BUN 27* 28* 27*   CREA 1.91* 1.91* 1.98*   CA 8.7 8.4* 8.5   ALB 2.5*  --   --    TBILI 0.2  --   --    SGOT 22  --   --    ALT 26  --   --        Signed: Annel Rausch MD

## 2019-11-15 NOTE — PROGRESS NOTES
Nephrology Progress Note  Parminder Diggs  Date of Admission : 11/8/2019    CC:  Follow up for POOJA       Assessment and Plan     POOJA:  - Suspect 2/2 poorly controlled HTN, DM and possible antibiotic toxicity   - creatinine improving, urine eos neg  - renal US unremarkable except a benign looking renal cysts and possible angiolipoma on lower pole   - cont present care  - daily labs for now     Possible CKD   - 2/2 Chronic NSAID use, > 10 years of poorly controlled DM and HTN   -UPCR pending     Diabetic Left Foot Infection w/ Osteo   -Bone Bx neg  - coag neg staph from 4th metatarsal bone specimen  - On Vanc + Zosyn   - ID following     Uncontrolled HTN :  - renal US symmetric Kidneys : less likely [place] but can be screened if BP remains labile   - cont current meds     Poorly controlled Type II DM   - per primary team      Severe Anemia :  - per primary team           Interval History:  Seen and examined. Feeling ok. No cp, sob, n/v/d. Cr improving. Current Medications: all current  Medications have been eviewed in EPIC  Review of Systems: Pertinent items are noted in HPI. Objective:  Vitals:    Vitals:    11/14/19 2130 11/15/19 0250 11/15/19 0311 11/15/19 0759   BP: 168/83 151/68  156/77   Pulse: 83 72  71   Resp: 18 18  16   Temp: 98.2 °F (36.8 °C) 97.3 °F (36.3 °C)  97.7 °F (36.5 °C)   SpO2: 90% 92%  95%   Weight:   89.9 kg (198 lb 1.9 oz)    Height:         Intake and Output:  No intake/output data recorded.   11/13 1901 - 11/15 0700  In: 720 [P.O.:720]  Out: -     Physical Examination:  General: NAD,Conversant   Neck:  Supple, no mass  Resp:  Lungs CTA B/L, no wheezing , normal respiratory effort  CV:  RRR,  no murmur or rub, 2+ b/l LE edema  GI:  Soft, NT, + Bowel sounds, no hepatosplenomegaly  Neurologic:  Non focal  Psych:             AAO x 3 appropriate affect   Skin:  No Rash  :  No cheney    []    High complexity decision making was performed  []    Patient is at high-risk of decompensation with multiple organ involvement    Lab Data Personally Reviewed: I have reviewed all the pertinent labs, microbiology data and radiology studies during assessment. Recent Labs     11/15/19  0259 11/14/19  0929 11/14/19  0620    139 139   K 4.0 4.1 4.2   * 110* 110*   CO2 22 24 21   * 120* 132*   BUN 28* 27* 28*   CREA 1.71* 1.91* 1.91*   CA 8.6 8.7 8.4*   ALB  --  2.5*  --    SGOT  --  22  --    ALT  --  26  --      Recent Labs     11/15/19  0257 11/14/19  0929 11/13/19  0551   WBC 6.5 6.8 6.6   HGB 7.4* 8.6* 7.5*   HCT 24.9* 28.4* 25.4*    361 301     No results found for: SDES  Lab Results   Component Value Date/Time    Culture result: (A) 11/11/2019 01:15 PM     STAPHYLOCOCCUS SPECIES, COAGULASE NEGATIVE ISOLATED FROM THIO BROTH ONLY    Culture result:  11/11/2019 01:15 PM     Specimen not collected anaerobically, recovery of anaerobes may be compromised. Anaerobic screening performed based on source.     Culture result: NO GROWTH 3 DAYS 11/11/2019 01:05 PM    Culture result: NO GROWTH 3 DAYS 11/11/2019 01:05 PM     Recent Results (from the past 24 hour(s))   GLUCOSE, POC    Collection Time: 11/14/19 11:31 AM   Result Value Ref Range    Glucose (POC) 143 (H) 65 - 100 mg/dL    Performed by Isra Atkins    GLUCOSE, POC    Collection Time: 11/14/19  4:24 PM   Result Value Ref Range    Glucose (POC) 184 (H) 65 - 100 mg/dL    Performed by Rusty Ly, POC    Collection Time: 11/14/19  9:07 PM   Result Value Ref Range    Glucose (POC) 267 (H) 65 - 100 mg/dL    Performed by PATTY MCELROY    CBC WITH AUTOMATED DIFF    Collection Time: 11/15/19  2:57 AM   Result Value Ref Range    WBC 6.5 3.6 - 11.0 K/uL    RBC 2.78 (L) 3.80 - 5.20 M/uL    HGB 7.4 (L) 11.5 - 16.0 g/dL    HCT 24.9 (L) 35.0 - 47.0 %    MCV 89.6 80.0 - 99.0 FL    MCH 26.6 26.0 - 34.0 PG    MCHC 29.7 (L) 30.0 - 36.5 g/dL    RDW 16.5 (H) 11.5 - 14.5 %    PLATELET 635 521 - 157 K/uL    MPV 11.2 8.9 - 12.9 FL    NRBC 0.0 0  WBC    ABSOLUTE NRBC 0.00 0.00 - 0.01 K/uL    NEUTROPHILS 64 32 - 75 %    LYMPHOCYTES 20 12 - 49 %    MONOCYTES 10 5 - 13 %    EOSINOPHILS 5 0 - 7 %    BASOPHILS 1 0 - 1 %    IMMATURE GRANULOCYTES 0 0.0 - 0.5 %    ABS. NEUTROPHILS 4.2 1.8 - 8.0 K/UL    ABS. LYMPHOCYTES 1.3 0.8 - 3.5 K/UL    ABS. MONOCYTES 0.7 0.0 - 1.0 K/UL    ABS. EOSINOPHILS 0.3 0.0 - 0.4 K/UL    ABS. BASOPHILS 0.1 0.0 - 0.1 K/UL    ABS. IMM. GRANS. 0.0 0.00 - 0.04 K/UL    DF AUTOMATED     METABOLIC PANEL, BASIC    Collection Time: 11/15/19  2:59 AM   Result Value Ref Range    Sodium 139 136 - 145 mmol/L    Potassium 4.0 3.5 - 5.1 mmol/L    Chloride 110 (H) 97 - 108 mmol/L    CO2 22 21 - 32 mmol/L    Anion gap 7 5 - 15 mmol/L    Glucose 209 (H) 65 - 100 mg/dL    BUN 28 (H) 6 - 20 MG/DL    Creatinine 1.71 (H) 0.55 - 1.02 MG/DL    BUN/Creatinine ratio 16 12 - 20      GFR est AA 39 (L) >60 ml/min/1.73m2    GFR est non-AA 32 (L) >60 ml/min/1.73m2    Calcium 8.6 8.5 - 10.1 MG/DL   GLUCOSE, POC    Collection Time: 11/15/19  6:38 AM   Result Value Ref Range    Glucose (POC) 210 (H) 65 - 100 mg/dL    Performed by Tramaine Aragon MD  [address] [place] [address]  Phone - [phone]   Fax - [phone]  [email or web address]

## 2019-11-15 NOTE — PROGRESS NOTES
Progress Note    Patient: Sharon Loving MRN: [id number]  SSN: [id number]    YOB: 1970  Age: 51 y.o. Sex: female      Admit Date: 11/8/2019  4 Day Post-Op     Subjective:     Patient seen resting quiet and comfortably and no apparent distress. No issues overnight. Received CAM boot earlier today      Objective:     Visit Vitals  /77 (BP 1 Location: Left arm, BP Patient Position: At rest)   Pulse 71   Temp 97.7 °F (36.5 °C)   Resp 16   Ht 5' 6\" (1.676 m)   Wt 89.9 kg (198 lb 1.9 oz)   SpO2 95%   BMI 31.98 kg/m²        Physical Exam:  Dressing clean, dry and intact to the left foot. Minimal strikethrough to inner dressings. All incisions with sutures intact. Mild post-op erythema and edema. No tenderness to foot. Labs/Radiology Review: images and reports reviewed    Assessment:   1. Charcot neuroarthropathy of the left foot  2. Uncontrolled diabetes mellitus    Plan/Recommendations/Medical Decision Making:   -Reviewed patient's biopsies and cultures. Findings are most consistent with new onset acute stage midfoot Charcot neuroarthropathy of the midfoot due to benign bone with fibrous changes and new bone formation with fibrosis in the 4th metatarsal base. Patient's WBC has been within normal limits throughout hospital stay and her symptoms (pain, erythema, LLE swelling) have resolved with NWB. Pathology of tissue from the area of possible abscess/serous noted on MRI and intraoperatively shows fibrous tissue with foreign body reaction, serum and fibrin compatible with seroma. This could possibly be due to packing that the patient may not have fully removed when changing her dressings at home.   -Patient's 4th TMTJ joint cultures did not have any growth. The cultures of the base of the 4th metatarsal has only shown staphylococcus, coagulase negative from thio broth only  -ID consulted for final antibiotic recommendations.  -Dressing changed at bedside.   Packing changed  -Reiterated to patient that it is imperative that she remain completely NWB to the LLE while she is still experiencing an active episode of acute Charcot. Discussed with patient that as the bone is softer and undergoing changes at this time, that she is at higher risk of displacing her midfoot joints. Discussed with patient that as long as she remains completely NWB to the LLE, she may be able to avoid surgery if the points continue to remain nondisplaced. Patient relates understand and is agreeable with plan. -Discussed with patient the importance of stricter glucose control and the effect that continued elevated blood sugars may have on her foot.  -Patient stable for discharge from a podiatric standpoint. She is to follow-up with me next Thursday in office. Discharge Instructions:  -NWB to LLE. Patient is to wear CAM boot to LLE, but may remove it in bed.  -Change dressing every other day. Pack lateral wound with 1/4\" plain packing. Dress foot with 4x4s, ABDs, kerlix, ACE wrap.  -Call office [phone] upon discharge and schedule a follow-up for next Thursday 11/21. Activity: non-weight bearing to LLE.

## 2019-11-15 NOTE — PROGRESS NOTES
ID Progress Note  11/15/2019    Subjective:     Feeling well, hopes to go home soon    Objective:     Antibiotics:  1. Vancomycin   2. Cefepime       Vitals:   Visit Vitals  /68 (BP 1 Location: Left arm, BP Patient Position: At rest)   Pulse 68   Temp 98.6 °F (37 °C)   Resp 14   Ht 5' 6\" (1.676 m)   Wt 89.9 kg (198 lb 1.9 oz)   LMP 2011   SpO2 91%   BMI 31.98 kg/m²        Tmax:  Temp (24hrs), Av °F (36.7 °C), Min:97.3 °F (36.3 °C), Max:98.6 °F (37 °C)      Exam:  Wound is dressed and dry    Labs:      Recent Labs     11/15/19  0259 11/15/19  0257 19  0929 19  0620 19  1627 19  0551   WBC  --  6.5 6.8  --   --  6.6   HGB  --  7.4* 8.6*  --   --  7.5*   PLT  --  308 361  --   --  301   BUN 28*  --  27* 28* 27* 27*   CREA 1.71*  --  1.91* 1.91* 1.98* 1.86*   SGOT  --   --  22  --   --   --    AP  --   --  159*  --   --   --    TBILI  --   --  0.2  --   --   --        Cultures:     No results found for: SDES  Lab Results   Component Value Date/Time    Culture result: (A) 2019 01:15 PM     STAPHYLOCOCCUS SPECIES, COAGULASE NEGATIVE ISOLATED FROM THIO BROTH ONLY    Culture result:  2019 01:15 PM     Specimen not collected anaerobically, recovery of anaerobes may be compromised. Anaerobic screening performed based on source. Culture result: NO GROWTH 4 DAYS 2019 01:05 PM    Culture result: NO GROWTH 4 DAYS 2019 01:05 PM       Radiology:     Line/Insert Date:           Assessment:     1. Charcot foot--biopsies negative for osteo  2. Coag negative staph (thio broth only) from culture--not sure this is significant  3. DM    Objective:     1. Home on oral Zyvox 600 mg bid for one week when ready for discharge  2. Discussed with Dr Eric Poe  3. OK for discharge from my standpoint as long as she can get the antibiotics  4.  Group will see over the weekend if asked    Karlee River MD

## 2019-11-15 NOTE — PROGRESS NOTES
Problem: Diabetes Self-Management  Goal: *Disease process and treatment process  Description  Define diabetes and identify own type of diabetes; list 3 options for treating diabetes. Outcome: Progressing Towards Goal  Goal: *Incorporating nutritional management into lifestyle  Description  Describe effect of type, amount and timing of food on blood glucose; list 3 methods for planning meals. Outcome: Progressing Towards Goal  Goal: *Incorporating physical activity into lifestyle  Description  State effect of exercise on blood glucose levels. Outcome: Progressing Towards Goal  Goal: *Developing strategies to promote health/change behavior  Description  Define the ABC's of diabetes; identify appropriate screenings, schedule and personal plan for screenings. Outcome: Progressing Towards Goal  Goal: *Using medications safely  Description  State effect of diabetes medications on diabetes; name diabetes medication taking, action and side effects. Outcome: Progressing Towards Goal  Goal: *Monitoring blood glucose, interpreting and using results  Description  Identify recommended blood glucose targets  and personal targets. Outcome: Progressing Towards Goal  Goal: *Prevention, detection, treatment of acute complications  Description  List symptoms of hyper- and hypoglycemia; describe how to treat low blood sugar and actions for lowering  high blood glucose level. Outcome: Progressing Towards Goal  Goal: *Prevention, detection and treatment of chronic complications  Description  Define the natural course of diabetes and describe the relationship of blood glucose levels to long term complications of diabetes.   Outcome: Progressing Towards Goal  Goal: *Developing strategies to address psychosocial issues  Description  Describe feelings about living with diabetes; identify support needed and support network  Outcome: Progressing Towards Goal  Goal: *Insulin pump training  Outcome: Progressing Towards Goal  Goal: *Sick day guidelines  Outcome: Progressing Towards Goal  Goal: *Patient Specific Goal (EDIT GOAL, INSERT TEXT)  Outcome: Progressing Towards Goal     Problem: General Infection Care Plan (Adult and Pediatric)  Goal: Improvement in signs and symptoms of infection  Outcome: Progressing Towards Goal  Goal: *Optimize nutritional status  Outcome: Progressing Towards Goal     Problem: Patient Education: Go to Patient Education Activity  Goal: Patient/Family Education  Outcome: Progressing Towards Goal     Problem: Pain  Goal: *Control of Pain  Outcome: Progressing Towards Goal  Goal: *PALLIATIVE CARE:  Alleviation of Pain  Outcome: Progressing Towards Goal     Problem: Patient Education: Go to Patient Education Activity  Goal: Patient/Family Education  Outcome: Progressing Towards Goal     Problem: Discharge Planning  Goal: *Discharge to safe environment  Outcome: Progressing Towards Goal     Problem: Patient Education: Go to Patient Education Activity  Goal: Patient/Family Education  Outcome: Progressing Towards Goal

## 2019-11-16 LAB
ANION GAP SERPL CALC-SCNC: 5 MMOL/L (ref 5–15)
BACTERIA SPEC CULT: ABNORMAL
BACTERIA SPEC CULT: ABNORMAL
BUN SERPL-MCNC: 33 MG/DL (ref 6–20)
BUN/CREAT SERPL: 19 (ref 12–20)
CALCIUM SERPL-MCNC: 8.6 MG/DL (ref 8.5–10.1)
CHLORIDE SERPL-SCNC: 112 MMOL/L (ref 97–108)
CO2 SERPL-SCNC: 19 MMOL/L (ref 21–32)
CREAT SERPL-MCNC: 1.71 MG/DL (ref 0.55–1.02)
GLUCOSE BLD STRIP.AUTO-MCNC: 162 MG/DL (ref 65–100)
GLUCOSE BLD STRIP.AUTO-MCNC: 178 MG/DL (ref 65–100)
GLUCOSE BLD STRIP.AUTO-MCNC: 204 MG/DL (ref 65–100)
GLUCOSE BLD STRIP.AUTO-MCNC: 231 MG/DL (ref 65–100)
GLUCOSE SERPL-MCNC: 170 MG/DL (ref 65–100)
GRAM STN SPEC: ABNORMAL
GRAM STN SPEC: ABNORMAL
POTASSIUM SERPL-SCNC: 4.4 MMOL/L (ref 3.5–5.1)
SERVICE CMNT-IMP: ABNORMAL
SODIUM SERPL-SCNC: 136 MMOL/L (ref 136–145)

## 2019-11-16 PROCEDURE — 65270000029 HC RM PRIVATE

## 2019-11-16 PROCEDURE — 74011250637 HC RX REV CODE- 250/637: Performed by: PODIATRIST

## 2019-11-16 PROCEDURE — 74011000258 HC RX REV CODE- 258: Performed by: HOSPITALIST

## 2019-11-16 PROCEDURE — 74011250637 HC RX REV CODE- 250/637: Performed by: INTERNAL MEDICINE

## 2019-11-16 PROCEDURE — 74011636637 HC RX REV CODE- 636/637: Performed by: INTERNAL MEDICINE

## 2019-11-16 PROCEDURE — 74011636637 HC RX REV CODE- 636/637: Performed by: HOSPITALIST

## 2019-11-16 PROCEDURE — 74011250636 HC RX REV CODE- 250/636: Performed by: HOSPITALIST

## 2019-11-16 PROCEDURE — 82962 GLUCOSE BLOOD TEST: CPT

## 2019-11-16 PROCEDURE — 74011636637 HC RX REV CODE- 636/637: Performed by: PODIATRIST

## 2019-11-16 PROCEDURE — 80048 BASIC METABOLIC PNL TOTAL CA: CPT

## 2019-11-16 PROCEDURE — 36415 COLL VENOUS BLD VENIPUNCTURE: CPT

## 2019-11-16 RX ORDER — INSULIN GLARGINE 100 [IU]/ML
14 INJECTION, SOLUTION SUBCUTANEOUS 2 TIMES DAILY
Status: DISCONTINUED | OUTPATIENT
Start: 2019-11-17 | End: 2019-11-18 | Stop reason: HOSPADM

## 2019-11-16 RX ADMIN — Medication 10 ML: at 05:31

## 2019-11-16 RX ADMIN — INSULIN GLARGINE 12 UNITS: 100 INJECTION, SOLUTION SUBCUTANEOUS at 11:56

## 2019-11-16 RX ADMIN — INSULIN LISPRO 2 UNITS: 100 INJECTION, SOLUTION INTRAVENOUS; SUBCUTANEOUS at 11:57

## 2019-11-16 RX ADMIN — METOPROLOL TARTRATE 100 MG: 50 TABLET, FILM COATED ORAL at 21:33

## 2019-11-16 RX ADMIN — INSULIN LISPRO 2 UNITS: 100 INJECTION, SOLUTION INTRAVENOUS; SUBCUTANEOUS at 07:24

## 2019-11-16 RX ADMIN — HYDRALAZINE HYDROCHLORIDE 50 MG: 50 TABLET, FILM COATED ORAL at 17:03

## 2019-11-16 RX ADMIN — Medication 10 ML: at 21:33

## 2019-11-16 RX ADMIN — CEFEPIME HYDROCHLORIDE 2 G: 2 INJECTION, POWDER, FOR SOLUTION INTRAVENOUS at 17:03

## 2019-11-16 RX ADMIN — LEVOTHYROXINE SODIUM 25 MCG: 25 TABLET ORAL at 07:23

## 2019-11-16 RX ADMIN — Medication 10 ML: at 17:04

## 2019-11-16 RX ADMIN — HYDRALAZINE HYDROCHLORIDE 50 MG: 50 TABLET, FILM COATED ORAL at 21:33

## 2019-11-16 RX ADMIN — HYDROCODONE BITARTRATE AND ACETAMINOPHEN 1 TABLET: 5; 325 TABLET ORAL at 21:33

## 2019-11-16 RX ADMIN — INSULIN LISPRO 2 UNITS: 100 INJECTION, SOLUTION INTRAVENOUS; SUBCUTANEOUS at 17:02

## 2019-11-16 RX ADMIN — METOPROLOL TARTRATE 100 MG: 50 TABLET, FILM COATED ORAL at 11:41

## 2019-11-16 RX ADMIN — HYDRALAZINE HYDROCHLORIDE 50 MG: 50 TABLET, FILM COATED ORAL at 11:39

## 2019-11-16 RX ADMIN — INSULIN LISPRO 3 UNITS: 100 INJECTION, SOLUTION INTRAVENOUS; SUBCUTANEOUS at 17:01

## 2019-11-16 RX ADMIN — AMLODIPINE BESYLATE 10 MG: 5 TABLET ORAL at 11:42

## 2019-11-16 RX ADMIN — ENOXAPARIN SODIUM 40 MG: 40 INJECTION SUBCUTANEOUS at 17:02

## 2019-11-16 RX ADMIN — CEFEPIME HYDROCHLORIDE 2 G: 2 INJECTION, POWDER, FOR SOLUTION INTRAVENOUS at 02:06

## 2019-11-16 RX ADMIN — VANCOMYCIN HYDROCHLORIDE 1250 MG: 10 INJECTION, POWDER, LYOPHILIZED, FOR SOLUTION INTRAVENOUS at 11:56

## 2019-11-16 RX ADMIN — Medication 10 ML: at 05:30

## 2019-11-16 RX ADMIN — INSULIN GLARGINE 12 UNITS: 100 INJECTION, SOLUTION SUBCUTANEOUS at 17:31

## 2019-11-16 RX ADMIN — INSULIN LISPRO 3 UNITS: 100 INJECTION, SOLUTION INTRAVENOUS; SUBCUTANEOUS at 07:23

## 2019-11-16 NOTE — PROGRESS NOTES
Bedside shift change report given to Haylee Tesfaye RN (oncoming nurse) by Eva Black RN (offgoing nurse). Report included the following information SBAR, MAR and Recent Results.

## 2019-11-16 NOTE — PROGRESS NOTES
Hospitalist Progress Note    NAME: Orville Ly   :  1970   MRN:  [id number]     Hospitalist Progress Note  Brenden Peguero MD  Answering service: [phone] OR [phone] from in house phone      Date of Service:  2019  NAME:  Orville Ly                                                         :  1970                                               MRN:  [id number]        #Left foot cellulitis - Charcot neuropathy of the left foot   ESR and C-reactive protein are elevated   A preliminary report of the MRI of the foot reported a 3.3 cm x 2.3 cm focal fluid collection, normal signal within the cuboid, lateral and intermediate cuneiforms and the base of of the fifth metatarsal worrisome for osteomyelitis there is also abnormal signal within the bases of the second and third metatarsals   Venous Doppler was negative for DVT   Status post I and D and bone biopsy. Bone biopsies negative for acute osteomyelitis  -Coag negative growing from thio broth from the fourth metatarsal bone specimen.      -per ortho: acute stage midfoot Charcot neuroarthropathy of the midfoot due to benign bone with fibrous changes and new bone formation with fibrosis in the 4th metatarsal base. -Pathology of tissue from the area of possible abscess/serous noted on MRI and intraoperatively shows fibrous tissue with foreign body reaction, serum and fibrin compatible with seroma.  has been on cefepime and vancomycin -recommended for zyvox for home by ID - unfortunately note was left at 5:30pm and since it was not communicated to me nor nursing sooner, case management assistance regarding pricing of the drug was mute - insurance companies would have closed for the weekend.     Discharge Instructions:  -NWB to LLE. Patient is to wear CAM boot to LLE, but may remove it in bed.  -Change dressing every other day. Pack lateral wound with 1/4\" plain packing.   Dress foot with 4x4s, ABDs, kerlix, ACE wrap.  -Call office [phone] upon discharge and schedule a follow-up for next Thursday 11/21.     Activity: non-weight bearing to LLE. #Poorly controlled type II DM, insulin-dependent with hyperglycemia:   - SSI, Accu-Chek, diabetic diet  - At home she reports using Lantus 22 units twice daily plus pre-meal scheduled short-acting insulin. Apparently hypoglycemic on home regimen in the hosp when first admitted.  -We are titrating her insulin, currently has been on Lantus 12 units twice daily. Increase to 14 units. Added lsipro 2 units AC 11/15. # Uncontrolled hypertension, blood pressure is better although is not where we would have like it to be ideally.  -No headache, diplopia or chest pain  - Currently on amlodipine 10 mg. She was on lisinopril 20 mg daily, on hold now due to POOJA;  - hydralazine 50 mg 3 times daily started on 11/9.    -11/14 after 1 mg alprazolam around noontime blood pressure came significantly down. Patient recognizes anxiety contributes to her blood pressure. #Chronic normocytic anemia, Check  stool occult. No clinically overt blood loss. Transfuse if hemoglobin drops below 7. +iron deficiency. Monitor CBC. Acute kidney injury due to multiple risk factors including uncontrolled hypertension, underlying diabetes, and may be antibiotic toxicity, she is on vancomycin. ACEI is on hold. Renal ultrasound was unremarkable. Nephrology was consulted  -Creatinine stabilized and IV fluid is stopped. Possible CKD   - 2/2 Chronic NSAID use, > 10 years of poorly controlled DM and HTN   Follow up with Dr Delia Tabor, [facility]    #Subclinical hypothyroidism which was detected last admission. TSH is greater than 10 now although T4 is low normal so Synthroid 25 mcg daily is started. TSH and T4 needed to be repeated in 4 to 6 weeks time. Body mass index is 32.67 kg/m².     DVT prophylaxis: Heparin  Code: Full  Patient walks independently     Dispo: home     Subjective:     Chief Complaint / Reason for Physician Visit  No complaints. Review of Systems:  Symptom Y/N Comments  Symptom Y/N Comments   Fever/Chills    Chest Pain n    Poor Appetite    Edema     Cough    Abdominal Pain n    Sputum    Joint Pain     SOB/OCHOA     Pruritis/Rash     Nausea/vomit    Tolerating PT/OT     Diarrhea n   Tolerating Diet y    Constipation    Other       Could NOT obtain due to:      Objective:     VITALS:   Last 24hrs VS reviewed since prior progress note. Most recent are:  Patient Vitals for the past 24 hrs:   Temp Pulse Resp BP SpO2   11/16/19 1524 97.6 °F (36.4 °C) 68 18 147/84 91 %   11/16/19 0942 98.3 °F (36.8 °C) 75 18 167/87 92 %   11/16/19 0447 97.7 °F (36.5 °C) 82 18 138/77 93 %   11/15/19 2155 98.5 °F (36.9 °C) 80 18 154/85 94 %     No intake or output data in the 24 hours ending 11/16/19 1718     PHYSICAL EXAM:  General: WD, WN. Alert, cooperative, no acute distress    EENT:  EOMI. Anicteric sclerae. MMM  Resp:  CTA bilaterally, no wheezing or rales. No accessory muscle use  CV:  Regular  rhythm,  + edema, L>R  GI:  Soft, Non distended, Non tender.  +Bowel sounds  Neurologic:  Alert and oriented X 3, normal speech,   Psych:   Good insight. Not anxious nor agitated  Skin:    No jaundice, L foot bandaged    Reviewed most current lab test results and cultures  YES  Reviewed most current radiology test results   YES  Review and summation of old records today    NO  Reviewed patient's current orders and MAR    YES  PMH/SH reviewed - no change compared to H&P  ________________________________________________________________________  Care Plan discussed with:    Comments   Patient x    Family      RN x    Care Manager     Consultant                        Multidiciplinary team rounds were held today with , nursing, pharmacist and clinical coordinator. Patient's plan of care was discussed; medications were reviewed and discharge planning was addressed. ________________________________________________________________________  Total NON critical care TIME:   Minutes    Total CRITICAL CARE TIME Spent:   Minutes non procedure based      Comments   >50% of visit spent in counseling and coordination of care     ________________________________________________________________________  Tapan Oliva MD     Procedures: see electronic medical records for all procedures/Xrays and details which were not copied into this note but were reviewed prior to creation of Plan. LABS:  I reviewed today's most current labs and imaging studies.   Pertinent labs include:  Recent Labs     11/15/19  0257 11/14/19  0929   WBC 6.5 6.8   HGB 7.4* 8.6*   HCT 24.9* 28.4*    361     Recent Labs     11/16/19  0204 11/15/19  0259 11/14/19  0929    139 139   K 4.4 4.0 4.1   * 110* 110*   CO2 19* 22 24   * 209* 120*   BUN 33* 28* 27*   CREA 1.71* 1.71* 1.91*   CA 8.6 8.6 8.7   ALB  --   --  2.5*   TBILI  --   --  0.2   SGOT  --   --  22   ALT  --   --  26       Signed: Tapan Oliva MD

## 2019-11-16 NOTE — PROGRESS NOTES
TELEPHONE CALL RECEIVED FROM DR. BOWLING WHO ADVISED THAT PT IS SCHEDULED TO DC ON ZYVOX 600MG BID FOR 1 WEEK. SHE WAS NOT SURE IF PT'S INSURANCE WILL COVER AND IS CONCERNED ABOUT HOW TO MOVE FORWARD. WRITER  SPOKE WITH PATIENT WHO ADVISED THAT SHE USES [facility] ON [address] FOR PRESCRIPTIONS. WRITER CONTACTED [address] [place] [address] SPOKE WITH PHARMACIST. WRITER ASKED IF THERE WAS A WAY TO RUN A SCRIPT TO SEE IF INSURANCE WILL COVER IT? SHE ADVISED THAT NEEDED TO BE CALLED IN OR E-SCRIBED. CONTACTED  TO ASK THAT SHE PLEASE CALL IN TO PHARMACY OR E-SCRIBE. SHE ADVISED DIDN'T HAVE TIME AT TIME SEEING PATIENTS AND ASKED THAT NURSE CALL IT IN. PROVIDED NURSE WITH PHONE NUMBER TO [facility] ON [address], AND EXPLAINED WHAT NEEDED TO BE ACHIEVED. WRITER APPRECIATES RN EFFORTS AND TIME TO CONTACT PHARMACY TO CALL SCRIPT ON PT BEHALF.   Augustin Gibbons LCSW

## 2019-11-16 NOTE — PROGRESS NOTES
Problem: Falls - Risk of  Goal: *Absence of Falls  Description  Document Darron Lehmannce Fall Risk and appropriate interventions in the flowsheet.   Outcome: Progressing Towards Goal  Note:   Fall Risk Interventions:  Mobility Interventions: Patient to call before getting OOB         Medication Interventions: Patient to call before getting OOB         History of Falls Interventions: Evaluate medications/consider consulting pharmacy         Problem: Patient Education: Go to Patient Education Activity  Goal: Patient/Family Education  Outcome: Progressing Towards Goal

## 2019-11-16 NOTE — ROUTINE PROCESS
Bedside shift change report given to lakisha tilley rn (oncoming nurse) by Khadra Thornton (offgoing nurse). Report included the following information SBAR and Kardex.

## 2019-11-16 NOTE — PROGRESS NOTES
Spoke with [address] [place] [address]. A total of 90mins was spent trying to gain authorization for Zyvox. The final word, was the authorization dept was closed until Monday.  was notified.

## 2019-11-17 LAB
ANION GAP SERPL CALC-SCNC: 6 MMOL/L (ref 5–15)
BASOPHILS # BLD: 0.1 K/UL (ref 0–0.1)
BASOPHILS NFR BLD: 1 % (ref 0–1)
BUN SERPL-MCNC: 31 MG/DL (ref 6–20)
BUN/CREAT SERPL: 19 (ref 12–20)
CALCIUM SERPL-MCNC: 8.5 MG/DL (ref 8.5–10.1)
CHLORIDE SERPL-SCNC: 112 MMOL/L (ref 97–108)
CO2 SERPL-SCNC: 22 MMOL/L (ref 21–32)
CREAT SERPL-MCNC: 1.65 MG/DL (ref 0.55–1.02)
DIFFERENTIAL METHOD BLD: ABNORMAL
EOSINOPHIL # BLD: 0.3 K/UL (ref 0–0.4)
EOSINOPHIL NFR BLD: 4 % (ref 0–7)
ERYTHROCYTE [DISTWIDTH] IN BLOOD BY AUTOMATED COUNT: 16.8 % (ref 11.5–14.5)
GLUCOSE BLD STRIP.AUTO-MCNC: 102 MG/DL (ref 65–100)
GLUCOSE BLD STRIP.AUTO-MCNC: 147 MG/DL (ref 65–100)
GLUCOSE BLD STRIP.AUTO-MCNC: 170 MG/DL (ref 65–100)
GLUCOSE BLD STRIP.AUTO-MCNC: 199 MG/DL (ref 65–100)
GLUCOSE SERPL-MCNC: 146 MG/DL (ref 65–100)
HCT VFR BLD AUTO: 25.2 % (ref 35–47)
HGB BLD-MCNC: 7.4 G/DL (ref 11.5–16)
IMM GRANULOCYTES # BLD AUTO: 0 K/UL (ref 0–0.04)
IMM GRANULOCYTES NFR BLD AUTO: 0 % (ref 0–0.5)
LYMPHOCYTES # BLD: 1.3 K/UL (ref 0.8–3.5)
LYMPHOCYTES NFR BLD: 19 % (ref 12–49)
MCH RBC QN AUTO: 26.3 PG (ref 26–34)
MCHC RBC AUTO-ENTMCNC: 29.4 G/DL (ref 30–36.5)
MCV RBC AUTO: 89.7 FL (ref 80–99)
MONOCYTES # BLD: 0.6 K/UL (ref 0–1)
MONOCYTES NFR BLD: 10 % (ref 5–13)
NEUTS SEG # BLD: 4.4 K/UL (ref 1.8–8)
NEUTS SEG NFR BLD: 66 % (ref 32–75)
NRBC # BLD: 0 K/UL (ref 0–0.01)
NRBC BLD-RTO: 0 PER 100 WBC
PLATELET # BLD AUTO: 330 K/UL (ref 150–400)
PMV BLD AUTO: 11.4 FL (ref 8.9–12.9)
POTASSIUM SERPL-SCNC: 4 MMOL/L (ref 3.5–5.1)
RBC # BLD AUTO: 2.81 M/UL (ref 3.8–5.2)
SERVICE CMNT-IMP: ABNORMAL
SODIUM SERPL-SCNC: 140 MMOL/L (ref 136–145)
WBC # BLD AUTO: 6.6 K/UL (ref 3.6–11)

## 2019-11-17 PROCEDURE — 74011636637 HC RX REV CODE- 636/637: Performed by: INTERNAL MEDICINE

## 2019-11-17 PROCEDURE — 74011000258 HC RX REV CODE- 258: Performed by: HOSPITALIST

## 2019-11-17 PROCEDURE — 74011250637 HC RX REV CODE- 250/637: Performed by: PODIATRIST

## 2019-11-17 PROCEDURE — 74011250636 HC RX REV CODE- 250/636: Performed by: HOSPITALIST

## 2019-11-17 PROCEDURE — 80048 BASIC METABOLIC PNL TOTAL CA: CPT

## 2019-11-17 PROCEDURE — 36415 COLL VENOUS BLD VENIPUNCTURE: CPT

## 2019-11-17 PROCEDURE — 85025 COMPLETE CBC W/AUTO DIFF WBC: CPT

## 2019-11-17 PROCEDURE — 82962 GLUCOSE BLOOD TEST: CPT

## 2019-11-17 PROCEDURE — 74011636637 HC RX REV CODE- 636/637: Performed by: PODIATRIST

## 2019-11-17 PROCEDURE — 65270000029 HC RM PRIVATE

## 2019-11-17 PROCEDURE — 74011250637 HC RX REV CODE- 250/637: Performed by: INTERNAL MEDICINE

## 2019-11-17 RX ADMIN — HYDRALAZINE HYDROCHLORIDE 50 MG: 50 TABLET, FILM COATED ORAL at 09:45

## 2019-11-17 RX ADMIN — METOPROLOL TARTRATE 100 MG: 50 TABLET, FILM COATED ORAL at 21:33

## 2019-11-17 RX ADMIN — LEVOTHYROXINE SODIUM 25 MCG: 25 TABLET ORAL at 07:34

## 2019-11-17 RX ADMIN — INSULIN GLARGINE 14 UNITS: 100 INJECTION, SOLUTION SUBCUTANEOUS at 09:45

## 2019-11-17 RX ADMIN — Medication 10 ML: at 21:33

## 2019-11-17 RX ADMIN — CEFEPIME HYDROCHLORIDE 2 G: 2 INJECTION, POWDER, FOR SOLUTION INTRAVENOUS at 03:18

## 2019-11-17 RX ADMIN — Medication 10 ML: at 07:27

## 2019-11-17 RX ADMIN — VANCOMYCIN HYDROCHLORIDE 1250 MG: 10 INJECTION, POWDER, LYOPHILIZED, FOR SOLUTION INTRAVENOUS at 12:45

## 2019-11-17 RX ADMIN — ENOXAPARIN SODIUM 40 MG: 40 INJECTION SUBCUTANEOUS at 17:01

## 2019-11-17 RX ADMIN — HYDROCODONE BITARTRATE AND ACETAMINOPHEN 1 TABLET: 5; 325 TABLET ORAL at 21:32

## 2019-11-17 RX ADMIN — METOPROLOL TARTRATE 100 MG: 50 TABLET, FILM COATED ORAL at 09:45

## 2019-11-17 RX ADMIN — INSULIN LISPRO 2 UNITS: 100 INJECTION, SOLUTION INTRAVENOUS; SUBCUTANEOUS at 07:35

## 2019-11-17 RX ADMIN — AMLODIPINE BESYLATE 10 MG: 5 TABLET ORAL at 09:45

## 2019-11-17 RX ADMIN — INSULIN LISPRO 2 UNITS: 100 INJECTION, SOLUTION INTRAVENOUS; SUBCUTANEOUS at 13:31

## 2019-11-17 RX ADMIN — Medication 10 ML: at 13:32

## 2019-11-17 RX ADMIN — Medication 10 ML: at 07:28

## 2019-11-17 RX ADMIN — INSULIN LISPRO 2 UNITS: 100 INJECTION, SOLUTION INTRAVENOUS; SUBCUTANEOUS at 07:34

## 2019-11-17 RX ADMIN — HYDRALAZINE HYDROCHLORIDE 50 MG: 50 TABLET, FILM COATED ORAL at 17:01

## 2019-11-17 RX ADMIN — INSULIN GLARGINE 14 UNITS: 100 INJECTION, SOLUTION SUBCUTANEOUS at 19:23

## 2019-11-17 RX ADMIN — HYDRALAZINE HYDROCHLORIDE 50 MG: 50 TABLET, FILM COATED ORAL at 21:33

## 2019-11-17 NOTE — PROGRESS NOTES
Problem: Falls - Risk of  Goal: *Absence of Falls  Description  Document Patricia Aguilera Fall Risk and appropriate interventions in the flowsheet.   Outcome: Progressing Towards Goal  Note:   Fall Risk Interventions:  Mobility Interventions: Patient to call before getting OOB         Medication Interventions: Patient to call before getting OOB         History of Falls Interventions: Evaluate medications/consider consulting pharmacy

## 2019-11-17 NOTE — PROGRESS NOTES
Problem: Diabetes Self-Management  Goal: *Disease process and treatment process  Description  Define diabetes and identify own type of diabetes; list 3 options for treating diabetes. Outcome: Progressing Towards Goal  Goal: *Incorporating nutritional management into lifestyle  Description  Describe effect of type, amount and timing of food on blood glucose; list 3 methods for planning meals. Outcome: Progressing Towards Goal  Goal: *Incorporating physical activity into lifestyle  Description  State effect of exercise on blood glucose levels. Outcome: Progressing Towards Goal  Goal: *Developing strategies to promote health/change behavior  Description  Define the ABC's of diabetes; identify appropriate screenings, schedule and personal plan for screenings. Outcome: Progressing Towards Goal  Goal: *Using medications safely  Description  State effect of diabetes medications on diabetes; name diabetes medication taking, action and side effects. Outcome: Progressing Towards Goal  Goal: *Monitoring blood glucose, interpreting and using results  Description  Identify recommended blood glucose targets  and personal targets. Outcome: Progressing Towards Goal  Goal: *Prevention, detection, treatment of acute complications  Description  List symptoms of hyper- and hypoglycemia; describe how to treat low blood sugar and actions for lowering  high blood glucose level. Outcome: Progressing Towards Goal  Goal: *Prevention, detection and treatment of chronic complications  Description  Define the natural course of diabetes and describe the relationship of blood glucose levels to long term complications of diabetes.   Outcome: Progressing Towards Goal  Goal: *Developing strategies to address psychosocial issues  Description  Describe feelings about living with diabetes; identify support needed and support network  Outcome: Progressing Towards Goal  Goal: *Insulin pump training  Outcome: Progressing Towards Goal  Goal: *Sick day guidelines  Outcome: Progressing Towards Goal  Goal: *Patient Specific Goal (EDIT GOAL, INSERT TEXT)  Outcome: Progressing Towards Goal     Problem: General Infection Care Plan (Adult and Pediatric)  Goal: Improvement in signs and symptoms of infection  Outcome: Progressing Towards Goal  Goal: *Optimize nutritional status  Outcome: Progressing Towards Goal     Problem: Patient Education: Go to Patient Education Activity  Goal: Patient/Family Education  Outcome: Progressing Towards Goal     Problem: Pain  Goal: *Control of Pain  Outcome: Progressing Towards Goal  Goal: *PALLIATIVE CARE:  Alleviation of Pain  Outcome: Progressing Towards Goal     Problem: Patient Education: Go to Patient Education Activity  Goal: Patient/Family Education  Outcome: Progressing Towards Goal     Problem: Discharge Planning  Goal: *Discharge to safe environment  Outcome: Progressing Towards Goal     Problem: Patient Education: Go to Patient Education Activity  Goal: Patient/Family Education  Outcome: Progressing Towards Goal     Problem: Falls - Risk of  Goal: *Absence of Falls  Description  Document [facility] Fall Risk and appropriate interventions in the flowsheet.   Outcome: Progressing Towards Goal  Note:   Fall Risk Interventions:  Mobility Interventions: Patient to call before getting OOB         Medication Interventions: Patient to call before getting OOB, Teach patient to arise slowly         History of Falls Interventions: Evaluate medications/consider consulting pharmacy         Problem: Patient Education: Go to Patient Education Activity  Goal: Patient/Family Education  Outcome: Progressing Towards Goal

## 2019-11-17 NOTE — PROGRESS NOTES
Problem: Patient Education: Go to Patient Education Activity  Goal: Patient/Family Education  Outcome: Progressing Towards Goal  Note:   Pt to be d/c  with ZYvox, but unable to obtain preauthorization. Pt asked if she could be discharged and get the prescription on Monday. Explained importance of continual antibiotic therapy.

## 2019-11-17 NOTE — PROGRESS NOTES
Hospitalist Progress Note    NAME: Kenny Larkin   :  1970   MRN:  [id number]     Hospitalist Progress Note  Rhea Yeh MD  Answering service: [phone] OR [phone] from in house phone      Date of Service:  2019  NAME:  Kenny Larkin                                                         :  1970                                               MRN:  [id number]        #Left foot cellulitis - Charcot neuropathy of the left foot   ESR and C-reactive protein are elevated   A preliminary report of the MRI of the foot reported a 3.3 cm x 2.3 cm focal fluid collection, normal signal within the cuboid, lateral and intermediate cuneiforms and the base of of the fifth metatarsal worrisome for osteomyelitis there is also abnormal signal within the bases of the second and third metatarsals   Venous Doppler was negative for DVT   Status post I and D and bone biopsy. Bone biopsies negative for acute osteomyelitis  -Coag negative growing from thio broth from the fourth metatarsal bone specimen.      -per ortho: acute stage midfoot Charcot neuroarthropathy of the midfoot due to benign bone with fibrous changes and new bone formation with fibrosis in the 4th metatarsal base. -Pathology of tissue from the area of possible abscess/serous noted on MRI and intraoperatively shows fibrous tissue with foreign body reaction, serum and fibrin compatible with seroma.  has been on cefepime and vancomycin -recommended for zyvox for home by ID - unfortunately note was left at 5:30pm and since it was not communicated to me nor nursing sooner, case management assistance regarding pricing and affordability of the drug was mute - insurance companies would have closed for the weekend.     Discharge Instructions:  -NWB to LLE. Patient is to wear CAM boot to LLE, but may remove it in bed.  -Change dressing every other day. Pack lateral wound with 1/4\" plain packing.   Dress foot with 4x4s, ABDs, kerlix, ACE bernard.  -Call office [phone] upon discharge and schedule a follow-up for next Thursday 11/21.     Activity: non-weight bearing to LLE. #Poorly controlled type II DM, insulin-dependent with hyperglycemia:   - SSI, Accu-Chek, diabetic diet  - At home she reports using Lantus 22 units twice daily plus pre-meal scheduled short-acting insulin. Apparently hypoglycemic on home regimen in the hosp when first admitted.  -We are titrating her insulin, currently has been on Lantus 12 units twice daily. Increased to 14 units. Added lsipro 2 units AC 11/15. # Uncontrolled hypertension, blood pressure is better although is not where we would have like it to be ideally.  -No headache, diplopia or chest pain  - Currently on amlodipine 10 mg. She was on lisinopril 20 mg daily, on hold now due to POOJA;  - hydralazine 50 mg 3 times daily started on 11/9.    -11/14 after 1 mg alprazolam around noontime blood pressure came significantly down. Patient recognizes anxiety contributes to her blood pressure. #Chronic normocytic anemia, Check  stool occult. No clinically overt blood loss. Transfuse if hemoglobin drops below 7. +iron deficiency. Monitor CBC. Acute kidney injury due to multiple risk factors including uncontrolled hypertension, underlying diabetes, and may be antibiotic toxicity, she is on vancomycin. ACEI is on hold. Renal ultrasound was unremarkable. Nephrology was consulted  -Creatinine improving and IV fluid is stopped. Possible CKD   - 2/2 Chronic NSAID use, > 10 years of poorly controlled DM and HTN   Follow up with Dr Nathanael Ramos, [facility]    #Subclinical hypothyroidism which was detected last admission. TSH is greater than 10 now although T4 is low normal so Synthroid 25 mcg daily is started. TSH and T4 needed to be repeated in 4 to 6 weeks time. Body mass index is 32.67 kg/m².     DVT prophylaxis: Heparin  Code: Full  Patient walks independently     Dispo: home Subjective:     Chief Complaint / Reason for Physician Visit  No complaints. Good night. Review of Systems:  Symptom Y/N Comments  Symptom Y/N Comments   Fever/Chills n   Chest Pain      Poor Appetite    Edema     Cough    Abdominal Pain n    Sputum    Joint Pain     SOB/OCHOA     Pruritis/Rash     Nausea/vomit n   Tolerating PT/OT     Diarrhea     Tolerating Diet y    Constipation    Other       Could NOT obtain due to:      Objective:     VITALS:   Last 24hrs VS reviewed since prior progress note. Most recent are:  Patient Vitals for the past 24 hrs:   Temp Pulse Resp BP SpO2   11/17/19 0915 98 °F (36.7 °C) 68 16 164/83 92 %   11/17/19 0430 97.8 °F (36.6 °C) 81 18 132/63 91 %   11/16/19 2132 97.8 °F (36.6 °C) 70 18 136/72 90 %   11/16/19 1524 97.6 °F (36.4 °C) 68 18 147/84 91 %     No intake or output data in the 24 hours ending 11/17/19 1330     PHYSICAL EXAM:  General: WD, WN. Alert, cooperative, no acute distress    EENT:  EOMI. Anicteric sclerae. MMM  Resp:  CTA bilaterally, no wheezing or rales. No accessory muscle use  CV:  Regular  rhythm,  + edema, L>R  GI:  Soft, Non distended, Non tender.  +Bowel sounds  Neurologic:  Alert and oriented X 3, normal speech,   Psych:   Good insight. Not anxious nor agitated  Skin:    No jaundice, L foot bandaged    Reviewed most current lab test results and cultures  YES  Reviewed most current radiology test results   YES  Review and summation of old records today    NO  Reviewed patient's current orders and MAR    YES  PMH/ reviewed - no change compared to H&P  ________________________________________________________________________  Care Plan discussed with:    Comments   Patient x    Family      RN      Care Manager     Consultant                        Multidiciplinary team rounds were held today with , nursing, pharmacist and clinical coordinator. Patient's plan of care was discussed; medications were reviewed and discharge planning was addressed. ________________________________________________________________________  Total NON critical care TIME:   Minutes    Total CRITICAL CARE TIME Spent:   Minutes non procedure based      Comments   >50% of visit spent in counseling and coordination of care     ________________________________________________________________________  Carolina Pedro MD     Procedures: see electronic medical records for all procedures/Xrays and details which were not copied into this note but were reviewed prior to creation of Plan. LABS:  I reviewed today's most current labs and imaging studies.   Pertinent labs include:  Recent Labs     11/17/19  0330 11/15/19  0257   WBC 6.6 6.5   HGB 7.4* 7.4*   HCT 25.2* 24.9*    308     Recent Labs     11/17/19 0330 11/16/19  0204 11/15/19  0259    136 139   K 4.0 4.4 4.0   * 112* 110*   CO2 22 19* 22   * 170* 209*   BUN 31* 33* 28*   CREA 1.65* 1.71* 1.71*   CA 8.5 8.6 8.6       Signed: Carolina Pedro MD

## 2019-11-18 VITALS
BODY MASS INDEX: 32.43 KG/M2 | WEIGHT: 201.8 LBS | DIASTOLIC BLOOD PRESSURE: 89 MMHG | HEIGHT: 66 IN | OXYGEN SATURATION: 92 % | SYSTOLIC BLOOD PRESSURE: 164 MMHG | RESPIRATION RATE: 16 BRPM | HEART RATE: 70 BPM | TEMPERATURE: 98.2 F

## 2019-11-18 LAB
GLUCOSE BLD STRIP.AUTO-MCNC: 122 MG/DL (ref 65–100)
GLUCOSE BLD STRIP.AUTO-MCNC: 162 MG/DL (ref 65–100)
SERVICE CMNT-IMP: ABNORMAL
SERVICE CMNT-IMP: ABNORMAL

## 2019-11-18 PROCEDURE — 74011250637 HC RX REV CODE- 250/637: Performed by: PODIATRIST

## 2019-11-18 PROCEDURE — 74011636637 HC RX REV CODE- 636/637: Performed by: INTERNAL MEDICINE

## 2019-11-18 PROCEDURE — 74011250637 HC RX REV CODE- 250/637: Performed by: INTERNAL MEDICINE

## 2019-11-18 PROCEDURE — 74011636637 HC RX REV CODE- 636/637: Performed by: PODIATRIST

## 2019-11-18 PROCEDURE — 82962 GLUCOSE BLOOD TEST: CPT

## 2019-11-18 RX ORDER — LINEZOLID 600 MG/1
600 TABLET, FILM COATED ORAL EVERY 12 HOURS
Status: DISCONTINUED | OUTPATIENT
Start: 2019-11-18 | End: 2019-11-18 | Stop reason: HOSPADM

## 2019-11-18 RX ORDER — LINEZOLID 600 MG/1
600 TABLET, FILM COATED ORAL EVERY 12 HOURS
Qty: 14 TAB | Refills: 0 | Status: SHIPPED | OUTPATIENT
Start: 2019-11-18 | End: 2019-11-25

## 2019-11-18 RX ORDER — HYDROCODONE BITARTRATE AND ACETAMINOPHEN 5; 325 MG/1; MG/1
1 TABLET ORAL
Qty: 10 TAB | Refills: 0 | Status: SHIPPED | OUTPATIENT
Start: 2019-11-18 | End: 2019-11-28

## 2019-11-18 RX ORDER — METOPROLOL TARTRATE 100 MG/1
100 TABLET ORAL EVERY 12 HOURS
Qty: 60 TAB | Refills: 0 | Status: SHIPPED | OUTPATIENT
Start: 2019-11-18 | End: 2019-12-18

## 2019-11-18 RX ORDER — AMLODIPINE BESYLATE 10 MG/1
10 TABLET ORAL DAILY
Qty: 30 TAB | Refills: 0 | Status: SHIPPED | OUTPATIENT
Start: 2019-11-19 | End: 2019-12-19

## 2019-11-18 RX ORDER — LEVOTHYROXINE SODIUM 25 UG/1
25 TABLET ORAL
Qty: 30 TAB | Refills: 0 | Status: SHIPPED | OUTPATIENT
Start: 2019-11-19 | End: 2019-12-19

## 2019-11-18 RX ADMIN — AMLODIPINE BESYLATE 10 MG: 5 TABLET ORAL at 09:53

## 2019-11-18 RX ADMIN — INSULIN LISPRO 2 UNITS: 100 INJECTION, SOLUTION INTRAVENOUS; SUBCUTANEOUS at 07:03

## 2019-11-18 RX ADMIN — LINEZOLID 600 MG: 600 TABLET, FILM COATED ORAL at 10:48

## 2019-11-18 RX ADMIN — INSULIN GLARGINE 14 UNITS: 100 INJECTION, SOLUTION SUBCUTANEOUS at 09:55

## 2019-11-18 RX ADMIN — INSULIN LISPRO 2 UNITS: 100 INJECTION, SOLUTION INTRAVENOUS; SUBCUTANEOUS at 13:20

## 2019-11-18 RX ADMIN — METOPROLOL TARTRATE 100 MG: 50 TABLET, FILM COATED ORAL at 09:54

## 2019-11-18 RX ADMIN — HYDRALAZINE HYDROCHLORIDE 50 MG: 50 TABLET, FILM COATED ORAL at 09:54

## 2019-11-18 RX ADMIN — LEVOTHYROXINE SODIUM 25 MCG: 25 TABLET ORAL at 06:50

## 2019-11-18 NOTE — PROGRESS NOTES
Bedside shift change report given to Mart Holloway RN (oncoming nurse) by Katelynn Jacob RN (offgoing nurse). Report included the following information SBAR, MAR and Recent Results.

## 2019-11-18 NOTE — PROGRESS NOTES
I have reviewed discharge instructions with the patient. The patient verbalized understanding. Prescriptions given  And  Zyvox was filled by Care Manager as review still pending.   Wound supplies given

## 2019-11-18 NOTE — DISCHARGE SUMMARY
Hospitalist Discharge Summary     Patient ID:  Seth Sandhoff  [id number]  51 y.o.  1970  11/8/2019    PCP on record: None    Admit date: 11/8/2019  Discharge date and time: 11/18/2019    DISCHARGE SUMMARY/HOSPITAL COURSE:  for full details see H&P, daily progress notes, labs, consult notes.:    #Left foot cellulitis - Charcot neuropathy of the left foot   ESR and C-reactive protein are elevated   A preliminary report of the MRI of the foot reported a 3.3 cm x 2.3 cm focal fluid collection, normal signal within the cuboid, lateral and intermediate cuneiforms and the base of of the fifth metatarsal worrisome for osteomyelitis there is also abnormal signal within the bases of the second and third metatarsals   Venous Doppler was negative for DVT   Status post I and D and bone biopsy. Bone biopsies negative for acute osteomyelitis  -Coag negative growing from thio broth from the fourth metatarsal bone specimen.      -per ortho: acute stage midfoot Charcot neuroarthropathy of the midfoot due to benign bone with fibrous changes and new bone formation with fibrosis in the 4th metatarsal base. -Pathology of tissue from the area of possible abscess/serous noted on MRI and intraoperatively shows fibrous tissue with foreign body reaction, serum and fibrin compatible with seroma.  has been on cefepime and vancomycin -recommended for zyvox for home by ID - unfortunately note was left at 5:30pm and since it was not communicated to me nor nursing sooner, case management assistance regarding pricing and affordability of the drug was mute - insurance companies would have closed for the weekend.     Discharge Instructions:  -NWB to LLE.  Patient is to wear CAM boot to LLE, but may remove it in bed.  -Change dressing every other day.  Pack lateral wound with 1/4\" plain packing.  Dress foot with 4x4s, ABDs, kerlix, ACE wrap.  -Call office [phone] upon discharge and schedule a follow-up for next Thursday 11/21.     Activity: non-weight bearing to LLE.       #Poorly controlled type II DM, insulin-dependent with hyperglycemia:   - SSI, Accu-Chek, diabetic diet  - At home she reports using Lantus 22 units twice daily plus pre-meal scheduled short-acting insulin. Apparently hypoglycemic on home regimen in the hosp when first admitted.  -We are titrating her insulin, currently has been on Lantus 12 units twice daily. Increased to 14 units. Added lsipro 2 units AC 11/15.     # Uncontrolled hypertension, blood pressure is better although is not where we would have like it to be ideally.  -No headache, diplopia or chest pain  - Currently on amlodipine 10 mg. She was on lisinopril 20 mg daily, on hold now due to POOJA;  - hydralazine 50 mg 3 times daily started on 11/9.    -11/14 after 1 mg alprazolam around noontime blood pressure came significantly down. Patient recognizes anxiety contributes to her blood pressure.         #Chronic normocytic anemia, Check  stool occult. No clinically overt blood loss. Transfuse if hemoglobin drops below 7. +iron deficiency. Monitor CBC.     Acute kidney injury due to multiple risk factors including uncontrolled hypertension, underlying diabetes, and may be antibiotic toxicity, she is on vancomycin. ACEI is on hold. Renal ultrasound was unremarkable. Nephrology was consulted  -Creatinine improving and IV fluid is stopped.     Possible CKD   - 2/2 Chronic NSAID use, > 10 years of poorly controlled DM and HTN   Follow up with Dr Manolo Montiel, [facility]     #Subclinical hypothyroidism which was detected last admission. TSH is greater than 10 now although T4 is low normal so Synthroid 25 mcg daily is started. TSH and T4 needed to be repeated in 4 to 6 weeks time.     CONSULTATIONS:  IP CONSULT TO PODIATRY  IP CONSULT TO NEPHROLOGY  IP CONSULT TO INFECTIOUS DISEASES  IP CONSULT TO HOSPITALIST    Excerpted HPI from H&P of Nathanael Willingham MD:  Ms Daria Emanuel is a 52-year-old female with PMhx significant for uncontrolled hypertension, diabetes, recurrent foot infection with osteomyelitis who was sent from her podiatrist office for worsening left foot swelling, pain and redness.     Patient was recently discharged from hospital after left foot metatarsal excision, I&D and left second toe partial amputation on oral doxycycline. Wound culture grew MSSA and biopsy of the bone showed surgical viable margin from the second toe and focal extension of osteo to inked margin on the fifth toe. At the time it was felt that surgical cure of osteomyelitis was achieved by podiatry. Since her discharge patient was seen by podiatry twice, last one being yesterday. But due to worsening of left foot swelling, pain, redness she was sent to the ER for further evaluation. Patient denies fever, abdominal pain, nausea, vomiting, no bowel or bladder abnormality. She has pain on foot and chill.     ______________________________________________________________________  _______________________________________________________________________  Patient seen and examined by me on discharge day. Pertinent Findings:  Gen:    Not in distress  Chest: Clear lungs  CVS:   Regular rhythm. Abd:  Soft, not distended, not tender    _______________________________________________________________________  DISCHARGE MEDICATIONS:   Current Discharge Medication List      START taking these medications    Details   linezolid (ZYVOX) 600 mg tablet Take 1 Tab by mouth every twelve (12) hours for 7 days. Qty: 14 Tab, Refills: 0      levothyroxine (SYNTHROID) 25 mcg tablet Take 1 Tab by mouth Daily (before breakfast) for 30 days. Qty: 30 Tab, Refills: 0      metoprolol tartrate (LOPRESSOR) 100 mg IR tablet Take 1 Tab by mouth every twelve (12) hours for 30 days.   Qty: 60 Tab, Refills: 0         CONTINUE these medications which have CHANGED    Details   insulin regular (NOVOLIN R REGULAR U-100 INSULN) 100 unit/mL injection Sliding scale insulin - patient has previous sliding scale that she has been using  Qty: 1 Vial, Refills: 0      amLODIPine (NORVASC) 10 mg tablet Take 1 Tab by mouth daily for 30 days. Qty: 30 Tab, Refills: 0      HYDROcodone-acetaminophen (NORCO) 5-325 mg per tablet Take 1 Tab by mouth nightly as needed for Pain for up to 10 days. Max Daily Amount: 1 Tab. Qty: 10 Tab, Refills: 0    Associated Diagnoses: Charcot's joint of left foot         CONTINUE these medications which have NOT CHANGED    Details   insulin detemir U-100 (LEVEMIR U-100 INSULIN) 100 unit/mL injection 22 Units by SubCUTAneous route two (2) times a day.          STOP taking these medications       lisinopril (PRINIVIL, ZESTRIL) 20 mg tablet Comments:   Reason for Stopping:                 Patient Follow Up Instructions:     See DC instructions    Follow-up Information     Follow up With Specialties Details Why Contact Julissa Ibarra DPM Foot and Ankle Surgery Call to make appt thursday [address]   [address]  [address] [place] [address] Avenue  [phone]          ________________________________________________________________    Condition at Discharge:  Stable  __________________________________________________________________    Disposition  Home with family, no needs    ____________________________________________________________________    Code Status: Full Code  ___________________________________________________________________      Total time in minutes spent coordinating this discharge (includes going over instructions, follow-up, prescriptions, and preparing report for sign off to her PCP) : 33 minutes    Signed:  Kevin Coronel MD

## 2019-11-18 NOTE — PROGRESS NOTES
Patient listed as not having a primary care physician. Hospital follow-up PCP transitional care appointment has been scheduled with Dr. Sarah Holguin for Monday, 11/25/19 at 10:30 a.m. Pending patient discharge.   Maggie Chapman, Care Management Specialist.

## 2019-11-18 NOTE — PROGRESS NOTES
Transitions of care:    Care manager did obtain patient;s zyvox from our pharmacy. Earlier in the day I attempted to obtain a pre-authorization for the above antibiotic through the [facility] phone line. I did speak with Ross Dubose from [place] and she states they could send the drug but it would take 2 plus days for her to receive medication. I did speak with our Pharmacist here at [facility] and I have procured the antibiotic for patient. This pharmacy does not have the correct Insulin dosage and patient will go to the [facility]  close to the hospital.  She has enough money to also pay for her analgesics post discharge. Patient is independent in her wound care. No other needs noted at this time. Patient's daughter to provide a ride home for her.

## 2019-11-18 NOTE — PROGRESS NOTES
Progress Note    Patient: Devin Zamora MRN: [id number]  SSN: [id number]    YOB: 1970  Age: 51 y.o. Sex: female      Admit Date: 11/8/2019  7 Days Post-Op     Subjective:     Patient seen resting quiet and comfortably and no apparent distress. No issues overnight. Has CAM boot at bedside. Discharge planned for today. Objective:     Visit Vitals  BP (!) 174/99 (BP 1 Location: Right arm, BP Patient Position: At rest)   Pulse 69   Temp 98.1 °F (36.7 °C)   Resp 16   Ht 5' 6\" (1.676 m)   Wt 91.5 kg (201 lb 12.8 oz)   SpO2 91%   BMI 32.57 kg/m²        Physical Exam:  Dressing clean, dry and intact to the left foot. Minimal strikethrough to inner dressings. All incisions with sutures intact. Mild post-op erythema and edema. No tenderness to foot. No purulence noted from lateral incision, and only serosanguineous drainage noted with packing. Labs/Radiology Review: images and reports reviewed    Assessment:   1. Charcot neuroarthropathy of the left foot  2. Uncontrolled diabetes mellitus    Plan/Recommendations/Medical Decision Making:   -Reviewed patient's biopsies and cultures. Findings are most consistent with new onset acute stage midfoot Charcot neuroarthropathy of the midfoot due to benign bone with fibrous changes and new bone formation with fibrosis in the 4th metatarsal base. Patient's WBC has been within normal limits throughout hospital stay and her symptoms (pain, erythema, LLE swelling) have resolved with NWB. Pathology of tissue from the area of possible abscess/serous noted on MRI and intraoperatively shows fibrous tissue with foreign body reaction, serum and fibrin compatible with seroma. This could possibly be due to packing that the patient may not have fully removed when changing her dressings at home.   -Patient's 4th TMTJ joint cultures did not have any growth.  The cultures of the base of the 4th metatarsal has only shown staphylococcus, coagulase negative from thio broth only  -Patient discussed with Dr. Eva Raya of ID on Friday. She is to go home on 1 week of zyvox 600 mg bid. -Dressing changed at bedside. Packing changed  -Reiterated to patient that it is imperative that she remain completely NWB to the LLE while she is still experiencing an active episode of acute Charcot. Discussed with patient that as the bone is softer and undergoing changes at this time, that she is at higher risk of displacing her midfoot joints. Discussed with patient that as long as she remains completely NWB to the LLE, she may be able to avoid surgery if the points continue to remain nondisplaced. Patient relates understand and is agreeable with plan. -Discussed with patient the importance of stricter glucose control and the effect that continued elevated blood sugars may have on her foot.  -Patient stable for discharge from a podiatric standpoint. She is to follow-up with me this Thursday in office. Discharge Instructions:  -NWB to LLE. Patient is to wear CAM boot to LLE, but may remove it in bed.  -Change dressing every other day. Pack lateral wound with 1/4\" plain packing. Dress foot with 4x4s, ABDs, kerlix, ACE wrap.  -Call office [phone] upon discharge and schedule a follow-up for Thursday 11/21. Activity: non-weight bearing to LLE.

## 2019-11-18 NOTE — PROGRESS NOTES
Nephrology Progress Note  Brenton Barker  Date of Admission : 11/8/2019    CC:  Follow up for POOJA       Assessment and Plan     POOJA:  - Suspect 2/2 poorly controlled HTN, DM and possible antibiotic toxicity   - creatinine down to 1.65 mg/dl , urine eos neg  - renal US unremarkable except a benign looking renal cysts and possible angiolipoma on lower pole   - f/u with us in 4 weeks      Possible CKD   - 2/2 Chronic NSAID use, > 10 years of poorly controlled DM and HTN   -UPCR 2.2 gm      Diabetic Left Foot Infection w/ Osteo   -Bone Bx neg  - coag neg staph from 4th metatarsal bone specimen  - on linezolid     Uncontrolled HTN :  - renal US symmetric Kidneys : less likely [place] but can be screened if BP remains labile   - cont current meds     Poorly controlled Type II DM   - per primary team      Severe Anemia :  - per primary team           Interval History:  Seen and examined. Feeling ok. No cp, sob, n/v/d. Cr improving. For d/c today     Current Medications: all current  Medications have been eviewed in EPIC  Review of Systems: Pertinent items are noted in HPI. Objective:  Vitals:    Vitals:    11/17/19 1419 11/17/19 2026 11/18/19 0547 11/18/19 0850   BP: 162/76 165/78  (!) 174/99   Pulse: 67 79  69   Resp: 16 16  16   Temp: 98.2 °F (36.8 °C) 98.1 °F (36.7 °C)  98.1 °F (36.7 °C)   SpO2: 92% 91%  91%   Weight:   91.5 kg (201 lb 12.8 oz)    Height:         Intake and Output:  No intake/output data recorded. No intake/output data recorded.     Physical Examination:  General: NAD,Conversant   Neck:  Supple, no mass  Resp:  Lungs CTA B/L, no wheezing , normal respiratory effort  CV:  RRR,  no murmur or rub, 2+ b/l LE edema  GI:  Soft, NT, + Bowel sounds, no hepatosplenomegaly  Neurologic:  Non focal  Psych:             AAO x 3 appropriate affect   Skin:  No Rash  :  No cheney    []    High complexity decision making was performed  []    Patient is at high-risk of decompensation with multiple organ involvement    Lab Data Personally Reviewed: I have reviewed all the pertinent labs, microbiology data and radiology studies during assessment. Recent Labs     11/17/19  0330 11/16/19  0204    136   K 4.0 4.4   * 112*   CO2 22 19*   * 170*   BUN 31* 33*   CREA 1.65* 1.71*   CA 8.5 8.6     Recent Labs     11/17/19  0330   WBC 6.6   HGB 7.4*   HCT 25.2*        No results found for: SDES  Lab Results   Component Value Date/Time    Culture result: (A) 11/11/2019 01:15 PM     STAPHYLOCOCCUS EPIDERMIDIS (OXACILLIN RESISTANT) ISOLATED FROM THIO BROTH ONLY    Culture result:  11/11/2019 01:15 PM     NO ANAEROBES ISOLATED Specimen not collected anaerobically, recovery of anaerobes may be compromised. Anaerobic screening performed based on source. Culture result: NO GROWTH 4 DAYS 11/11/2019 01:05 PM    Culture result: NO GROWTH 4 DAYS 11/11/2019 01:05 PM     Recent Results (from the past 24 hour(s))   GLUCOSE, POC    Collection Time: 11/17/19  4:13 PM   Result Value Ref Range    Glucose (POC) 102 (H) 65 - 100 mg/dL    Performed by Agnes Guerra    GLUCOSE, POC    Collection Time: 11/17/19  9:18 PM   Result Value Ref Range    Glucose (POC) 147 (H) 65 - 100 mg/dL    Performed by Natasha Lezama, POC    Collection Time: 11/18/19  6:47 AM   Result Value Ref Range    Glucose (POC) 122 (H) 65 - 100 mg/dL    Performed by Brian Otto    GLUCOSE, POC    Collection Time: 11/18/19 11:54 AM   Result Value Ref Range    Glucose (POC) 162 (H) 65 - 100 mg/dL    Performed by Linda Ely MD  [address]  Phone - [phone]   Fax - [phone]  www. [facility]

## 2019-11-18 NOTE — PROGRESS NOTES
Spiritual Care Assessment/Progress Note  [facility]      NAME: Miguel Ángel Sapp      MRN: [id number]  AGE: 51 y.o.  SEX: female  Restoration Affiliation: Non Adventism   Language: English     11/18/2019     Total Time (in minutes): 10     Spiritual Assessment begun in [facility] 2[facility] MED SURG through conversation with:         [x]Patient        [] Family    [] Friend(s)        Reason for Consult: Initial/Spiritual assessment, patient floor     Spiritual beliefs: (Please include comment if needed)     [x] Identifies with a noel tradition: Mary Cummings        [] Supported by a noel community:            [] Claims no spiritual orientation:           [] Seeking spiritual identity:                [] Adheres to an individual form of spirituality:           [] Not able to assess:                           Identified resources for coping:      [] Prayer                               [] Music                  [] Guided Imagery     [x] Family/friends                 [] Pet visits     [] Devotional reading                         [] Unknown     [] Other:                                             Interventions offered during this visit: (See comments for more details)    Patient Interventions: Affirmation of noel, Affirmation of emotions/emotional suffering, Coping skills reviewed/reinforced, Iconic (affirming the presence of God/Higher Power), Prayer (actual)           Plan of Care:     [] Support spiritual and/or cultural needs    [] Support AMD and/or advance care planning process      [] Support grieving process   [] Coordinate Rites and/or Rituals    [] Coordination with community clergy   [] No spiritual needs identified at this time   [] Detailed Plan of Care below (See Comments)  [] Make referral to Music Therapy  [] Make referral to Pet Therapy     [] Make referral to Addiction services  [] Make referral to [facility]  [] Make referral to Spiritual Care Partner  [] No future visits requested        [x] Follow up visits as needed     Comments:  visit for initial spiritual assessment. Patient sitting up in bed, good eye contact, smiling. Says she is feeling better and is being discharged today from the hospital.  Provided spiritual presence and listening as she spoke of her present thoughts, feelings, and concerns. Says she is not in need of anything at this time except she would like prayer and to continue praying for her. A prayer was offered and she appeared comforted and encouraged as a result of this prayer and visit and expressed gratitude for this prayer and visit. Visited by Rev. Jo Avalos MDiv, [facility], [facility]   paging service: [phone])

## 2019-11-18 NOTE — PROGRESS NOTES
Transitons of Care:   has been in contact with hospitalist and obtained a prescription for her antibiotic and her insulin. I did follow up with patient's nurse Dianna Guzman and patient is able to perform her own wound care to foot. Nurse will send patient home with some supplies. I called [facility] earlier this am and they were unsure when they could fill the antibiotic and then I followed up with [facility] outpatient pharmacy.  spent some time talking with patient. Patient's daughter will provide transportation home for her mom after prescriptions obtained.

## 2019-11-18 NOTE — DISCHARGE INSTRUCTIONS
Discharge Instructions       PATIENT ID: Ame Pisano  MRN: [id number]   YOB: 1970    DATE OF ADMISSION: 11/8/2019 10:19 AM    DATE OF DISCHARGE: 11/18/2019    PRIMARY CARE PROVIDER: None     ATTENDING PHYSICIAN: Mazin Dunlap MD  DISCHARGING PROVIDER: Johnnie Cowart MD    To contact this individual call [phone] and ask the  to page. If unavailable ask to be transferred the Adult Hospitalist Department. DISCHARGE DIAGNOSES Charcot neuroarthropathy of the left foot    CONSULTATIONS: IP CONSULT TO PODIATRY  IP CONSULT TO NEPHROLOGY  IP CONSULT TO INFECTIOUS DISEASES  IP CONSULT TO HOSPITALIST    PROCEDURES/SURGERIES: Procedure(s):  INCISION AND DRAINAGE LEFT FOOT ABSCESS WITH BONE BIOPSY     PENDING TEST RESULTS:   At the time of discharge the following test results are still pending: n/a  TSH and T4 needed to be repeated in 4 to 6 weeks time. FOLLOW UP APPOINTMENTS:   See below   Please also follow up with PCP about your thyroid and BP and kidneys    DIET: Diabetic Diet    ACTIVITY: WOUND CARE:  -NWB to LLE. Patient is to wear CAM boot to LLE, but may remove it in bed.  -Change dressing every other day. Pack lateral wound with 1/4\" plain packing. Dress foot with 4x4s, ABDs, kerlix, ACE wrap.  -Call office [phone] upon discharge and schedule a follow-up for Thursday 11/21. Aure krueger DPM.      DISCHARGE MEDICATIONS:   See Medication Reconciliation Form    · It is important that you take the medication exactly as they are prescribed. · Keep your medication in the bottles provided by the pharmacist and keep a list of the medication names, dosages, and times to be taken in your wallet. · Do not take other medications without consulting your doctor. NOTIFY YOUR PHYSICIAN FOR ANY OF THE FOLLOWING:   Fever over 101 degrees for 24 hours.    Chest pain, shortness of breath, fever, chills, nausea, vomiting, diarrhea, change in mentation, falling, weakness, bleeding. Severe pain or pain not relieved by medications. Or, any other signs or symptoms that you may have questions about.       DISPOSITION:    Home With:   OT  PT  HH  RN       SNF/Inpatient Rehab/LTAC    Independent/assisted living    Hospice    Other:       Signed:   Lindsey Mckenzie MD  11/18/2019  1:35 PM

## 2020-01-07 ENCOUNTER — HOSPITAL ENCOUNTER (INPATIENT)
Age: 50
LOS: 7 days | Discharge: HOME | DRG: 711 | End: 2020-01-14
Attending: STUDENT IN AN ORGANIZED HEALTH CARE EDUCATION/TRAINING PROGRAM | Admitting: STUDENT IN AN ORGANIZED HEALTH CARE EDUCATION/TRAINING PROGRAM
Payer: MEDICAID

## 2020-01-07 ENCOUNTER — APPOINTMENT (OUTPATIENT)
Dept: GENERAL RADIOLOGY | Age: 50
DRG: 711 | End: 2020-01-07
Attending: STUDENT IN AN ORGANIZED HEALTH CARE EDUCATION/TRAINING PROGRAM
Payer: MEDICAID

## 2020-01-07 DIAGNOSIS — L03.116 CELLULITIS OF LEFT FOOT: ICD-10-CM

## 2020-01-07 DIAGNOSIS — G60.0 CHARCOT-MARIE-TOOTH DISEASE-LIKE DEFORMITY OF FOOT: ICD-10-CM

## 2020-01-07 DIAGNOSIS — L03.116 CELLULITIS OF FOOT, LEFT: Primary | ICD-10-CM

## 2020-01-07 LAB
ALBUMIN SERPL-MCNC: 1.7 G/DL (ref 3.5–5)
ALBUMIN/GLOB SERPL: 0.3 {RATIO} (ref 1.1–2.2)
ALP SERPL-CCNC: 183 U/L (ref 45–117)
ALT SERPL-CCNC: 8 U/L (ref 12–78)
ANION GAP SERPL CALC-SCNC: 6 MMOL/L (ref 5–15)
AST SERPL-CCNC: 8 U/L (ref 15–37)
BASOPHILS # BLD: 0 K/UL (ref 0–0.1)
BASOPHILS NFR BLD: 0 % (ref 0–1)
BILIRUB SERPL-MCNC: 0.2 MG/DL (ref 0.2–1)
BUN SERPL-MCNC: 24 MG/DL (ref 6–20)
BUN/CREAT SERPL: 23 (ref 12–20)
CALCIUM SERPL-MCNC: 8.6 MG/DL (ref 8.5–10.1)
CHLORIDE SERPL-SCNC: 103 MMOL/L (ref 97–108)
CO2 SERPL-SCNC: 28 MMOL/L (ref 21–32)
COMMENT, HOLDF: NORMAL
CREAT SERPL-MCNC: 1.06 MG/DL (ref 0.55–1.02)
DIFFERENTIAL METHOD BLD: ABNORMAL
EOSINOPHIL # BLD: 0.1 K/UL (ref 0–0.4)
EOSINOPHIL NFR BLD: 1 % (ref 0–7)
ERYTHROCYTE [DISTWIDTH] IN BLOOD BY AUTOMATED COUNT: 16.7 % (ref 11.5–14.5)
GLOBULIN SER CALC-MCNC: 6.3 G/DL (ref 2–4)
GLUCOSE SERPL-MCNC: 81 MG/DL (ref 65–100)
HCT VFR BLD AUTO: 28.8 % (ref 35–47)
HGB BLD-MCNC: 8.5 G/DL (ref 11.5–16)
IMM GRANULOCYTES # BLD AUTO: 0 K/UL (ref 0–0.04)
IMM GRANULOCYTES NFR BLD AUTO: 0 % (ref 0–0.5)
LACTATE BLD-SCNC: 1.28 MMOL/L (ref 0.4–2)
LYMPHOCYTES # BLD: 1.6 K/UL (ref 0.8–3.5)
LYMPHOCYTES NFR BLD: 15 % (ref 12–49)
MCH RBC QN AUTO: 26.1 PG (ref 26–34)
MCHC RBC AUTO-ENTMCNC: 29.5 G/DL (ref 30–36.5)
MCV RBC AUTO: 88.3 FL (ref 80–99)
MONOCYTES # BLD: 0.8 K/UL (ref 0–1)
MONOCYTES NFR BLD: 7 % (ref 5–13)
NEUTS SEG # BLD: 8.3 K/UL (ref 1.8–8)
NEUTS SEG NFR BLD: 77 % (ref 32–75)
NRBC # BLD: 0 K/UL (ref 0–0.01)
NRBC BLD-RTO: 0 PER 100 WBC
PLATELET # BLD AUTO: 524 K/UL (ref 150–400)
PMV BLD AUTO: 10.2 FL (ref 8.9–12.9)
POTASSIUM SERPL-SCNC: 3.7 MMOL/L (ref 3.5–5.1)
PROT SERPL-MCNC: 8 G/DL (ref 6.4–8.2)
RBC # BLD AUTO: 3.26 M/UL (ref 3.8–5.2)
SAMPLES BEING HELD,HOLD: NORMAL
SODIUM SERPL-SCNC: 137 MMOL/L (ref 136–145)
WBC # BLD AUTO: 10.9 K/UL (ref 3.6–11)

## 2020-01-07 PROCEDURE — 87077 CULTURE AEROBIC IDENTIFY: CPT

## 2020-01-07 PROCEDURE — 73630 X-RAY EXAM OF FOOT: CPT

## 2020-01-07 PROCEDURE — 83605 ASSAY OF LACTIC ACID: CPT

## 2020-01-07 PROCEDURE — 99283 EMERGENCY DEPT VISIT LOW MDM: CPT

## 2020-01-07 PROCEDURE — 80053 COMPREHEN METABOLIC PANEL: CPT

## 2020-01-07 PROCEDURE — 74011000258 HC RX REV CODE- 258: Performed by: STUDENT IN AN ORGANIZED HEALTH CARE EDUCATION/TRAINING PROGRAM

## 2020-01-07 PROCEDURE — 87186 SC STD MICRODIL/AGAR DIL: CPT

## 2020-01-07 PROCEDURE — 65660000000 HC RM CCU STEPDOWN

## 2020-01-07 PROCEDURE — 96374 THER/PROPH/DIAG INJ IV PUSH: CPT

## 2020-01-07 PROCEDURE — 74011250636 HC RX REV CODE- 250/636: Performed by: STUDENT IN AN ORGANIZED HEALTH CARE EDUCATION/TRAINING PROGRAM

## 2020-01-07 PROCEDURE — 87205 SMEAR GRAM STAIN: CPT

## 2020-01-07 PROCEDURE — 36415 COLL VENOUS BLD VENIPUNCTURE: CPT

## 2020-01-07 PROCEDURE — 85025 COMPLETE CBC W/AUTO DIFF WBC: CPT

## 2020-01-07 PROCEDURE — 87040 BLOOD CULTURE FOR BACTERIA: CPT

## 2020-01-07 RX ORDER — VANCOMYCIN 2 GRAM/500 ML IN 0.9 % SODIUM CHLORIDE INTRAVENOUS
2000 ONCE
Status: COMPLETED | OUTPATIENT
Start: 2020-01-07 | End: 2020-01-08

## 2020-01-07 RX ORDER — METOPROLOL TARTRATE 100 MG/1
100 TABLET ORAL 2 TIMES DAILY
COMMUNITY

## 2020-01-07 RX ORDER — DEXTROSE MONOHYDRATE 100 MG/ML
0-250 INJECTION, SOLUTION INTRAVENOUS AS NEEDED
Status: DISCONTINUED | OUTPATIENT
Start: 2020-01-07 | End: 2020-01-14 | Stop reason: HOSPADM

## 2020-01-07 RX ORDER — MAGNESIUM SULFATE 100 %
4 CRYSTALS MISCELLANEOUS AS NEEDED
Status: DISCONTINUED | OUTPATIENT
Start: 2020-01-07 | End: 2020-01-14 | Stop reason: HOSPADM

## 2020-01-07 RX ORDER — LEVOTHYROXINE SODIUM 25 UG/1
25 TABLET ORAL
COMMUNITY

## 2020-01-07 RX ORDER — AMLODIPINE BESYLATE 10 MG/1
5 TABLET ORAL DAILY
COMMUNITY

## 2020-01-07 RX ADMIN — VANCOMYCIN HYDROCHLORIDE 2000 MG: 10 INJECTION, POWDER, LYOPHILIZED, FOR SOLUTION INTRAVENOUS at 23:55

## 2020-01-07 RX ADMIN — PIPERACILLIN AND TAZOBACTAM 3.38 G: 3; .375 INJECTION, POWDER, LYOPHILIZED, FOR SOLUTION INTRAVENOUS at 22:56

## 2020-01-08 ENCOUNTER — APPOINTMENT (OUTPATIENT)
Dept: MRI IMAGING | Age: 50
DRG: 711 | End: 2020-01-08
Attending: STUDENT IN AN ORGANIZED HEALTH CARE EDUCATION/TRAINING PROGRAM
Payer: MEDICAID

## 2020-01-08 PROBLEM — M71.072 ABSCESS OF BURSA OF LEFT FOOT: Status: ACTIVE | Noted: 2020-01-08

## 2020-01-08 PROBLEM — Z91.199 NONCOMPLIANCE WITH TREATMENT PLAN: Status: ACTIVE | Noted: 2020-01-08

## 2020-01-08 PROBLEM — S93.325D: Status: ACTIVE | Noted: 2020-01-08

## 2020-01-08 LAB
ANION GAP SERPL CALC-SCNC: 7 MMOL/L (ref 5–15)
BUN SERPL-MCNC: 22 MG/DL (ref 6–20)
BUN/CREAT SERPL: 22 (ref 12–20)
CALCIUM SERPL-MCNC: 8.1 MG/DL (ref 8.5–10.1)
CHLORIDE SERPL-SCNC: 104 MMOL/L (ref 97–108)
CO2 SERPL-SCNC: 25 MMOL/L (ref 21–32)
CREAT SERPL-MCNC: 1.02 MG/DL (ref 0.55–1.02)
ERYTHROCYTE [DISTWIDTH] IN BLOOD BY AUTOMATED COUNT: 16.6 % (ref 11.5–14.5)
GLUCOSE BLD STRIP.AUTO-MCNC: 122 MG/DL (ref 65–100)
GLUCOSE BLD STRIP.AUTO-MCNC: 193 MG/DL (ref 65–100)
GLUCOSE BLD STRIP.AUTO-MCNC: 241 MG/DL (ref 65–100)
GLUCOSE BLD STRIP.AUTO-MCNC: 346 MG/DL (ref 65–100)
GLUCOSE SERPL-MCNC: 194 MG/DL (ref 65–100)
HCT VFR BLD AUTO: 22.8 % (ref 35–47)
HGB BLD-MCNC: 6.7 G/DL (ref 11.5–16)
MCH RBC QN AUTO: 25.9 PG (ref 26–34)
MCHC RBC AUTO-ENTMCNC: 29.4 G/DL (ref 30–36.5)
MCV RBC AUTO: 88 FL (ref 80–99)
NRBC # BLD: 0 K/UL (ref 0–0.01)
NRBC BLD-RTO: 0 PER 100 WBC
PLATELET # BLD AUTO: 386 K/UL (ref 150–400)
PMV BLD AUTO: 10.4 FL (ref 8.9–12.9)
POTASSIUM SERPL-SCNC: 3.9 MMOL/L (ref 3.5–5.1)
RBC # BLD AUTO: 2.59 M/UL (ref 3.8–5.2)
SERVICE CMNT-IMP: ABNORMAL
SODIUM SERPL-SCNC: 136 MMOL/L (ref 136–145)
WBC # BLD AUTO: 8.5 K/UL (ref 3.6–11)

## 2020-01-08 PROCEDURE — 65270000029 HC RM PRIVATE

## 2020-01-08 PROCEDURE — 86923 COMPATIBILITY TEST ELECTRIC: CPT

## 2020-01-08 PROCEDURE — 74011636637 HC RX REV CODE- 636/637: Performed by: STUDENT IN AN ORGANIZED HEALTH CARE EDUCATION/TRAINING PROGRAM

## 2020-01-08 PROCEDURE — 85027 COMPLETE CBC AUTOMATED: CPT

## 2020-01-08 PROCEDURE — 80048 BASIC METABOLIC PNL TOTAL CA: CPT

## 2020-01-08 PROCEDURE — 82962 GLUCOSE BLOOD TEST: CPT

## 2020-01-08 PROCEDURE — A9575 INJ GADOTERATE MEGLUMI 0.1ML: HCPCS | Performed by: STUDENT IN AN ORGANIZED HEALTH CARE EDUCATION/TRAINING PROGRAM

## 2020-01-08 PROCEDURE — 86900 BLOOD TYPING SEROLOGIC ABO: CPT

## 2020-01-08 PROCEDURE — 74011250637 HC RX REV CODE- 250/637: Performed by: STUDENT IN AN ORGANIZED HEALTH CARE EDUCATION/TRAINING PROGRAM

## 2020-01-08 PROCEDURE — 74011250636 HC RX REV CODE- 250/636: Performed by: STUDENT IN AN ORGANIZED HEALTH CARE EDUCATION/TRAINING PROGRAM

## 2020-01-08 PROCEDURE — 74011000258 HC RX REV CODE- 258: Performed by: STUDENT IN AN ORGANIZED HEALTH CARE EDUCATION/TRAINING PROGRAM

## 2020-01-08 PROCEDURE — 73720 MRI LWR EXTREMITY W/O&W/DYE: CPT

## 2020-01-08 PROCEDURE — 36415 COLL VENOUS BLD VENIPUNCTURE: CPT

## 2020-01-08 RX ORDER — SODIUM CHLORIDE 0.9 % (FLUSH) 0.9 %
10 SYRINGE (ML) INJECTION
Status: COMPLETED | OUTPATIENT
Start: 2020-01-08 | End: 2020-01-08

## 2020-01-08 RX ORDER — SODIUM CHLORIDE 0.9 % (FLUSH) 0.9 %
10 SYRINGE (ML) INJECTION
Status: DISPENSED | OUTPATIENT
Start: 2020-01-08 | End: 2020-01-08

## 2020-01-08 RX ORDER — LEVOTHYROXINE SODIUM 25 UG/1
25 TABLET ORAL
Status: DISCONTINUED | OUTPATIENT
Start: 2020-01-08 | End: 2020-01-14 | Stop reason: HOSPADM

## 2020-01-08 RX ORDER — ACETAMINOPHEN 325 MG/1
650 TABLET ORAL
Status: DISCONTINUED | OUTPATIENT
Start: 2020-01-08 | End: 2020-01-14 | Stop reason: HOSPADM

## 2020-01-08 RX ORDER — INSULIN GLARGINE 100 [IU]/ML
22 INJECTION, SOLUTION SUBCUTANEOUS 2 TIMES DAILY
Status: DISCONTINUED | OUTPATIENT
Start: 2020-01-08 | End: 2020-01-14 | Stop reason: HOSPADM

## 2020-01-08 RX ORDER — VANCOMYCIN HYDROCHLORIDE
1250
Status: DISCONTINUED | OUTPATIENT
Start: 2020-01-08 | End: 2020-01-08

## 2020-01-08 RX ORDER — AMLODIPINE BESYLATE 5 MG/1
10 TABLET ORAL DAILY
Status: DISCONTINUED | OUTPATIENT
Start: 2020-01-08 | End: 2020-01-14 | Stop reason: HOSPADM

## 2020-01-08 RX ORDER — GADOTERATE MEGLUMINE 376.9 MG/ML
18 INJECTION INTRAVENOUS
Status: COMPLETED | OUTPATIENT
Start: 2020-01-08 | End: 2020-01-08

## 2020-01-08 RX ORDER — SODIUM CHLORIDE 9 MG/ML
250 INJECTION, SOLUTION INTRAVENOUS AS NEEDED
Status: DISCONTINUED | OUTPATIENT
Start: 2020-01-08 | End: 2020-01-10

## 2020-01-08 RX ORDER — MORPHINE SULFATE 2 MG/ML
1 INJECTION, SOLUTION INTRAMUSCULAR; INTRAVENOUS
Status: DISCONTINUED | OUTPATIENT
Start: 2020-01-08 | End: 2020-01-14 | Stop reason: HOSPADM

## 2020-01-08 RX ORDER — SODIUM CHLORIDE 0.9 % (FLUSH) 0.9 %
5-40 SYRINGE (ML) INJECTION AS NEEDED
Status: DISCONTINUED | OUTPATIENT
Start: 2020-01-08 | End: 2020-01-14 | Stop reason: HOSPADM

## 2020-01-08 RX ORDER — VANCOMYCIN/0.9 % SOD CHLORIDE 1.5G/250ML
1500 PLASTIC BAG, INJECTION (ML) INTRAVENOUS
Status: DISCONTINUED | OUTPATIENT
Start: 2020-01-08 | End: 2020-01-13

## 2020-01-08 RX ORDER — SODIUM CHLORIDE 0.9 % (FLUSH) 0.9 %
5-40 SYRINGE (ML) INJECTION EVERY 8 HOURS
Status: DISCONTINUED | OUTPATIENT
Start: 2020-01-08 | End: 2020-01-14 | Stop reason: HOSPADM

## 2020-01-08 RX ORDER — ONDANSETRON 2 MG/ML
4 INJECTION INTRAMUSCULAR; INTRAVENOUS
Status: DISCONTINUED | OUTPATIENT
Start: 2020-01-08 | End: 2020-01-14 | Stop reason: HOSPADM

## 2020-01-08 RX ORDER — METOPROLOL TARTRATE 50 MG/1
100 TABLET ORAL 2 TIMES DAILY
Status: DISCONTINUED | OUTPATIENT
Start: 2020-01-08 | End: 2020-01-14 | Stop reason: HOSPADM

## 2020-01-08 RX ADMIN — CEFEPIME HYDROCHLORIDE 2 G: 2 INJECTION, POWDER, FOR SOLUTION INTRAVENOUS at 17:53

## 2020-01-08 RX ADMIN — MORPHINE SULFATE 1 MG: 2 INJECTION, SOLUTION INTRAMUSCULAR; INTRAVENOUS at 01:03

## 2020-01-08 RX ADMIN — AMLODIPINE BESYLATE 10 MG: 5 TABLET ORAL at 09:08

## 2020-01-08 RX ADMIN — Medication 10 ML: at 14:33

## 2020-01-08 RX ADMIN — MORPHINE SULFATE 1 MG: 2 INJECTION, SOLUTION INTRAMUSCULAR; INTRAVENOUS at 19:59

## 2020-01-08 RX ADMIN — MORPHINE SULFATE 1 MG: 2 INJECTION, SOLUTION INTRAMUSCULAR; INTRAVENOUS at 14:32

## 2020-01-08 RX ADMIN — CEFEPIME HYDROCHLORIDE 2 G: 2 INJECTION, POWDER, FOR SOLUTION INTRAVENOUS at 07:46

## 2020-01-08 RX ADMIN — GADOTERATE MEGLUMINE 18 ML: 376.9 INJECTION INTRAVENOUS at 07:08

## 2020-01-08 RX ADMIN — Medication 10 ML: at 07:09

## 2020-01-08 RX ADMIN — Medication 10 ML: at 01:03

## 2020-01-08 RX ADMIN — METOPROLOL TARTRATE 100 MG: 50 TABLET ORAL at 09:08

## 2020-01-08 RX ADMIN — HUMAN INSULIN 7 UNITS: 100 INJECTION, SOLUTION SUBCUTANEOUS at 07:46

## 2020-01-08 RX ADMIN — MORPHINE SULFATE 1 MG: 2 INJECTION, SOLUTION INTRAMUSCULAR; INTRAVENOUS at 07:47

## 2020-01-08 RX ADMIN — METOPROLOL TARTRATE 100 MG: 50 TABLET ORAL at 17:45

## 2020-01-08 RX ADMIN — INSULIN GLARGINE 22 UNITS: 100 INJECTION, SOLUTION SUBCUTANEOUS at 17:48

## 2020-01-08 RX ADMIN — INSULIN GLARGINE 22 UNITS: 100 INJECTION, SOLUTION SUBCUTANEOUS at 09:08

## 2020-01-08 RX ADMIN — Medication 10 ML: at 07:48

## 2020-01-08 RX ADMIN — HUMAN INSULIN 3 UNITS: 100 INJECTION, SOLUTION SUBCUTANEOUS at 11:54

## 2020-01-08 RX ADMIN — LEVOTHYROXINE SODIUM 25 MCG: 0.03 TABLET ORAL at 07:48

## 2020-01-08 RX ADMIN — CEFEPIME HYDROCHLORIDE 2 G: 2 INJECTION, POWDER, FOR SOLUTION INTRAVENOUS at 23:20

## 2020-01-08 RX ADMIN — VANCOMYCIN HYDROCHLORIDE 1500 MG: 10 INJECTION, POWDER, LYOPHILIZED, FOR SOLUTION INTRAVENOUS at 18:03

## 2020-01-08 RX ADMIN — HUMAN INSULIN 2 UNITS: 100 INJECTION, SOLUTION SUBCUTANEOUS at 17:45

## 2020-01-08 NOTE — ED PROVIDER NOTES
Patient is a 29-year-old female history of diabetes and osteomyelitis of the left foot presenting to the emergency department today  for evaluation of her left foot wound. In October patient had partial left fifth metatarsal removal in the following month had abscess requiring drainage of the foot. She states that the suture wounds of never closed since then. Over the past 4 to 5 days she has noticed increasing redness, swelling and foul-smelling brown drainage from the dorsum of her foot. She is 3 small wounds to the dorsum of the foot. She saw her podiatrist today who was concerned about osteomyelitis and sent her here for further evaluation. Her podiatrist sent a note stating that she wanted an MRI with contrast and admission. Patient denies fever but does have pain of the foot. Pain is worse when she ambulates although she typically is nonweightbearing entheses however notes today she did walk on the foot). Denies any other acute symptoms at this time.            Past Medical History:   Diagnosis Date    Cataract     Cataracts, bilateral     Diabetes ([address]     [facility]omyel[address] [address]        Past Surgical History:   Procedure Laterality Date    HX CATARACT REMOVAL  2/2011; 5-11    right eye; left    HX TONSIL AND ADENOIDECTOMY  1985    HX TONSILLECTOMY  1984    HX TUBAL LIGATION  1997         Family History:   Problem Relation Age of Onset    Hypertension Mother     Cancer Father         prostate    Diabetes Maternal Grandmother     Hypertension Maternal Grandmother        Social History     Socioeconomic History    Marital status:      Spouse name: Not on file    Number of children: Not on file    Years of education: Not on file    Highest education level: Not on file   Occupational History    Not on file   Social Needs    Financial resource strain: Not on file    Food insecurity:     Worry: Not on file     Inability: Not on file    Transportation needs:     Medical: Not on file Non-medical: Not on file   Tobacco Use    Smoking status: Never Smoker    Smokeless tobacco: Never Used   Substance and Sexual Activity    Alcohol use: Yes     Comment: rarely    Drug use: No    Sexual activity: Not Currently   Lifestyle    Physical activity:     Days per week: Not on file     Minutes per session: Not on file    Stress: Not on file   Relationships    Social connections:     Talks on phone: Not on file     Gets together: Not on file     Attends [employer] service: Not on file     Active member of club or organization: Not on file     Attends meetings of clubs or organizations: Not on file     Relationship status: Not on file    Intimate partner violence:     Fear of current or ex partner: Not on file     Emotionally abused: Not on file     Physically abused: Not on file     Forced sexual activity: Not on file   Other Topics Concern    Not on file   Social History Narrative    ** Merged History Encounter **              ALLERGIES: Patient has no known allergies. Review of Systems   Constitutional: Negative for chills and fever. HENT: Negative for congestion and rhinorrhea. Eyes: Negative for redness and visual disturbance. Respiratory: Negative for cough and shortness of breath. Cardiovascular: Negative for chest pain and leg swelling. Gastrointestinal: Negative for abdominal pain, diarrhea, nausea and vomiting. Genitourinary: Negative for dysuria, flank pain, frequency, hematuria and urgency. Musculoskeletal: Positive for arthralgias and joint swelling. Negative for back pain, myalgias and neck pain. Skin: Positive for wound. Negative for rash. Allergic/Immunologic: Negative for immunocompromised state. Neurological: Negative for dizziness and headaches.        Vitals:    01/07/20 2158   BP: (!) 171/95   Pulse: (!) 106   Resp: 18   Temp: 98.5 °F (36.9 °C)   SpO2: 100%   Weight: 85.7 kg (189 lb)   Height: 5' 6\" (1.676 m)            Physical Exam  Vitals signs and nursing note reviewed. Constitutional:       General: She is not in acute distress. Appearance: She is well-developed. She is not diaphoretic. HENT:      Head: Normocephalic. Mouth/Throat:      Pharynx: No oropharyngeal exudate. Eyes:      General:         Right eye: No discharge. Left eye: No discharge. Pupils: Pupils are equal, round, and reactive to light. Neck:      Musculoskeletal: Normal range of motion and neck supple. Cardiovascular:      Rate and Rhythm: Regular rhythm. Tachycardia present. Heart sounds: Normal heart sounds. No murmur. No friction rub. No gallop. Pulmonary:      Effort: Pulmonary effort is normal. No respiratory distress. Breath sounds: Normal breath sounds. No stridor. No wheezing or rales. Abdominal:      General: Bowel sounds are normal. There is no distension. Palpations: Abdomen is soft. Tenderness: There is no tenderness. There is no guarding or rebound. Musculoskeletal: Normal range of motion. General: No deformity. Skin:     General: Skin is warm and dry. Capillary Refill: Capillary refill takes less than 2 seconds. Comments: See pictures below for wound  Draining brown purulent malodorous fluid  Tender to palpate  Foot is warm with good cap refill; difficult to palpate DP due to swelling however can palpate PT pulse in the foot    Neurological:      Mental Status: She is alert and oriented to person, place, and time. Psychiatric:         Behavior: Behavior normal.                Labs Reviewed:   No leukocytosis  Chronic anemia  Lactate normal  Wound and blood cultures sent      Imaging Reviewed:   XR L foot:  IMPRESSION: Cortical destruction is suggested at the tarsometatarsal joints  where there has been interval dislocation since the prior study. Osteomyelitis  at the metatarsal bases is not excluded. . Alternatively, consider neuropathic  change.       Course:  Vancomycin and Zosyn IV ordered    Hospitalist Kelby Serve for Admission  11:07 PM    ED Room Number: ER05/05  Patient Name and age:  Jacob Fuentes 51 y.o.  female  Working Diagnosis:   1. Cellulitis of left foot    2. Charcot-Margareth-Tooth disease-like deformity of foot      Readmission: no  Isolation Requirements:  no  Recommended Level of Care:  med/surg  Code Status:  Full Code  Department:[facility] Adult ED - [phone]  Other:  51 y.o. female hx of diabetes sent from podiatry with wound/cellulitis to foot requesting admit. CBC/lactate ok. No fever. XR cannot rule out osteo. Abx started. MDM:  51 y.o. female hx of diabetes and multiple L foot surgeries sent by podiatry for admission with concern for persistent foot wound/cellulitis. XR cannot rule osteo, will ultimately require MRI. Doesn't appear septic at this time. Overall well appearing with stable VS other than mild tachycardia. Broad spectrum abx started. Low suspicion for nec fasc at this time. Will admit for further management. Clinical Impression:     ICD-10-CM ICD-9-CM    1. Cellulitis of left foot L03.116 682.7    2.  Charcot-Margareth-Tooth disease-like deformity of foot G60.0 356.1            Disposition: Admit  Simon Phipps DO

## 2020-01-08 NOTE — PROGRESS NOTES
TRANSFER - IN REPORT:    Verbal report received from Tracy(name) on Joy Gonzalez  being received from ED(unit) for routine progression of care      Report consisted of patients Situation, Background, Assessment and   Recommendations(SBAR). Information from the following report(s) SBAR, Kardex, ED Summary, Intake/Output and MAR was reviewed with the receiving nurse. Opportunity for questions and clarification was provided. Assessment completed upon patients arrival to unit and care assumed.

## 2020-01-08 NOTE — PROGRESS NOTES
Pharmacist Note - Vancomycin Dosing    Consult provided for this 51 y.o. female for indication of diabetic foot infection with possible osteo. Antibiotic regimen(s): vanc + cefepime  Patient on vancomycin PTA? NO     Recent Labs     20  2233   WBC 10.9   CREA 1.06*   BUN 24*     Frequency of BMP: daily x 3  Height: 167.6 cm  Weight: 85.7 kg  Est CrCl: 71.6 ml/min; UO: - ml/kg/hr  Temp (24hrs), Av.8 °F (37.1 °C), Min:98.5 °F (36.9 °C), Max:99 °F (37.2 °C)    Cultures:   wound-   paired blood-    Goal trough = ~15 mcg/ml    Therapy will be initiated with a loading dose of 2000 mg IV x 1 to be followed by a maintenance dose of 1250 mg IV every 16 hours. Pharmacy to follow patient daily and order levels / make dose adjustments as appropriate.

## 2020-01-08 NOTE — H&P
HISTORY AND PHYSICAL  Elma Santos MD        PCP: None    Please note that this dictation was completed with [employer], the computer voice recognition software. Quite often unanticipated grammatical, syntax, homophones, and other interpretive errors are inadvertently transcribed by the computer software. Please disregard these errors. Please excuse any errors that have escaped final proofreading. Chief Complaint:   Left foot wound         History of present illness:   Patient is a 45-year-old female with medical history significant for diabetes mellitus, hypertension, hypothyroidism, chronic anemia and recurrent foot infection with osteomyelitis who presents to the emergency department with chief complaint of worsening left foot wound. Patient reports she had surgery (partial left fifth metatarsal removal) done in October , noted to have foot abscess requiring drainage the following months. She reports over the past few days she noted increased pain, swelling with overlying hyperemia and purulent drainage over the dorsum of her left foot. She was seen by her podiatrist today, recommend MRI with contrast to rule out concerning osteomyelitis. She denies any fever, chills, nausea, vomiting, shortness of breath, chest pain, palpitation, urinary or bowel complaints. In the emergency department, V/S was remarkable for tachycardia 100s. She was given a dose of Vanco and Zosyn. PMH/PSH:  Past Medical History:   Diagnosis Date    Cataract     Cataracts, bilateral     Diabetes ([address]     Osteomyelitis ([address]      Past Surgical History:   Procedure Laterality Date    HX CATARACT REMOVAL  2/2011; 5-11    right eye; left    HX TONSIL AND ADENOIDECTOMY  1985    HX TONSILLECTOMY  1984    HX TUBAL LIGATION  1997       Home meds:   Prior to Admission medications    Medication Sig Start Date End Date Taking? Authorizing Provider   amLODIPine (NORVASC) 10 mg tablet Take 10 mg by mouth daily.    Yes Provider, Historical   levothyroxine (SYNTHROID) 25 mcg tablet Take 25 mcg by mouth Daily (before breakfast). Yes Provider, Historical   metoprolol tartrate (LOPRESSOR) 100 mg IR tablet Take 100 mg by mouth two (2) times a day. Yes Provider, Historical   insulin regular (NOVOLIN R REGULAR U-100 INSULN) 100 unit/mL injection Sliding scale insulin - patient has previous sliding scale that she has been using 11/18/19  Yes Tiara Layton MD   insulin detemir U-100 (LEVEMIR U-100 INSULIN) 100 unit/mL injection 22 Units by SubCUTAneous route two (2) times a day. Yes Provider, Historical       Allergies:  No Known Allergies    FH:  Family History   Problem Relation Age of Onset    Hypertension Mother     Cancer Father         prostate    Diabetes Maternal Grandmother     Hypertension Maternal Grandmother        SH:  Social History     Tobacco Use    Smoking status: Never Smoker    Smokeless tobacco: Never Used   Substance Use Topics    Alcohol use: Yes     Comment: rarely       ROS:   Constitutional: Negative for activity change, appetite change and fatigue. HENT: Negative for ear pain, facial swelling, sore throat and trouble swallowing.    Eyes: No visual disturbance. Negative for pain and discharge. Respiratory: Negative for chest tightness, shortness of breath and wheezing.    Cardiovascular: Negative for chest pain and palpitations. Gastrointestinal: Negative for abdominal pain, blood in stool, nausea and vomiting. Genitourinary: Negative for difficulty urinating, flank pain and hematuria. Musculoskeletal: Negative for arthralgias, joint swelling, myalgias and neck pain. Skin: Negative for color change and rash. Neurological: Negative for  dizziness, headache, weakness or numbness. Hematological: Negative for adenopathy. Does not bruise/bleed easily.    Psychiatric/Behavioral: Negative for behavioral problems, confusion and sleep disturbance.   All other systems reviewed and are negative    PHYSICAL EXAM:  Visit Vitals  /84   Pulse 97   Temp 98.5 °F (36.9 °C)   Resp 18   Ht 5' 6\" (1.676 m)   Wt 85.7 kg (189 lb)   LMP 05/01/2011   SpO2 96%   BMI 30.51 kg/m²       General:          Alert, cooperative, no distress, appears stated age. HEENT:           Atraumatic, anicteric sclerae, pink conjunctivae                          No oral ulcers, mucosa moist, throat clear, dentition fair  Neck:               Supple, symmetrical,  thyroid: non tender  Lungs:             Clear to auscultation bilaterally. No Wheezing or Rhonchi. No rales. Chest wall:      No tenderness  No Accessory muscle use. Heart:              Regular  rhythm,  No  murmur   No edema  Abdomen:        Soft, non-tender. Not distended. Bowel sounds normal  Extremities: The left   with redness, warm and purulent drainage +2 pitting edema  (see pic )  Skin:                Not pale. Not Jaundiced  No rashes   Psych:             Not anxious or agitated. Neurologic:     Alert and oriented to PPT, CNII-XII intact. Motor and sensory exam grossly intact. Labs/Imaging:  Recent Results (from the past 24 hour(s))   CBC WITH AUTOMATED DIFF    Collection Time: 01/07/20 10:33 PM   Result Value Ref Range    WBC 10.9 3.6 - 11.0 K/uL    RBC 3.26 (L) 3.80 - 5.20 M/uL    HGB 8.5 (L) 11.5 - 16.0 g/dL    HCT 28.8 (L) 35.0 - 47.0 %    MCV 88.3 80.0 - 99.0 FL    MCH 26.1 26.0 - 34.0 PG    MCHC 29.5 (L) 30.0 - 36.5 g/dL    RDW 16.7 (H) 11.5 - 14.5 %    PLATELET 611 (H) 730 - 400 K/uL    MPV 10.2 8.9 - 12.9 FL    NRBC 0.0 0  WBC    ABSOLUTE NRBC 0.00 0.00 - 0.01 K/uL    NEUTROPHILS 77 (H) 32 - 75 %    LYMPHOCYTES 15 12 - 49 %    MONOCYTES 7 5 - 13 %    EOSINOPHILS 1 0 - 7 %    BASOPHILS 0 0 - 1 %    IMMATURE GRANULOCYTES 0 0.0 - 0.5 %    ABS. NEUTROPHILS 8.3 (H) 1.8 - 8.0 K/UL    ABS. LYMPHOCYTES 1.6 0.8 - 3.5 K/UL    ABS. MONOCYTES 0.8 0.0 - 1.0 K/UL    ABS. EOSINOPHILS 0.1 0.0 - 0.4 K/UL    ABS.  BASOPHILS 0.0 0.0 - 0.1 K/UL    ABS. IMM. GRANS. 0.0 0.00 - 0.04 K/UL    DF AUTOMATED     METABOLIC PANEL, COMPREHENSIVE    Collection Time: 01/07/20 10:33 PM   Result Value Ref Range    Sodium 137 136 - 145 mmol/L    Potassium 3.7 3.5 - 5.1 mmol/L    Chloride 103 97 - 108 mmol/L    CO2 28 21 - 32 mmol/L    Anion gap 6 5 - 15 mmol/L    Glucose 81 65 - 100 mg/dL    BUN 24 (H) 6 - 20 MG/DL    Creatinine 1.06 (H) 0.55 - 1.02 MG/DL    BUN/Creatinine ratio 23 (H) 12 - 20      GFR est AA >60 >60 ml/min/1.73m2    GFR est non-AA 55 (L) >60 ml/min/1.73m2    Calcium 8.6 8.5 - 10.1 MG/DL    Bilirubin, total 0.2 0.2 - 1.0 MG/DL    ALT (SGPT) 8 (L) 12 - 78 U/L    AST (SGOT) 8 (L) 15 - 37 U/L    Alk. phosphatase 183 (H) 45 - 117 U/L    Protein, total 8.0 6.4 - 8.2 g/dL    Albumin 1.7 (L) 3.5 - 5.0 g/dL    Globulin 6.3 (H) 2.0 - 4.0 g/dL    A-G Ratio 0.3 (L) 1.1 - 2.2     SAMPLES BEING HELD    Collection Time: 01/07/20 10:33 PM   Result Value Ref Range    SAMPLES BEING HELD 1red 1blue     COMMENT        Add-on orders for these samples will be processed based on acceptable specimen integrity and analyte stability, which may vary by analyte. CULTURE, WOUND [place] STAIN    Collection Time: 01/07/20 10:33 PM   Result Value Ref Range    Special Requests: NO SPECIAL REQUESTS      GRAM STAIN RARE WBCS SEEN      GRAM STAIN NO ORGANISMS SEEN      Culture result: PENDING    POC LACTIC ACID    Collection Time: 01/07/20 10:38 PM   Result Value Ref Range    Lactic Acid (POC) 1.28 0.40 - 2.00 mmol/L       Recent Labs     01/07/20  2233   WBC 10.9   HGB 8.5*   HCT 28.8*   *     Recent Labs     01/07/20  2233      K 3.7      CO2 28   BUN 24*   CREA 1.06*   GLU 81   CA 8.6     Recent Labs     01/07/20  2233   SGOT 8*   ALT 8*   *   TBILI 0.2   TP 8.0   ALB 1.7*   GLOB 6.3*       No results for input(s): CPK, CKNDX, TROIQ in the last 72 hours.     No lab exists for component: CPKMB    No results for input(s): INR, PTP, APTT, INREXT in the last 72 hours. No results for input(s): PH, PCO2, PO2 in the last 72 hours. XR FOOT LT MIN 3 V  Narrative: EXAM: XR FOOT LT MIN 3 V    INDICATION: looking for osteo, dorsum of foot erythematous/swollen. COMPARISON: 11/8/2019    FINDINGS: Three views of the left foot demonstrate dislocation at the first  tarsometatarsal joint and probably the remainder of the tarsometatarsal joints  since the prior study, with poor definition of the cortical margins of the  proximal metatarsals. The fifth metatarsal is absent, stable. . Soft tissue  swelling is generalized and significantly worsened since the prior study. .  Impression: IMPRESSION: Cortical destruction is suggested at the tarsometatarsal joints  where there has been interval dislocation since the prior study. Osteomyelitis  at the metatarsal bases is not excluded. . Alternatively, consider neuropathic  change. Assessment & Plan:  ====================  Left foot purulent cellulitis , diabetic   In the setting of Charcot neuroarthropathy of the left foot  No leukocytosis or lactic acidosis  Wound and blood culture pending  MRI with contrast pending (per podiatry recommendation)  In the ED ,received a dose  Vanco and Zosyn , will switch zosyn to cefepime and cont Vanc   Podiatry consult  Wound care    Diabetes mellitus  Continue home insulin  SSI  Accu-Cheks    Hypertension  Continue home meds  Hydralazine as needed    Chronic normocytic anemia  Likely ACD  Hemoglobin at baseline  Transfuse if hemoglobin less than 7    Hypothyroidism  Continue home Synthroid    Patient's Baseline: Ambulates with walking  DVT ppx: SCD  Code status: Full  Disposition: TBD  Care plan discussed with patient and nurse.                 Signed By: Elma Santos MD     January 7, 2020

## 2020-01-08 NOTE — PROGRESS NOTES
Day #2 of Vancomycin  Indication:  DM Lt. foot infection dorsal wound with osteomyelitis  - MRI (): \"Progressive Charcot changes of the midfoot with superimposed abscess collection and osteomyelitis. Sinus tract drains to the dorsum of the foot. \"  Current regimen:  1250 mg IV Q 16 hrs    Abx regimen:  Vancomycin and cefepime  ID Following ?: Consulted  Concomitant nephrotoxic drugs (requires more frequent monitoring): None  Frequency of BMP?: Daily through 1/10  Recent Labs     20  0334 20  2233   WBC 8.5 10.9   CREA 1.02 1.06*   BUN 22* 24*   Est CrCl: 74.4 ml/min; UO: -- ml/kg/hr (not assessed)  Temp (24hrs), Av.4 °F (36.9 °C), Min:98 °F (36.7 °C), Max:99 °F (37.2 °C)    Cultures:    Wound, Lt.foot - rare WBCs - pending    Blood, paired - NGTD - pending    Goal trough = 15 - 20 mcg/mL    Recent trough history (date/time/level/dose/action taken):  None thus far. Plan: Osteomyelitis confirmed on MRI today; will adjust regimen for a more aggressive dose. Change to 1500 mg IV Q 16hrs. Pharmacy will continue to monitor this patient daily for changes in clinical status and renal function.     Mary Lux, PharmD  Clinical Pharmacist, Orthopedics and Med/Surg () [id number] [employer] [phone]

## 2020-01-08 NOTE — PROGRESS NOTES
Physical Therapy Screening:  Services are not indicated at this time. [facility] based on results obtained during the nursing admission assessment. The patients chart was reviewed and the patient is not appropriate for a skilled therapy evaluation at this time. Please consult physical therapy if any therapy needs arise. Thank you.     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

## 2020-01-08 NOTE — ROUTINE PROCESS
TRANSFER - OUT REPORT:    Verbal report given to RN (name) on Kevin Patel  being transferred to  (unit) for routine progression of care       Report consisted of patients Situation, Background, Assessment and   Recommendations(SBAR). Information from the following report(s) SBAR, ED Summary and MAR was reviewed with the receiving nurse. Lines:   Peripheral IV 01/07/20 Right Wrist (Active)   Site Assessment Clean, dry, & intact 1/7/2020 10:40 PM   Phlebitis Assessment 0 1/7/2020 10:40 PM   Infiltration Assessment 0 1/7/2020 10:40 PM   Dressing Status Clean, dry, & intact 1/7/2020 10:40 PM        Opportunity for questions and clarification was provided. Patient transported by transport. With IV vancomycin infusing.

## 2020-01-08 NOTE — ED TRIAGE NOTES
Patient sent from podiatrist office for concern of osteomyelitis to left foot or cellulitis. Patient with history of osteomyelitis to that foot requiring surgery.

## 2020-01-08 NOTE — CONSULTS
Dictated consult to follow. Patient has long-standing history of left foot infections and noncompliance with treatment plan. In October, patient has 5th ray resected for osteomyelitis. Patient left the hospital AMA and was noncompliant with weightbearing restrictions. Patient then developed an abscess and was readmitted in November. At the time MRI showed possible osteomyelitis vs. early Charcot on MRI. I then performed an incision and drainage of osteomyelitis and bone cultures and biopsies. Cultures and biopsies were negative for osteomyelitis and showed no growth indicating early Charcot. As patient was in active Charcot, she was instructed to strictly stay off the foot. At the time she agreed to this and was trained and sent home with a walker. Then followed these instructions until her sutures were removed by my partner Dr. Martin Delgadillo, and areas were still not completely healed at that time. She was instructed to remain off the foot do local wound care and follow-up with me in 1-2 weeks. However, did not show up for the appointment and did not return to the office until a month later. At that time, she was noting extreme pain with ambulation. She arrived at the appointment without her walker and in a surgical shoe. Patient admitted to ignoring her weightbearing instructions. I reviewed the patient's x-rays with her at the time and educated her that by walking on her foot with active Charcot, she had developed a tarsometatarsal joint dislocation. She refused a cast at that time. She was instructed to stay off her foot, and was given a script for a knee walker in order to increase compliance. However, yesterday patient arrived yesterday in office. She noted at the time she had not been staying off the foot despite having a walker at home, and that she had been having increased drainage and pain from the foot. She notes that she has lost her appetite.   There was significant purulent drainage in office. I instructed her to proceed immediately to hospital regarding concerns for superimposed cellulitis and osteomyelitis on her current episode of acute Charcot. Today I assessed patient at bedside. I was able to express > 20 cc of purulent drainage from the foot. I reviewed her MRI findings showing a significant complex abscess and osteomyelitis with her Charcot midfoot changes. I discussed with her that I would recommend considering a left BKA due to midfoot deformity, complexity of the infection, and patient's previous difficulty with maintaining compliance with her wound care and weightbearing restrictions. Patient relates that she is very reluctant about losing the foot. I discussed that saving the foot would require a multi-stage procedure that would require multiple surgeries and IV antibiotics, and that she would have to remain s nonweightbearing to the foot, which has been difficult for her to comply with in the past.  As patient has previously seen [facility]. Janet Barajas at [facility], I have asked him to discuss the possiblity of a left BKA.

## 2020-01-08 NOTE — PROGRESS NOTES
Primary Nurse Ayanna Montes and Jeanie Castrejon RN performed a dual skin assessment on this patient Impairment noted- see wound doc flow sheet  Cyrus score is 20    Wound on dorsum of left foot

## 2020-01-08 NOTE — PROGRESS NOTES
[facility]                                                                                          Hospitalist Progress Note  Jacob Mckay MD  Answering service: [phone] OR [phone] from in house phone        Date of Service:  2020  NAME:  Hina Mohr  :  1970  MRN:  [id number]      Admission Summary:     Patient is a 56-year-old female with medical history significant for diabetes mellitus, hypertension, hypothyroidism, chronic anemia and recurrent foot infection with osteomyelitis who presents to the emergency department with chief complaint of worsening left foot wound. Patient reports she had surgery (partial left fifth metatarsal removal) done in October , noted to have foot abscess requiring drainage the following months. She reports over the past few days she noted increased pain, swelling with overlying hyperemia and purulent drainage over the dorsum of her left foot. She was seen by her podiatrist today, recommend MRI with contrast to rule out concerning osteomyelitis. She denies any fever, chills, nausea, vomiting, shortness of breath, chest pain, palpitation, urinary or bowel complaints. Interval history / Subjective:   Patient seen and examined. Labs chart and imaging reviewed. Her foot is having abscess and osteomyelitis most likely she will need BKA vascular surgery has been consulted podiatry is on board blood cultures are positive for gram-positive cocci rods ID on board  On antibiotics currently. Patient is having hemoglobin less than 7 will transfuse 1 unit of blood. Assessment & Plan:     Left foot purulent cellulitis with osteo ,  In the setting of Charcot neuroarthropathy of the left foot  MRI reviewed positive for osteomyelitis. On cefepime and cont Vanc ,   Podiatry consult appreciated. Vascular surgery consulted. Follow-up blood cultures and wound cultures  Wound care      Gram-positive cocci in blood  On vancomycin. ID consulted. Diabetes mellitus  Continue home insulin Lantus 22  SSI  Accu-Cheks     Hypertension  Continue home meds amlodipine and Lopressor  Hydralazine as needed     Chronic normocytic anemia  Likely ACD  Hemoglobin at baseline  Hemoglobin is 6.7 transfuse 1 unit of blood today     Hypothyroidism  Continue home Synthroid     Patient's Baseline: Ambulates with walking  DVT ppx: SCD  Code status: Full  Disposition: TBD  Code status:   DVT prophylaxis:     Care Plan discussed with: Patient/Family  Disposition: Home w/Family and TBD     Hospital Problems  Date Reviewed: 11/11/2019          Codes Class Noted POA    Abscess of bursa of left foot ICD-10-CM: M71.072  ICD-9-CM: 727.89  1/8/2020 Unknown        Noncompliance with treatment plan ICD-10-CM: Z91.11  ICD-9-CM: V15.81  1/8/2020 Unknown        Closed dislocation of tarsometatarsal joint, left, subsequent encounter ICD-10-CM: S93.325D  ICD-9-CM: V58.89  1/8/2020 Unknown        Cellulitis of foot, left ICD-10-CM: L03.116  ICD-9-CM: 682.7  1/7/2020 Unknown        Type 2 diabetes mellitus with Charcot's joint of left foot ([facility] ICD-10-CM: E11.610  ICD-9-CM: 250.60, 713.5  11/14/2019 Yes        Charcot's joint of left foot ICD-10-CM: X25.069  ICD-9-CM: 094.0, 713.5  11/14/2019 Yes        Acute osteomyelitis of left foot ([facility] ICD-10-CM: Q64.291  ICD-9-CM: 730.07  10/7/2019 Yes        Uncontrolled type 2 diabetes mellitus with peripheral neuropathy [facility]) ICD-10-CM: E11.42, E11.65  ICD-9-CM: 250.62, 357.2  10/7/2019 Yes                Review of Systems:   A comprehensive review of systems was negative except for that written in the HPI. Vital Signs:    Last 24hrs VS reviewed since prior progress note.  Most recent are:  Visit Vitals  /80   Pulse 74   Temp 98.8 °F (37.1 °C)   Resp 18   Ht 5' 6\" (1.676 m)   Wt 85.7 kg (189 lb)   SpO2 97%   BMI 30.51 kg/m²       No intake or output data in the 24 hours ending 01/08/20 5689     Physical Examination: Constitutional:  No acute distress, cooperative, pleasant   ENT:  Oral mucous moist, oropharynx benign. Resp:  CTA bilaterally. No wheezing/rhonchi/rales. No accessory muscle use   CV:  Regular rhythm, normal rate, no murmurs, gallops, rubs    GI:  Soft, non distended, non tender. normoactive bowel sounds, no hepatosplenomegaly     Musculoskeletal:      No edema, warm, 2+ pulses throughout    Neurologic:  Moves all extremities. AAOx3, CN II-XII reviewed           Data Review:    Review and/or order of clinical lab test      Labs:     Recent Labs     01/08/20 0334 01/07/20 2233   WBC 8.5 10.9   HGB 6.7* 8.5*   HCT 22.8* 28.8*    524*     Recent Labs     01/08/20 0334 01/07/20 2233    137   K 3.9 3.7    103   CO2 25 28   BUN 22* 24*   CREA 1.02 1.06*   * 81   CA 8.1* 8.6     Recent Labs     01/07/20 2233   SGOT 8*   ALT 8*   *   TBILI 0.2   TP 8.0   ALB 1.7*   GLOB 6.3*     No results for input(s): INR, PTP, APTT, INREXT in the last 72 hours. No results for input(s): FE, TIBC, PSAT, FERR in the last 72 hours. Lab Results   Component Value Date/Time    Folate 16.2 11/13/2019 04:28 PM      No results for input(s): PH, PCO2, PO2 in the last 72 hours. No results for input(s): CPK, CKNDX, TROIQ in the last 72 hours.     No lab exists for component: CPKMB  Lab Results   Component Value Date/Time    Cholesterol, total 176 10/03/2014 03:56 AM    HDL Cholesterol 61 10/03/2014 03:56 AM    LDL, calculated 90.8 10/03/2014 03:56 AM    Triglyceride 121 10/03/2014 03:56 AM    CHOL/HDL Ratio 2.9 10/03/2014 03:56 AM     Lab Results   Component Value Date/Time    Glucose (POC) 193 (H) 01/08/2020 04:37 PM    Glucose (POC) 241 (H) 01/08/2020 11:23 AM    Glucose (POC) 346 (H) 01/08/2020 07:34 AM    Glucose (POC) 162 (H) 11/18/2019 11:54 AM    Glucose (POC) 122 (H) 11/18/2019 06:47 AM     Lab Results   Component Value Date/Time    Color YELLOW/STRAW 11/13/2019 04:33 PM    Appearance CLEAR 11/13/2019 04:33 PM    Specific gravity 1.014 11/13/2019 04:33 PM    pH (UA) 5.5 11/13/2019 04:33 PM    Protein 100 (A) 11/13/2019 04:33 PM    Glucose 250 (A) 11/13/2019 04:33 PM    Ketone NEGATIVE  11/13/2019 04:33 PM    Bilirubin NEGATIVE  11/13/2019 04:33 PM    Urobilinogen 0.2 11/13/2019 04:33 PM    Nitrites NEGATIVE  11/13/2019 04:33 PM    Leukocyte Esterase NEGATIVE  11/13/2019 04:33 PM    Epithelial cells MODERATE (A) 11/13/2019 04:33 PM    Bacteria NEGATIVE  11/13/2019 04:33 PM    WBC 0-4 11/13/2019 04:33 PM    RBC 0-5 11/13/2019 04:33 PM         Medications Reviewed:     Current Facility-Administered Medications   Medication Dose Route Frequency    amLODIPine (NORVASC) tablet 10 mg  10 mg Oral DAILY    insulin glargine (LANTUS) injection 22 Units  22 Units SubCUTAneous BID    levothyroxine (SYNTHROID) tablet 25 mcg  25 mcg Oral ACB    metoprolol tartrate (LOPRESSOR) tablet 100 mg  100 mg Oral BID    sodium chloride (NS) flush 5-40 mL  5-40 mL IntraVENous Q8H    sodium chloride (NS) flush 5-40 mL  5-40 mL IntraVENous PRN    acetaminophen (TYLENOL) tablet 650 mg  650 mg Oral Q4H PRN    morphine injection 1 mg  1 mg IntraVENous Q4H PRN    ondansetron (ZOFRAN) injection 4 mg  4 mg IntraVENous Q4H PRN    sodium chloride (NS) flush 10 mL  10 mL IntraVENous RAD ONCE    vancomycin (VANCOCIN) 1500 mg in  ml infusion  1,500 mg IntraVENous Q16H    0.9% sodium chloride infusion 250 mL  250 mL IntraVENous PRN    cefepime (MAXIPIME) 2 g in 0.9% sodium chloride (MBP/ADV) 100 mL  2 g IntraVENous Q8H    insulin regular (NOVOLIN R, HUMULIN R) injection   SubCUTAneous AC&HS    glucose chewable tablet 16 g  4 Tab Oral PRN    glucagon (GLUCAGEN) injection 1 mg  1 mg IntraMUSCular PRN    dextrose 10% infusion 0-250 mL  0-250 mL IntraVENous PRN    Vancomycin- Pharmacy to Dose   Other Rx Dosing/Monitoring     ______________________________________________________________________  EXPECTED LENGTH OF STAY: - - -  ACTUAL LENGTH OF STAY:          1                 Eliz Herrera MD

## 2020-01-08 NOTE — WOUND CARE
Wound Care Note:     New consult placed by physician request for wound care    Chart shows:  Admitted for cellulitis of left foot  Past Medical History:   Diagnosis Date    Cataract     Cataracts, bilateral     Diabetes ([address]     Osteomyelitis ([address]      Wound Culture obtained on 1/7/20 with pending results of rare WBCs seen and no organisms seen  WBC = 8.5 on 1/8/20  Admitted from home    Assessment:   Patient is A&O x 4, communicative, continent with no assistance needed in repositioning. Bed: Total Care  Diet: Diabetic consistent carb regular  Patient reports pain in left foot; no number given. Bilateral heels skin intact; left heel with light erythema; right heel without erythema. Patient refused for buttocks and sacral skin to be assessed. 1. POA left dorsal foot with three wounds, foot edematous with erythema, small sero/sang drainage, patient with pain in foot so wounds were not probed. Podiatry sent patient to ED and will be managing care. Wound care will defer to podiatry. 4 x 4's and roll gauze applied. MRI from 1/7/20 shows \"progressive Charcot changes of the midfoot with superimposed abscess collection and osteomyelitis. Sinus tract drains to dorsum of the foot\". Patient supine. Heels offloaded on pillows. Recommendations:    Defer to podiatry. Skin Care & Pressure Prevention:  Minimize layers of linen/pads under patient to optimize support surface. Turn/reposition approximately every 2 hours and offload heels. Manage incontinence / promote continence   Nourishing Skin Cream to dry skin    Discussed above plan with patient & Joey Herrera RN    Transition of Care: Wound care will sign off.       RIVAS Bassett, RN, [facility], [facility].  office [phone]  pager [phone] or call  to page

## 2020-01-08 NOTE — PROGRESS NOTES
Admission Medication Reconciliation:    Information obtained from:  Patient  RxQuery data available¹:  YES    Comments/Recommendations: Spoke with patient regarding use of PTA medications including prescription/OTC, vitamins/supplements, inhaled, topical, injectable, nasal, otic and ophthalmic medications. Patient was a fair historian. She was able to identify medications she took, but was unsure of doses. Utilized fill history in RxQuery to verify dosing. Updated PTA meds/reviewed patient's allergies. 1)  Of note, patient reports she has been out of her amlodipine, metoprolol and levothyroxine for the past few days because she was not able to make it to the doctor to get refills because of her foot. Reports the last doses she took were on Thursday/Friday. Patient reports she uses a sliding scale for her Novolin R. She took 4 units earlier today with dinner (states she did not eat much). 2)  Medication changes (since last review): Added  - Amlodipine  - Levothyroxine  - Metoprolol tartrate    Adjusted  - None    Removed  - None     ¹RxQuery pharmacy benefit data reflects medications filled and processed through the patient's insurance, however   this data does NOT capture whether the medication was picked up or is currently being taken by the patient. Allergies:  Patient has no known allergies. Significant PMH/Disease States:   Past Medical History:   Diagnosis Date    Cataract     Cataracts, bilateral     Diabetes ([facility]     Osteomyelitis ([facility]      Chief Complaint for this Admission:    Chief Complaint   Patient presents with    Foot Pain     Prior to Admission Medications:   Prior to Admission Medications   Prescriptions Last Dose Informant Taking? amLODIPine (NORVASC) 10 mg tablet 1/3/2020  Yes   Sig: Take 10 mg by mouth daily. insulin detemir U-100 (LEVEMIR U-100 INSULIN) 100 unit/mL injection 2020 at Unknown time  Yes   Si Units by SubCUTAneous route two (2) times a day.    insulin regular (NOVOLIN R REGULAR U-100 INSULN) 100 unit/mL injection 1/7/2020 at Unknown time  Yes   Sig: Sliding scale insulin - patient has previous sliding scale that she has been using   levothyroxine (SYNTHROID) 25 mcg tablet 1/3/2020  Yes   Sig: Take 25 mcg by mouth Daily (before breakfast). metoprolol tartrate (LOPRESSOR) 100 mg IR tablet 1/3/2020  Yes   Sig: Take 100 mg by mouth two (2) times a day. Facility-Administered Medications: None       Please contact the main inpatient pharmacy with any questions or concerns at [phone] and we will direct you to the clinical pharmacist covering this patient's care while in-house.    SHERYL Parisi

## 2020-01-08 NOTE — CONSULTS
Infectious Disease Consult    Today's Date: 1/8/2020   Admit Date: 1/7/2020    Impression:   · Charcot foot with abscess  · Positive blood culture  · Significant compliance issues    Plan:   · OR prn  · IV antibiotic therapy for at least a couple of weeks  · Probably best served with a BKA--she is reluctant     Anti-infectives:   · Vancomycin   · Cefepime     Subjective:   Date of Consultation:  January 8, 2020  Referring Physician: Dr Emelia Lin    Patient is a 51 y.o. female who is known to me from care given in the past. She is admitted with worsening of Charcot foot, abscess of same and she has now been found to have positive blood culture. She has been extremely non-compliant with NWB status and has had worsening of her foot, significant elevations of blood glucose, etc. She has not had fevers or chills that she recalls. She is on IV antibiotic therapy and we are asked to see her in consultation. Patient Active Problem List   Diagnosis Code    Diabetes mellitus type 1, uncontrolled, insulin dependent ([facility]) E10.65    Anxiety state F41.1    Unspecified essential hypertension I10    HTN (hypertension) I10    DM type 2 (diabetes mellitus, type 2) ([facility]) E11.9    Acute osteomyelitis of left foot ([facility]) M86.172    Cellulitis and abscess of toe of left foot L03.032, L02.612    Uncontrolled type 2 diabetes mellitus with peripheral neuropathy ([facility]) E11.42, E11.65    Osteomyelitis of second toe of left foot ([facility]) M86.9    Acute osteomyelitis of metatarsal bone of left foot ([address] M86.172    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 116    Abscess of bursa of left foot M71.072    Noncompliance with treatment plan Z91.11    Closed dislocation of tarsometatarsal joint, left, subsequent encounter N87.363Q     Past Medical History:   Diagnosis Date    Cataract     Cataracts, bilateral     Diabetes [facility]     Osteomyelitis ([facility]       Family History   Problem Relation Age of Onset    Hypertension Mother     Cancer Father         prostate    Diabetes Maternal Grandmother     Hypertension Maternal Grandmother       Social History     Tobacco Use    Smoking status: Never Smoker    Smokeless tobacco: Never Used   Substance Use Topics    Alcohol use: Yes     Comment: rarely     Past Surgical History:   Procedure Laterality Date    HX CATARACT REMOVAL  2011; 5-    right eye; left    HX TONSIL AND ADENOIDECTOMY      HX TONSILLECTOMY      HX TUBAL LIGATION        Prior to Admission medications    Medication Sig Start Date End Date Taking? Authorizing Provider   amLODIPine (NORVASC) 10 mg tablet Take 10 mg by mouth daily. Yes Provider, Historical   levothyroxine (SYNTHROID) 25 mcg tablet Take 25 mcg by mouth Daily (before breakfast). Yes Provider, Historical   metoprolol tartrate (LOPRESSOR) 100 mg IR tablet Take 100 mg by mouth two (2) times a day. Yes Provider, Historical   insulin regular (NOVOLIN R REGULAR U-100 INSULN) 100 unit/mL injection Sliding scale insulin - patient has previous sliding scale that she has been using 19  Yes Missy Levy MD   insulin detemir U-100 (LEVEMIR U-100 INSULIN) 100 unit/mL injection 22 Units by SubCUTAneous route two (2) times a day. Yes Provider, Historical       No Known Allergies     Review of Systems:  Pertinent items are noted in the History of Present Illness.     Objective:     Visit Vitals  /80   Pulse 74   Temp 98.8 °F (37.1 °C)   Resp 18   Ht 5' 6\" (1.676 m)   Wt 85.7 kg (189 lb)   SpO2 97%   BMI 30.51 kg/m²     Temp (24hrs), Av.5 °F (36.9 °C), Min:98 °F (36.7 °C), Max:99 °F (37.2 °C)       Lines:  Peripheral IV:       Physical Exam:  Lungs:  clear to auscultation bilaterally  Heart:  regular rate and rhythm  Abdomen:  soft, non-tender.  Bowel sounds normal. No masses,  no organomegaly  Skin:  no rash or abnormalities  Wound is dressed and dry    Data Review:     CBC:  Recent Labs     01/08/20 0334 01/07/20 2233   WBC 8.5 10.9   GRANS  --  77*   MONOS  --  7   EOS  --  1   ANEU  --  8.3*   ABL  --  1.6   HGB 6.7* 8.5*   HCT 22.8* 28.8*    524*       BMP:  Recent Labs     01/08/20 0334 01/07/20 2233   CREA 1.02 1.06*   BUN 22* 24*    137   K 3.9 3.7    103   CO2 25 28   AGAP 7 6   * 81       LFTS:  Recent Labs     01/07/20 2233   TBILI 0.2   ALT 8*   SGOT 8*   *   TP 8.0   ALB 1.7*       Microbiology:     All Micro Results     Procedure Component Value Units Date/Time    CULTURE, Shana Camel STAIN [[id number]]  (Abnormal) Collected:  01/07/20 2233    Order Status:  Completed Specimen:  Wound from Foot Updated:  01/08/20 1527     Special Requests: NO SPECIAL REQUESTS        GRAM STAIN RARE WBCS SEEN         NO ORGANISMS SEEN        Culture result: FEW GRAM NEGATIVE RODS               CHECKING FOR POSSIBLE FEW 2ND GRAM NEGATIVE PATIENCE                  LIGHT MIXED SKIN JESSI ISOLATED          CULTURE, BLOOD, PAIRED [[id number]]  (Abnormal) Collected:  01/07/20 2234    Order Status:  Completed Specimen:  Blood Updated:  01/08/20 1322     Special Requests: NO SPECIAL REQUESTS        Culture result:       GRAM POSITIVE COCCI IN PAIRS AND CHAINS GROWING IN 1 OF 2 BOTTLES DRAWN SITE = R WRIST                  REMAINING BOTTLE(S) HAS/HAVE NO GROWTH SO FAR                Imaging:   Reviewed     Signed By: Letty Paige MD     January 8, 2020

## 2020-01-08 NOTE — PROGRESS NOTES
Care Management Interventions  PCP Verified by CM: Yes  Palliative Care Criteria Met (RRAT>21 & CHF Dx)?: No  Mode of Transport at Discharge: Other (see comment)  MyChart Signup: No  Discharge Durable Medical Equipment: No  Health Maintenance Reviewed: Yes  Physical Therapy Consult: No  Occupational Therapy Consult: No  Speech Therapy Consult: No  Current Support Network: Other(Lives with her daughter)  Confirm Follow Up Transport: Family  The Plan for Transition of Care is Related to the Following Treatment Goals : Infectious Disease following, Vascular surgery consult. The Patient and/or Patient Representative was Provided with a Choice of Provider and Agrees with the Discharge Plan?: Yes   Resource Information Provided?: No  Discharge Location  Discharge Placement: Home with family assistance    Reason for Admission:                     RRAT Score:  14                Do you (patient/family) have any concerns for transition/discharge? She is trying to obtain a knee scooter through insurance. Plan for utilizing home health:   No indication at this time. Current Advanced Directive/Advance Care Plan: Not on file, her daugher is NOK. Patient was not interested in completing an AMD.  Transition of Care Plan:        Reviewed chart for transitions of care,and discussed in rounds. CM met with patient at bedside to explain role and offer support. Patient is alert and oriented x4, and confirmed demographics. She lives with her daughter and and grandchildren in private residence. She has requested assistance in obtaining a knee scooter through her insurance, cm will update her. She is listed as not having a PCP, but is working closely with [facility] in scheduling an appointment. CM to follow for further discharge needs.     Dave Lane, [facility]

## 2020-01-09 LAB
ANION GAP SERPL CALC-SCNC: 5 MMOL/L (ref 5–15)
BACTERIA SPEC CULT: ABNORMAL
BUN SERPL-MCNC: 24 MG/DL (ref 6–20)
BUN/CREAT SERPL: 23 (ref 12–20)
CALCIUM SERPL-MCNC: 8.6 MG/DL (ref 8.5–10.1)
CHLORIDE SERPL-SCNC: 104 MMOL/L (ref 97–108)
CO2 SERPL-SCNC: 26 MMOL/L (ref 21–32)
CREAT SERPL-MCNC: 1.05 MG/DL (ref 0.55–1.02)
GLUCOSE BLD STRIP.AUTO-MCNC: 153 MG/DL (ref 65–100)
GLUCOSE BLD STRIP.AUTO-MCNC: 225 MG/DL (ref 65–100)
GLUCOSE BLD STRIP.AUTO-MCNC: 236 MG/DL (ref 65–100)
GLUCOSE BLD STRIP.AUTO-MCNC: 95 MG/DL (ref 65–100)
GLUCOSE SERPL-MCNC: 83 MG/DL (ref 65–100)
GRAM STN SPEC: ABNORMAL
GRAM STN SPEC: ABNORMAL
POTASSIUM SERPL-SCNC: 4 MMOL/L (ref 3.5–5.1)
SERVICE CMNT-IMP: ABNORMAL
SERVICE CMNT-IMP: NORMAL
SODIUM SERPL-SCNC: 135 MMOL/L (ref 136–145)

## 2020-01-09 PROCEDURE — 82962 GLUCOSE BLOOD TEST: CPT

## 2020-01-09 PROCEDURE — P9016 RBC LEUKOCYTES REDUCED: HCPCS

## 2020-01-09 PROCEDURE — 65270000029 HC RM PRIVATE

## 2020-01-09 PROCEDURE — 80048 BASIC METABOLIC PNL TOTAL CA: CPT

## 2020-01-09 PROCEDURE — 36415 COLL VENOUS BLD VENIPUNCTURE: CPT

## 2020-01-09 PROCEDURE — 36430 TRANSFUSION BLD/BLD COMPNT: CPT

## 2020-01-09 PROCEDURE — 30233N1 TRANSFUSION OF NONAUTOLOGOUS RED BLOOD CELLS INTO PERIPHERAL VEIN, PERCUTANEOUS APPROACH: ICD-10-PCS | Performed by: PODIATRIST

## 2020-01-09 PROCEDURE — 74011250636 HC RX REV CODE- 250/636: Performed by: STUDENT IN AN ORGANIZED HEALTH CARE EDUCATION/TRAINING PROGRAM

## 2020-01-09 PROCEDURE — 74011636637 HC RX REV CODE- 636/637: Performed by: STUDENT IN AN ORGANIZED HEALTH CARE EDUCATION/TRAINING PROGRAM

## 2020-01-09 PROCEDURE — 74011000258 HC RX REV CODE- 258: Performed by: STUDENT IN AN ORGANIZED HEALTH CARE EDUCATION/TRAINING PROGRAM

## 2020-01-09 PROCEDURE — 74011250637 HC RX REV CODE- 250/637: Performed by: STUDENT IN AN ORGANIZED HEALTH CARE EDUCATION/TRAINING PROGRAM

## 2020-01-09 RX ADMIN — MORPHINE SULFATE 1 MG: 2 INJECTION, SOLUTION INTRAMUSCULAR; INTRAVENOUS at 21:23

## 2020-01-09 RX ADMIN — MORPHINE SULFATE 1 MG: 2 INJECTION, SOLUTION INTRAMUSCULAR; INTRAVENOUS at 09:35

## 2020-01-09 RX ADMIN — METOPROLOL TARTRATE 100 MG: 50 TABLET ORAL at 18:58

## 2020-01-09 RX ADMIN — LEVOTHYROXINE SODIUM 25 MCG: 0.03 TABLET ORAL at 06:55

## 2020-01-09 RX ADMIN — VANCOMYCIN HYDROCHLORIDE 1500 MG: 10 INJECTION, POWDER, LYOPHILIZED, FOR SOLUTION INTRAVENOUS at 09:30

## 2020-01-09 RX ADMIN — INSULIN GLARGINE 22 UNITS: 100 INJECTION, SOLUTION SUBCUTANEOUS at 18:59

## 2020-01-09 RX ADMIN — MORPHINE SULFATE 1 MG: 2 INJECTION, SOLUTION INTRAMUSCULAR; INTRAVENOUS at 16:47

## 2020-01-09 RX ADMIN — HUMAN INSULIN 3 UNITS: 100 INJECTION, SOLUTION SUBCUTANEOUS at 12:45

## 2020-01-09 RX ADMIN — CEFEPIME HYDROCHLORIDE 2 G: 2 INJECTION, POWDER, FOR SOLUTION INTRAVENOUS at 06:56

## 2020-01-09 RX ADMIN — HUMAN INSULIN 3 UNITS: 100 INJECTION, SOLUTION SUBCUTANEOUS at 16:45

## 2020-01-09 RX ADMIN — CEFEPIME HYDROCHLORIDE 2 G: 2 INJECTION, POWDER, FOR SOLUTION INTRAVENOUS at 15:55

## 2020-01-09 RX ADMIN — Medication 10 ML: at 16:48

## 2020-01-09 RX ADMIN — Medication 10 ML: at 15:56

## 2020-01-09 RX ADMIN — AMLODIPINE BESYLATE 10 MG: 5 TABLET ORAL at 09:31

## 2020-01-09 RX ADMIN — INSULIN GLARGINE 22 UNITS: 100 INJECTION, SOLUTION SUBCUTANEOUS at 09:30

## 2020-01-09 RX ADMIN — METOPROLOL TARTRATE 100 MG: 50 TABLET ORAL at 09:31

## 2020-01-09 RX ADMIN — CEFEPIME HYDROCHLORIDE 2 G: 2 INJECTION, POWDER, FOR SOLUTION INTRAVENOUS at 23:27

## 2020-01-09 RX ADMIN — Medication 10 ML: at 21:25

## 2020-01-09 RX ADMIN — Medication 10 ML: at 23:26

## 2020-01-09 NOTE — PROGRESS NOTES
ID Progress Note  2020    Subjective:     She states that she is feeling okay. She is for the OR later tomorrow to debride her foot. Objective:     Antibiotics:  1. Vancomycin  2. Cefepime      Vitals:   Visit Vitals  /84 (BP 1 Location: Left arm, BP Patient Position: At rest)   Pulse 71   Temp 98.5 °F (36.9 °C)   Resp 16   Ht 5' 6\" (1.676 m)   Wt 85.7 kg (189 lb)   LMP 2011   SpO2 93%   BMI 30.51 kg/m²        Tmax:  Temp (24hrs), Av.2 °F (36.8 °C), Min:97.5 °F (36.4 °C), Max:99 °F (37.2 °C)      Exam:  Lungs:  clear to auscultation bilaterally  Heart:  regular rate and rhythm  Abdomen:  soft, non-tender. Bowel sounds normal. No masses,  no organomegaly  Her foot is dressed and dry. There is no proximal or distal cellulitis. Labs:      Recent Labs     20  0618 20  0334 20  2233   WBC  --  8.5 10.9   HGB  --  6.7* 8.5*   PLT  --  386 524*   BUN 24* 22* 24*   CREA 1.05* 1.02 1.06*   SGOT  --   --  8*   AP  --   --  183*   TBILI  --   --  0.2       Cultures:     No results found for: [facility]  Lab Results   Component Value Date/Time    Culture result: (A) 2020 10:34 PM     ALPHA STREPTOCOCCUS, NOT S. PNEUMONIAE GROWING IN 1 OF 2 BOTTLES DRAWN SITE = R WRIST    Culture result: REMAINING BOTTLE(S) HAS/HAVE NO GROWTH SO FAR 2020 10:34 PM    Culture result: FEW GRAM NEGATIVE RODS (A) 2020 10:33 PM    Culture result: CHECKING FOR POSSIBLE FEW 2ND GRAM NEGATIVE PATIENCE (A) 2020 10:33 PM    Culture result: LIGHT MIXED SKIN JESSI ISOLATED 2020 10:33 PM       Radiology:     Line/Insert Date:           Assessment:     1. Charcot foot with secondary abscessshe is for the OR tomorrow  2. Alpha strep bacteremia not certain of significance as this can be a contaminant. It can certainly infect soft tissues, as well. 3. She has significant care compliance issues    Objective:     1. Continue current therapy  2. Follow-up cultures and studies  3.  OR CHRISTIANO Edmondson Jeovanny Noe MD

## 2020-01-09 NOTE — PROGRESS NOTES
Vascular Surgery  --no complaints (I think has severe neuropathy)  --reiterated that I think she is likely to eventually end up with bka but reasonable to try foot salvage  --available as needed

## 2020-01-09 NOTE — CONSULTS
Vascular Surgery Consult  --full note to follow  --in brief 52 yo female with diabetes and complex chronic left foot infection who presents with recurrent abscess and osteomyelitis  --we asked to see re: possible bka  --I have discussed the recovery and nature of amputation as well as the likelihood of her needing this in the near future  --her current hope is to pursue limb salvage (difficult challenge given the severity of the infection and her compliance) but agree that is worth cleaning out the abscess and continuing abx and seeing her foot improves.   --will follow

## 2020-01-09 NOTE — PROGRESS NOTES
[facility]  Hospitalist Group                                                                                          Hospitalist Progress Note  Sandra Abernathy MD  Answering service: [phone] OR [phone] from in house phone        Date of Service:  2020  NAME:  Cristiano Hein  :  1970  MRN:  [id number]      Admission Summary:     Patient is a 28-year-old female with medical history significant for diabetes mellitus, hypertension, hypothyroidism, chronic anemia and recurrent foot infection with osteomyelitis who presents to the emergency department with chief complaint of worsening left foot wound. Patient reports she had surgery (partial left fifth metatarsal removal) done in October , noted to have foot abscess requiring drainage the following months. She reports over the past few days she noted increased pain, swelling with overlying hyperemia and purulent drainage over the dorsum of her left foot. She was seen by her podiatrist today, recommend MRI with contrast to rule out concerning osteomyelitis. She denies any fever, chills, nausea, vomiting, shortness of breath, chest pain, palpitation, urinary or bowel complaints. Interval history / Subjective:   Patient seen and examined. Labs chart and imaging reviewed. Patient is refusing to get BKA she is open to get more I&D and surgeries on her foot but is trying not to get BKA done she is very clear about her ideology and has discussed her with the vascular surgeon and podiatrist.  She is on schedule for I&D tomorrow by the podiatry. Got 1 unit of blood transfusion yesterday we can check hemoglobin in the morning. Alpha Streptococcus positive in the blood    Gram-negative rods positive in the foot    Repeat blood cultures soon         Assessment & Plan:     Left foot purulent cellulitis with osteo ,  In the setting of Charcot neuroarthropathy of the left foot  MRI reviewed positive for osteomyelitis.   On cefepime and cont Vanc ,   Podiatry consult appreciated. Vascular surgery consulted. Follow-up blood cultures show Streptococcus. Wound culture showed gram-negative rods. Podiatry to do I&D tomorrow  Only definitive treatment is BKA at this point as emphasized by vascular surgery and podiatry. But the patient does not want BKA      Gram-positive cocci in blood. Alpha strep  On vancomycin. ID consulted. Diabetes mellitus  Continue home insulin Lantus 22  SSI  Accu-Cheks     Hypertension  Continue home meds amlodipine and Lopressor  Hydralazine as needed     Chronic normocytic anemia  Likely ACD  Hemoglobin at baseline  Hemoglobin is 6.7 transfuse 1 unit of blood on 1/8/2020.   Check hemoglobin in the morning     Hypothyroidism  Continue home Synthroid     Patient's Baseline: Ambulates with walking  DVT ppx: SCD  Code status: Full  Disposition: TBD  Code status:   DVT prophylaxis:     Care Plan discussed with: Patient/Family  Disposition: Home w/Family and TBD     Hospital Problems  Date Reviewed: 11/11/2019          Codes Class Noted POA    Abscess of bursa of left foot ICD-10-CM: M71.072  ICD-9-CM: 727.89  1/8/2020 Unknown        Noncompliance with treatment plan ICD-10-CM: Z91.11  ICD-9-CM: V15.81  1/8/2020 Unknown        Closed dislocation of tarsometatarsal joint, left, subsequent encounter ICD-10-CM: S93.325D  ICD-9-CM: V58.89  1/8/2020 Unknown        Cellulitis of foot, left ICD-10-CM: L03.116  ICD-9-CM: 682.7  1/7/2020 Unknown        Type 2 diabetes mellitus with Charcot's joint of left foot ([facility] ICD-10-CM: E11.610  ICD-9-CM: 250.60, 713.5  11/14/2019 Yes        Charcot's joint of left foot ICD-10-CM: Q33.158  ICD-9-CM: 094.0, 713.5  11/14/2019 Yes        Acute osteomyelitis of left foot ([facility] ICD-10-CM: M86.172  ICD-9-CM: 730.07  10/7/2019 Yes        Uncontrolled type 2 diabetes mellitus with peripheral neuropathy ([facility] ICD-10-CM: E11.42, E11.65  ICD-9-CM: 250.62, 357.2  10/7/2019 Yes                Review of Systems:   A comprehensive review of systems was negative except for that written in the HPI. Vital Signs:    Last 24hrs VS reviewed since prior progress note. Most recent are:  Visit Vitals  /84 (BP 1 Location: Left arm, BP Patient Position: At rest)   Pulse 71   Temp 98.5 °F (36.9 °C)   Resp 16   Ht 5' 6\" (1.676 m)   Wt 85.7 kg (189 lb)   SpO2 93%   BMI 30.51 kg/m²         Intake/Output Summary (Last 24 hours) at 1/9/2020 1622  Last data filed at 1/9/2020 4070  Gross per 24 hour   Intake 540 ml   Output    Net 540 ml        Physical Examination:             Constitutional:  No acute distress, cooperative, pleasant   ENT:  Oral mucous moist, oropharynx benign. Resp:  CTA bilaterally. No wheezing/rhonchi/rales. No accessory muscle use   CV:  Regular rhythm, normal rate, no murmurs, gallops, rubs    GI:  Soft, non distended, non tender. normoactive bowel sounds, no hepatosplenomegaly     Musculoskeletal:      No edema, warm, 2+ pulses throughout    Neurologic:  Moves all extremities. AAOx3, CN II-XII reviewed           Data Review:    Review and/or order of clinical lab test      Labs:     Recent Labs     01/08/20  0334 01/07/20  2233   WBC 8.5 10.9   HGB 6.7* 8.5*   HCT 22.8* 28.8*    524*     Recent Labs     01/09/20  0618 01/08/20  0334 01/07/20  2233   * 136 137   K 4.0 3.9 3.7    104 103   CO2 26 25 28   BUN 24* 22* 24*   CREA 1.05* 1.02 1.06*   GLU 83 194* 81   CA 8.6 8.1* 8.6     Recent Labs     01/07/20  2233   SGOT 8*   ALT 8*   *   TBILI 0.2   TP 8.0   ALB 1.7*   GLOB 6.3*     No results for input(s): INR, PTP, APTT, INREXT, INREXT in the last 72 hours. No results for input(s): FE, TIBC, PSAT, FERR in the last 72 hours. Lab Results   Component Value Date/Time    Folate 16.2 11/13/2019 04:28 PM      No results for input(s): PH, PCO2, PO2 in the last 72 hours. No results for input(s): CPK, CKNDX, TROIQ in the last 72 hours.     No lab exists for component: CPKMB  Lab Results   Component Value Date/Time    Cholesterol, total 176 10/03/2014 03:56 AM    HDL Cholesterol 61 10/03/2014 03:56 AM    LDL, calculated 90.8 10/03/2014 03:56 AM    Triglyceride 121 10/03/2014 03:56 AM    CHOL/HDL Ratio 2.9 10/03/2014 03:56 AM     Lab Results   Component Value Date/Time    Glucose (POC) 236 (H) 01/09/2020 11:44 AM    Glucose (POC) 95 01/09/2020 06:26 AM    Glucose (POC) 122 (H) 01/08/2020 09:49 PM    Glucose (POC) 193 (H) 01/08/2020 04:37 PM    Glucose (POC) 241 (H) 01/08/2020 11:23 AM     Lab Results   Component Value Date/Time    Color YELLOW/STRAW 11/13/2019 04:33 PM    Appearance CLEAR 11/13/2019 04:33 PM    Specific gravity 1.014 11/13/2019 04:33 PM    pH (UA) 5.5 11/13/2019 04:33 PM    Protein 100 (A) 11/13/2019 04:33 PM    Glucose 250 (A) 11/13/2019 04:33 PM    Ketone NEGATIVE  11/13/2019 04:33 PM    Bilirubin NEGATIVE  11/13/2019 04:33 PM    Urobilinogen 0.2 11/13/2019 04:33 PM    Nitrites NEGATIVE  11/13/2019 04:33 PM    Leukocyte Esterase NEGATIVE  11/13/2019 04:33 PM    Epithelial cells MODERATE (A) 11/13/2019 04:33 PM    Bacteria NEGATIVE  11/13/2019 04:33 PM    WBC 0-4 11/13/2019 04:33 PM    RBC 0-5 11/13/2019 04:33 PM         Medications Reviewed:     Current Facility-Administered Medications   Medication Dose Route Frequency    amLODIPine (NORVASC) tablet 10 mg  10 mg Oral DAILY    insulin glargine (LANTUS) injection 22 Units  22 Units SubCUTAneous BID    levothyroxine (SYNTHROID) tablet 25 mcg  25 mcg Oral ACB    metoprolol tartrate (LOPRESSOR) tablet 100 mg  100 mg Oral BID    sodium chloride (NS) flush 5-40 mL  5-40 mL IntraVENous Q8H    sodium chloride (NS) flush 5-40 mL  5-40 mL IntraVENous PRN    acetaminophen (TYLENOL) tablet 650 mg  650 mg Oral Q4H PRN    morphine injection 1 mg  1 mg IntraVENous Q4H PRN    ondansetron (ZOFRAN) injection 4 mg  4 mg IntraVENous Q4H PRN    vancomycin (VANCOCIN) 1500 mg in  ml infusion  1,500 mg IntraVENous Q16H  0.9% sodium chloride infusion 250 mL  250 mL IntraVENous PRN    cefepime (MAXIPIME) 2 g in 0.9% sodium chloride (MBP/ADV) 100 mL  2 g IntraVENous Q8H    insulin regular (NOVOLIN R, HUMULIN R) injection   SubCUTAneous AC&HS    glucose chewable tablet 16 g  4 Tab Oral PRN    glucagon (GLUCAGEN) injection 1 mg  1 mg IntraMUSCular PRN    dextrose 10% infusion 0-250 mL  0-250 mL IntraVENous PRN    Vancomycin- Pharmacy to Dose   Other Rx Dosing/Monitoring     ______________________________________________________________________  EXPECTED LENGTH OF STAY: - - -  ACTUAL LENGTH OF STAY:          2                 Matthew Stern MD

## 2020-01-09 NOTE — CONSULTS
[address]    Name:  Aislinn Esparza  MR#:  [id number]  :  1970  ACCOUNT #:  [de-identified]  DATE OF SERVICE:  2020    REASON FOR CONSULTATION:  Left foot wound, evaluate for left below-knee amputation. HISTORY OF PRESENT ILLNESS:  The patient is a 79-year-old female with diabetes and multiple medical problems, who presents with worsening of a chronic left foot wound associated with abscess and osteomyelitis, and she has had difficulty with compliance amongst other problems. She was subsequently admitted through Dr. Kendall Mesa in her office for severe foot infection. She has been placed on antibiotics. Of note, there is a possibility of a positive blood culture, although it is difficult to tell if this is accurate. Infectious Disease has been consulted and has seen her as well. I have done a left foot surgery several years ago. PAST MEDICAL HISTORY:  Significant for:  1. Type 1 diabetes. 2.  Hypertension. 3.  Anxiety. 4.  Medical noncompliance. 5.  History of multiple left foot infection problems. 6.  Prior bony amputations for osteomyelitis. SOCIAL HISTORY:  Negative for tobacco or daily alcohol. REVIEW OF SYSTEMS:  Negative other than what has been mentioned. PHYSICAL EXAMINATION:  VITAL SIGNS:  Her temperature is 97.5. She has been afebrile. Heart rate 67, blood pressure is 164/93. She is 95% on room air. GENERAL:  Alert and oriented. She is in no acute distress. LUNGS:  She is clear to auscultation bilaterally. HEART:  Regular rate and rhythm. ABDOMEN:  Soft, nontender. EXTREMITIES:  Lower extremities are warm. She has palpable pulses throughout both legs. The left foot is bandaged, but the imaging has been reviewed and consistent with an abscess that communicates to the dorsum of the foot, it has been partially drained. LABORATORY DATA:  Lab values have been reviewed.   Her white blood cell count is 8.5, hemoglobin is 6.7, this is from yesterday. Chemistries are mostly within normal limits. IMPRESSION AND PLAN:  A 71-year-old female with foot-threatening infection. I have explained to her that the likelihood of requiring below-knee amputation is very likely. She is at this point reluctant to have amputation surgery and hopes to proceed with salvage. I have explained that this will require drainage, long-term antibiotics, medical compliance, possible further amputation and may still fail, and she has voiced understanding. I have discussed the recovery of the amputation as well as the nature of the surgery, long-term recovery and followup with a prosthetist, and she has voiced understanding. I will follow her in the hospital and re-discuss with her. Thank you for the consultation.       Nitish Eckert MD [id number]  D:  01/09/2020 8:29  T:  01/09/2020 11:48  JOB #:  [id number]

## 2020-01-09 NOTE — PROGRESS NOTES
Bedside shift change report given to CHARLA Overton (oncoming nurse) by Ned Kraft RN (offgoing nurse). Report included the following information SBAR, Kardex, Procedure Summary, Intake/Output, MAR and Recent Results.

## 2020-01-09 NOTE — PROGRESS NOTES
Progress Note    Patient: Roya Kearns MRN: [id number]  SSN: [id number]    YOB: 1970  Age: 51 y.o. Sex: female      Admit Date: 1/7/2020  * No surgery found *     Procedure:       Subjective:     Patient seen resting quiet and comfortably and no apparent distress. Notes that she is still having pain in foot, but that it was improved after I expressed a significant amount of purulent drainage at bedside. Relates that she spoke with Asim Paz and Krysten Moore and understands her high likelihood of going onto a BKA but that she would like to attempt an incision and drainage of the left foot first.     Objective:     Visit Vitals  BP (!) 175/100 (BP 1 Location: Left arm, BP Patient Position: At rest)   Pulse 81   Temp 97.9 °F (36.6 °C)   Resp 16   Ht 5' 6\" (1.676 m)   Wt 85.7 kg (189 lb)   SpO2 96%   BMI 30.51 kg/m²        Physical Exam:  Neurovascular status unchanged. Increased erythema and edema proximal to the ankle. Increased drainage form the dorsal surgical dehiscence sites. Charcot foot deformity present. Erythema and edema of the foot unchanged.     Labs/Radiology Review: images and reports reviewed     Assessment:     Hospital Problems  Date Reviewed: 11/11/2019          Codes Class Noted POA    Abscess of bursa of left foot ICD-10-CM: M71.072  ICD-9-CM: 727.89  1/8/2020 Unknown        Noncompliance with treatment plan ICD-10-CM: Z91.11  ICD-9-CM: V15.81  1/8/2020 Unknown        Closed dislocation of tarsometatarsal joint, left, subsequent encounter ICD-10-CM: S93.325D  ICD-9-CM: V58.89  1/8/2020 Unknown        Cellulitis of foot, left ICD-10-CM: L03.116  ICD-9-CM: 682.7  1/7/2020 Unknown        Type 2 diabetes mellitus with Charcot's joint of left foot ([facility] ICD-10-CM: E11.610  ICD-9-CM: 250.60, 713.5  11/14/2019 Yes        Charcot's joint of left foot ICD-10-CM: J00.487  ICD-9-CM: 094.0, 713.5  11/14/2019 Yes        Acute osteomyelitis of left foot ([facility] ICD-10-CM: O79.053  ICD-9-CM: 730.07  10/7/2019 Yes        Uncontrolled type 2 diabetes mellitus with peripheral neuropathy [facility]) ICD-10-CM: E11.42, E11.65  ICD-9-CM: 250.62, 357.2  10/7/2019 Yes              Plan/Recommendations/Medical Decision Making:   -Discussed treatment plan at length with patient at bedside. Patient understands importance of maintaining NWB to LLE. She also understands a high likelihood of progressing to BKA after discussing the situation with Asim Paz and Mk Castro. She is interested in pursuing incision and drainage of foot in an attempted to salvage the foot. She understands that this is part of a multi-staged attempt at limb salvage, and that she will need to be NWB to the LLE and be on long-term abx.  -Will add-on patient for incision and drainage of left foot tomorrow. She has consent to the removal of any necrotic, nonviable bone tomorrow as well.  -Patient currently has one positive blood culture. Wound culture shows GNR  -Continue NWB to LLE  -NPO after midnight  -Patient consented for procedure tomorrow.    -Plan discussed with Dr. Becca Larson. -Appreciate ID, Vascular input.

## 2020-01-09 NOTE — PROGRESS NOTES
Bedside and Verbal shift change report given to Timur Calixto, [address] [place] [address]  (oncoming nurse) by Trina Saravia RN  (offgoing nurse). Report included the following information SBAR.

## 2020-01-09 NOTE — CONSULTS
[address]    Name:  Bonnie Parra  MR#:  [id number]  :  1970  ACCOUNT #:  [de-identified]  DATE OF SERVICE:  2020    HISTORY OF PRESENT ILLNESS:  The patient is a 45-year-old  female who is currently being seen for osteomyelitis, with cellulitis and Charcot neuroarthropathy of the left foot. The patient is known to me from my office in a previous admission. The patient does have a chronic history of left foot infection. This all began earlier last year when she had [facility] recurrent left diabetic foot infections and, at that time, she deferred surgical intervention due to being homeless and having lack of insurance. However within a year, she was able to obtain insurance. I met her in office in late 2019. At the patient's first appointment, she had brought all her previous medical history from the [address] [place] [address] [address] showing that she had been told previously that she did have significant osteomyelitis of the left fifth ray and had been, at that time, recommended by Dr. Julien Umaña that she needed to have a left fifth ray resection, which she had refused at that time. When I saw her for the first time in 2019, she had a very painful, swollen foot. At that time I agreed with Dr. Julien Umaña that she likely did have osteomyelitis with an abscess. The patient was not agreeable to surgery at that time, but did agree to an MRI and based on the MRI result, proceeding as needed. I did obtain an MRI as outpatient in 2019 showing an abscess with osteomyelitis of the foot. At that point, I urged the patient to go to the hospital for admission and a fifth ray resection. The patient delayed going to the hospital until mid 10/2019. At that point a decision was made, the patient had also developed osteomyelitis of the left second toe.   At that time, I performed a partial left second toe amputation and a left fifth ray resection with drainage of the abscess. At that point in time, I explained to the patient the importance of needing to reduce her weightbearing as well as proper wound care plans. The patient did leave AMA during this admission. The patient's incisions did heal uneventfully; however, the patient was not compliant with minimizing with the weightbearing and walked excessively on the wound. Eventually, she developed an abscess seroma of the prior left puncture wound site, which required a return to the OR in 11/2019 and drainage of that area. Additionally at that time, a repeat MRI was performed that did show reactive changes consistent with Charcot versus osteomyelitis. At that time of the incision and drainage of the seroma on the lateral aspect of her foot, I did perform multiple bone biopsies. The bone biopsies and associated bone cultures did not show any osteomyelitis or any bacterial growth in any of the bones on pathology and bone cultures. I discussed with the patient that her MRI findings and her fibrotic changes noted on the pathology of all biopsy sites was most consistent with acute-stage Charcot. I explained to the patient at that time that she would need to be strictly non-weightbearing as she was in acute-stage Charcot and that bearing any weight on the foot would cause tarsometatarsal dislocation and possibly worsening of the wounds at her surgical sites which had been primarily closed. At that time, the patient was given a written handout on Charcot neuroarthropathy. I discussed the treatment plan with her in detail, and she was agreeable to that at bedside in 08/2019. However at that time, she was trained with a walker, with how to use, be non-weightbearing in a CAM boot with a walker. She was discharged from the hospital and stated that she would comply with those instructions. She then followed up a week and a half later after admission, and my partner, Dr. Kevin Eli,  removed her sutures.   He did notice some slight dehiscence of the incision site and reinforced the non-weightbearing treatment plan. She was instructed to follow up with me in a week; however, the patient did not show up for that appointment and did not follow up for another month. When the patient followed up with me last week in office for the first time since her past admission, she was noticing excessive pain to the foot. She was walking in with direct weightbearing to the foot in only a surgical shoe. She did not have her walker, she did not have her CAM boot. She stated that her incision sites had further opened up, but that there was no pus. The area was not red. She did relate extreme pain to the foot. I did take an x-ray of the foot and discussed with her that she now had a tarsometatarsal joint dislocation due to her walking on her active Charcot when her foot was already unstable. I discussed with the patient that due to the amount of swelling and due to the open dehiscence, that she was not a good candidate for any hardware at this time in the foot to repair it until it healed. I reinforced to her that she needed to stay off the foot. I was unable to put the cast on the area at that time due to the open incision sites which were going to require daily wound care. I explained to the patient how to do daily wound care. I reinforced to her that she needed to go into the CAM boot, and that she must be strictly non-weightbearing with her walker that she had been sent home with. I also additionally gave her a script for a rolling knee walker in order to increase her compliance. However, the patient did not do any of the above. She came into my office yesterday, again bearing full weight to the foot. She stated that she had difficulty obtaining the knee scooter, but did not attempt to use the regular walker that she already had at home.   The patient, since I had last seen her, she had developed significant drainage from her foot, which she had been needing to change 3 to 4 times a day and had not notified my office of this. She had also related a loss of appetite. In the office, I did note that she had draining purulence from her prior wound dehiscence sites. I explained to her that the area was increasingly swollen from the week before. I explained to her that I was concerned that she had developed a significant abscess due to significant purulent drainage coming from her dorsal foot incisions, and that I was concerned that she was going to have osteomyelitis. I recommended the patient to the emergency room right away. The patient asked if this could be treated as an outpatient. I reinforced that this was a serious condition and I could not treat this orally. The patient then presented to the emergency department earlier this morning and had an MRI performed earlier today. ACTIVE PROBLEM LIST:  Patient Active Problem List   Diagnosis Code    Diabetes mellitus type 1, uncontrolled, insulin dependent ([facility]) E10.65    Anxiety state F41.1    Unspecified essential hypertension I10    HTN (hypertension) I10    DM type 2 (diabetes mellitus, type 2) ([facility]) E11.9    Acute osteomyelitis of left foot ([facility]) M86.172    Cellulitis and abscess of toe of left foot L03.032, L02.612    Uncontrolled type 2 diabetes mellitus with peripheral neuropathy ([facility]) E11.42, E11.65    Osteomyelitis of second toe of left foot ([facility]) M86.9    Acute osteomyelitis of metatarsal bone of left foot ([address] M86.172    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 116    Abscess of bursa of left foot M71.072    Noncompliance with treatment plan Z91.11    Closed dislocation of tarsometatarsal joint, left, subsequent encounter S93.325D       PAST MEDICAL HISTORY:  Past Medical History:   Diagnosis Date    Cataract     Cataracts, bilateral     Diabetes ([facility]     Osteomyelitis ([facility]          PAST SURGICAL HISTORY:  Past Surgical History:   Procedure Laterality Date    HX CATARACT REMOVAL  2/2011; 5-11    right eye; left    HX TONSIL AND ADENOIDECTOMY  1985    HX TONSILLECTOMY  1984    HX TUBAL LIGATION  1997       FAMILY HISTORY:  Family History   Problem Relation Age of Onset    Hypertension Mother     Cancer Father         prostate    Diabetes Maternal Grandmother     Hypertension Maternal Grandmother        SOCIAL HISTORY:  Social History     Socioeconomic History    Marital status:      Spouse name: Not on file    Number of children: Not on file    Years of education: Not on file    Highest education level: Not on file   Occupational History    Not on file   Social Needs    Financial resource strain: Not on file    Food insecurity:     Worry: Not on file     Inability: Not on file    Transportation needs:     Medical: Not on file     Non-medical: Not on file   Tobacco Use    Smoking status: Never Smoker    Smokeless tobacco: Never Used   Substance and Sexual Activity    Alcohol use: Yes     Comment: rarely    Drug use: No    Sexual activity: Not Currently   Lifestyle    Physical activity:     Days per week: Not on file     Minutes per session: Not on file    Stress: Not on file   Relationships    Social connections:     Talks on phone: Not on file     Gets together: Not on file     Attends Congregation service: Not on file     Active member of club or organization: Not on file     Attends meetings of clubs or organizations: Not on file     Relationship status: Not on file    Intimate partner violence:     Fear of current or ex partner: Not on file     Emotionally abused: Not on file     Physically abused: Not on file     Forced sexual activity: Not on file   Other Topics Concern    Not on file   Social History Narrative ** Merged History Encounter **            MEDICATION LIST:  No current facility-administered medications on file prior to encounter. Current Outpatient Medications on File Prior to Encounter   Medication Sig Dispense Refill    amLODIPine (NORVASC) 10 mg tablet Take 10 mg by mouth daily.  levothyroxine (SYNTHROID) 25 mcg tablet Take 25 mcg by mouth Daily (before breakfast).  metoprolol tartrate (LOPRESSOR) 100 mg IR tablet Take 100 mg by mouth two (2) times a day.  insulin regular (NOVOLIN R REGULAR U-100 INSULN) 100 unit/mL injection Sliding scale insulin - patient has previous sliding scale that she has been using 1 Vial 0    insulin detemir U-100 (LEVEMIR U-100 INSULIN) 100 unit/mL injection 22 Units by SubCUTAneous route two (2) times a day. ALLERGIES:  NO KNOWN ALLERGIES. REVIEW OF SYSTEMS:  A complete review of systems was performed, with pertinent findings noted above. PHYSICAL EXAMINATION:  GENERAL:  The patient is alert and oriented to person, time, and place. VITAL SIGNS:  Blood pressure 148/80, temperature 98.8, pulse 76, respirations 18, SpO2 of 97%. HEENT:  Head:  Normocephalic and atraumatic. ABDOMEN:  Soft and nontender. EXTREMITIES/MUSCULOSKELETAL:  Left foot, the patient's prior fifth metatarsal resection and prior partial second toe amputation noted. There is significant swelling. The midfoot is unstable due to the prior tarsometatarsal joint dislocation as well as Charcot changes. There is significant swelling to the foot. DERMATOLOGIC:  There are small dehisced wounds to the plantar aspect of the foot. There is, upon initial presentation, some serosanguineous drainage. However, the wounds are probed to bone. Also with milking of the foot, approximately 20 mL of malodorous purulent drainage was expressed from the foot. VASCULAR:  Palpable dorsalis pedis and posterior tibial pulses.   NEUROLOGIC:  The patient does have significantly decreased sensation. LABORATORY RESULTS:  All pertinent laboratory findings were reviewed. RADIOLOGICAL IMAGING:  The patient's radiology reports were reviewed. Xr Foot Lt Min 3 V    Result Date: 1/7/2020  EXAM: XR FOOT LT MIN 3 V INDICATION: looking for osteo, dorsum of foot erythematous/swollen. COMPARISON: 11/8/2019 FINDINGS: Three views of the left foot demonstrate dislocation at the first tarsometatarsal joint and probably the remainder of the tarsometatarsal joints since the prior study, with poor definition of the cortical margins of the proximal metatarsals. The fifth metatarsal is absent, stable. . Soft tissue swelling is generalized and significantly worsened since the prior study. .     IMPRESSION: Cortical destruction is suggested at the tarsometatarsal joints where there has been interval dislocation since the prior study. Osteomyelitis at the metatarsal bases is not excluded. . Alternatively, consider neuropathic change. Mri Foot Lt W Wo Cont    Result Date: 1/8/2020  INDICATION: Charcot changes of the left foot with cellulitis evaluate for osteomyelitis. History of diabetes. Previous surgery to the left foot Exam: MRI left foot with and without contrast Sequences include short axis and long axis T1, short axis, long axis and sagittal T2 with fat saturation. Short axis TI with fat saturation. After the intravenous administration of 13 cc Dotarem multiplanar fat-suppressed T1-weighted images were obtained Comparisons: 1/7/2020 and November 8, 2019 FINDINGS: In comparison with the November study there has been marked fragmentation of the midfoot with disruption of the Lisfranc joint. There is medial dislocation of the base of the first metatarsal and lateral dislocation and dorsal dislocation of the remaining metatarsals. There is marked fragmentation of the cuneiforms and early fragmentation of the cuboid. There is marked edema and postcontrast enhancement of these bones.  There is a sinus tract extending from the dorsum of the foot through the Lisfranc joint into and a complex collection measuring 5.4 x 5.3 x 2.3 cm. Given that this communicates with the skin this is concerning for an abscess. This abscess communicates with the base of the metatarsals all cuneiforms and distal margin of the cuboid concerning for superimposed osteomyelitis A second small sinus tract is noted along the dorsal lateral aspect of the foot with a small partially drained collection. There is diffuse enhancement of the muscles of the foot. Diffuse edema and enhancement of the subcutaneous tissues     IMPRESSION: 1. Progressive Charcot changes of the midfoot with superimposed abscess collection and osteomyelitis. Sinus tract drains to the dorsum of the foot        ASSESSMENT: The patient has a left foot diabetic foot infection with cellulitis and abscess of the left mid foot, with osteomyelitis and tarsometatarsal dislocation due to Charcot neuroarthropathy related to her significant diabetes and peripheral neuropathy. The patient also has a history of noncompliance. PLAN:  I reviewed the patient's MRI findings, which did show a significant abscess as well as osteomyelitis and progressive Charcot changes in the midfoot. The patient's abscess does show the complex collection extending from the dorsum of the foot through the Lisfranc joint measuring 5.4 x 5.3 x 2.3 cm, with communicating sinuses towards the dorsum, and another small collection along the dorsolateral aspect of the foot. I discussed these findings with the patient at bedside. I discussed with the patient that she is at high risk of losing this limb, and that I would recommend discussing a below-knee amputation with Dr. Mason Torres as she does have infection of multiple bones at the middle foot as well as a complex abscess.     For successful limb salvage, I discussed with her that while we could drain the abscess and attempt to treat the osteomyelitis with IV antibiotics, but that she will still be at significant risk for a left below knee amputation. I reiterated that it would be a multistep treatment process requiring multiple surgeries, including surgical incision and drainage of the foot, treatment with IV antibiotics, and with possible external fixation and if, at some point, the foot was cured of the osteomyelitis, then attempting a reconstruction of the foot with internal hardware. I stressed that strict compliance with and adherence to the treatment plan would be essential.    I discussed with the patient that for anyone, this would be a very high-risk plan with risk of failure, and that I am very concerned about the patient's continued history of non-compliance with treatment plans while she has been in my care and from the records I had previously reviewed at [facility]. The patient relates that she is very hesitant to lose her foot and that she is willing to do anything to save it. However, I did relate to the patient that she has told me this multiple times before and has ignored medical advice. I discussed with her that I at least want her to talk with Dr. Eleazar Mendosa who she has previously seen at [facility], about the possibility of a left below-knee amputation. The patient states that she is willing to talk with him as she does feel comfortable with him. I spoke with Dr. Eleazar Mendosa regarding the patient, and he will see her later today to discuss her foot. Additionally, Dr. Florentino Bah of Infectious Disease will be evaluating the patient as well. The patient is to keep a dry gauze dressing on the foot. I will stop by later tomorrow to again discuss her surgical options after she talks with Dr. Eleazar Mendosa, and see if she is open to the [address] or is adamant about not losing the foot and attempting to try a multistaged wound salvage and reconstruction of the foot.   I did relate to the patient that even if we did attempt to save the foot, that there is a high likelihood that she would go on to still need a BKA eventually in the future. Thank you for the consultation, I will continue to follow the patient while in-house.       AMY Devaughn Opitz, DPM [email or web address]  D:  01/08/2020 16:52  T:  01/08/2020 21:46  JOB #:  [id number]

## 2020-01-10 ENCOUNTER — ANESTHESIA (OUTPATIENT)
Dept: SURGERY | Age: 50
DRG: 711 | End: 2020-01-10
Payer: MEDICAID

## 2020-01-10 ENCOUNTER — ANESTHESIA EVENT (OUTPATIENT)
Dept: SURGERY | Age: 50
DRG: 711 | End: 2020-01-10
Payer: MEDICAID

## 2020-01-10 LAB
ABO + RH BLD: NORMAL
ANION GAP SERPL CALC-SCNC: 7 MMOL/L (ref 5–15)
BASOPHILS # BLD: 0.1 K/UL (ref 0–0.1)
BASOPHILS NFR BLD: 1 % (ref 0–1)
BLD PROD TYP BPU: NORMAL
BLOOD GROUP ANTIBODIES SERPL: NORMAL
BPU ID: NORMAL
BUN SERPL-MCNC: 26 MG/DL (ref 6–20)
BUN/CREAT SERPL: 20 (ref 12–20)
CALCIUM SERPL-MCNC: 8.9 MG/DL (ref 8.5–10.1)
CHLORIDE SERPL-SCNC: 104 MMOL/L (ref 97–108)
CO2 SERPL-SCNC: 25 MMOL/L (ref 21–32)
CREAT SERPL-MCNC: 1.31 MG/DL (ref 0.55–1.02)
CROSSMATCH RESULT,%XM: NORMAL
DIFFERENTIAL METHOD BLD: ABNORMAL
EOSINOPHIL # BLD: 0.2 K/UL (ref 0–0.4)
EOSINOPHIL NFR BLD: 3 % (ref 0–7)
ERYTHROCYTE [DISTWIDTH] IN BLOOD BY AUTOMATED COUNT: 16.7 % (ref 11.5–14.5)
GLUCOSE BLD STRIP.AUTO-MCNC: 123 MG/DL (ref 65–100)
GLUCOSE BLD STRIP.AUTO-MCNC: 134 MG/DL (ref 65–100)
GLUCOSE BLD STRIP.AUTO-MCNC: 138 MG/DL (ref 65–100)
GLUCOSE BLD STRIP.AUTO-MCNC: 44 MG/DL (ref 65–100)
GLUCOSE BLD STRIP.AUTO-MCNC: 48 MG/DL (ref 65–100)
GLUCOSE BLD STRIP.AUTO-MCNC: 88 MG/DL (ref 65–100)
GLUCOSE BLD STRIP.AUTO-MCNC: 94 MG/DL (ref 65–100)
GLUCOSE BLD STRIP.AUTO-MCNC: 95 MG/DL (ref 65–100)
GLUCOSE SERPL-MCNC: 53 MG/DL (ref 65–100)
HCT VFR BLD AUTO: 31.7 % (ref 35–47)
HGB BLD-MCNC: 9.7 G/DL (ref 11.5–16)
IMM GRANULOCYTES # BLD AUTO: 0 K/UL (ref 0–0.04)
IMM GRANULOCYTES NFR BLD AUTO: 0 % (ref 0–0.5)
LYMPHOCYTES # BLD: 0.9 K/UL (ref 0.8–3.5)
LYMPHOCYTES NFR BLD: 13 % (ref 12–49)
MCH RBC QN AUTO: 27.2 PG (ref 26–34)
MCHC RBC AUTO-ENTMCNC: 30.6 G/DL (ref 30–36.5)
MCV RBC AUTO: 88.8 FL (ref 80–99)
MONOCYTES # BLD: 0.6 K/UL (ref 0–1)
MONOCYTES NFR BLD: 8 % (ref 5–13)
NEUTS SEG # BLD: 5.4 K/UL (ref 1.8–8)
NEUTS SEG NFR BLD: 75 % (ref 32–75)
NRBC # BLD: 0 K/UL (ref 0–0.01)
NRBC BLD-RTO: 0 PER 100 WBC
PLATELET # BLD AUTO: 472 K/UL (ref 150–400)
PMV BLD AUTO: 10.7 FL (ref 8.9–12.9)
POTASSIUM SERPL-SCNC: 4.5 MMOL/L (ref 3.5–5.1)
RBC # BLD AUTO: 3.57 M/UL (ref 3.8–5.2)
SERVICE CMNT-IMP: ABNORMAL
SERVICE CMNT-IMP: NORMAL
SODIUM SERPL-SCNC: 136 MMOL/L (ref 136–145)
SPECIMEN EXP DATE BLD: NORMAL
STATUS OF UNIT,%ST: NORMAL
UNIT DIVISION, %UDIV: 0
WBC # BLD AUTO: 7.2 K/UL (ref 3.6–11)

## 2020-01-10 PROCEDURE — 85025 COMPLETE CBC W/AUTO DIFF WBC: CPT

## 2020-01-10 PROCEDURE — 76010000153 HC OR TIME 1.5 TO 2 HR: Performed by: PODIATRIST

## 2020-01-10 PROCEDURE — 02HV33Z INSERTION OF INFUSION DEVICE INTO SUPERIOR VENA CAVA, PERCUTANEOUS APPROACH: ICD-10-PCS | Performed by: INTERNAL MEDICINE

## 2020-01-10 PROCEDURE — 76060000034 HC ANESTHESIA 1.5 TO 2 HR: Performed by: PODIATRIST

## 2020-01-10 PROCEDURE — 74011000250 HC RX REV CODE- 250: Performed by: NURSE ANESTHETIST, CERTIFIED REGISTERED

## 2020-01-10 PROCEDURE — 77030011640 HC PAD GRND REM COVD -A: Performed by: PODIATRIST

## 2020-01-10 PROCEDURE — 87176 TISSUE HOMOGENIZATION CULTR: CPT

## 2020-01-10 PROCEDURE — 87077 CULTURE AEROBIC IDENTIFY: CPT

## 2020-01-10 PROCEDURE — 74011000258 HC RX REV CODE- 258: Performed by: STUDENT IN AN ORGANIZED HEALTH CARE EDUCATION/TRAINING PROGRAM

## 2020-01-10 PROCEDURE — 36415 COLL VENOUS BLD VENIPUNCTURE: CPT

## 2020-01-10 PROCEDURE — 77030020754 HC CUF TRNQT 2BLA STRY -B: Performed by: PODIATRIST

## 2020-01-10 PROCEDURE — 74011250636 HC RX REV CODE- 250/636: Performed by: STUDENT IN AN ORGANIZED HEALTH CARE EDUCATION/TRAINING PROGRAM

## 2020-01-10 PROCEDURE — 87186 SC STD MICRODIL/AGAR DIL: CPT

## 2020-01-10 PROCEDURE — 74011250637 HC RX REV CODE- 250/637: Performed by: PODIATRIST

## 2020-01-10 PROCEDURE — 74011000258 HC RX REV CODE- 258: Performed by: INTERNAL MEDICINE

## 2020-01-10 PROCEDURE — 74011250637 HC RX REV CODE- 250/637: Performed by: INTERNAL MEDICINE

## 2020-01-10 PROCEDURE — 87102 FUNGUS ISOLATION CULTURE: CPT

## 2020-01-10 PROCEDURE — 74011000258 HC RX REV CODE- 258: Performed by: PODIATRIST

## 2020-01-10 PROCEDURE — 74011250636 HC RX REV CODE- 250/636: Performed by: ANESTHESIOLOGY

## 2020-01-10 PROCEDURE — 65270000029 HC RM PRIVATE

## 2020-01-10 PROCEDURE — 77030031139 HC SUT VCRL2 J&J -A: Performed by: PODIATRIST

## 2020-01-10 PROCEDURE — 77030010509 HC AIRWY LMA MSK TELE -A: Performed by: NURSE ANESTHETIST, CERTIFIED REGISTERED

## 2020-01-10 PROCEDURE — 80048 BASIC METABOLIC PNL TOTAL CA: CPT

## 2020-01-10 PROCEDURE — 0SBJ0ZZ EXCISION OF LEFT TARSAL JOINT, OPEN APPROACH: ICD-10-PCS | Performed by: PODIATRIST

## 2020-01-10 PROCEDURE — 76210000017 HC OR PH I REC 1.5 TO 2 HR: Performed by: PODIATRIST

## 2020-01-10 PROCEDURE — 87205 SMEAR GRAM STAIN: CPT

## 2020-01-10 PROCEDURE — 77030019895 HC PCKNG STRP IODO -A: Performed by: PODIATRIST

## 2020-01-10 PROCEDURE — 77030018836 HC SOL IRR NACL ICUM -A: Performed by: PODIATRIST

## 2020-01-10 PROCEDURE — 82962 GLUCOSE BLOOD TEST: CPT

## 2020-01-10 PROCEDURE — 87116 MYCOBACTERIA CULTURE: CPT

## 2020-01-10 PROCEDURE — 74011250636 HC RX REV CODE- 250/636: Performed by: NURSE ANESTHETIST, CERTIFIED REGISTERED

## 2020-01-10 PROCEDURE — 77030002916 HC SUT ETHLN J&J -A: Performed by: PODIATRIST

## 2020-01-10 RX ORDER — SODIUM CHLORIDE, SODIUM LACTATE, POTASSIUM CHLORIDE, CALCIUM CHLORIDE 600; 310; 30; 20 MG/100ML; MG/100ML; MG/100ML; MG/100ML
INJECTION, SOLUTION INTRAVENOUS
Status: DISCONTINUED | OUTPATIENT
Start: 2020-01-10 | End: 2020-01-10 | Stop reason: HOSPADM

## 2020-01-10 RX ORDER — FENTANYL CITRATE 50 UG/ML
INJECTION, SOLUTION INTRAMUSCULAR; INTRAVENOUS AS NEEDED
Status: DISCONTINUED | OUTPATIENT
Start: 2020-01-10 | End: 2020-01-10 | Stop reason: HOSPADM

## 2020-01-10 RX ORDER — HYDROCODONE BITARTRATE AND ACETAMINOPHEN 5; 325 MG/1; MG/1
TABLET ORAL
Status: DISPENSED
Start: 2020-01-10 | End: 2020-01-11

## 2020-01-10 RX ORDER — MORPHINE SULFATE 10 MG/ML
2 INJECTION, SOLUTION INTRAMUSCULAR; INTRAVENOUS
Status: DISCONTINUED | OUTPATIENT
Start: 2020-01-10 | End: 2020-01-10 | Stop reason: HOSPADM

## 2020-01-10 RX ORDER — MIDAZOLAM HYDROCHLORIDE 1 MG/ML
INJECTION, SOLUTION INTRAMUSCULAR; INTRAVENOUS AS NEEDED
Status: DISCONTINUED | OUTPATIENT
Start: 2020-01-10 | End: 2020-01-10 | Stop reason: HOSPADM

## 2020-01-10 RX ORDER — HYDROMORPHONE HYDROCHLORIDE 1 MG/ML
0.2 INJECTION, SOLUTION INTRAMUSCULAR; INTRAVENOUS; SUBCUTANEOUS
Status: DISCONTINUED | OUTPATIENT
Start: 2020-01-10 | End: 2020-01-10 | Stop reason: HOSPADM

## 2020-01-10 RX ORDER — SODIUM CHLORIDE, SODIUM LACTATE, POTASSIUM CHLORIDE, CALCIUM CHLORIDE 600; 310; 30; 20 MG/100ML; MG/100ML; MG/100ML; MG/100ML
125 INJECTION, SOLUTION INTRAVENOUS CONTINUOUS
Status: DISCONTINUED | OUTPATIENT
Start: 2020-01-10 | End: 2020-01-10 | Stop reason: HOSPADM

## 2020-01-10 RX ORDER — DIPHENHYDRAMINE HYDROCHLORIDE 50 MG/ML
12.5 INJECTION, SOLUTION INTRAMUSCULAR; INTRAVENOUS AS NEEDED
Status: DISCONTINUED | OUTPATIENT
Start: 2020-01-10 | End: 2020-01-10 | Stop reason: HOSPADM

## 2020-01-10 RX ORDER — PHENYLEPHRINE HCL IN 0.9% NACL 0.4MG/10ML
SYRINGE (ML) INTRAVENOUS AS NEEDED
Status: DISCONTINUED | OUTPATIENT
Start: 2020-01-10 | End: 2020-01-10 | Stop reason: HOSPADM

## 2020-01-10 RX ORDER — DEXTROSE MONOHYDRATE AND SODIUM CHLORIDE 5; .9 G/100ML; G/100ML
100 INJECTION, SOLUTION INTRAVENOUS CONTINUOUS
Status: DISCONTINUED | OUTPATIENT
Start: 2020-01-10 | End: 2020-01-12

## 2020-01-10 RX ORDER — SODIUM CHLORIDE 0.9 % (FLUSH) 0.9 %
5-40 SYRINGE (ML) INJECTION AS NEEDED
Status: DISCONTINUED | OUTPATIENT
Start: 2020-01-10 | End: 2020-01-10 | Stop reason: HOSPADM

## 2020-01-10 RX ORDER — MIDAZOLAM HYDROCHLORIDE 1 MG/ML
1 INJECTION, SOLUTION INTRAMUSCULAR; INTRAVENOUS AS NEEDED
Status: CANCELLED | OUTPATIENT
Start: 2020-01-10

## 2020-01-10 RX ORDER — FENTANYL CITRATE 50 UG/ML
25 INJECTION, SOLUTION INTRAMUSCULAR; INTRAVENOUS
Status: DISCONTINUED | OUTPATIENT
Start: 2020-01-10 | End: 2020-01-10 | Stop reason: HOSPADM

## 2020-01-10 RX ORDER — SODIUM CHLORIDE 9 MG/ML
100 INJECTION, SOLUTION INTRAVENOUS CONTINUOUS
Status: DISCONTINUED | OUTPATIENT
Start: 2020-01-10 | End: 2020-01-10

## 2020-01-10 RX ORDER — FENTANYL CITRATE 50 UG/ML
INJECTION, SOLUTION INTRAMUSCULAR; INTRAVENOUS
Status: DISPENSED
Start: 2020-01-10 | End: 2020-01-11

## 2020-01-10 RX ORDER — LIDOCAINE HYDROCHLORIDE 20 MG/ML
INJECTION, SOLUTION EPIDURAL; INFILTRATION; INTRACAUDAL; PERINEURAL AS NEEDED
Status: DISCONTINUED | OUTPATIENT
Start: 2020-01-10 | End: 2020-01-10 | Stop reason: HOSPADM

## 2020-01-10 RX ORDER — ACETAMINOPHEN 325 MG/1
650 TABLET ORAL ONCE
Status: CANCELLED | OUTPATIENT
Start: 2020-01-10 | End: 2020-01-10

## 2020-01-10 RX ORDER — SODIUM CHLORIDE 0.9 % (FLUSH) 0.9 %
5-40 SYRINGE (ML) INJECTION EVERY 8 HOURS
Status: CANCELLED | OUTPATIENT
Start: 2020-01-10

## 2020-01-10 RX ORDER — SODIUM CHLORIDE 0.9 % (FLUSH) 0.9 %
5-40 SYRINGE (ML) INJECTION AS NEEDED
Status: CANCELLED | OUTPATIENT
Start: 2020-01-10

## 2020-01-10 RX ORDER — HYDROCODONE BITARTRATE AND ACETAMINOPHEN 5; 325 MG/1; MG/1
2 TABLET ORAL
Status: DISCONTINUED | OUTPATIENT
Start: 2020-01-10 | End: 2020-01-14 | Stop reason: HOSPADM

## 2020-01-10 RX ORDER — FENTANYL CITRATE 50 UG/ML
50 INJECTION, SOLUTION INTRAMUSCULAR; INTRAVENOUS AS NEEDED
Status: CANCELLED | OUTPATIENT
Start: 2020-01-10

## 2020-01-10 RX ORDER — HYDRALAZINE HYDROCHLORIDE 25 MG/1
25 TABLET, FILM COATED ORAL 3 TIMES DAILY
Status: DISCONTINUED | OUTPATIENT
Start: 2020-01-10 | End: 2020-01-13

## 2020-01-10 RX ORDER — MIDAZOLAM HYDROCHLORIDE 1 MG/ML
0.5 INJECTION, SOLUTION INTRAMUSCULAR; INTRAVENOUS
Status: DISCONTINUED | OUTPATIENT
Start: 2020-01-10 | End: 2020-01-10 | Stop reason: HOSPADM

## 2020-01-10 RX ORDER — SODIUM CHLORIDE, SODIUM LACTATE, POTASSIUM CHLORIDE, CALCIUM CHLORIDE 600; 310; 30; 20 MG/100ML; MG/100ML; MG/100ML; MG/100ML
25 INJECTION, SOLUTION INTRAVENOUS CONTINUOUS
Status: CANCELLED | OUTPATIENT
Start: 2020-01-10 | End: 2020-01-11

## 2020-01-10 RX ORDER — LIDOCAINE HYDROCHLORIDE 10 MG/ML
0.1 INJECTION, SOLUTION EPIDURAL; INFILTRATION; INTRACAUDAL; PERINEURAL AS NEEDED
Status: CANCELLED | OUTPATIENT
Start: 2020-01-10

## 2020-01-10 RX ORDER — SODIUM CHLORIDE 0.9 % (FLUSH) 0.9 %
5-40 SYRINGE (ML) INJECTION EVERY 8 HOURS
Status: DISCONTINUED | OUTPATIENT
Start: 2020-01-10 | End: 2020-01-10 | Stop reason: HOSPADM

## 2020-01-10 RX ORDER — PROPOFOL 10 MG/ML
INJECTION, EMULSION INTRAVENOUS AS NEEDED
Status: DISCONTINUED | OUTPATIENT
Start: 2020-01-10 | End: 2020-01-10 | Stop reason: HOSPADM

## 2020-01-10 RX ORDER — SODIUM CHLORIDE 9 MG/ML
25 INJECTION, SOLUTION INTRAVENOUS CONTINUOUS
Status: CANCELLED | OUTPATIENT
Start: 2020-01-10 | End: 2020-01-11

## 2020-01-10 RX ORDER — ONDANSETRON 2 MG/ML
INJECTION INTRAMUSCULAR; INTRAVENOUS AS NEEDED
Status: DISCONTINUED | OUTPATIENT
Start: 2020-01-10 | End: 2020-01-10 | Stop reason: HOSPADM

## 2020-01-10 RX ORDER — ONDANSETRON 2 MG/ML
4 INJECTION INTRAMUSCULAR; INTRAVENOUS AS NEEDED
Status: DISCONTINUED | OUTPATIENT
Start: 2020-01-10 | End: 2020-01-10 | Stop reason: HOSPADM

## 2020-01-10 RX ORDER — OXYCODONE HYDROCHLORIDE 5 MG/1
5 TABLET ORAL AS NEEDED
Status: DISCONTINUED | OUTPATIENT
Start: 2020-01-10 | End: 2020-01-10 | Stop reason: HOSPADM

## 2020-01-10 RX ADMIN — CEFEPIME HYDROCHLORIDE 2 G: 2 INJECTION, POWDER, FOR SOLUTION INTRAVENOUS at 15:03

## 2020-01-10 RX ADMIN — Medication 80 MCG: at 18:10

## 2020-01-10 RX ADMIN — MORPHINE SULFATE 1 MG: 2 INJECTION, SOLUTION INTRAMUSCULAR; INTRAVENOUS at 09:41

## 2020-01-10 RX ADMIN — FENTANYL CITRATE 25 MCG: 50 INJECTION, SOLUTION INTRAMUSCULAR; INTRAVENOUS at 19:26

## 2020-01-10 RX ADMIN — CEFEPIME HYDROCHLORIDE 2 G: 2 INJECTION, POWDER, FOR SOLUTION INTRAVENOUS at 07:36

## 2020-01-10 RX ADMIN — Medication 10 ML: at 00:12

## 2020-01-10 RX ADMIN — FENTANYL CITRATE 25 MCG: 50 INJECTION, SOLUTION INTRAMUSCULAR; INTRAVENOUS at 19:21

## 2020-01-10 RX ADMIN — VANCOMYCIN HYDROCHLORIDE 1500 MG: 10 INJECTION, POWDER, LYOPHILIZED, FOR SOLUTION INTRAVENOUS at 03:21

## 2020-01-10 RX ADMIN — Medication 80 MCG: at 18:06

## 2020-01-10 RX ADMIN — Medication 80 MCG: at 17:59

## 2020-01-10 RX ADMIN — FENTANYL CITRATE 50 MCG: 50 INJECTION, SOLUTION INTRAMUSCULAR; INTRAVENOUS at 17:27

## 2020-01-10 RX ADMIN — HYDRALAZINE HYDROCHLORIDE 25 MG: 25 TABLET, FILM COATED ORAL at 20:07

## 2020-01-10 RX ADMIN — LIDOCAINE HYDROCHLORIDE 60 MG: 20 INJECTION, SOLUTION EPIDURAL; INFILTRATION; INTRACAUDAL; PERINEURAL at 17:27

## 2020-01-10 RX ADMIN — Medication 10 ML: at 13:41

## 2020-01-10 RX ADMIN — FENTANYL CITRATE 25 MCG: 50 INJECTION, SOLUTION INTRAMUSCULAR; INTRAVENOUS at 17:41

## 2020-01-10 RX ADMIN — SODIUM CHLORIDE, SODIUM LACTATE, POTASSIUM CHLORIDE, AND CALCIUM CHLORIDE 125 ML/HR: 600; 310; 30; 20 INJECTION, SOLUTION INTRAVENOUS at 19:42

## 2020-01-10 RX ADMIN — MIDAZOLAM 2 MG: 1 INJECTION INTRAMUSCULAR; INTRAVENOUS at 17:17

## 2020-01-10 RX ADMIN — FENTANYL CITRATE 25 MCG: 50 INJECTION, SOLUTION INTRAMUSCULAR; INTRAVENOUS at 19:43

## 2020-01-10 RX ADMIN — SODIUM CHLORIDE, POTASSIUM CHLORIDE, SODIUM LACTATE AND CALCIUM CHLORIDE: 600; 310; 30; 20 INJECTION, SOLUTION INTRAVENOUS at 18:14

## 2020-01-10 RX ADMIN — PROPOFOL 150 MG: 10 INJECTION, EMULSION INTRAVENOUS at 17:27

## 2020-01-10 RX ADMIN — MORPHINE SULFATE 1 MG: 2 INJECTION, SOLUTION INTRAMUSCULAR; INTRAVENOUS at 13:41

## 2020-01-10 RX ADMIN — Medication 10 ML: at 07:37

## 2020-01-10 RX ADMIN — HYDROCODONE BITARTRATE AND ACETAMINOPHEN 2 TABLET: 5; 325 TABLET ORAL at 21:37

## 2020-01-10 RX ADMIN — ONDANSETRON HYDROCHLORIDE 4 MG: 2 INJECTION, SOLUTION INTRAMUSCULAR; INTRAVENOUS at 17:37

## 2020-01-10 RX ADMIN — PHENYLEPHRINE HYDROCHLORIDE 40 MCG/MIN: 10 INJECTION INTRAVENOUS at 18:34

## 2020-01-10 RX ADMIN — Medication 80 MCG: at 17:50

## 2020-01-10 RX ADMIN — Medication 80 MCG: at 18:23

## 2020-01-10 RX ADMIN — FENTANYL CITRATE 25 MCG: 50 INJECTION, SOLUTION INTRAMUSCULAR; INTRAVENOUS at 17:44

## 2020-01-10 RX ADMIN — SODIUM CHLORIDE, POTASSIUM CHLORIDE, SODIUM LACTATE AND CALCIUM CHLORIDE: 600; 310; 30; 20 INJECTION, SOLUTION INTRAVENOUS at 17:11

## 2020-01-10 RX ADMIN — DEXTROSE MONOHYDRATE AND SODIUM CHLORIDE 100 ML/HR: 5; .9 INJECTION, SOLUTION INTRAVENOUS at 09:41

## 2020-01-10 NOTE — PROGRESS NOTES
[address] [facility] Adult  Hospitalist Group                                                                                          Hospitalist Progress Note  Zay Avendano MD  Answering service: [phone] from in house phone        Date of Service:  1/10/2020  NAME:  Erick Massey  :  1970  MRN:  [id number]      Admission Summary:     Patient is a 70-year-old female with medical history significant for diabetes mellitus, hypertension, hypothyroidism, chronic anemia and recurrent foot infection with osteomyelitis who presents to the emergency department with chief complaint of worsening left foot wound. Patient reports she had surgery (partial left fifth metatarsal removal) done in October , noted to have foot abscess requiring drainage the following months. She reports over the past few days she noted increased pain, swelling with overlying hyperemia and purulent drainage over the dorsum of her left foot. She was seen by her podiatrist today, recommend MRI with contrast to rule out concerning osteomyelitis. She denies any fever, chills, nausea, vomiting, shortness of breath, chest pain, palpitation, urinary or bowel complaints. Interval history / Subjective:   Patient seen and examined. Labs chart and imaging reviewed. She is hypoglycemic this morning. I added dextrose to her normal saline. Held her Lantus. Is going for I&D today. Repeat blood cultures tomorrow. Assessment & Plan:     Left foot purulent cellulitis with osteo ,  In the setting of Charcot neuroarthropathy of the left foot  MRI reviewed positive for osteomyelitis. On cefepime and cont Vanc ,   Podiatry consult appreciated. Vascular surgery consulted. Follow-up blood cultures show alpha Streptococcus. Wound culture showed Klebsiella and Enterobacter. Only definitive treatment is BKA at this point as emphasized by vascular surgery and podiatry.   But the patient does not want BKA  She agreed to get I&D today by podiatry      Alpha strep bacteremia  On vancomycin. ID consulted. Repeat blood cultures in a.m.  2D echo    Diabetes mellitus  Continue home insulin Lantus 22  SSI  Accu-Cheks  Hypoglycemic this morning her Lantus is held. Added dextrose to her normal saline. Can resume her Lantus tomorrow if blood sugars remain stable     Hypertension  Continue home meds amlodipine and Lopressor  Hydralazine as needed     Chronic normocytic anemia  Likely ACD  Hemoglobin at baseline  Hemoglobin is 6.7 transfuse 1 unit of blood on 1/8/2020. Check hemoglobin in the morning     Hypothyroidism  Continue home Synthroid     Patient's Baseline: Ambulates with walking  DVT ppx: SCD  Code status: Full  Disposition: TBD  Code status:   DVT prophylaxis:     Care Plan discussed with: Patient/Family  Disposition: Home w/Family and TBD     Hospital Problems  Date Reviewed: 11/11/2019          Codes Class Noted POA    Abscess of bursa of left foot ICD-10-CM: M71.072  ICD-9-CM: 727.89  1/8/2020 Unknown        Noncompliance with treatment plan ICD-10-CM: Z91.11  ICD-9-CM: V15.81  1/8/2020 Unknown        Closed dislocation of tarsometatarsal joint, left, subsequent encounter ICD-10-CM: S93.325D  ICD-9-CM: V58.89  1/8/2020 Unknown        Cellulitis of foot, left ICD-10-CM: L03.116  ICD-9-CM: 682.7  1/7/2020 Unknown        Type 2 diabetes mellitus with Charcot's joint of left foot ([facility] ICD-10-CM: E11.610  ICD-9-CM: 250.60, 713.5  11/14/2019 Yes        Charcot's joint of left foot ICD-10-CM: G27.994  ICD-9-CM: 094.0, 713.5  11/14/2019 Yes        Acute osteomyelitis of left foot ([facility] ICD-10-CM: N64.605  ICD-9-CM: 730.07  10/7/2019 Yes        Uncontrolled type 2 diabetes mellitus with peripheral neuropathy [facility]) ICD-10-CM: E11.42, E11.65  ICD-9-CM: 250.62, 357.2  10/7/2019 Yes                Review of Systems:   A comprehensive review of systems was negative except for that written in the HPI.        Vital Signs:    Last 24hrs VS reviewed since prior progress note. Most recent are:  Visit Vitals  BP (!) 169/100 (BP 1 Location: Left arm, BP Patient Position: At rest)   Pulse 72   Temp 98 °F (36.7 °C)   Resp 16   Ht 5' 6\" (1.676 m)   Wt 85.7 kg (189 lb)   SpO2 91%   BMI 30.51 kg/m²         Intake/Output Summary (Last 24 hours) at 1/10/2020 1536  Last data filed at 1/9/2020 2131  Gross per 24 hour   Intake 300 ml   Output    Net 300 ml        Physical Examination:             Constitutional:  No acute distress, cooperative, pleasant   ENT:  Oral mucous moist, oropharynx benign. Resp:  CTA bilaterally. No wheezing/rhonchi/rales. No accessory muscle use   CV:  Regular rhythm, normal rate, no murmurs, gallops, rubs    GI:  Soft, non distended, non tender. normoactive bowel sounds, no hepatosplenomegaly     Musculoskeletal:      No edema, warm, 2+ pulses throughout    Neurologic:  Moves all extremities. AAOx3, CN II-XII reviewed           Data Review:    Review and/or order of clinical lab test      Labs:     Recent Labs     01/10/20  0415 01/08/20  0334   WBC 7.2 8.5   HGB 9.7* 6.7*   HCT 31.7* 22.8*   * 386     Recent Labs     01/10/20  0400 01/09/20  0618 01/08/20  0334    135* 136   K 4.5 4.0 3.9    104 104   CO2 25 26 25   BUN 26* 24* 22*   CREA 1.31* 1.05* 1.02   GLU 53* 83 194*   CA 8.9 8.6 8.1*     Recent Labs     01/07/20  2233   SGOT 8*   ALT 8*   *   TBILI 0.2   TP 8.0   ALB 1.7*   GLOB 6.3*     No results for input(s): INR, PTP, APTT, INREXT, INREXT in the last 72 hours. No results for input(s): FE, TIBC, PSAT, FERR in the last 72 hours. Lab Results   Component Value Date/Time    Folate 16.2 11/13/2019 04:28 PM      No results for input(s): PH, PCO2, PO2 in the last 72 hours. No results for input(s): CPK, CKNDX, TROIQ in the last 72 hours.     No lab exists for component: CPKMB  Lab Results   Component Value Date/Time    Cholesterol, total 176 10/03/2014 03:56 AM    HDL Cholesterol 61 10/03/2014 03:56 AM    LDL, calculated 90.8 10/03/2014 03:56 AM    Triglyceride 121 10/03/2014 03:56 AM    CHOL/HDL Ratio 2.9 10/03/2014 03:56 AM     Lab Results   Component Value Date/Time    Glucose (POC) 134 (H) 01/10/2020 11:40 AM    Glucose (POC) 88 01/10/2020 06:18 AM    Glucose (POC) 94 01/10/2020 02:50 AM    Glucose (POC) 48 (LL) 01/10/2020 02:27 AM    Glucose (POC) 44 (LL) 01/10/2020 02:11 AM     Lab Results   Component Value Date/Time    Color YELLOW/STRAW 11/13/2019 04:33 PM    Appearance CLEAR 11/13/2019 04:33 PM    Specific gravity 1.014 11/13/2019 04:33 PM    pH (UA) 5.5 11/13/2019 04:33 PM    Protein 100 (A) 11/13/2019 04:33 PM    Glucose 250 (A) 11/13/2019 04:33 PM    Ketone NEGATIVE  11/13/2019 04:33 PM    Bilirubin NEGATIVE  11/13/2019 04:33 PM    Urobilinogen 0.2 11/13/2019 04:33 PM    Nitrites NEGATIVE  11/13/2019 04:33 PM    Leukocyte Esterase NEGATIVE  11/13/2019 04:33 PM    Epithelial cells MODERATE (A) 11/13/2019 04:33 PM    Bacteria NEGATIVE  11/13/2019 04:33 PM    WBC 0-4 11/13/2019 04:33 PM    RBC 0-5 11/13/2019 04:33 PM         Medications Reviewed:     Current Facility-Administered Medications   Medication Dose Route Frequency    dextrose 5% and 0.9% NaCl infusion  100 mL/hr IntraVENous CONTINUOUS    Vancomycin, Trough - Please draw IMMEDIATELY PRIOR to starting 1800 dose on Friday, 1/10/2020. Thanks!    Other ONCE    hydrALAZINE (APRESOLINE) tablet 25 mg  25 mg Oral TID    amLODIPine (NORVASC) tablet 10 mg  10 mg Oral DAILY    [Held by provider] insulin glargine (LANTUS) injection 22 Units  22 Units SubCUTAneous BID    levothyroxine (SYNTHROID) tablet 25 mcg  25 mcg Oral ACB    metoprolol tartrate (LOPRESSOR) tablet 100 mg  100 mg Oral BID    sodium chloride (NS) flush 5-40 mL  5-40 mL IntraVENous Q8H    sodium chloride (NS) flush 5-40 mL  5-40 mL IntraVENous PRN    acetaminophen (TYLENOL) tablet 650 mg  650 mg Oral Q4H PRN    morphine injection 1 mg  1 mg IntraVENous Q4H PRN    ondansetron [facility]) injection 4 mg  4 mg IntraVENous Q4H PRN    vancomycin (VANCOCIN) 1500 mg in  ml infusion  1,500 mg IntraVENous Q16H    0.9% sodium chloride infusion 250 mL  250 mL IntraVENous PRN    cefepime (MAXIPIME) 2 g in 0.9% sodium chloride (MBP/ADV) 100 mL  2 g IntraVENous Q8H    insulin regular (NOVOLIN R, HUMULIN R) injection   SubCUTAneous AC&HS    glucose chewable tablet 16 g  4 Tab Oral PRN    glucagon (GLUCAGEN) injection 1 mg  1 mg IntraMUSCular PRN    dextrose 10% infusion 0-250 mL  0-250 mL IntraVENous PRN    Vancomycin- Pharmacy to Dose   Other Rx Dosing/Monitoring     ______________________________________________________________________  EXPECTED LENGTH OF STAY: - - -  ACTUAL LENGTH OF STAY:          3                 Maria De Jesus Ríos MD

## 2020-01-10 NOTE — PROGRESS NOTES
ID Progress Note  1/10/2020    Subjective:     She states that she is feeling okay. She is for the OR shortly. Objective:     Antibiotics:  1. Vancomycin  2. Cefepime      Vitals:   Visit Vitals  BP (!) 169/100 (BP 1 Location: Left arm, BP Patient Position: At rest)   Pulse 72   Temp 98 °F (36.7 °C)   Resp 16   Ht 5' 6\" (1.676 m)   Wt 85.7 kg (189 lb)   LMP 2011   SpO2 91%   BMI 30.51 kg/m²        Tmax:  Temp (24hrs), Av.5 °F (36.9 °C), Min:97.9 °F (36.6 °C), Max:99.6 °F (37.6 °C)      Exam:  Lungs:  clear to auscultation bilaterally  Heart:  regular rate and rhythm  Abdomen:  soft, non-tender. Bowel sounds normal. No masses,  no organomegaly  Her foot is dressed and dry. There is no proximal or distal cellulitis. Labs:      Recent Labs     01/10/20  0415 01/10/20  0400 20  0618 20  0334 20  2233   WBC 7.2  --   --  8.5 10.9   HGB 9.7*  --   --  6.7* 8.5*   *  --   --  386 524*   BUN  --  26* 24* 22* 24*   CREA  --  1.31* 1.05* 1.02 1.06*   SGOT  --   --   --   --  8*   AP  --   --   --   --  183*   TBILI  --   --   --   --  0.2       Cultures:     No results found for: [facility]  Lab Results   Component Value Date/Time    Culture result: (A) 2020 10:34 PM     ALPHA STREPTOCOCCUS, NOT S. PNEUMONIAE GROWING IN 1 OF 2 BOTTLES DRAWN SITE = R WRIST    Culture result: REMAINING BOTTLE(S) HAS/HAVE NO GROWTH SO FAR 2020 10:34 PM    Culture result: LIGHT KLEBSIELLA PNEUMONIAE (A) 2020 10:33 PM    Culture result: LIGHT ENTEROBACTER CLOACAE (A) 2020 10:33 PM    Culture result: LIGHT MIXED SKIN JESSI ISOLATED 2020 10:33 PM       Radiology:     Line/Insert Date:           Assessment:     1. Charcot foot with secondary abscessshe is for the OR later today  2. Alpha strep bacteremia not certain of significance as this can be a contaminant. It can certainly infect soft tissues, as well.   3. She has significant care compliance issues    Objective: 1. Continue current therapy  2. Follow-up cultures and studies  3. OR PRN  4.  Group will see over the weekend if asked    Arin Branch MD

## 2020-01-10 NOTE — PROGRESS NOTES
CORINNA:  1. I&D today. 2. Blood cultures tomorrow. CM noted patient to have I &D today. There is still discussion of BKA. CM to follow for discharge plan.     Molly Jerez [facility]

## 2020-01-10 NOTE — PROGRESS NOTES
Bedside and Verbal shift change report given to [facility] (oncoming nurse) by Perry Rooney RN (offgoing nurse). Report included the following information SBAR, Kardex, OR Summary, Intake/Output, MAR and Recent Results.

## 2020-01-10 NOTE — ANESTHESIA PREPROCEDURE EVALUATION
Relevant Problems   No relevant active problems     Relevant Problems   No relevant active problems       Anesthetic History   No history of anesthetic complications            Review of Systems / Medical History  Patient summary reviewed, nursing notes reviewed and pertinent labs reviewed    Pulmonary  Within defined limits                 Neuro/Psych   Within defined limits           Cardiovascular  Within defined limits  Hypertension                   GI/Hepatic/Renal  Within defined limits              Endo/Other  Within defined limits  Diabetes         Other Findings              Physical Exam    Airway  Mallampati: II  TM Distance: > 6 cm  Neck ROM: normal range of motion   Mouth opening: Normal     Cardiovascular  Regular rate and rhythm,  S1 and S2 normal,  no murmur, click, rub, or gallop             Dental  No notable dental hx       Pulmonary  Breath sounds clear to auscultation               Abdominal  GI exam deferred       Other Findings            Anesthetic Plan    ASA: 2  Anesthesia type: general          Induction: Intravenous  Anesthetic plan and risks discussed with: Patient              Anesthetic History   No history of anesthetic complications            Review of Systems / Medical History  Patient summary reviewed, nursing notes reviewed and pertinent labs reviewed    Pulmonary  Within defined limits                 Neuro/Psych   Within defined limits           Cardiovascular  Within defined limits  Hypertension              Exercise tolerance: <4 METS     GI/Hepatic/Renal  Within defined limits              Endo/Other  Within defined limits  Diabetes: type 2, using insulin  Hypothyroidism: well controlled  Arthritis and anemia     Other Findings   Comments:  recurrent foot infection with osteomyelitis            Physical Exam    Airway  Mallampati: II  TM Distance: 4 - 6 cm         Cardiovascular               Dental    Dentition: Poor dentition     Pulmonary Abdominal         Other Findings            Anesthetic Plan    ASA: 3  Anesthesia type: general          Induction: Intravenous  Anesthetic plan and risks discussed with: Patient

## 2020-01-11 LAB
ANION GAP SERPL CALC-SCNC: 5 MMOL/L (ref 5–15)
BASOPHILS # BLD: 0 K/UL (ref 0–0.1)
BASOPHILS NFR BLD: 1 % (ref 0–1)
BUN SERPL-MCNC: 26 MG/DL (ref 6–20)
BUN/CREAT SERPL: 20 (ref 12–20)
CALCIUM SERPL-MCNC: 8.4 MG/DL (ref 8.5–10.1)
CHLORIDE SERPL-SCNC: 101 MMOL/L (ref 97–108)
CO2 SERPL-SCNC: 26 MMOL/L (ref 21–32)
CREAT SERPL-MCNC: 1.3 MG/DL (ref 0.55–1.02)
DIFFERENTIAL METHOD BLD: ABNORMAL
EOSINOPHIL # BLD: 0.3 K/UL (ref 0–0.4)
EOSINOPHIL NFR BLD: 4 % (ref 0–7)
ERYTHROCYTE [DISTWIDTH] IN BLOOD BY AUTOMATED COUNT: 17.1 % (ref 11.5–14.5)
GLUCOSE BLD STRIP.AUTO-MCNC: 243 MG/DL (ref 65–100)
GLUCOSE BLD STRIP.AUTO-MCNC: 321 MG/DL (ref 65–100)
GLUCOSE BLD STRIP.AUTO-MCNC: 350 MG/DL (ref 65–100)
GLUCOSE BLD STRIP.AUTO-MCNC: 353 MG/DL (ref 65–100)
GLUCOSE BLD STRIP.AUTO-MCNC: 397 MG/DL (ref 65–100)
GLUCOSE BLD STRIP.AUTO-MCNC: 438 MG/DL (ref 65–100)
GLUCOSE BLD STRIP.AUTO-MCNC: 97 MG/DL (ref 65–100)
GLUCOSE SERPL-MCNC: 355 MG/DL (ref 65–100)
HCT VFR BLD AUTO: 26.1 % (ref 35–47)
HGB BLD-MCNC: 7.7 G/DL (ref 11.5–16)
IMM GRANULOCYTES # BLD AUTO: 0 K/UL (ref 0–0.04)
IMM GRANULOCYTES NFR BLD AUTO: 0 % (ref 0–0.5)
LYMPHOCYTES # BLD: 1 K/UL (ref 0.8–3.5)
LYMPHOCYTES NFR BLD: 13 % (ref 12–49)
MCH RBC QN AUTO: 26.4 PG (ref 26–34)
MCHC RBC AUTO-ENTMCNC: 29.5 G/DL (ref 30–36.5)
MCV RBC AUTO: 89.4 FL (ref 80–99)
MONOCYTES # BLD: 0.7 K/UL (ref 0–1)
MONOCYTES NFR BLD: 8 % (ref 5–13)
NEUTS SEG # BLD: 5.8 K/UL (ref 1.8–8)
NEUTS SEG NFR BLD: 74 % (ref 32–75)
NRBC # BLD: 0 K/UL (ref 0–0.01)
NRBC BLD-RTO: 0 PER 100 WBC
PLATELET # BLD AUTO: 432 K/UL (ref 150–400)
PMV BLD AUTO: 10.9 FL (ref 8.9–12.9)
POTASSIUM SERPL-SCNC: 5 MMOL/L (ref 3.5–5.1)
RBC # BLD AUTO: 2.92 M/UL (ref 3.8–5.2)
SERVICE CMNT-IMP: ABNORMAL
SERVICE CMNT-IMP: NORMAL
SODIUM SERPL-SCNC: 132 MMOL/L (ref 136–145)
WBC # BLD AUTO: 7.9 K/UL (ref 3.6–11)

## 2020-01-11 PROCEDURE — 74011250636 HC RX REV CODE- 250/636: Performed by: PODIATRIST

## 2020-01-11 PROCEDURE — 74011636637 HC RX REV CODE- 636/637: Performed by: PODIATRIST

## 2020-01-11 PROCEDURE — 51798 US URINE CAPACITY MEASURE: CPT

## 2020-01-11 PROCEDURE — 74011000258 HC RX REV CODE- 258: Performed by: PODIATRIST

## 2020-01-11 PROCEDURE — C1751 CATH, INF, PER/CENT/MIDLINE: HCPCS

## 2020-01-11 PROCEDURE — 74011250637 HC RX REV CODE- 250/637: Performed by: PODIATRIST

## 2020-01-11 PROCEDURE — 65270000029 HC RM PRIVATE

## 2020-01-11 PROCEDURE — 85025 COMPLETE CBC W/AUTO DIFF WBC: CPT

## 2020-01-11 PROCEDURE — 80048 BASIC METABOLIC PNL TOTAL CA: CPT

## 2020-01-11 PROCEDURE — 36556 INSERT NON-TUNNEL CV CATH: CPT

## 2020-01-11 PROCEDURE — 87040 BLOOD CULTURE FOR BACTERIA: CPT

## 2020-01-11 PROCEDURE — 36415 COLL VENOUS BLD VENIPUNCTURE: CPT

## 2020-01-11 PROCEDURE — 74011636637 HC RX REV CODE- 636/637: Performed by: INTERNAL MEDICINE

## 2020-01-11 PROCEDURE — 97530 THERAPEUTIC ACTIVITIES: CPT

## 2020-01-11 PROCEDURE — 82962 GLUCOSE BLOOD TEST: CPT

## 2020-01-11 PROCEDURE — 97161 PT EVAL LOW COMPLEX 20 MIN: CPT

## 2020-01-11 PROCEDURE — 97116 GAIT TRAINING THERAPY: CPT

## 2020-01-11 RX ORDER — HYDROCODONE BITARTRATE AND ACETAMINOPHEN 5; 325 MG/1; MG/1
TABLET ORAL
Status: DISPENSED
Start: 2020-01-11 | End: 2020-01-11

## 2020-01-11 RX ORDER — HYDROCODONE BITARTRATE AND ACETAMINOPHEN 5; 325 MG/1; MG/1
TABLET ORAL
Status: DISPENSED
Start: 2020-01-11 | End: 2020-01-12

## 2020-01-11 RX ORDER — CEFEPIME HYDROCHLORIDE 2 G/1
INJECTION, POWDER, FOR SOLUTION INTRAVENOUS
Status: DISPENSED
Start: 2020-01-11 | End: 2020-01-12

## 2020-01-11 RX ORDER — HYDRALAZINE HYDROCHLORIDE 25 MG/1
TABLET, FILM COATED ORAL
Status: DISPENSED
Start: 2020-01-11 | End: 2020-01-11

## 2020-01-11 RX ORDER — AMLODIPINE BESYLATE 5 MG/1
TABLET ORAL
Status: DISPENSED
Start: 2020-01-11 | End: 2020-01-11

## 2020-01-11 RX ORDER — SODIUM CHLORIDE 0.9 % (FLUSH) 0.9 %
SYRINGE (ML) INJECTION
Status: DISPENSED
Start: 2020-01-11 | End: 2020-01-12

## 2020-01-11 RX ORDER — METOPROLOL TARTRATE 50 MG/1
TABLET ORAL
Status: DISPENSED
Start: 2020-01-11 | End: 2020-01-11

## 2020-01-11 RX ORDER — SODIUM CHLORIDE 900 MG/100ML
INJECTION INTRAVENOUS
Status: DISPENSED
Start: 2020-01-11 | End: 2020-01-12

## 2020-01-11 RX ORDER — INSULIN LISPRO 100 [IU]/ML
INJECTION, SOLUTION INTRAVENOUS; SUBCUTANEOUS
Status: DISPENSED
Start: 2020-01-11 | End: 2020-01-12

## 2020-01-11 RX ORDER — METOPROLOL TARTRATE 50 MG/1
TABLET ORAL
Status: DISPENSED
Start: 2020-01-11 | End: 2020-01-12

## 2020-01-11 RX ORDER — HYDRALAZINE HYDROCHLORIDE 25 MG/1
TABLET, FILM COATED ORAL
Status: DISPENSED
Start: 2020-01-11 | End: 2020-01-12

## 2020-01-11 RX ADMIN — AMLODIPINE BESYLATE 10 MG: 5 TABLET ORAL at 09:39

## 2020-01-11 RX ADMIN — HUMAN INSULIN 10 UNITS: 100 INJECTION, SOLUTION SUBCUTANEOUS at 19:27

## 2020-01-11 RX ADMIN — HYDROCODONE BITARTRATE AND ACETAMINOPHEN 2 TABLET: 5; 325 TABLET ORAL at 20:36

## 2020-01-11 RX ADMIN — HYDROCODONE BITARTRATE AND ACETAMINOPHEN 1 TABLET: 5; 325 TABLET ORAL at 13:40

## 2020-01-11 RX ADMIN — HUMAN INSULIN 3 UNITS: 100 INJECTION, SOLUTION SUBCUTANEOUS at 12:40

## 2020-01-11 RX ADMIN — HYDROCODONE BITARTRATE AND ACETAMINOPHEN 2 TABLET: 5; 325 TABLET ORAL at 07:25

## 2020-01-11 RX ADMIN — HYDRALAZINE HYDROCHLORIDE 25 MG: 25 TABLET, FILM COATED ORAL at 09:39

## 2020-01-11 RX ADMIN — METOPROLOL TARTRATE 100 MG: 50 TABLET ORAL at 09:39

## 2020-01-11 RX ADMIN — LEVOTHYROXINE SODIUM 25 MCG: 0.03 TABLET ORAL at 07:26

## 2020-01-11 RX ADMIN — HUMAN INSULIN 8 UNITS: 100 INJECTION, SOLUTION SUBCUTANEOUS at 22:44

## 2020-01-11 RX ADMIN — CEFEPIME HYDROCHLORIDE 2 G: 2 INJECTION, POWDER, FOR SOLUTION INTRAVENOUS at 19:26

## 2020-01-11 RX ADMIN — HYDRALAZINE HYDROCHLORIDE 25 MG: 25 TABLET, FILM COATED ORAL at 19:28

## 2020-01-11 RX ADMIN — HYDROCODONE BITARTRATE AND ACETAMINOPHEN 2 TABLET: 5; 325 TABLET ORAL at 01:34

## 2020-01-11 RX ADMIN — METOPROLOL TARTRATE 100 MG: 50 TABLET ORAL at 19:49

## 2020-01-11 NOTE — PERIOP NOTES
1955 Called and informed Dr. Vitor Mims of the patient's vitals  170/90, HR 73, Resp 13, and Sats 99%. Pt stated left foot pain 5/10 and denied any pain meds at this time. Dr. Vitor Mims ok to administered schedule hydralazine 25 at this time and pt may return to her room. Order noted. TRANSFER - OUT REPORT:    Verbal report given to Gigi Pastor(name) on Jacob Fuentes  being transferred to [facility](unit) for routine post - op       Report consisted of patients Situation, Background, Assessment and   Recommendations(SBAR). Time Pre op antibiotic given:1720  Anesthesia Stop time: 2000  Green Present on Transfer to floor:no  Order for Green on Chart:no  Discharge Prescriptions with Chart:no    Information from the following report(s) SBAR, OR Summary, Intake/Output, Cardiac Rhythm NSR and Alarm Parameters  was reviewed with the receiving nurse. Opportunity for questions and clarification was provided. Is the patient on 02? yes       L/Min 2       Other 0    Is the patient on a monitor? NO    Is the nurse transporting with the patient? NO    Surgical Waiting Area notified of patient's transfer from PACU? NO      The following personal items collected during your admission accompanied patient upon transfer:   Dental Appliance: Dental Appliances: None  Vision: Visual Aid: Glasses, With patient  Hearing Aid:    Jewelry: Jewelry: None  Clothing: Clothing: Jacket/Coat, Pants, Socks, With patient, Footwear  Other Valuables: Other Valuables:  At bedside, Cell Phone, Purse  Valuables sent to safe:

## 2020-01-11 NOTE — OP NOTES
[facility]  OPERATIVE REPORT    Name:  Valente Mejia  MR#:  [id number]  :  1970  ACCOUNT #:  [de-identified]  DATE OF SERVICE:  01/10/2020      PREOPERATIVE DIAGNOSIS:  Left foot abscess. POSTOPERATIVE DIAGNOSES:  Left foot abscess and osteomyelitis. PROCEDURE PERFORMED:  Incision and drainage of left foot abscess with removal of necrotic bone    SURGEON:  Aure Lowe DPM    ASSISTANT:  None. ANESTHESIA:  General anesthesia. COMPLICATIONS:  None. SPECIMENS REMOVED:  Left midfoot bone fragments sent for culture    IMPLANTS:  None. ESTIMATED BLOOD LOSS:  100 mL. HEMOSTASIS:  None. PROCEDURE IN DETAIL:  Under mild sedation, the patient was brought into the operating room, placed on the operating table in supine position. The patient was then intubated and placed under general anesthesia. A full time-out was performed. The foot was then scrubbed, prepped and draped in the usual aseptic manner with a pneumatic ankle tourniquet placed around the patient's left ankle. Attention was then directed to the dorsal aspect of the patient's foot, where a previous MRI had shown significant complex abscess around the mid tarsal joint particularly in the area of the first intermetatarsal space with osteomyelitis superimposed on the patient's current Charcot arthropathy changes. An incision was made over that first intermetatarsal space and careful sharp and blunt dissection was then made down to the level of the joint where an arthrotomy was made in the tarsometatarsal joint. At this point, significant copious drainage was noted and some further purulence was expressed as well as some significant serous fluid. Additionally, there were significant necrotic bone fragments and phlegmon noted and this area was then sharply rongeured in order to remove any nonviable soft tissue and bone. Any sharp bleeding vessels were cauterized as needed.   Attention was then directed as there were 2 spaces that did connect with the secondary abscess over the lateral aspect of the foot and another incision was made in the dorsal aspect of this foot. This area was then additionally explored and any necrotic soft tissue and bone were then rongeured out as needed. The area was then flushed with copious amount of sterile saline and 2 both incision sites utilizing pulse lavage for a total of 3 liters of normal sterile saline. Again, the wound beds were inspected. Any remaining phlegmon was removed. Part of the remaining bone fragments were then sent to pathology to identify the causative organismremaining. At this point, the distal aspects of the patient's incision sites were closed. The remaining wound edges were packed with quarter-inch iodoform packing. The foot was then dressed with 4 x 4's, ABDs, Kerlix, and Ace wrap. The patient was then awoken from surgery and sent to the postanesthesia care unit with all vital signs stable.       ANTONIO Haney/CRISTIAN_NICOLE_SHEMAR/[email or web address]  D:  01/10/2020 19:15  T:  01/11/2020 5:02  JOB #:  [id number]

## 2020-01-11 NOTE — BRIEF OP NOTE
BRIEF OPERATIVE NOTE    Date of Procedure: 1/10/2020   Preoperative Diagnosis: LEFT FOOT ABSCESS  Postoperative Diagnosis: LEFT FOOT ABSCESS    Procedure(s):  I&D LEFT FOOT ABSCESS  Surgeon(s) and Role:     * Kevon Eason DPM - Primary         Surgical Assistant: None    Surgical Staff:  Circ-1: Paulie Flowers RN  Scrub RN-1: Augustine Angel  Scrub RN-Relief: Aubree Castelan  Event Time In Time Out   Incision Start 01/10/2020 1749    Incision Close 01/10/2020 1858      Anesthesia: General   Estimated Blood Loss: 100 cc  Specimens:   ID Type Source Tests Collected by Time Destination   1 : LEFT FOOT BONE Wound Foot, left CULTURE, ANAEROBIC, CULTURE & SMEAR, AFB, CULTURE, FUNGUS, CULTURE, WOUND W [facility] 1/10/2020 1835 Microbiology      Findings: Removal of necrotic soft tissue and bone, significant fragmentation due to Charcot   Complications: None  Implants: * No implants in log *

## 2020-01-11 NOTE — PROGRESS NOTES
Progress Note    Patient: Indira Ruby MRN: [id number]  SSN: [id number]    YOB: 1970  Age: 51 y.o. Sex: female      Admit Date: 2020  1 Day Post-Op     Procedure:   Procedure(s):  I&D LEFT FOOT ABSCESS    Subjective:     Patient seen & examined at bedside. Denies any n/v/f/ns/c. Pt in good spirits. Objective:     Visit Vitals  BP (!) 175/93 (BP 1 Location: Right arm, BP Patient Position: At rest)   Pulse 80   Temp 97.7 °F (36.5 °C)   Resp 16   Ht 5' 6\" (1.676 m)   Wt 85.7 kg (189 lb)   SpO2 97%   BMI 30.51 kg/m²      Left Foot Exam: dorsal and lateral wounds packed open with deep probe. No pus expressed. No malodor. Surrounding edema and erythema improved. Labs/Radiology Review: images and reports reviewed    Assessment:     Hospital Problems  Date Reviewed: 1/10/2020          Codes Class Noted POA    Abscess of bursa of left foot ICD-10-CM: M71.072  ICD-9-CM: 727.89  2020 Unknown        Noncompliance with treatment plan ICD-10-CM: Z91.11  ICD-9-CM: V15.81  2020 Unknown        Closed dislocation of tarsometatarsal joint, left, subsequent encounter ICD-10-CM: S93.325D  ICD-9-CM: V58.89  2020 Unknown        Cellulitis of foot, left ICD-10-CM: L03.116  ICD-9-CM: 682.7  2020 Unknown        Type 2 diabetes mellitus with Charcot's joint of left foot ([facility] ICD-10-CM: E11.610  ICD-9-CM: 250.60, 713.5  2019 Yes        Charcot's joint of left foot ICD-10-CM: G44.585  ICD-9-CM: 094.0, 713.5  2019 Yes        Acute osteomyelitis of left foot ([facility] ICD-10-CM: C25.824  ICD-9-CM: 730.07  10/7/2019 Yes        Uncontrolled type 2 diabetes mellitus with peripheral neuropathy [facility]) ICD-10-CM: E11.42, E11.65  ICD-9-CM: 250.62, 357.2  10/7/2019 Yes              Plan/Recommendations/Medical Decision Makin.) Continue antibxs. Sx cxs = gram neg rods thus far. Path pending.   2.) New packing/DSD/Ace applied. 3.) Continue strict nwb to left foot.     4.) Will reassess tomorrow

## 2020-01-11 NOTE — ANESTHESIA POSTPROCEDURE EVALUATION
Post-Anesthesia Evaluation and Assessment    Patient: Jacob Fuentes MRN: [id number]  SSN: [id number]    YOB: 1970  Age: 51 y.o. Sex: female       Cardiovascular Function/Vital Signs  Visit Vitals  /84   Pulse 71   Temp 36.4 °C (97.6 °F)   Resp 11   Ht 5' 6\" (1.676 m)   Wt 85.7 kg (189 lb)   SpO2 99%   BMI 30.51 kg/m²       Patient is status post General anesthesia for Procedure(s):  I&D LEFT FOOT ABSCESS. Nausea/Vomiting: None    Postoperative hydration reviewed and adequate. Pain:  Pain Scale 1: Numeric (0 - 10) (01/10/20 1944)  Pain Intensity 1: 6 (01/10/20 1944)   Managed    Neurological Status:   Neuro (WDL): Within Defined Limits (01/10/20 1908)  Neuro  Neurologic State: Alert (01/10/20 1908)  Orientation Level: Oriented X4 (01/10/20 1908)  Cognition: Follows commands (01/10/20 1908)  Speech: Clear (01/10/20 1908)  LUE Motor Response: Purposeful (01/10/20 1908)  LLE Motor Response: Weak (01/10/20 1908)  RUE Motor Response: Purposeful (01/10/20 1908)  RLE Motor Response: Purposeful (01/10/20 1908)   At baseline    Mental Status and Level of Consciousness: Alert and oriented to person, place, and time    Pulmonary Status:   O2 Device: Nasal cannula (01/10/20 1908)   Adequate oxygenation and airway patent    Complications related to anesthesia: None    Post-anesthesia assessment completed. No concerns    Signed By: Mark Reyes MD     January 10, 2020              Procedure(s):  I&D LEFT FOOT ABSCESS. general    <BSHSIANPOST>    Vitals Value Taken Time   /90 1/10/2020  7:45 PM   Temp 36.4 °C (97.6 °F) 1/10/2020  7:08 PM   Pulse 72 1/10/2020  7:51 PM   Resp 12 1/10/2020  7:51 PM   SpO2 97 % 1/10/2020  7:51 PM   Vitals shown include unvalidated device data.

## 2020-01-11 NOTE — PROGRESS NOTES
Pt lost IV access some time before I was given report around 0000 01/11. Pt refused to have anyone other than a \"skilled IV access nurse\" attempt an IV. Chava Cummings (nursing supervisor) and an ICU nurse attempted to obtain IV access a total of 3 times at which point the pt refused any further sticks. After explaining how IV access is necessary to administer the ordered IV antibiotics, the pt agreed to only a PICC line and still refuses any further attempts at a peripheral IV. The on call hospitalist (Dr. Shakila Ortiz) was notified and agreed that a PICC could be attempted in the morning.

## 2020-01-11 NOTE — PERIOP NOTES
3 peripheral IV  access placed by Dr Deandra Garza. No central line. Blood drawn at the time of insertion by Dr Deandra Garza. Paired blood, cbc and chem 6 sent.

## 2020-01-11 NOTE — PROGRESS NOTES
Problem: Mobility Impaired (Adult and Pediatric)  Goal: *Acute Goals and Plan of Care (Insert Text)  Description  FUNCTIONAL STATUS PRIOR TO ADMISSION: Patient was independent and active without use of DME.    HOME SUPPORT PRIOR TO ADMISSION: The patient lived with her daughter and two granddaughters who provide support when needed. Physical Therapy Goals  Initiated 1/11/2020  1. Patient will move from supine to sit and sit to supine  in bed with independence within 7 day(s). 2.  Patient will transfer from bed to chair and chair to bed with independence using the least restrictive device within 7 day(s). 3.  Patient will perform sit to stand with independence within 7 day(s). 4.  Patient will ambulate with supervision/set-up for 75 feet with the least restrictive device within 7 day(s). 5.  Patient will ascend/descend 4 stairs with no handrail(s) with minimal assistance/contact guard assist within 7 day(s). 1/11/2020 1551 by Sharad Jackson, PT  Outcome: Progressing Towards Goal   PHYSICAL THERAPY TREATMENT  Patient: Tamia Gilbert (51 y.o. female)  Date: 1/11/2020  Diagnosis: Cellulitis of foot, left [L03.116]   <principal problem not specified>  Procedure(s) (LRB):  I&D LEFT FOOT ABSCESS (Left) 1 Day Post-Op  Precautions:    Chart, physical therapy assessment, plan of care and goals were reviewed. ASSESSMENT  Patient continues with skilled PT services and is progressing towards goals. PT evaluation initiated and completed today. Current Level of Function Impacting Discharge (mobility/balance): Requires CGA for bed mobility/transfers, NWB RLE, min A for ambulation with rolling walker for 20ft or less with hop-to gait pattern.     Other factors to consider for discharge: 4 steps to enter home without rail, lives with daughter and grandchildren who can help, physician documented non compliance with WB status prior to this surgery         PLAN :  Patient continues to benefit from skilled intervention to address the above impairments. Continue treatment per established plan of care. to address goals. Recommendation for discharge: (in order for the patient to meet his/her long term goals)  To be determined: Progression with further gait and stair training required to make this decision. This discharge recommendation:  Has not yet been discussed the attending provider and/or case management    IF patient discharges home will need the following DME: shower chair, rolling walker or \"knee scooter\" that she reports in the process of purchasing. DME to be determined (TBD) based on progress. SUBJECTIVE:   Patient stated I like to be independent, I want to get home as soon as possible.     OBJECTIVE DATA SUMMARY:   Critical Behavior:  Neurologic State: Alert  Orientation Level: Oriented X4  Cognition: Appropriate safety awareness, Appropriate decision making     Functional Mobility Training:  Bed Mobility:     Supine to Sit: Contact guard assistance  Sit to Supine: Contact guard assistance  Scooting: Supervision        Transfers:  Sit to Stand: Contact guard assistance  Stand to Sit: Contact guard assistance  Stand Pivot Transfers: Minimum assistance       Balance:  Sitting: Intact; Without support  Standing: Impaired; With support  Standing - Static: Fair  Standing - Dynamic : Poor  Ambulation/Gait Training:  Distance (ft): 25 Feet (ft)  Assistive Device: Walker, rolling  Ambulation - Level of Assistance: Minimal assistance      Gait Abnormalities: Other(Patient NWB LLE, hop to with RLE)  Right Side Weight Bearing: As tolerated  Left Side Weight Bearing: Non-weight bearing  Base of Support: Center of gravity altered       Step Length: Right shortened       Stairs:  Not attempted      Pain Ratin/10    Activity Tolerance:   Good  Please refer to the flowsheet for vital signs taken during this treatment.     After treatment patient left in no apparent distress:   Supine in bed, Call bell within reach, and Side rails x 3    COMMUNICATION/COLLABORATION:   The patients plan of care was discussed with: Registered Nurse    Martinez Raza, PT   Time Calculation: 40 mins

## 2020-01-11 NOTE — PROGRESS NOTES
Asked to obtain IV access and draw blood. Pt consents to mult PIVs and blood draws. 1.  20 g R wrist PIV placed w/o compl. 2.  20 g L forearm PIV sterilely placed w/o compl., one paired blood culture drawn. 3.  18 g R wrist PIV sterilely placed w/o compl., one paired BC drawn + blood drawn for hemotology and chem. Pt tolerated proc well.

## 2020-01-11 NOTE — PROGRESS NOTES
Problem: Mobility Impaired (Adult and Pediatric)  Goal: *Acute Goals and Plan of Care (Insert Text)  Description  FUNCTIONAL STATUS PRIOR TO ADMISSION: Patient was independent and active without use of DME.    HOME SUPPORT PRIOR TO ADMISSION: The patient lived with her daughter and two granddaughters who provide support when needed. Physical Therapy Goals  Initiated 1/11/2020  1. Patient will move from supine to sit and sit to supine  in bed with independence within 7 day(s). 2.  Patient will transfer from bed to chair and chair to bed with independence using the least restrictive device within 7 day(s). 3.  Patient will perform sit to stand with independence within 7 day(s). 4.  Patient will ambulate with supervision/set-up for 75 feet with the least restrictive device within 7 day(s). 5.  Patient will ascend/descend 4 stairs with no handrail(s) with minimal assistance/contact guard assist within 7 day(s). Outcome: Progressing Towards Goal  PHYSICAL THERAPY EVALUATION  Patient: Sofia Villafana (47 y.o. female)  Date: 1/11/2020  Primary Diagnosis: Cellulitis of foot, left [L03.116]  Procedure(s) (LRB):  I&D LEFT FOOT ABSCESS (Left) 1 Day Post-Op   Precautions: NWB LLE s/p L foot arthrotomy to tarsometatarsal joint         ASSESSMENT  Based on the objective data described below, the patient presents with a decline in overall mobility as compared to her PLOF  This is a 51year old woman who underwent a partial L 5th metatarsal removal, charcot neuroarthroplasty in October 2019 due to osteomyelitis of the L foot. As per documentation, patient was non compliant with her precautions and recommendations and did not maintain NWB LLE. She returned to the hospital due to new L foot pain and cellulitis. She underwent L foot arthrotomy to the tarsometatarsal joint with removal of nonviable tissue and bone on 1/10/2020.  She has four steps to enter her home without a rail and had been going up and down them before by scooting on her bottom. Patient reports her bathroom and bedroom are next to each other, her son is planning to install grab bars in the bathroom. She also reports she is in the process of getting a \"knee scooter\" to get around. She states she had to quit her job recently due to medical complications. On day of evaluation, patient presents with a decline in mobility including requiring CGA for bed mobility, transfers, min A for ambulation with rolling walker for 20 ft or less (NWB RLE), unable to attempt steps at this time. Patient was completely independent prior to this hospitalization. Current Level of Function Impacting Discharge (mobility/balance): Requires assistance for all mobility including CGA for transfers/bed mobility, min A for ambulation with RW with a hop to gait pattern for 20ft or less, has 4 steps to enter without rail to get into her home. Patient reports her bathroom and bedroom are next to each other, her son is planning to install grab bars. Functional Outcome Measure: The patient scored 9 on the Tinetti outcome measure which is indicative of high risk for falls. Other factors to consider for discharge: History of non compliance with WB precautions for prior surgery leading to current problems     Patient will benefit from skilled therapy intervention to address the above noted impairments. PLAN :  Recommendations and Planned Interventions: bed mobility training, transfer training, gait training, therapeutic exercises, neuromuscular re-education, patient and family training/education, and therapeutic activities      Frequency/Duration: Patient will be followed by physical therapy:  5 times a week to address goals. Recommendation for discharge: (in order for the patient to meet his/her long term goals)  To be determined: Depending on progression with NWB gait and stair training, patient may be appropriate for discharge home with [place] in the future.      This discharge recommendation:  Has not yet been discussed the attending provider and/or case management    IF patient discharges home will need the following DME: shower chair and rolling walker or \"knee scooter\"         SUBJECTIVE:   Patient stated I am very independent and I know I have to follow my doctors instructions.     OBJECTIVE DATA SUMMARY:   HISTORY:    Past Medical History:   Diagnosis Date    Cataract     Cataracts, bilateral     [facility]     Osteomyelitis ([facility] [address] [facility]      Past Surgical History:   Procedure Laterality Date    HX CATARACT REMOVAL  2/2011; 5-11    right eye; left    HX TONSIL AND ADENOIDECTOMY  1985    HX TONSILLECTOMY  1984    HX TUBAL LIGATION  1997       Personal factors and/or comorbidities impacting plan of care: HTN, anemia, osteomyelitis of L foot, DM II, Patient history of non-compliance with NWB RLE from surgery in October 2019    [address]: Private residence  One/Two Story Residence: Two story  Living Alone: No  Support Systems: Family member(s)  Patient Expects to be Discharged to[de-identified] Private residence  Current DME Used/Available at Home: None    EXAMINATION/PRESENTATION/DECISION MAKING:   Critical Behavior:  Neurologic State: Alert  Orientation Level: Oriented X4  Cognition: Appropriate safety awareness, Appropriate decision making     Hearing: Auditory  Auditory Impairment: None    Strength:        WNL except unable to test LLE ankle joint due to bandage s/p surgery       Functional Mobility:  Bed Mobility:     Supine to Sit: Contact guard assistance  Sit to Supine: Contact guard assistance  Scooting: Supervision  Transfers:  Sit to Stand: Contact guard assistance  Stand to Sit: Contact guard assistance  Stand Pivot Transfers: Minimum assistance       Balance:   Sitting: Intact; Without support  Standing: Impaired; With support  Standing - Static: Fair  Standing - Dynamic : Poor  Ambulation/Gait Training:  Distance (ft): 25 Feet (ft)  Assistive Device: Walker, rolling  Ambulation - Level of Assistance: Minimal assistance    Gait Abnormalities: Other(Patient NWB LLE, hop to with RLE)  Right Side Weight Bearing: As tolerated  Left Side Weight Bearing: Non-weight bearing  Base of Support: Center of gravity altered     Step Length: Right shortened     Stairs:   Did not assess on day of evaluation     Functional Measure:  Tinetti test:    Sitting Balance: 1  Arises: 1  Attempts to Rise: 2  Immediate Standing Balance: 1  Standing Balance: 0  Nudged: 0  Eyes Closed: 0  Turn 360 Degrees - Continuous/Discontinuous: 0  Turn 360 Degrees - Steady/Unsteady: 0  Sitting Down: 1  Balance Score: 6 Balance total score  Indication of Gait: 0  R Step Length/Height: 0  L Step Length/Height: 0  R Foot Clearance: 1  L Foot Clearance: 1  Step Symmetry: 0  Step Continuity: 0  Path: 1  Trunk: 0  Walking Time: 0  Gait Score: 3 Gait total score  Total Score: 9/28 Overall total score         Tinetti Tool Score Risk of Falls  <19 = High Fall Risk  19-24 = Moderate Fall Risk  25-28 = Low Fall Risk  Tinetti ME. Performance-Oriented Assessment of Mobility Problems in Elderly Patients. [place] 66; [id number].  (Scoring Description: PT Bulletin Feb. 10, 1993)    Older adults: Balbina Madrigal et al, 2009; n = 1000 [facility] elderly evaluated with ABC, NICK, ADL, and IADL)  · Mean NICK score for males aged 69-68 years = 26.21(3.40)  · Mean NICK score for females age 69-68 years = 25.16(4.30)  · Mean NICK score for males over 80 years = 23.29(6.02)  · Mean NICK score for females over 80 years = 17.20(8.32)           Physical Therapy Evaluation Charge Determination   History Examination Presentation Decision-Making   LOW Complexity : Zero comorbidities / personal factors that will impact the outcome / POC LOW Complexity : 1-2 Standardized tests and measures addressing body structure, function, activity limitation and / or participation in recreation  LOW Complexity : Stable, uncomplicated  LOW Complexity : FOTO score of       Based on the above components, the patient evaluation is determined to be of the following complexity level: LOW     Pain Ratin/10    Activity Tolerance:   Good  Please refer to the flowsheet for vital signs taken during this treatment. After treatment patient left in no apparent distress:   Supine in bed, Heels elevated for pressure relief, Call bell within reach, and Side rails x 3    COMMUNICATION/EDUCATION:   The patients plan of care was discussed with: Registered Nurse. Fall prevention education was provided and the patient/caregiver indicated understanding. and Patient/family agree to work toward stated goals and plan of care.     Thank you for this referral.  Florencio Sherman, PT   Time Calculation: 40 mins

## 2020-01-11 NOTE — PROGRESS NOTES
Vascular Access Team: PICC order noted and no call placed to PICC team regarding PICC order. There are no PICC nurses available today. Called and notified Myke Ramírez RN.  Jael Patton RN

## 2020-01-12 LAB
GLUCOSE BLD STRIP.AUTO-MCNC: 149 MG/DL (ref 65–100)
GLUCOSE BLD STRIP.AUTO-MCNC: 198 MG/DL (ref 65–100)
GLUCOSE BLD STRIP.AUTO-MCNC: 248 MG/DL (ref 65–100)
GLUCOSE BLD STRIP.AUTO-MCNC: 262 MG/DL (ref 65–100)
GLUCOSE BLD STRIP.AUTO-MCNC: 303 MG/DL (ref 65–100)
SERVICE CMNT-IMP: ABNORMAL

## 2020-01-12 PROCEDURE — 82962 GLUCOSE BLOOD TEST: CPT

## 2020-01-12 PROCEDURE — 74011250636 HC RX REV CODE- 250/636: Performed by: PODIATRIST

## 2020-01-12 PROCEDURE — 65270000029 HC RM PRIVATE

## 2020-01-12 PROCEDURE — 74011250637 HC RX REV CODE- 250/637: Performed by: PODIATRIST

## 2020-01-12 PROCEDURE — 74011636637 HC RX REV CODE- 636/637: Performed by: PODIATRIST

## 2020-01-12 PROCEDURE — 74011000258 HC RX REV CODE- 258: Performed by: PODIATRIST

## 2020-01-12 PROCEDURE — 74011250636 HC RX REV CODE- 250/636: Performed by: INTERNAL MEDICINE

## 2020-01-12 PROCEDURE — 74011636637 HC RX REV CODE- 636/637: Performed by: STUDENT IN AN ORGANIZED HEALTH CARE EDUCATION/TRAINING PROGRAM

## 2020-01-12 RX ORDER — SODIUM CHLORIDE 9 MG/ML
100 INJECTION, SOLUTION INTRAVENOUS CONTINUOUS
Status: DISCONTINUED | OUTPATIENT
Start: 2020-01-12 | End: 2020-01-13

## 2020-01-12 RX ADMIN — HUMAN INSULIN 7 UNITS: 100 INJECTION, SOLUTION SUBCUTANEOUS at 11:54

## 2020-01-12 RX ADMIN — METOPROLOL TARTRATE 100 MG: 50 TABLET ORAL at 09:45

## 2020-01-12 RX ADMIN — INSULIN GLARGINE 22 UNITS: 100 INJECTION, SOLUTION SUBCUTANEOUS at 20:10

## 2020-01-12 RX ADMIN — INSULIN GLARGINE 22 UNITS: 100 INJECTION, SOLUTION SUBCUTANEOUS at 09:46

## 2020-01-12 RX ADMIN — CEFEPIME HYDROCHLORIDE 2 G: 2 INJECTION, POWDER, FOR SOLUTION INTRAVENOUS at 03:43

## 2020-01-12 RX ADMIN — HYDROCODONE BITARTRATE AND ACETAMINOPHEN 1 TABLET: 5; 325 TABLET ORAL at 14:48

## 2020-01-12 RX ADMIN — AMLODIPINE BESYLATE 10 MG: 5 TABLET ORAL at 09:45

## 2020-01-12 RX ADMIN — HYDRALAZINE HYDROCHLORIDE 25 MG: 25 TABLET, FILM COATED ORAL at 03:40

## 2020-01-12 RX ADMIN — HYDROCODONE BITARTRATE AND ACETAMINOPHEN 2 TABLET: 5; 325 TABLET ORAL at 06:47

## 2020-01-12 RX ADMIN — HYDROCODONE BITARTRATE AND ACETAMINOPHEN 2 TABLET: 5; 325 TABLET ORAL at 02:16

## 2020-01-12 RX ADMIN — HYDRALAZINE HYDROCHLORIDE 25 MG: 25 TABLET, FILM COATED ORAL at 18:12

## 2020-01-12 RX ADMIN — HUMAN INSULIN 2 UNITS: 100 INJECTION, SOLUTION SUBCUTANEOUS at 22:17

## 2020-01-12 RX ADMIN — HUMAN INSULIN 5 UNITS: 100 INJECTION, SOLUTION SUBCUTANEOUS at 18:11

## 2020-01-12 RX ADMIN — METOPROLOL TARTRATE 100 MG: 50 TABLET ORAL at 18:18

## 2020-01-12 RX ADMIN — Medication 10 ML: at 14:49

## 2020-01-12 RX ADMIN — VANCOMYCIN HYDROCHLORIDE 1500 MG: 10 INJECTION, POWDER, LYOPHILIZED, FOR SOLUTION INTRAVENOUS at 18:11

## 2020-01-12 RX ADMIN — HYDROCODONE BITARTRATE AND ACETAMINOPHEN 2 TABLET: 5; 325 TABLET ORAL at 20:10

## 2020-01-12 RX ADMIN — LEVOTHYROXINE SODIUM 25 MCG: 0.03 TABLET ORAL at 06:44

## 2020-01-12 RX ADMIN — Medication 10 ML: at 06:45

## 2020-01-12 RX ADMIN — VANCOMYCIN HYDROCHLORIDE 1500 MG: 10 INJECTION, POWDER, LYOPHILIZED, FOR SOLUTION INTRAVENOUS at 02:17

## 2020-01-12 RX ADMIN — SODIUM CHLORIDE 100 ML/HR: 900 INJECTION, SOLUTION INTRAVENOUS at 15:08

## 2020-01-12 RX ADMIN — SODIUM CHLORIDE 100 ML/HR: 900 INJECTION, SOLUTION INTRAVENOUS at 01:06

## 2020-01-12 RX ADMIN — HUMAN INSULIN 2 UNITS: 100 INJECTION, SOLUTION SUBCUTANEOUS at 06:45

## 2020-01-12 RX ADMIN — Medication 10 ML: at 22:18

## 2020-01-12 RX ADMIN — CEFEPIME HYDROCHLORIDE 2 G: 2 INJECTION, POWDER, FOR SOLUTION INTRAVENOUS at 11:55

## 2020-01-12 RX ADMIN — HYDRALAZINE HYDROCHLORIDE 25 MG: 25 TABLET, FILM COATED ORAL at 11:59

## 2020-01-12 NOTE — PROGRESS NOTES
Progress Note    Patient: Karis Campbell MRN: [id number]  SSN: [id number]    YOB: 1970  Age: 51 y.o. Sex: female      Admit Date: 2020    Subjective:     Patient seen & examined at bedside. Denies any n/v/f/ns/c.    Objective:     Visit Vitals  /82 (BP 1 Location: Right arm, BP Patient Position: At rest)   Pulse 63   Temp 98 °F (36.7 °C)   Resp 16   Ht 5' 6\" (1.676 m)   Wt 85.7 kg (189 lb)   SpO2 92%   BMI 30.51 kg/m²      Left Foot Exam: dorsal and lateral wounds with deep probe. No pus, no malodor. +mild localized edema    Labs/Radiology Review: images and reports reviewed    Assessment:     Hospital Problems  Date Reviewed: 1/10/2020          Codes Class Noted POA    Abscess of bursa of left foot ICD-10-CM: M71.072  ICD-9-CM: 727.89  2020 Unknown        Noncompliance with treatment plan ICD-10-CM: Z91.11  ICD-9-CM: V15.81  2020 Unknown        Closed dislocation of tarsometatarsal joint, left, subsequent encounter ICD-10-CM: S93.325D  ICD-9-CM: V58.89  2020 Unknown        Cellulitis of foot, left ICD-10-CM: L03.116  ICD-9-CM: 682.7  2020 Unknown        Type 2 diabetes mellitus with Charcot's joint of left foot ([facility] ICD-10-CM: E11.610  ICD-9-CM: 250.60, 713.5  2019 Yes        Charcot's joint of left foot ICD-10-CM: A80.748  ICD-9-CM: 094.0, 713.5  2019 Yes        Acute osteomyelitis of left foot ([facility] ICD-10-CM: G78.994  ICD-9-CM: 730.07  10/7/2019 Yes        Uncontrolled type 2 diabetes mellitus with peripheral neuropathy [facility]) ICD-10-CM: E11.42, E11.65  ICD-9-CM: 250.62, 357.2  10/7/2019 Yes              Plan/Recommendations/Medical Decision Makin.) New packing/DSD/Ace applied. 2.) Continue antibxs. Path pending. 3.) Continue strict nwb to left foot. 4.) Dr. Bridger Snyder to reassess patient tomorrow.

## 2020-01-12 NOTE — PROGRESS NOTES
[facility]                                                                                          Hospitalist Progress Note  Ortiz Patterson MD  Answering service: [phone] OR 36 from in house phone        Date of Service:  2020  NAME:  Hilary Nguyen  :  1970  MRN:  [id number]      Admission Summary:     Patient is a 70-year-old female with medical history significant for diabetes mellitus, hypertension, hypothyroidism, chronic anemia and recurrent foot infection with osteomyelitis who presents to the emergency department with chief complaint of worsening left foot wound. Patient reports she had surgery (partial left fifth metatarsal removal) done in October , noted to have foot abscess requiring drainage the following months. She reports over the past few days she noted increased pain, swelling with overlying hyperemia and purulent drainage over the dorsum of her left foot. She was seen by her podiatrist today, recommend MRI with contrast to rule out concerning osteomyelitis. She denies any fever, chills, nausea, vomiting, shortness of breath, chest pain, palpitation, urinary or bowel complaints. Interval history / Subjective:       Patient seen and examined. Labs chart and imaging reviewed  She is doing better today. She got IVs placed peripherally by the anesthesiologist yesterday. Follow-up blood cultures is no growth to date. She is feeling happy post I&D  Dr. Jose Villegas to follow-up the patient tomorrow         Assessment & Plan:     Left foot purulent cellulitis with osteo ,  In the setting of Charcot neuroarthropathy of the left foot  MRI reviewed positive for osteomyelitis. On cefepime and cont Vanc ,   Podiatry consult appreciated. Vascular surgery consulted. Follow-up blood cultures show alpha Streptococcus. Wound culture showed Klebsiella and Enterobacter.   Only definitive treatment is BKA at this point as emphasized by vascular surgery and podiatry. But the patient does not want BKA  S/p I and D on 1/10/2020 podiatry to follow-up tomorrow  Follow-up the new blood cultures      Alpha strep bacteremia  On vancomycin. ID consulted. 2D echo pending for vegetations    Diabetes mellitus  Restart her Lantus home dose which was held because of hypoglycemia 2 days ago  SSI  Accu-Cheks  Stop the dextrose given she is normoglycemic now        Hypertension  Continue home meds amlodipine and Lopressor  Hydralazine as needed     Chronic normocytic anemia  Likely ACD  Hemoglobin at baseline  Hemoglobin is 6.7 transfuse 1 unit of blood on 1/8/2020.   Check hemoglobin in the morning     Hypothyroidism  Continue home Synthroid     Patient's Baseline: Ambulates with walking  DVT ppx: SCD  Code status: Full  Disposition: TBD  Code status:   DVT prophylaxis:     Care Plan discussed with: Patient/Family  Disposition: Home w/Family and TBD     Hospital Problems  Date Reviewed: 1/10/2020          Codes Class Noted POA    Abscess of bursa of left foot ICD-10-CM: M71.072  ICD-9-CM: 727.89  1/8/2020 Unknown        Noncompliance with treatment plan ICD-10-CM: Z91.11  ICD-9-CM: V15.81  1/8/2020 Unknown        Closed dislocation of tarsometatarsal joint, left, subsequent encounter ICD-10-CM: S93.325D  ICD-9-CM: V58.89  1/8/2020 Unknown        Cellulitis of foot, left ICD-10-CM: L03.116  ICD-9-CM: 682.7  1/7/2020 Unknown        Type 2 diabetes mellitus with Charcot's joint of left foot ([facility] ICD-10-CM: E11.610  ICD-9-CM: 250.60, 713.5  11/14/2019 Yes        Charcot's joint of left foot ICD-10-CM: H77.245  ICD-9-CM: 094.0, 713.5  11/14/2019 Yes        Acute osteomyelitis of left foot ([facility] ICD-10-CM: J68.722  ICD-9-CM: 730.07  10/7/2019 Yes        Uncontrolled type 2 diabetes mellitus with peripheral neuropathy [facility]) ICD-10-CM: E11.42, E11.65  ICD-9-CM: 250.62, 357.2  10/7/2019 Yes                Review of Systems:   A comprehensive review of systems was negative except for that written in the HPI. Vital Signs:    Last 24hrs VS reviewed since prior progress note. Most recent are:  Visit Vitals  /84   Pulse 66   Temp 98 °F (36.7 °C)   Resp 16   Ht 5' 6\" (1.676 m)   Wt 85.7 kg (189 lb)   SpO2 98%   BMI 30.51 kg/m²       No intake or output data in the 24 hours ending 01/12/20 1447     Physical Examination:             Constitutional:  No acute distress, cooperative, pleasant   ENT:  Oral mucous moist, oropharynx benign. Resp:  CTA bilaterally. No wheezing/rhonchi/rales. No accessory muscle use   CV:  Regular rhythm, normal rate, no murmurs, gallops, rubs    GI:  Soft, non distended, non tender. normoactive bowel sounds, no hepatosplenomegaly     Musculoskeletal:      No edema, warm, 2+ pulses throughout    Neurologic:  Moves all extremities. AAOx3, CN II-XII reviewed           Data Review:    Review and/or order of clinical lab test      Labs:     Recent Labs     01/11/20  1810 01/10/20  0415   WBC 7.9 7.2   HGB 7.7* 9.7*   HCT 26.1* 31.7*   * 472*     Recent Labs     01/11/20  1810 01/10/20  0400   * 136   K 5.0 4.5    104   CO2 26 25   BUN 26* 26*   CREA 1.30* 1.31*   * 53*   CA 8.4* 8.9     No results for input(s): SGOT, GPT, ALT, AP, TBIL, TBILI, TP, ALB, GLOB, GGT, AML, LPSE in the last 72 hours. No lab exists for component: AMYP, HLPSE  No results for input(s): INR, PTP, APTT, INREXT, INREXT in the last 72 hours. No results for input(s): FE, TIBC, PSAT, FERR in the last 72 hours. Lab Results   Component Value Date/Time    Folate 16.2 11/13/2019 04:28 PM      No results for input(s): PH, PCO2, PO2 in the last 72 hours. No results for input(s): CPK, CKNDX, TROIQ in the last 72 hours.     No lab exists for component: CPKMB  Lab Results   Component Value Date/Time    Cholesterol, total 176 10/03/2014 03:56 AM    HDL Cholesterol 61 10/03/2014 03:56 AM    LDL, calculated 90.8 10/03/2014 03:56 AM    Triglyceride 121 10/03/2014 03:56 AM    CHOL/HDL Ratio 2.9 10/03/2014 03:56 AM     Lab Results   Component Value Date/Time    Glucose (POC) 303 (H) 01/12/2020 11:00 AM    Glucose (POC) 198 (H) 01/12/2020 06:14 AM    Glucose (POC) 149 (H) 01/12/2020 03:43 AM    Glucose (POC) 321 (H) 01/11/2020 11:30 PM    Glucose (POC) 350 (H) 01/11/2020 10:04 PM     Lab Results   Component Value Date/Time    Color YELLOW/STRAW 11/13/2019 04:33 PM    Appearance CLEAR 11/13/2019 04:33 PM    Specific gravity 1.014 11/13/2019 04:33 PM    pH (UA) 5.5 11/13/2019 04:33 PM    Protein 100 (A) 11/13/2019 04:33 PM    Glucose 250 (A) 11/13/2019 04:33 PM    Ketone NEGATIVE  11/13/2019 04:33 PM    Bilirubin NEGATIVE  11/13/2019 04:33 PM    Urobilinogen 0.2 11/13/2019 04:33 PM    Nitrites NEGATIVE  11/13/2019 04:33 PM    Leukocyte Esterase NEGATIVE  11/13/2019 04:33 PM    Epithelial cells MODERATE (A) 11/13/2019 04:33 PM    Bacteria NEGATIVE  11/13/2019 04:33 PM    WBC 0-4 11/13/2019 04:33 PM    RBC 0-5 11/13/2019 04:33 PM         Medications Reviewed:     Current Facility-Administered Medications   Medication Dose Route Frequency    0.9% sodium chloride infusion  100 mL/hr IntraVENous CONTINUOUS    [START ON 1/13/2020] cefepime (MAXIPIME) 2 g in 0.9% sodium chloride (MBP/ADV) 100 mL  2 g IntraVENous Q12H    hydrALAZINE (APRESOLINE) tablet 25 mg  25 mg Oral TID    HYDROcodone-acetaminophen (NORCO) 5-325 mg per tablet 2 Tab  2 Tab Oral Q4H PRN    amLODIPine (NORVASC) tablet 10 mg  10 mg Oral DAILY    insulin glargine (LANTUS) injection 22 Units  22 Units SubCUTAneous BID    levothyroxine (SYNTHROID) tablet 25 mcg  25 mcg Oral ACB    metoprolol tartrate (LOPRESSOR) tablet 100 mg  100 mg Oral BID    sodium chloride (NS) flush 5-40 mL  5-40 mL IntraVENous Q8H    sodium chloride (NS) flush 5-40 mL  5-40 mL IntraVENous PRN    acetaminophen (TYLENOL) tablet 650 mg  650 mg Oral Q4H PRN    morphine injection 1 mg  1 mg IntraVENous Q4H PRN    ondansetron (ZOFRAN) injection 4 mg  4 mg IntraVENous Q4H PRN    vancomycin (VANCOCIN) 1500 mg in  ml infusion  1,500 mg IntraVENous Q16H    insulin regular (NOVOLIN R, HUMULIN R) injection   SubCUTAneous AC&HS    glucose chewable tablet 16 g  4 Tab Oral PRN    glucagon (GLUCAGEN) injection 1 mg  1 mg IntraMUSCular PRN    dextrose 10% infusion 0-250 mL  0-250 mL IntraVENous PRN    Vancomycin- Pharmacy to Dose   Other Rx Dosing/Monitoring     ______________________________________________________________________  EXPECTED LENGTH OF STAY: - - -  ACTUAL LENGTH OF STAY:          Nadeem Wood MD

## 2020-01-12 NOTE — PROGRESS NOTES
Patient's blood sugar 438. MD notified. Orders given for 10 units regular insulin to be given and to recheck blood sugar in 30 mins. Primary nurse updated.

## 2020-01-13 LAB
ANION GAP SERPL CALC-SCNC: 6 MMOL/L (ref 5–15)
BACTERIA SPEC CULT: ABNORMAL
BUN SERPL-MCNC: 25 MG/DL (ref 6–20)
BUN/CREAT SERPL: 20 (ref 12–20)
CALCIUM SERPL-MCNC: 7.9 MG/DL (ref 8.5–10.1)
CHLORIDE SERPL-SCNC: 107 MMOL/L (ref 97–108)
CO2 SERPL-SCNC: 23 MMOL/L (ref 21–32)
CREAT SERPL-MCNC: 1.26 MG/DL (ref 0.55–1.02)
GLUCOSE BLD STRIP.AUTO-MCNC: 107 MG/DL (ref 65–100)
GLUCOSE BLD STRIP.AUTO-MCNC: 118 MG/DL (ref 65–100)
GLUCOSE BLD STRIP.AUTO-MCNC: 160 MG/DL (ref 65–100)
GLUCOSE BLD STRIP.AUTO-MCNC: 189 MG/DL (ref 65–100)
GLUCOSE SERPL-MCNC: 163 MG/DL (ref 65–100)
GRAM STN SPEC: ABNORMAL
GRAM STN SPEC: ABNORMAL
POTASSIUM SERPL-SCNC: 4.3 MMOL/L (ref 3.5–5.1)
SERVICE CMNT-IMP: ABNORMAL
SODIUM SERPL-SCNC: 136 MMOL/L (ref 136–145)

## 2020-01-13 PROCEDURE — 82962 GLUCOSE BLOOD TEST: CPT

## 2020-01-13 PROCEDURE — 74011250637 HC RX REV CODE- 250/637: Performed by: PODIATRIST

## 2020-01-13 PROCEDURE — 36573 INSJ PICC RS&I 5 YR+: CPT | Performed by: INTERNAL MEDICINE

## 2020-01-13 PROCEDURE — 74011636637 HC RX REV CODE- 636/637: Performed by: PODIATRIST

## 2020-01-13 PROCEDURE — 74011636637 HC RX REV CODE- 636/637: Performed by: STUDENT IN AN ORGANIZED HEALTH CARE EDUCATION/TRAINING PROGRAM

## 2020-01-13 PROCEDURE — 77030027138 HC INCENT SPIROMETER -A

## 2020-01-13 PROCEDURE — 80048 BASIC METABOLIC PNL TOTAL CA: CPT

## 2020-01-13 PROCEDURE — 36415 COLL VENOUS BLD VENIPUNCTURE: CPT

## 2020-01-13 PROCEDURE — 74011250636 HC RX REV CODE- 250/636: Performed by: INTERNAL MEDICINE

## 2020-01-13 PROCEDURE — 74011000258 HC RX REV CODE- 258: Performed by: INTERNAL MEDICINE

## 2020-01-13 PROCEDURE — 74011250636 HC RX REV CODE- 250/636: Performed by: PODIATRIST

## 2020-01-13 PROCEDURE — 74011250637 HC RX REV CODE- 250/637: Performed by: INTERNAL MEDICINE

## 2020-01-13 PROCEDURE — 65270000029 HC RM PRIVATE

## 2020-01-13 RX ORDER — SODIUM CHLORIDE 9 MG/ML
100 INJECTION, SOLUTION INTRAVENOUS CONTINUOUS
Status: DISPENSED | OUTPATIENT
Start: 2020-01-13 | End: 2020-01-14

## 2020-01-13 RX ORDER — HYDRALAZINE HYDROCHLORIDE 50 MG/1
50 TABLET, FILM COATED ORAL 3 TIMES DAILY
Status: DISCONTINUED | OUTPATIENT
Start: 2020-01-13 | End: 2020-01-14 | Stop reason: HOSPADM

## 2020-01-13 RX ADMIN — AMLODIPINE BESYLATE 10 MG: 5 TABLET ORAL at 08:03

## 2020-01-13 RX ADMIN — CEFEPIME HYDROCHLORIDE 2 G: 2 INJECTION, POWDER, FOR SOLUTION INTRAVENOUS at 00:18

## 2020-01-13 RX ADMIN — HUMAN INSULIN 2 UNITS: 100 INJECTION, SOLUTION SUBCUTANEOUS at 11:47

## 2020-01-13 RX ADMIN — MEROPENEM 500 MG: 500 INJECTION, POWDER, FOR SOLUTION INTRAVENOUS at 22:09

## 2020-01-13 RX ADMIN — HYDRALAZINE HYDROCHLORIDE 50 MG: 50 TABLET, FILM COATED ORAL at 18:01

## 2020-01-13 RX ADMIN — SODIUM CHLORIDE 100 ML/HR: 900 INJECTION, SOLUTION INTRAVENOUS at 15:38

## 2020-01-13 RX ADMIN — METOPROLOL TARTRATE 100 MG: 50 TABLET ORAL at 18:01

## 2020-01-13 RX ADMIN — METOPROLOL TARTRATE 100 MG: 50 TABLET ORAL at 08:03

## 2020-01-13 RX ADMIN — INSULIN GLARGINE 22 UNITS: 100 INJECTION, SOLUTION SUBCUTANEOUS at 18:03

## 2020-01-13 RX ADMIN — Medication 10 ML: at 06:00

## 2020-01-13 RX ADMIN — HUMAN INSULIN 2 UNITS: 100 INJECTION, SOLUTION SUBCUTANEOUS at 18:02

## 2020-01-13 RX ADMIN — INSULIN GLARGINE 22 UNITS: 100 INJECTION, SOLUTION SUBCUTANEOUS at 08:06

## 2020-01-13 RX ADMIN — Medication 10 ML: at 22:08

## 2020-01-13 RX ADMIN — LEVOTHYROXINE SODIUM 25 MCG: 0.03 TABLET ORAL at 07:29

## 2020-01-13 RX ADMIN — CEFEPIME HYDROCHLORIDE 2 G: 2 INJECTION, POWDER, FOR SOLUTION INTRAVENOUS at 13:05

## 2020-01-13 RX ADMIN — HYDROCODONE BITARTRATE AND ACETAMINOPHEN 2 TABLET: 5; 325 TABLET ORAL at 07:28

## 2020-01-13 RX ADMIN — Medication 10 ML: at 13:06

## 2020-01-13 RX ADMIN — HYDRALAZINE HYDROCHLORIDE 50 MG: 50 TABLET, FILM COATED ORAL at 11:46

## 2020-01-13 RX ADMIN — HYDRALAZINE HYDROCHLORIDE 25 MG: 25 TABLET, FILM COATED ORAL at 04:02

## 2020-01-13 RX ADMIN — SODIUM CHLORIDE 100 ML/HR: 900 INJECTION, SOLUTION INTRAVENOUS at 19:02

## 2020-01-13 RX ADMIN — MEROPENEM 500 MG: 500 INJECTION, POWDER, FOR SOLUTION INTRAVENOUS at 15:35

## 2020-01-13 RX ADMIN — HYDROCODONE BITARTRATE AND ACETAMINOPHEN 2 TABLET: 5; 325 TABLET ORAL at 18:10

## 2020-01-13 RX ADMIN — VANCOMYCIN HYDROCHLORIDE 1500 MG: 10 INJECTION, POWDER, LYOPHILIZED, FOR SOLUTION INTRAVENOUS at 09:54

## 2020-01-13 NOTE — PROGRESS NOTES
IV Antibiotic Orders    1. Diagnosis:  Diabetic foot infection with osteomyelitis  2. Routine PICC care  3. Antibiotic:  Ertapenem 1 gram IV Q 24 hours  4. Lab each Monday:   CBC/diff/platelets   BMP   ESR  5. Lab each Thursday:   CBC/diff/platelets   BMP  6. Fax lab to Dr. Noemi Lerma @ [phone].  7.  Call Dr. Noemi Lerma @ [phone] for wbc under 4 or creatinine over 2  8. Duration of therapy: 6 weeks   Please call Dr. Noemi Lerma @ [phone] before stopping therapy. 9.  Allergies:  None to current regimen  10.  Call [phone] with name of [address] [place] [address] and to arrange follow up appointment in 15 days    Amrita Ingram MD

## 2020-01-13 NOTE — PROGRESS NOTES
Problem: Falls - Risk of  Goal: *Absence of Falls  Description  Document Dipak Contreras Fall Risk and appropriate interventions in the flowsheet. Outcome: Progressing Towards Goal  Note: Fall Risk Interventions:  Mobility Interventions: Communicate number of staff needed for ambulation/transfer, OT consult for ADLs, Patient to call before getting OOB, PT Consult for assist device competence, PT Consult for mobility concerns, Utilize walker, cane, or other assistive device         Medication Interventions: Teach patient to arise slowly, Patient to call before getting OOB    Elimination Interventions: Call light in reach, Patient to call for help with toileting needs, Toileting schedule/hourly rounds              Problem: Patient Education: Go to Patient Education Activity  Goal: Patient/Family Education  Outcome: Progressing Towards Goal     Problem: Discharge Planning  Goal: *Discharge to safe environment  Description  See CM note.     Michael Gomez [facility]     Outcome: Progressing Towards Goal     Problem: Patient Education: Go to Patient Education Activity  Goal: Patient/Family Education  Outcome: Progressing Towards Goal

## 2020-01-13 NOTE — PROGRESS NOTES
CORINNA:  1. Waiting PICC line. 2. [facility]   3. Family transport. Patient waiting for PICC line placement. Patient would prefer to go to the [facility] for ABX. Cm faxed Iv ABX orders to [facility] waiting scheduled times. Her daughter will transport at discharge.     [facility]

## 2020-01-13 NOTE — PROGRESS NOTES
Progress Note    Patient: Tamia Gilbert MRN: [id number]  SSN: [id number]    YOB: 1970  Age: 51 y.o. Sex: female      Admit Date: 1/7/2020    Subjective:     Patient seen & examined at bedside. Denies any n/v/f/ns/c.    Objective:     Visit Vitals  /90 (BP 1 Location: Left arm, BP Patient Position: At rest)   Pulse 69   Temp 98.6 °F (37 °C)   Resp 16   Ht 5' 6\" (1.676 m)   Wt 85.7 kg (189 lb)   SpO2 94%   BMI 30.51 kg/m²      Left Foot Exam: dorsal and lateral wounds with deep probe. No pus, no malodor. +mild localized edema    Labs/Radiology Review: images and reports reviewed    Assessment:     Hospital Problems  Date Reviewed: 1/10/2020          Codes Class Noted POA    Abscess of bursa of left foot ICD-10-CM: M71.072  ICD-9-CM: 727.89  1/8/2020 Unknown        Noncompliance with treatment plan ICD-10-CM: Z91.11  ICD-9-CM: V15.81  1/8/2020 Unknown        Closed dislocation of tarsometatarsal joint, left, subsequent encounter ICD-10-CM: S93.325D  ICD-9-CM: V58.89  1/8/2020 Unknown        Cellulitis of foot, left ICD-10-CM: L03.116  ICD-9-CM: 682.7  1/7/2020 Unknown        Type 2 diabetes mellitus with Charcot's joint of left foot ([facility] ICD-10-CM: E11.610  ICD-9-CM: 250.60, 713.5  11/14/2019 Yes        Charcot's joint of left foot ICD-10-CM: Y85.420  ICD-9-CM: 094.0, 713.5  11/14/2019 Yes        Acute osteomyelitis of left foot ([facility] ICD-10-CM: M86.172  ICD-9-CM: 730.07  10/7/2019 Yes        Uncontrolled type 2 diabetes mellitus with peripheral neuropathy [facility]) ICD-10-CM: E11.42, E11.65  ICD-9-CM: 250.62, 357.2  10/7/2019 Yes              Plan/Recommendations/Medical Decision Making:     -New packing/DSD/Ace applied. Continue LWC. Discussed with patient that she will need to continue packing the wound on discharge.  -Continue antibxs. Surgical bone culture growing enterobacter, and alpha strep (sensitivities pending).  Previous wound culture earlier this admission grew klebsiella, enterobacter. Patient understands need for long-term antibiotics. .  -Continue strict nwb to left foot. Patient has walker at home. Also has script for rolling knee walker at home.  -No further surgery planned for this admission. She understands that this is part of a multi-staged attempt at limb salvage, and that she will need to be NWB to the LLE and be on long-term abx. She also understands that she is still at risk of progressing to a left BKA.

## 2020-01-13 NOTE — PROGRESS NOTES
PICC order noted. Has 2 peripheral IV access for now. Spoke to RN to call us if PICC is needed prior to discharge.

## 2020-01-13 NOTE — PROGRESS NOTES
ID Progress Note  2020    Subjective:     She states that she is feeling okay. Objective:     Antibiotics:  1. Vancomycin  2. Cefepime      Vitals:   Visit Vitals  /90 (BP 1 Location: Left arm, BP Patient Position: At rest)   Pulse 69   Temp 98.6 °F (37 °C)   Resp 16   Ht 5' 6\" (1.676 m)   Wt 85.7 kg (189 lb)   LMP 2011   SpO2 94%   BMI 30.51 kg/m²        Tmax:  Temp (24hrs), Av.3 °F (36.8 °C), Min:98.1 °F (36.7 °C), Max:98.6 °F (37 °C)      Exam:  Lungs:  clear to auscultation bilaterally  Heart:  regular rate and rhythm  Abdomen:  soft, non-tender. Bowel sounds normal. No masses,  no organomegaly  Her foot is dressed and dry. There is no proximal or distal cellulitis. Labs:      Recent Labs     20  0022 20  1810   WBC  --  7.9   HGB  --  7.7*   PLT  --  432*   BUN 25* 26*   CREA 1.26* 1.30*       Cultures:     No results found for: SDES  Lab Results   Component Value Date/Time    Culture result: NO GROWTH 2 DAYS 2020 06:10 PM    Culture result: NO FUNGUS ISOLATED 3 DAYS 01/10/2020 06:35 PM    Culture result: SCANT ENTEROBACTER CLOACAE (A) 01/10/2020 06:35 PM    Culture result: (A) 01/10/2020 06:35 PM     LIGHT ALPHA STREPTOCOCCUS (SENSITIVITIES PERFORMED BY E-TEST)       Radiology:     Line/Insert Date:           Assessment:     1. Charcot foot with secondary abscessshe is status post OR  2. Alpha strep bacteremia not certain of significance as this can be a contaminant. It can certainly infect soft tissues, as well. 3. Bone culture positive for enterobacter  4. She has significant care compliance issues    Objective:     1. Continue current therapy  2. Follow-up cultures and studies  3.  She will need an extended course of antibiotic therapy, PICC line, etc.  4. Orders are on the chart for home health    Ede Whittington MD

## 2020-01-13 NOTE — PROGRESS NOTES
[facility]  Hospitalist Group                                                                                          Hospitalist Progress Note  Jacob Mckay MD  Answering service: [phone] from in house phone        Date of Service:  2020  NAME:  Hina Mohr  :  1970  MRN:  [id number]      Admission Summary:     Patient is a 28-year-old female with medical history significant for diabetes mellitus, hypertension, hypothyroidism, chronic anemia and recurrent foot infection with osteomyelitis who presents to the emergency department with chief complaint of worsening left foot wound. Patient reports she had surgery (partial left fifth metatarsal removal) done in October , noted to have foot abscess requiring drainage the following months. She reports over the past few days she noted increased pain, swelling with overlying hyperemia and purulent drainage over the dorsum of her left foot. She was seen by her podiatrist today, recommend MRI with contrast to rule out concerning osteomyelitis. She denies any fever, chills, nausea, vomiting, shortness of breath, chest pain, palpitation, urinary or bowel complaints. Interval history / Subjective:       Patient seen and examined. Labs chart and imaging reviewed  She is doing better today. She is to follow outpatient with podiatry she needs to take IV antibiotics which have been prescribed by Dr. Jeovanny Noe  I have ordered the PICC line and  Possible discharge today       Assessment & Plan:     Left foot purulent cellulitis with osteo ,  In the setting of Charcot neuroarthropathy of the left foot  MRI reviewed positive for osteomyelitis. On cefepime and cont Vanc ,   Podiatry consult appreciated. Vascular surgery consulted. Follow-up blood cultures show alpha Streptococcus. Wound culture showed Klebsiella and Enterobacter. Only definitive treatment is BKA at this point as emphasized by vascular surgery and podiatry. But the patient does not want BKA  S/p I and D on 1/10/2020 podiatry to follow-up tomorrow  Follow-up the new blood cultures negative so far      Alpha strep bacteremia  On vancomycin. ID consulted. 2D echo pending for vegetations    Diabetes mellitus  Restart her Lantus home dose which was held because of hypoglycemia 2 days ago  SSI  Accu-Cheks  Stop the dextrose given she is normoglycemic now        Hypertension  Continue home meds amlodipine and Lopressor  Hydralazine as needed     Chronic normocytic anemia  Likely ACD  Hemoglobin at baseline  Hemoglobin is 6.7 transfuse 1 unit of blood on 1/8/2020.   Check hemoglobin in the morning     Hypothyroidism  Continue home Synthroid     Patient's Baseline: Ambulates with walking  DVT ppx: SCD  Code status: Full  Disposition: TBD  Code status:   DVT prophylaxis:     Care Plan discussed with: Patient/Family  Disposition: Home w/Family and TBD     Hospital Problems  Date Reviewed: 1/10/2020          Codes Class Noted POA    Abscess of bursa of left foot ICD-10-CM: M71.072  ICD-9-CM: 727.89  1/8/2020 Unknown        Noncompliance with treatment plan ICD-10-CM: Z91.11  ICD-9-CM: V15.81  1/8/2020 Unknown        Closed dislocation of tarsometatarsal joint, left, subsequent encounter ICD-10-CM: S93.325D  ICD-9-CM: V58.89  1/8/2020 Unknown        Cellulitis of foot, left ICD-10-CM: L03.116  ICD-9-CM: 682.7  1/7/2020 Unknown        Type 2 diabetes mellitus with Charcot's joint of left foot ([facility] ICD-10-CM: E11.610  ICD-9-CM: 250.60, 713.5  11/14/2019 Yes        Charcot's joint of left foot ICD-10-CM: A79.169  ICD-9-CM: 094.0, 713.5  11/14/2019 Yes        Acute osteomyelitis of left foot ([facility] ICD-10-CM: X30.945  ICD-9-CM: 730.07  10/7/2019 Yes        Uncontrolled type 2 diabetes mellitus with peripheral neuropathy [facility]) ICD-10-CM: E11.42, E11.65  ICD-9-CM: 250.62, 357.2  10/7/2019 Yes                Review of Systems:   A comprehensive review of systems was negative except for that written in the HPI. Vital Signs:    Last 24hrs VS reviewed since prior progress note. Most recent are:  Visit Vitals  /86 (BP 1 Location: Left arm, BP Patient Position: At rest)   Pulse 66   Temp 98 °F (36.7 °C)   Resp 16   Ht 5' 6\" (1.676 m)   Wt 85.7 kg (189 lb)   SpO2 94%   BMI 30.51 kg/m²       No intake or output data in the 24 hours ending 01/13/20 1455     Physical Examination:             Constitutional:  No acute distress, cooperative, pleasant   ENT:  Oral mucous moist, oropharynx benign. Resp:  CTA bilaterally. No wheezing/rhonchi/rales. No accessory muscle use   CV:  Regular rhythm, normal rate, no murmurs, gallops, rubs    GI:  Soft, non distended, non tender. normoactive bowel sounds, no hepatosplenomegaly     Musculoskeletal:      No edema, warm, 2+ pulses throughout    Neurologic:  Moves all extremities. AAOx3, CN II-XII reviewed           Data Review:    Review and/or order of clinical lab test      Labs:     Recent Labs     01/11/20  1810   WBC 7.9   HGB 7.7*   HCT 26.1*   *     Recent Labs     01/13/20  0022 01/11/20  1810    132*   K 4.3 5.0    101   CO2 23 26   BUN 25* 26*   CREA 1.26* 1.30*   * 355*   CA 7.9* 8.4*     No results for input(s): SGOT, GPT, ALT, AP, TBIL, TBILI, TP, ALB, GLOB, GGT, AML, LPSE in the last 72 hours. No lab exists for component: AMYP, HLPSE  No results for input(s): INR, PTP, APTT, INREXT, INREXT in the last 72 hours. No results for input(s): FE, TIBC, PSAT, FERR in the last 72 hours. Lab Results   Component Value Date/Time    Folate 16.2 11/13/2019 04:28 PM      No results for input(s): PH, PCO2, PO2 in the last 72 hours. No results for input(s): CPK, CKNDX, TROIQ in the last 72 hours.     No lab exists for component: CPKMB  Lab Results   Component Value Date/Time    Cholesterol, total 176 10/03/2014 03:56 AM    HDL Cholesterol 61 10/03/2014 03:56 AM    LDL, calculated 90.8 10/03/2014 03:56 AM    Triglyceride 121 10/03/2014 03:56 AM    CHOL/HDL Ratio 2.9 10/03/2014 03:56 AM     Lab Results   Component Value Date/Time    Glucose (POC) 189 (H) 01/13/2020 11:36 AM    Glucose (POC) 118 (H) 01/13/2020 07:26 AM    Glucose (POC) 248 (H) 01/12/2020 10:04 PM    Glucose (POC) 262 (H) 01/12/2020 03:59 PM    Glucose (POC) 303 (H) 01/12/2020 11:00 AM     Lab Results   Component Value Date/Time    Color YELLOW/STRAW 11/13/2019 04:33 PM    Appearance CLEAR 11/13/2019 04:33 PM    Specific gravity 1.014 11/13/2019 04:33 PM    pH (UA) 5.5 11/13/2019 04:33 PM    Protein 100 (A) 11/13/2019 04:33 PM    Glucose 250 (A) 11/13/2019 04:33 PM    Ketone NEGATIVE  11/13/2019 04:33 PM    Bilirubin NEGATIVE  11/13/2019 04:33 PM    Urobilinogen 0.2 11/13/2019 04:33 PM    Nitrites NEGATIVE  11/13/2019 04:33 PM    Leukocyte Esterase NEGATIVE  11/13/2019 04:33 PM    Epithelial cells MODERATE (A) 11/13/2019 04:33 PM    Bacteria NEGATIVE  11/13/2019 04:33 PM    WBC 0-4 11/13/2019 04:33 PM    RBC 0-5 11/13/2019 04:33 PM         Medications Reviewed:     Current Facility-Administered Medications   Medication Dose Route Frequency    hydrALAZINE (APRESOLINE) tablet 50 mg  50 mg Oral TID    0.9% sodium chloride infusion  100 mL/hr IntraVENous CONTINUOUS    meropenem (MERREM) 500 mg in 0.9% sodium chloride (MBP/ADV) 50 mL  0.5 g IntraVENous Q6H    HYDROcodone-acetaminophen (NORCO) 5-325 mg per tablet 2 Tab  2 Tab Oral Q4H PRN    amLODIPine (NORVASC) tablet 10 mg  10 mg Oral DAILY    insulin glargine (LANTUS) injection 22 Units  22 Units SubCUTAneous BID    levothyroxine (SYNTHROID) tablet 25 mcg  25 mcg Oral ACB    metoprolol tartrate (LOPRESSOR) tablet 100 mg  100 mg Oral BID    sodium chloride (NS) flush 5-40 mL  5-40 mL IntraVENous Q8H    sodium chloride (NS) flush 5-40 mL  5-40 mL IntraVENous PRN    acetaminophen (TYLENOL) tablet 650 mg  650 mg Oral Q4H PRN    morphine injection 1 mg  1 mg IntraVENous Q4H PRN    ondansetron (ZOFRAN) injection 4 mg 4 mg IntraVENous Q4H PRN    insulin regular (NOVOLIN R, HUMULIN R) injection   SubCUTAneous AC&HS    glucose chewable tablet 16 g  4 Tab Oral PRN    glucagon (GLUCAGEN) injection 1 mg  1 mg IntraMUSCular PRN    dextrose 10% infusion 0-250 mL  0-250 mL IntraVENous PRN     ______________________________________________________________________  EXPECTED LENGTH OF STAY: - - -  ACTUAL LENGTH OF STAY:          6                 Sandra Abernathy MD

## 2020-01-13 NOTE — PROGRESS NOTES
Notified Pharmacist re: lose of IV Access. Waiting to hear from [facility] [address] re: PICC line placement.

## 2020-01-14 VITALS
HEART RATE: 68 BPM | WEIGHT: 189 LBS | SYSTOLIC BLOOD PRESSURE: 164 MMHG | RESPIRATION RATE: 16 BRPM | DIASTOLIC BLOOD PRESSURE: 86 MMHG | TEMPERATURE: 97.3 F | BODY MASS INDEX: 30.37 KG/M2 | OXYGEN SATURATION: 97 % | HEIGHT: 66 IN

## 2020-01-14 LAB
ANION GAP SERPL CALC-SCNC: 6 MMOL/L (ref 5–15)
BASOPHILS # BLD: 0.1 K/UL (ref 0–0.1)
BASOPHILS NFR BLD: 1 % (ref 0–1)
BUN SERPL-MCNC: 25 MG/DL (ref 6–20)
BUN/CREAT SERPL: 23 (ref 12–20)
CALCIUM SERPL-MCNC: 8.4 MG/DL (ref 8.5–10.1)
CHLORIDE SERPL-SCNC: 110 MMOL/L (ref 97–108)
CO2 SERPL-SCNC: 21 MMOL/L (ref 21–32)
CREAT SERPL-MCNC: 1.09 MG/DL (ref 0.55–1.02)
DIFFERENTIAL METHOD BLD: ABNORMAL
EOSINOPHIL # BLD: 0.4 K/UL (ref 0–0.4)
EOSINOPHIL NFR BLD: 6 % (ref 0–7)
ERYTHROCYTE [DISTWIDTH] IN BLOOD BY AUTOMATED COUNT: 17.1 % (ref 11.5–14.5)
GLUCOSE BLD STRIP.AUTO-MCNC: 91 MG/DL (ref 65–100)
GLUCOSE BLD STRIP.AUTO-MCNC: 91 MG/DL (ref 65–100)
GLUCOSE SERPL-MCNC: 71 MG/DL (ref 65–100)
HCT VFR BLD AUTO: 26.9 % (ref 35–47)
HGB BLD-MCNC: 8 G/DL (ref 11.5–16)
IMM GRANULOCYTES # BLD AUTO: 0 K/UL (ref 0–0.04)
IMM GRANULOCYTES NFR BLD AUTO: 0 % (ref 0–0.5)
LYMPHOCYTES # BLD: 1.4 K/UL (ref 0.8–3.5)
LYMPHOCYTES NFR BLD: 20 % (ref 12–49)
MCH RBC QN AUTO: 26.3 PG (ref 26–34)
MCHC RBC AUTO-ENTMCNC: 29.7 G/DL (ref 30–36.5)
MCV RBC AUTO: 88.5 FL (ref 80–99)
MONOCYTES # BLD: 0.7 K/UL (ref 0–1)
MONOCYTES NFR BLD: 10 % (ref 5–13)
NEUTS SEG # BLD: 4.3 K/UL (ref 1.8–8)
NEUTS SEG NFR BLD: 63 % (ref 32–75)
NRBC # BLD: 0 K/UL (ref 0–0.01)
NRBC BLD-RTO: 0 PER 100 WBC
PLATELET # BLD AUTO: 429 K/UL (ref 150–400)
PMV BLD AUTO: 10.2 FL (ref 8.9–12.9)
POTASSIUM SERPL-SCNC: 4 MMOL/L (ref 3.5–5.1)
RBC # BLD AUTO: 3.04 M/UL (ref 3.8–5.2)
SERVICE CMNT-IMP: NORMAL
SERVICE CMNT-IMP: NORMAL
SODIUM SERPL-SCNC: 137 MMOL/L (ref 136–145)
WBC # BLD AUTO: 6.9 K/UL (ref 3.6–11)

## 2020-01-14 PROCEDURE — 36415 COLL VENOUS BLD VENIPUNCTURE: CPT

## 2020-01-14 PROCEDURE — 76937 US GUIDE VASCULAR ACCESS: CPT

## 2020-01-14 PROCEDURE — 82962 GLUCOSE BLOOD TEST: CPT

## 2020-01-14 PROCEDURE — 74011250637 HC RX REV CODE- 250/637: Performed by: INTERNAL MEDICINE

## 2020-01-14 PROCEDURE — 74011250637 HC RX REV CODE- 250/637: Performed by: PODIATRIST

## 2020-01-14 PROCEDURE — 80048 BASIC METABOLIC PNL TOTAL CA: CPT

## 2020-01-14 PROCEDURE — C1751 CATH, INF, PER/CENT/MIDLINE: HCPCS

## 2020-01-14 PROCEDURE — 74011000258 HC RX REV CODE- 258: Performed by: INTERNAL MEDICINE

## 2020-01-14 PROCEDURE — 74011250636 HC RX REV CODE- 250/636: Performed by: INTERNAL MEDICINE

## 2020-01-14 PROCEDURE — 74011636637 HC RX REV CODE- 636/637: Performed by: STUDENT IN AN ORGANIZED HEALTH CARE EDUCATION/TRAINING PROGRAM

## 2020-01-14 PROCEDURE — 77030020365 HC SOL INJ SOD CL 0.9% 50ML

## 2020-01-14 PROCEDURE — 85025 COMPLETE CBC W/AUTO DIFF WBC: CPT

## 2020-01-14 RX ORDER — HYDRALAZINE HYDROCHLORIDE 50 MG/1
100 TABLET, FILM COATED ORAL 3 TIMES DAILY
Qty: 90 TAB | Refills: 4 | Status: SHIPPED | OUTPATIENT
Start: 2020-01-14

## 2020-01-14 RX ORDER — HYDROCODONE BITARTRATE AND ACETAMINOPHEN 5; 325 MG/1; MG/1
1-2 TABLET ORAL
Qty: 60 TAB | Refills: 0 | Status: SHIPPED | OUTPATIENT
Start: 2020-01-14 | End: 2020-01-28

## 2020-01-14 RX ORDER — ERTAPENEM 1 G/1
1 INJECTION, POWDER, LYOPHILIZED, FOR SOLUTION INTRAMUSCULAR; INTRAVENOUS EVERY 24 HOURS
Qty: 42 EACH | Refills: 0 | Status: SHIPPED
Start: 2020-01-14

## 2020-01-14 RX ADMIN — HYDROCODONE BITARTRATE AND ACETAMINOPHEN 2 TABLET: 5; 325 TABLET ORAL at 12:14

## 2020-01-14 RX ADMIN — AMLODIPINE BESYLATE 10 MG: 5 TABLET ORAL at 08:48

## 2020-01-14 RX ADMIN — Medication 10 ML: at 07:12

## 2020-01-14 RX ADMIN — MEROPENEM 500 MG: 500 INJECTION, POWDER, FOR SOLUTION INTRAVENOUS at 08:47

## 2020-01-14 RX ADMIN — HYDRALAZINE HYDROCHLORIDE 50 MG: 50 TABLET, FILM COATED ORAL at 04:11

## 2020-01-14 RX ADMIN — MEROPENEM 500 MG: 500 INJECTION, POWDER, FOR SOLUTION INTRAVENOUS at 15:33

## 2020-01-14 RX ADMIN — INSULIN GLARGINE 22 UNITS: 100 INJECTION, SOLUTION SUBCUTANEOUS at 08:48

## 2020-01-14 RX ADMIN — HYDROCODONE BITARTRATE AND ACETAMINOPHEN 2 TABLET: 5; 325 TABLET ORAL at 01:17

## 2020-01-14 RX ADMIN — LEVOTHYROXINE SODIUM 25 MCG: 0.03 TABLET ORAL at 07:12

## 2020-01-14 RX ADMIN — METOPROLOL TARTRATE 100 MG: 50 TABLET ORAL at 08:48

## 2020-01-14 RX ADMIN — HYDRALAZINE HYDROCHLORIDE 50 MG: 50 TABLET, FILM COATED ORAL at 12:14

## 2020-01-14 RX ADMIN — MEROPENEM 500 MG: 500 INJECTION, POWDER, FOR SOLUTION INTRAVENOUS at 04:11

## 2020-01-14 NOTE — PROGRESS NOTES
10:28 AM    Received updates from [facility] that they are able to schedule patient for services. Requesting a new updated referral form. [facility] to reach out to ID to obtain updated order. CM will continue to follow up and assist with CORINNA needs as they arise. 10:46 AM  Patient has been scheduled with [facility] for IV Antibiotics.   F/U placed on AVS for 1/15/20 at 10:00 am.      Lenord Lombard, MSW/ARTI  Care Management

## 2020-01-14 NOTE — PROGRESS NOTES
Bedside and Verbal shift change report given to Prashant Leonardo RN (oncoming nurse) by Erin Corcoran (offgoing nurse). Report included the following information SBAR, Kardex, OR Summary, Intake/Output, MAR and Recent Results.

## 2020-01-14 NOTE — PROCEDURES
PICC Placement Note    PRE-PROCEDURE VERIFICATION  Correct Procedure: yes  Correct Site:  yes  Temperature: Temp: 97.5 °F (36.4 °C), Temperature Source: Temp Source: Oral  Recent Labs     01/14/20  0106   BUN 25*   CREA 1.09*   *   WBC 6.9     Allergies: Chlorhexidine  Education materials, including PICC Booklet, for PICC Care given to patient: yes. See Patient Education activity for further details. PROCEDURE DETAIL  A double lumen PICC line was started for Home IV Therapy. The following documentation is in addition to the PICC properties in the lines/airways flowsheet :  Lot #: [id number]  Was xylocaine 1% used intradermally:  yes  Catheter Length: 39 (cm)  Vein Selection for PICC:right basilic  Central Line Bundle followed yes  Complication Related to Insertion: none    The placement was verified by ECG/Sapiens technology: The  tip location is on the right side and the tip is in the  superior vena cava. See ECG results for PICC tip placement. Report given to nurse Qing Guevara. Line is okay to use.     Everton Trejo RN

## 2020-01-14 NOTE — PROGRESS NOTES
ID Progress Note  2020    Subjective:     She states that she is feeling okay. Objective:     Antibiotics:  1. Vancomycin  2. Cefepime      Vitals:   Visit Vitals  /86   Pulse 68   Temp 97.3 °F (36.3 °C)   Resp 16   Ht 5' 6\" (1.676 m)   Wt 85.7 kg (189 lb)   LMP 2011   SpO2 97%   BMI 30.51 kg/m²        Tmax:  Temp (24hrs), Av.7 °F (36.5 °C), Min:97.3 °F (36.3 °C), Max:98.2 °F (36.8 °C)      Exam:  Lungs:  clear to auscultation bilaterally  Heart:  regular rate and rhythm  Abdomen:  soft, non-tender. Bowel sounds normal. No masses,  no organomegaly  Her foot is dressed and dry. There is no proximal or distal cellulitis. Labs:      Recent Labs     20  0106 20  0022 20  1810   WBC 6.9  --  7.9   HGB 8.0*  --  7.7*   *  --  432*   BUN 25* 25* 26*   CREA 1.09* 1.26* 1.30*       Cultures:     No results found for: SDES  Lab Results   Component Value Date/Time    Culture result: NO GROWTH 3 DAYS 2020 06:10 PM    Culture result: NO FUNGUS ISOLATED 3 DAYS 01/10/2020 06:35 PM    Culture result: SCANT ENTEROBACTER CLOACAE (A) 01/10/2020 06:35 PM    Culture result: (A) 01/10/2020 06:35 PM     LIGHT ALPHA STREPTOCOCCUS (SENSITIVITIES PERFORMED BY E-TEST)       Radiology:     Line/Insert Date:           Assessment:     1. Charcot foot with secondary abscessshe is status post OR  2. Alpha strep bacteremia not certain of significance as this can be a contaminant. It can certainly infect soft tissues, as well. 3. Bone culture positive for enterobacter  4. She has significant care compliance issues    Objective:     1. Continue current therapy  2. Follow-up cultures and studies  3.  She will need an extended course of antibiotic therapy, PICC line, etc.  4. Orders are on the chart for home health    Brigette Melendez MD

## 2020-01-14 NOTE — DISCHARGE SUMMARY
Discharge Summary       PATIENT ID: Myke Coombs  MRN: [id number]   YOB: 1970    DATE OF ADMISSION: 1/7/2020 10:03 PM    DATE OF DISCHARGE:1/14/2020 3pm  PRIMARY CARE PROVIDER: None     ATTENDING PHYSICIAN: KATELYN  DISCHARGING PROVIDER: Jessica Marrufo MD    To contact this individual call [phone] and ask the  to page. If unavailable ask to be transferred the Adult Hospitalist Department. CONSULTATIONS: IP CONSULT TO PODIATRY  IP CONSULT TO INFECTIOUS DISEASES  IP CONSULT TO VASCULAR SURGERY    PROCEDURES/SURGERIES: Procedure(s): INFUSION CATHETER INSERTION    [facility]MITTING [facility] COURSE:   Patient is a 42-year-old female with medical history significant for diabetes mellitus, hypertension, hypothyroidism, chronic anemia and recurrent foot infection with osteomyelitis who presents to the emergency department with chief complaint of worsening left foot wound. Patient reports she had surgery (partial left fifth metatarsal removal) done in October , noted to have foot abscess requiring drainage the following months. She reports over the past few days she noted increased pain, swelling with overlying hyperemia and purulent drainage over the dorsum of her left foot. She was seen by her podiatrist today, recommend MRI with contrast to rule out concerning osteomyelitis. She denies any fever, chills, nausea, vomiting, shortness of breath, chest pain, palpitation, urinary or bowel complaints.       In the emergency department, V/S was remarkable for tachycardia 100s. She was given a dose of Vanco and Zosyn. DISCHARGE DIAGNOSES / PLAN:      Left foot purulent cellulitis with osteo ,  In the setting of Charcot neuroarthropathy of the left foot  MRI reviewed positive for osteomyelitis. Podiatry consult appreciated. Vascular surgery consulted. Follow-up blood cultures show alpha Streptococcus. Wound culture showed Klebsiella and Enterobacter.   Only definitive treatment is BKA at this point as emphasized by vascular surgery and podiatry. But the patient does not want BKA  S/p I & D on 1/10/2020 home on IV abx for 6 weeks at infusion center via PICC        Alpha strep bacteremia  Treated inpatient with IV vancomycin. ID consulted with DC abx recs     Diabetes mellitus  Stable glucose levels on current insulin regimen        Hypertension  Continue home meds amlodipine and Lopressor  Hydralazine added TID     Chronic anemia of iron deficiency  Transfused this admission   hemoglobin 8 on DC  Iron rx given on DC     Hypothyroidism  Continue home Synthroid     ADDITIONAL CARE RECOMMENDATIONS:   -Maintain NWB to LLE at all times.  -Elevate LLE when sitting.  -Change dressing daily with wet-to-dry dressing at the surgical sites and cover with dry gauze dressing and ACE bandage.  -Follow-up with Dr Aleksandr Cheatham in office next week  [address]   [address] [place] [address]  [phone]    Follow up with Dr Delmy Torres- infectious disease in 15 days  [facility]  [phone]    Have your primary care MD monitor and continue to manage your anemia (iron deficiency), high blood pressure, and kidney function labs - which are consistent with mild chronic kidney disease, and diabetes (glucose levels)    Diabetic, heart healthy (low salt) diet  Continue Home PT    Patient to get daily infusions of IV antibiotics via PICC line starting tomorrow [address]: [address] [place] [address]  Antibiotic:  Ertapenem 1 gram IV Q 24 hours   Lab each Monday: CBC/diff/platelets/ BMP/ ESR  Lab each Thursday: CBC/diff/platelets/ BMP  Fax lab to Dr. Delmy Torres @ [phone].   Call Dr. Delmy Torres @ [phone] for wbc under 4 or creatinine over 2  Duration of therapy: at least 6 weeks       PENDING TEST RESULTS:   At the time of discharge the following test results are still pending: none    FOLLOW UP APPOINTMENTS:    Follow-up Information     Follow up With Specialties Details Why Contact Info    Outpatient Infusion  Go on 1/15/2020 [facility] Iv Antibiotics at 10:00 am [facility]: [address] [place] [address]. Madhuri, Dave6 Lainey Gibsonjoyce    Alexia Ortiz DPM Foot and Ankle Surgery Schedule an appointment as soon as possible for a visit in 1 week  [address]  [address]  [address]      Merlin Marion, MD Infectious Diseases Schedule an appointment as soon as possible for a visit in 15 days  [facility]  [phone]      None    None (395) Patient stated that they have no PCP               DIET: diabetic heart healthy    ACTIVITY: Activity as tolerated    WOUND CARE: see above    EQUIPMENT needed: rollator      DISCHARGE MEDICATIONS:  Current Discharge Medication List      START taking these medications    Details   ertapenem (INVANZ) 1 gram injection 1 g by IntraVENous route every twenty-four (24) hours. Qty: 42 Each, Refills: 0      hydrALAZINE (APRESOLINE) 50 mg tablet Take 2 Tabs by mouth three (3) times daily. Qty: 90 Tab, Refills: 4      HYDROcodone-acetaminophen (NORCO) 5-325 mg per tablet Take 1-2 Tabs by mouth every six (6) hours as needed for Pain for up to 14 days. Max Daily Amount: 8 Tabs. Qty: 60 Tab, Refills: 0    Associated Diagnoses: Cellulitis of foot, left         CONTINUE these medications which have CHANGED    Details   insulin detemir U-100 (LEVEMIR U-100 INSULIN) 100 unit/mL injection 22 Units by SubCUTAneous route two (2) times a day for 31 days.  May substitute with whatever is equivalent and on patient formulary insurance coverage  Qty: 13.2 mL, Refills: 5      insulin regular (NOVOLIN R REGULAR U-100 INSULN) 100 unit/mL injection Sliding scale insulin - patient has previous sliding scale that she has been using  May substitutue with pharmacy equivalent that is covered by patient insurance/formulary  Qty: 2 Vial, Refills: 5         CONTINUE these medications which have NOT CHANGED    Details   amLODIPine (NORVASC) 10 mg tablet Take 10 mg by mouth daily. levothyroxine (SYNTHROID) 25 mcg tablet Take 25 mcg by mouth Daily (before breakfast). metoprolol tartrate (LOPRESSOR) 100 mg IR tablet Take 100 mg by mouth two (2) times a day. Ferrous sulfate 325mg po daily      NOTIFY YOUR PHYSICIAN FOR ANY OF THE FOLLOWING:   Fever over 101 degrees for 24 hours. Chest pain, shortness of breath, fever, chills, nausea, vomiting, diarrhea, change in mentation, falling, weakness, bleeding. Severe pain or pain not relieved by medications. Or, any other signs or symptoms that you may have questions about. DISPOSITION:    Home With:   OT  PT  HH  RN       Long term SNF/Inpatient Rehab   X Independent/    Hospice    Other:       PATIENT CONDITION AT DISCHARGE:     Functional status    Poor     Deconditioned    X Independent      Cognition   X  Lucid     Forgetful     Dementia      Catheters/lines (plus indication)    Green    X PICC     PEG     None      Code status    X Full code     DNR      PHYSICAL EXAMINATION AT DISCHARGE:  Patient Vitals for the past 24 hrs:   Temp Pulse Resp BP SpO2   01/14/20 1430 97.3 °F (36.3 °C) 68 16 164/86 97 %   01/14/20 1214    168/90    01/14/20 0850 97.9 °F (36.6 °C) 72 16 (!) 181/96 96 %   01/14/20 0109 97.5 °F (36.4 °C) 63 16 (!) 176/92 98 %   01/13/20 1938 98.2 °F (36.8 °C) 65 16 152/82 97 %   01/13/20 1800  69  175/82      General:          Alert, cooperative, no distress, appears stated age. HEENT:           Atraumatic, EOMI  Neck:               Supple, symmetrical  Lungs:             Clear to auscultation bilaterally. No Wheezing or Rhonchi. No rales. Chest wall:      No tenderness  No Accessory muscle use. Heart:              Regular  rhythm,  No  murmur   No edema  Abdomen:        Soft, non-tender. Not distended.   Bowel sounds normal  Extremities:     edema and ace wrap on left leg/foot  Skin: Not pale. Not Jaundiced  No rashes   Psych:             Not anxious or agitated.   Neurologic:      Alert, moves all extremities, answers questions appropriately and responds to commands       CHRONIC MEDICAL DIAGNOSES:  Problem List as of 1/14/2020 Date Reviewed: 1/10/2020          Codes Class Noted - Resolved    Abscess of bursa of left foot ICD-10-CM: M71.072  ICD-9-CM: 727.89  1/8/2020 - Present        Noncompliance with treatment plan ICD-10-CM: Z91.11  ICD-9-CM: V15.81  1/8/2020 - Present        Closed dislocation of tarsometatarsal joint, left, subsequent encounter ICD-10-CM: S93.325D  ICD-9-CM: V58.89  1/8/2020 - Present        Cellulitis of foot, left ICD-10-CM: L03.116  ICD-9-CM: 682.7  1/7/2020 - Present        Type 2 diabetes mellitus with Charcot's joint of left foot ([facility] ICD-10-CM: E11.610  ICD-9-CM: 250.60, 713.5  11/14/2019 - Present        Charcot's joint of left foot ICD-10-CM: E22.410  ICD-9-CM: 094.0, 713.5  11/14/2019 - Present        Left foot infection ICD-10-CM: L08.9  ICD-9-CM: 686.9  11/8/2019 - Present        HTN (hypertension) ICD-10-CM: I10  ICD-9-CM: 401.9  10/7/2019 - Present        DM type 2 (diabetes mellitus, type 2) ([facility] ICD-10-CM: E11.9  ICD-9-CM: 250.00  10/7/2019 - Present        Acute osteomyelitis of left foot ([facility] ICD-10-CM: I60.116  ICD-9-CM: 730.07  10/7/2019 - Present        Cellulitis and abscess of toe of left foot ICD-10-CM: L03.032, L02.612  ICD-9-CM: 681.10  10/7/2019 - Present        Uncontrolled type 2 diabetes mellitus with peripheral neuropathy ([facility] ICD-10-CM: E11.42, E11.65  ICD-9-CM: 250.62, 357.2  10/7/2019 - Present        Osteomyelitis of second toe of left foot ([facility] ICD-10-CM: M86.9  ICD-9-CM: 730.27  10/7/2019 - Present        Acute osteomyelitis of metatarsal bone of left foot ([facility] ICD-10-CM: K02.946  ICD-9-CM: 730.07  10/7/2019 - Present        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 ([facility] ICD-10-CM: L34.944, H34.950  ICD-9-CM: 250.80, 707.14  10/7/2019 - Present        Unspecified essential hypertension ICD-10-CM: I10  ICD-9-CM: 401.9  9/1/2011 - Present        Diabetes mellitus type 1, uncontrolled, insulin dependent ([facility] ICD-10-CM: E10.65  ICD-9-CM: 250.03  4/20/2011 - Present        Anxiety state ICD-10-CM: F41.1  ICD-9-CM: 300.00  4/20/2011 - Present        RESOLVED: Osteomyelitis ([facility] ICD-10-CM: M86.9  ICD-9-CM: 730.20  10/2/2014 - 10/12/2014              Greater than 30 minutes were spent with the patient on counseling and coordination of care    Signed:   Davin Ruiz MD  1/14/2020  3:12 PM

## 2020-01-14 NOTE — PROGRESS NOTES
Progress Note    Patient: Ya Esposito MRN: [id number]  SSN: [id number]    YOB: 1970  Age: 51 y.o. Sex: female      Admit Date: 1/7/2020    Subjective:     Patient seen & examined at bedside. Denies any n/v/f/ns/c. Notes that she is going home today. No new issues. Objective:     Visit Vitals  BP (!) 181/96 (BP 1 Location: Left arm, BP Patient Position: At rest)   Pulse 72   Temp 97.9 °F (36.6 °C)   Resp 16   Ht 5' 6\" (1.676 m)   Wt 85.7 kg (189 lb)   SpO2 96%   BMI 30.51 kg/m²      Left Foot Exam: dorsal and lateral wounds with deep probe. No pus, no malodor. +mild localized edema    Labs/Radiology Review: images and reports reviewed    Assessment:     Hospital Problems  Date Reviewed: 1/10/2020          Codes Class Noted POA    Abscess of bursa of left foot ICD-10-CM: M71.072  ICD-9-CM: 727.89  1/8/2020 Unknown        Noncompliance with treatment plan ICD-10-CM: Z91.11  ICD-9-CM: V15.81  1/8/2020 Unknown        Closed dislocation of tarsometatarsal joint, left, subsequent encounter ICD-10-CM: S93.325D  ICD-9-CM: V58.89  1/8/2020 Unknown        Cellulitis of foot, left ICD-10-CM: L03.116  ICD-9-CM: 682.7  1/7/2020 Unknown        Type 2 diabetes mellitus with Charcot's joint of left foot ([facility] ICD-10-CM: E11.610  ICD-9-CM: 250.60, 713.5  11/14/2019 Yes        Charcot's joint of left foot ICD-10-CM: D34.062  ICD-9-CM: 094.0, 713.5  11/14/2019 Yes        Acute osteomyelitis of left foot ([facility] ICD-10-CM: M86.172  ICD-9-CM: 730.07  10/7/2019 Yes        Uncontrolled type 2 diabetes mellitus with peripheral neuropathy [facility]) ICD-10-CM: E11.42, E11.65  ICD-9-CM: 250.62, 357.2  10/7/2019 Yes              Plan/Recommendations/Medical Decision Making:     -New packing/DSD/Ace applied. Continue LWC. Discussed with patient that she will need to continue packing the wound on discharge. I demonstrated and explained how to perform the dressing change at bedside.   Patient verbally related understanding on how to care for her surgical site.  -Continue antibxs. Surgical bone culture growing enterobacter, and alpha strep (sensitivities pending). Previous wound culture earlier this admission grew klebsiella, enterobacter. Patient understands need for long-term antibiotics.  -Continue strict nwb to left foot. Patient has walker at home. Also has script for rolling knee walker at home. Since knee walker is not covered by her insurance plan, patient notes that she is trying to find a used one as she is unsure that she will be able to maintain NWB with a standard walker, as she is tempted to put her foot on the ground.  -No further surgery planned for this admission. She understands that this is part of a multi-staged attempt at limb salvage, and that she will need to be NWB to the LLE and be on long-term abx. She also understands that she is still at risk of progressing to a left BKA. -Patient is stable for discharge from a podiatric standpoint. Discharge Instructions:  -Maintain NWB to LLE at all times.  -Elevate LLE when sitting.  -Change dressing daily with wet-to-dry dressing at the surgical sites and cover with dry gauze dressing and ACE bandage.  -Follow-up with me in office next week.

## 2020-01-14 NOTE — PROGRESS NOTES
I have reviewed discharge instructions with the patient. The patient verbalized understanding. Patient supplied with wound care supplies, prescriptions and printed AVS. Patient signed digital AVS and left hospital with sister.

## 2020-01-14 NOTE — DISCHARGE INSTRUCTIONS
-Maintain NWB to LLE at all times.  -Elevate LLE when sitting.  -Change dressing daily with wet-to-dry dressing at the surgical sites and cover with dry gauze dressing and ACE bandage.  -Follow-up with Dr Cristy Venegas in office next week  [address]   [address] [address] [facility]  [phone]    Follow up with Dr Maryjane White- infectious disease in 15 days  [facility]  [phone]    Have your primary care MD monitor and continue to manage your anemia (iron deficiency), high blood pressure, and kidney function labs - which are consistent with mild chronic kidney disease, and diabetes (glucose levels)    Diabetic, heart healthy (low salt) diet  Continue Home PT    Patient to get daily infusions of IV antibiotics via PICC line starting tomorrow [address]: [address]. [place] [address]  Antibiotic:  Ertapenem 1 gram IV Q 24 hours   Lab each Monday: CBC/diff/platelets/ BMP/ ESR  Lab each Thursday: CBC/diff/platelets/ BMP  Fax lab to Dr. Maryjane White @ [phone].   Call Dr. Maryjane White @ [phone] for wbc under 4 or creatinine over 2  Duration of therapy: at least 6 weeks

## 2020-01-14 NOTE — PROGRESS NOTES
Bedside and Verbal shift change report given to Ghanshyam Cohen (oncoming nurse) by Khoi Ames RN (offgoing nurse). Report included the following information SBAR, Kardex, OR Summary, MAR and Recent Results.

## 2020-01-15 ENCOUNTER — HOSPITAL ENCOUNTER (OUTPATIENT)
Dept: INFUSION THERAPY | Age: 50
Discharge: HOME | End: 2020-01-15
Payer: MEDICAID

## 2020-01-15 VITALS
RESPIRATION RATE: 20 BRPM | SYSTOLIC BLOOD PRESSURE: 138 MMHG | DIASTOLIC BLOOD PRESSURE: 79 MMHG | HEART RATE: 67 BPM | TEMPERATURE: 97.6 F | OXYGEN SATURATION: 98 %

## 2020-01-15 PROCEDURE — 74011000258 HC RX REV CODE- 258: Performed by: INTERNAL MEDICINE

## 2020-01-15 PROCEDURE — 96365 THER/PROPH/DIAG IV INF INIT: CPT

## 2020-01-15 PROCEDURE — 74011250636 HC RX REV CODE- 250/636: Performed by: INTERNAL MEDICINE

## 2020-01-15 RX ORDER — SODIUM CHLORIDE 0.9 % (FLUSH) 0.9 %
5-10 SYRINGE (ML) INJECTION AS NEEDED
Status: CANCELLED | OUTPATIENT
Start: 2020-01-15 | End: 2020-01-16

## 2020-01-15 RX ORDER — HEPARIN 100 UNIT/ML
500 SYRINGE INTRAVENOUS AS NEEDED
Status: CANCELLED | OUTPATIENT
Start: 2020-01-15 | End: 2020-01-16

## 2020-01-15 RX ORDER — HEPARIN 100 UNIT/ML
500 SYRINGE INTRAVENOUS AS NEEDED
Status: DISCONTINUED | OUTPATIENT
Start: 2020-01-15 | End: 2020-01-16 | Stop reason: HOSPADM

## 2020-01-15 RX ORDER — SODIUM CHLORIDE 0.9 % (FLUSH) 0.9 %
5-10 SYRINGE (ML) INJECTION AS NEEDED
Status: DISCONTINUED | OUTPATIENT
Start: 2020-01-15 | End: 2020-01-16 | Stop reason: HOSPADM

## 2020-01-15 RX ADMIN — ERTAPENEM SODIUM 1 G: 1 INJECTION, POWDER, LYOPHILIZED, FOR SOLUTION INTRAMUSCULAR; INTRAVENOUS at 10:26

## 2020-01-15 RX ADMIN — Medication 10 ML: at 10:57

## 2020-01-15 RX ADMIN — Medication 10 ML: at 10:25

## 2020-01-15 RX ADMIN — HEPARIN 500 UNITS: 100 SYRINGE at 10:58

## 2020-01-15 NOTE — PROGRESS NOTES
[address] @ [facility] VISIT NOTE     1020 Patient arrives for Daily Invanz infusions x 6 Weeks (until 02/26/2020) without acute problems. Please see connect care for complete assessment and education provided. Vital signs stable throughout and prior to discharge, Pt. Tolerated treatment well and discharged without incident. Patient is aware of next [facility] appointment on 01/16/2020. Appointment card given to patient. Medications Verified by Paul Babin RN & Leelee Osorio RN via [employer]:  1. Invanz 1g IV  2. NS IV Flush via RUE SL PICC line  3.  Heparin 500 units IV Flush via RUE SL PICC line     VITAL SIGNS  Patient Vitals for the past 12 hrs:   Temp Pulse Resp BP SpO2   01/15/20 1056 97.6 °F (36.4 °C) 67 20 138/79 98 %   01/15/20 1022 97.6 °F (36.4 °C) 70 20 130/51 98 %

## 2020-01-16 ENCOUNTER — HOSPITAL ENCOUNTER (OUTPATIENT)
Dept: INFUSION THERAPY | Age: 50
Discharge: HOME | End: 2020-01-16
Payer: MEDICAID

## 2020-01-16 VITALS
RESPIRATION RATE: 20 BRPM | TEMPERATURE: 97.3 F | DIASTOLIC BLOOD PRESSURE: 88 MMHG | HEART RATE: 73 BPM | SYSTOLIC BLOOD PRESSURE: 162 MMHG | OXYGEN SATURATION: 98 %

## 2020-01-16 LAB
ANION GAP SERPL CALC-SCNC: 4 MMOL/L (ref 5–15)
BACTERIA SPEC CULT: NORMAL
BASOPHILS # BLD: 0.1 K/UL (ref 0–0.1)
BASOPHILS NFR BLD: 1 % (ref 0–1)
BUN SERPL-MCNC: 24 MG/DL (ref 6–20)
BUN/CREAT SERPL: 23 (ref 12–20)
CALCIUM SERPL-MCNC: 8.5 MG/DL (ref 8.5–10.1)
CHLORIDE SERPL-SCNC: 110 MMOL/L (ref 97–108)
CO2 SERPL-SCNC: 25 MMOL/L (ref 21–32)
CREAT SERPL-MCNC: 1.06 MG/DL (ref 0.55–1.02)
DIFFERENTIAL METHOD BLD: ABNORMAL
EOSINOPHIL # BLD: 0.3 K/UL (ref 0–0.4)
EOSINOPHIL NFR BLD: 3 % (ref 0–7)
ERYTHROCYTE [DISTWIDTH] IN BLOOD BY AUTOMATED COUNT: 17.5 % (ref 11.5–14.5)
GLUCOSE SERPL-MCNC: 265 MG/DL (ref 65–100)
HCT VFR BLD AUTO: 27.7 % (ref 35–47)
HGB BLD-MCNC: 8.2 G/DL (ref 11.5–16)
IMM GRANULOCYTES # BLD AUTO: 0 K/UL (ref 0–0.04)
IMM GRANULOCYTES NFR BLD AUTO: 1 % (ref 0–0.5)
LYMPHOCYTES # BLD: 1.4 K/UL (ref 0.8–3.5)
LYMPHOCYTES NFR BLD: 18 % (ref 12–49)
MCH RBC QN AUTO: 26.4 PG (ref 26–34)
MCHC RBC AUTO-ENTMCNC: 29.6 G/DL (ref 30–36.5)
MCV RBC AUTO: 89.1 FL (ref 80–99)
MONOCYTES # BLD: 0.5 K/UL (ref 0–1)
MONOCYTES NFR BLD: 6 % (ref 5–13)
NEUTS SEG # BLD: 5.7 K/UL (ref 1.8–8)
NEUTS SEG NFR BLD: 71 % (ref 32–75)
NRBC # BLD: 0 K/UL (ref 0–0.01)
NRBC BLD-RTO: 0 PER 100 WBC
PLATELET # BLD AUTO: 492 K/UL (ref 150–400)
PMV BLD AUTO: 10.6 FL (ref 8.9–12.9)
POTASSIUM SERPL-SCNC: 3.9 MMOL/L (ref 3.5–5.1)
RBC # BLD AUTO: 3.11 M/UL (ref 3.8–5.2)
SERVICE CMNT-IMP: NORMAL
SODIUM SERPL-SCNC: 139 MMOL/L (ref 136–145)
WBC # BLD AUTO: 7.9 K/UL (ref 3.6–11)

## 2020-01-16 PROCEDURE — 80048 BASIC METABOLIC PNL TOTAL CA: CPT

## 2020-01-16 PROCEDURE — 74011250636 HC RX REV CODE- 250/636: Performed by: INTERNAL MEDICINE

## 2020-01-16 PROCEDURE — 36415 COLL VENOUS BLD VENIPUNCTURE: CPT

## 2020-01-16 PROCEDURE — 96365 THER/PROPH/DIAG IV INF INIT: CPT

## 2020-01-16 PROCEDURE — 74011000258 HC RX REV CODE- 258: Performed by: INTERNAL MEDICINE

## 2020-01-16 PROCEDURE — 85025 COMPLETE CBC W/AUTO DIFF WBC: CPT

## 2020-01-16 RX ORDER — SODIUM CHLORIDE 0.9 % (FLUSH) 0.9 %
5-10 SYRINGE (ML) INJECTION AS NEEDED
Status: DISCONTINUED | OUTPATIENT
Start: 2020-01-16 | End: 2020-01-17 | Stop reason: HOSPADM

## 2020-01-16 RX ORDER — HEPARIN 100 UNIT/ML
500 SYRINGE INTRAVENOUS AS NEEDED
Status: DISCONTINUED | OUTPATIENT
Start: 2020-01-16 | End: 2020-01-17 | Stop reason: HOSPADM

## 2020-01-16 RX ADMIN — Medication 10 ML: at 15:05

## 2020-01-16 RX ADMIN — ERTAPENEM SODIUM 1 G: 1 INJECTION, POWDER, LYOPHILIZED, FOR SOLUTION INTRAMUSCULAR; INTRAVENOUS at 14:27

## 2020-01-16 RX ADMIN — HEPARIN 500 UNITS: 100 SYRINGE at 15:06

## 2020-01-16 RX ADMIN — Medication 10 ML: at 14:26

## 2020-01-16 NOTE — PROGRESS NOTES
[address] @ [facility] VISIT NOTE     1410 Patient arrives for Daily Invanz Infusions x 6 Weeks (until 02/26/2020) without acute problems. Please see connect care for complete assessment and education provided. Vital signs stable throughout and prior to discharge, Pt. Tolerated treatment well and discharged without incident. Patient/daughter is aware of next [facility] appointment on 01/17/2020. Appointment card given to patient/parents. Medications Verified by Joey Tolliver RN & Lucie Arceo RN via [employer]:  1. Invanz 1g IV  2. NS IV SASH Flush via RUE SL PICC line  3. Heparin 500 units IV Flush via RUE SL PICC line     VITAL SIGNS  Patient Vitals for the past 12 hrs:   Temp Pulse Resp BP SpO2   01/16/20 1510 97.3 °F (36.3 °C) 73  162/88    01/16/20 1420    160/88    01/16/20 1412 97.4 °F (36.3 °C) 88 20 (!) 178/94 98 %       LAB WORK  Recent Results (from the past 12 hour(s))   CBC WITH AUTOMATED DIFF    Collection Time: 01/16/20  2:16 PM   Result Value Ref Range    WBC 7.9 3.6 - 11.0 K/uL    RBC 3.11 (L) 3.80 - 5.20 M/uL    HGB 8.2 (L) 11.5 - 16.0 g/dL    HCT 27.7 (L) 35.0 - 47.0 %    MCV 89.1 80.0 - 99.0 FL    MCH 26.4 26.0 - 34.0 PG    MCHC 29.6 (L) 30.0 - 36.5 g/dL    RDW 17.5 (H) 11.5 - 14.5 %    PLATELET 062 (H) 806 - 400 K/uL    MPV 10.6 8.9 - 12.9 FL    NRBC 0.0 0  WBC    ABSOLUTE NRBC 0.00 0.00 - 0.01 K/uL    NEUTROPHILS 71 32 - 75 %    LYMPHOCYTES 18 12 - 49 %    MONOCYTES 6 5 - 13 %    EOSINOPHILS 3 0 - 7 %    BASOPHILS 1 0 - 1 %    IMMATURE GRANULOCYTES 1 (H) 0.0 - 0.5 %    ABS. NEUTROPHILS 5.7 1.8 - 8.0 K/UL    ABS. LYMPHOCYTES 1.4 0.8 - 3.5 K/UL    ABS. MONOCYTES 0.5 0.0 - 1.0 K/UL    ABS. EOSINOPHILS 0.3 0.0 - 0.4 K/UL    ABS. BASOPHILS 0.1 0.0 - 0.1 K/UL    ABS. IMM.  GRANS. 0.0 0.00 - 0.04 K/UL    DF AUTOMATED     METABOLIC PANEL, BASIC    Collection Time: 01/16/20  2:16 PM   Result Value Ref Range    Sodium 139 136 - 145 mmol/L    Potassium 3.9 3.5 - 5.1 mmol/L    Chloride 110 (H) 97 - 108 mmol/L    CO2 25 21 - 32 mmol/L    Anion gap 4 (L) 5 - 15 mmol/L    Glucose 265 (H) 65 - 100 mg/dL    BUN 24 (H) 6 - 20 MG/DL    Creatinine 1.06 (H) 0.55 - 1.02 MG/DL    BUN/Creatinine ratio 23 (H) 12 - 20      GFR est AA >60 >60 ml/min/1.73m2    GFR est non-AA 55 (L) >60 ml/min/1.73m2    Calcium 8.5 8.5 - 10.1 MG/DL

## 2020-01-17 ENCOUNTER — HOSPITAL ENCOUNTER (OUTPATIENT)
Dept: INFUSION THERAPY | Age: 50
Discharge: HOME | End: 2020-01-17
Payer: MEDICAID

## 2020-01-17 VITALS
OXYGEN SATURATION: 97 % | DIASTOLIC BLOOD PRESSURE: 82 MMHG | RESPIRATION RATE: 18 BRPM | TEMPERATURE: 98.4 F | SYSTOLIC BLOOD PRESSURE: 158 MMHG

## 2020-01-17 PROCEDURE — 96365 THER/PROPH/DIAG IV INF INIT: CPT

## 2020-01-17 PROCEDURE — 74011000258 HC RX REV CODE- 258: Performed by: INTERNAL MEDICINE

## 2020-01-17 PROCEDURE — 74011250636 HC RX REV CODE- 250/636: Performed by: INTERNAL MEDICINE

## 2020-01-17 RX ORDER — SODIUM CHLORIDE 0.9 % (FLUSH) 0.9 %
10 SYRINGE (ML) INJECTION AS NEEDED
Status: DISCONTINUED | OUTPATIENT
Start: 2020-01-17 | End: 2020-01-18 | Stop reason: HOSPADM

## 2020-01-17 RX ORDER — HEPARIN 100 UNIT/ML
500 SYRINGE INTRAVENOUS AS NEEDED
Status: DISCONTINUED | OUTPATIENT
Start: 2020-01-17 | End: 2020-01-21 | Stop reason: HOSPADM

## 2020-01-17 RX ADMIN — ERTAPENEM SODIUM 1 G: 1 INJECTION, POWDER, LYOPHILIZED, FOR SOLUTION INTRAMUSCULAR; INTRAVENOUS at 10:25

## 2020-01-17 RX ADMIN — Medication 10 ML: at 11:02

## 2020-01-17 RX ADMIN — Medication 10 ML: at 10:24

## 2020-01-17 RX ADMIN — Medication 500 UNITS: at 11:03

## 2020-01-17 NOTE — PROGRESS NOTES
[address] [facility] Adult  Hospitalist Group                                                                                          Hospitalist Progress Note  Jame Sever, MD  Answering service: [phone] from in house phone        Date of Service:  2020  NAME:  Arsen Horton  :  1970  MRN:  [id number]      Admission Summary:     Patient is a 27-year-old female with medical history significant for diabetes mellitus, hypertension, hypothyroidism, chronic anemia and recurrent foot infection with osteomyelitis who presents to the emergency department with chief complaint of worsening left foot wound. Patient reports she had surgery (partial left fifth metatarsal removal) done in October , noted to have foot abscess requiring drainage the following months. She reports over the past few days she noted increased pain, swelling with overlying hyperemia and purulent drainage over the dorsum of her left foot. She was seen by her podiatrist today, recommend MRI with contrast to rule out concerning osteomyelitis. She denies any fever, chills, nausea, vomiting, shortness of breath, chest pain, palpitation, urinary or bowel complaints. Interval history / Subjective:       Patient seen and examined. Labs chart and imaging reviewed  She has lost her iv access. Will get central line            Assessment & Plan:     Left foot purulent cellulitis with osteo ,  In the setting of Charcot neuroarthropathy of the left foot  MRI reviewed positive for osteomyelitis. On cefepime and cont Vanc ,   Podiatry consult appreciated. Vascular surgery consulted. Follow-up blood cultures show alpha Streptococcus. Wound culture showed Klebsiella and Enterobacter. Only definitive treatment is BKA at this point as emphasized by vascular surgery and podiatry. But the patient does not want BKA  S/p I and D on 1/10/2020      Alpha strep bacteremia  On vancomycin. ID consulted.   Repeat blood cultures in a.m. , not done as patient refused blood work , will get central line   2D echo    Diabetes mellitus  Continue home insulin Lantus 22  SSI  Accu-Cheks  Hypoglycemic this morning her Lantus is held. Added dextrose to her normal saline. Can resume her Lantus tomorrow if blood sugars remain stable     Hypertension  Continue home meds amlodipine and Lopressor  Hydralazine as needed     Chronic normocytic anemia  Likely ACD  Hemoglobin at baseline  Hemoglobin is 6.7 transfuse 1 unit of blood on 1/8/2020. Check hemoglobin in the morning     Hypothyroidism  Continue home Synthroid     Patient's Baseline: Ambulates with walking  DVT ppx: SCD  Code status: Full  Disposition: TBD  Code status:   DVT prophylaxis:     Care Plan discussed with: Patient/Family  Disposition: Home w/Family and TBD     Hospital Problems  Date Reviewed: 1/10/2020          Codes Class Noted POA    Abscess of bursa of left foot ICD-10-CM: M71.072  ICD-9-CM: 727.89  1/8/2020 Unknown        Noncompliance with treatment plan ICD-10-CM: Z91.11  ICD-9-CM: V15.81  1/8/2020 Unknown        Closed dislocation of tarsometatarsal joint, left, subsequent encounter ICD-10-CM: S93.325D  ICD-9-CM: V58.89  1/8/2020 Unknown        Cellulitis of foot, left ICD-10-CM: L03.116  ICD-9-CM: 682.7  1/7/2020 Unknown        Type 2 diabetes mellitus with Charcot's joint of left foot ([facility] ICD-10-CM: E11.610  ICD-9-CM: 250.60, 713.5  11/14/2019 Yes        Charcot's joint of left foot ICD-10-CM: N29.990  ICD-9-CM: 094.0, 713.5  11/14/2019 Yes        Acute osteomyelitis of left foot ([facility] ICD-10-CM: U36.481  ICD-9-CM: 730.07  10/7/2019 Yes        Uncontrolled type 2 diabetes mellitus with peripheral neuropathy [facility]) ICD-10-CM: E11.42, E11.65  ICD-9-CM: 250.62, 357.2  10/7/2019 Yes                Review of Systems:   A comprehensive review of systems was negative except for that written in the HPI. Vital Signs:    Last 24hrs VS reviewed since prior progress note.  Most recent are:  Visit Vitals  BP (!) 175/93 (BP 1 Location: Right arm, BP Patient Position: At rest)   Pulse 80   Temp 97.7 °F (36.5 °C)   Resp 16   Ht 5' 6\" (1.676 m)   Wt 85.7 kg (189 lb)   SpO2 97%   BMI 30.51 kg/m²         Intake/Output Summary (Last 24 hours) at 1/11/2020 1438  Last data filed at 1/11/2020 0114  Gross per 24 hour   Intake 1037.5 ml   Output 475 ml   Net 562.5 ml        Physical Examination:             Constitutional:  No acute distress, cooperative, pleasant   ENT:  Oral mucous moist, oropharynx benign. Resp:  CTA bilaterally. No wheezing/rhonchi/rales. No accessory muscle use   CV:  Regular rhythm, normal rate, no murmurs, gallops, rubs    GI:  Soft, non distended, non tender. normoactive bowel sounds, no hepatosplenomegaly     Musculoskeletal:      No edema, warm, 2+ pulses throughout    Neurologic:  Moves all extremities. AAOx3, CN II-XII reviewed           Data Review:    Review and/or order of clinical lab test      Labs:     Recent Labs     01/10/20  0415   WBC 7.2   HGB 9.7*   HCT 31.7*   *     Recent Labs     01/10/20  0400 01/09/20  0618    135*   K 4.5 4.0    104   CO2 25 26   BUN 26* 24*   CREA 1.31* 1.05*   GLU 53* 83   CA 8.9 8.6     No results for input(s): SGOT, GPT, ALT, AP, TBIL, TBILI, TP, ALB, GLOB, GGT, AML, LPSE in the last 72 hours. No lab exists for component: AMYP, HLPSE  No results for input(s): INR, PTP, APTT, INREXT, INREXT in the last 72 hours. No results for input(s): FE, TIBC, PSAT, FERR in the last 72 hours. Lab Results   Component Value Date/Time    Folate 16.2 11/13/2019 04:28 PM      No results for input(s): PH, PCO2, PO2 in the last 72 hours. No results for input(s): CPK, CKNDX, TROIQ in the last 72 hours.     No lab exists for component: CPKMB  Lab Results   Component Value Date/Time    Cholesterol, total 176 10/03/2014 03:56 AM    HDL Cholesterol 61 10/03/2014 03:56 AM    LDL, calculated 90.8 10/03/2014 03:56 AM    Triglyceride 121 10/03/2014 03:56 AM    CHOL/HDL Ratio 2.9 10/03/2014 03:56 AM     Lab Results   Component Value Date/Time    Glucose (POC) 243 (H) 01/11/2020 11:46 AM    Glucose (POC) 97 01/11/2020 06:25 AM    Glucose (POC) 95 01/10/2020 10:12 PM    Glucose (POC) 123 (H) 01/10/2020 07:12 PM    Glucose (POC) 138 (H) 01/10/2020 04:15 PM     Lab Results   Component Value Date/Time    Color YELLOW/STRAW 11/13/2019 04:33 PM    Appearance CLEAR 11/13/2019 04:33 PM    Specific gravity 1.014 11/13/2019 04:33 PM    pH (UA) 5.5 11/13/2019 04:33 PM    Protein 100 (A) 11/13/2019 04:33 PM    Glucose 250 (A) 11/13/2019 04:33 PM    Ketone NEGATIVE  11/13/2019 04:33 PM    Bilirubin NEGATIVE  11/13/2019 04:33 PM    Urobilinogen 0.2 11/13/2019 04:33 PM    Nitrites NEGATIVE  11/13/2019 04:33 PM    Leukocyte Esterase NEGATIVE  11/13/2019 04:33 PM    Epithelial cells MODERATE (A) 11/13/2019 04:33 PM    Bacteria NEGATIVE  11/13/2019 04:33 PM    WBC 0-4 11/13/2019 04:33 PM    RBC 0-5 11/13/2019 04:33 PM         Medications Reviewed:     Current Facility-Administered Medications   Medication Dose Route Frequency    hydrALAZINE (APRESOLINE) 25 mg tablet        metoprolol tartrate (LOPRESSOR) 50 mg tablet        amLODIPine (NORVASC) 5 mg tablet        insulin lispro (HUMALOG) 100 unit/mL injection        insulin regular (NOVOLIN R, HUMULIN R) 100 unit/mL injection        HYDROcodone-acetaminophen (NORCO) 5-325 mg per tablet        dextrose 5% and 0.9% NaCl infusion  100 mL/hr IntraVENous CONTINUOUS    hydrALAZINE (APRESOLINE) tablet 25 mg  25 mg Oral TID    HYDROcodone-acetaminophen (NORCO) 5-325 mg per tablet 2 Tab  2 Tab Oral Q4H PRN    Vancomycin trough - 1/11 @ 0900   Other ONCE    amLODIPine (NORVASC) tablet 10 mg  10 mg Oral DAILY    [Held by provider] insulin glargine (LANTUS) injection 22 Units  22 Units SubCUTAneous BID    levothyroxine (SYNTHROID) tablet 25 mcg  25 mcg Oral ACB    metoprolol tartrate (LOPRESSOR) tablet 100 mg 100 mg Oral BID    sodium chloride (NS) flush 5-40 mL  5-40 mL IntraVENous Q8H    sodium chloride (NS) flush 5-40 mL  5-40 mL IntraVENous PRN    acetaminophen (TYLENOL) tablet 650 mg  650 mg Oral Q4H PRN    morphine injection 1 mg  1 mg IntraVENous Q4H PRN    ondansetron (ZOFRAN) injection 4 mg  4 mg IntraVENous Q4H PRN    vancomycin (VANCOCIN) 1500 mg in  ml infusion  1,500 mg IntraVENous Q16H    cefepime (MAXIPIME) 2 g in 0.9% sodium chloride (MBP/ADV) 100 mL  2 g IntraVENous Q8H    insulin regular (NOVOLIN R, HUMULIN R) injection   SubCUTAneous AC&HS    glucose chewable tablet 16 g  4 Tab Oral PRN    glucagon (GLUCAGEN) injection 1 mg  1 mg IntraMUSCular PRN    dextrose 10% infusion 0-250 mL  0-250 mL IntraVENous PRN    Vancomycin- Pharmacy to Dose   Other Rx Dosing/Monitoring     ______________________________________________________________________  EXPECTED LENGTH OF STAY: - - -  ACTUAL LENGTH OF STAY:          Augustus Zhang MD Patent

## 2020-01-17 NOTE — PROGRESS NOTES
[address] [facility] @ [facility] VISIT NOTE     0256 Patient arrives for Daily Invanz Infusions x 6 Weeks (until 02/26/2020) without acute problems. Please see [facility] care for complete assessment and education provided. Vital signs stable throughout and prior to discharge, Pt. Tolerated treatment well and discharged without incident. Patient/daughter is aware of next [facility] appointment on 01/18/2020. Appointment card given to patient/daughter. Medications Verified by Nada Phalen RN via Lift:  1. Invanz 1g IV  2. NS SASH IV Flush via RUE SL PICC line  3.  Heparin 500 units IV Flush via RUE SL PICC line     VITAL SIGNS  Patient Vitals for the past 12 hrs:   Temp Resp BP SpO2   01/17/20 1101 98.4 °F (36.9 °C) 18 158/82    01/17/20 1018 97.5 °F (36.4 °C) 20 152/86 97 %

## 2020-01-18 ENCOUNTER — HOSPITAL ENCOUNTER (OUTPATIENT)
Dept: INFUSION THERAPY | Age: 50
Discharge: HOME | End: 2020-01-18
Payer: MEDICAID

## 2020-01-18 VITALS — TEMPERATURE: 96.8 F | SYSTOLIC BLOOD PRESSURE: 158 MMHG | DIASTOLIC BLOOD PRESSURE: 68 MMHG | RESPIRATION RATE: 18 BRPM

## 2020-01-18 PROCEDURE — 74011000258 HC RX REV CODE- 258: Performed by: INTERNAL MEDICINE

## 2020-01-18 PROCEDURE — 96365 THER/PROPH/DIAG IV INF INIT: CPT

## 2020-01-18 PROCEDURE — 74011250636 HC RX REV CODE- 250/636: Performed by: INTERNAL MEDICINE

## 2020-01-18 RX ORDER — SODIUM CHLORIDE 0.9 % (FLUSH) 0.9 %
5-10 SYRINGE (ML) INJECTION AS NEEDED
Status: DISCONTINUED | OUTPATIENT
Start: 2020-01-18 | End: 2020-01-19 | Stop reason: HOSPADM

## 2020-01-18 RX ORDER — HEPARIN 100 UNIT/ML
500 SYRINGE INTRAVENOUS AS NEEDED
Status: DISCONTINUED | OUTPATIENT
Start: 2020-01-18 | End: 2020-01-19 | Stop reason: HOSPADM

## 2020-01-18 RX ADMIN — ERTAPENEM SODIUM 1 G: 1 INJECTION, POWDER, LYOPHILIZED, FOR SOLUTION INTRAMUSCULAR; INTRAVENOUS at 09:17

## 2020-01-18 RX ADMIN — Medication 10 ML: at 09:52

## 2020-01-18 RX ADMIN — Medication 500 UNITS: at 09:52

## 2020-01-18 NOTE — PROGRESS NOTES
Outpatient Infusion Center Short Visit Progress Note    0915 Patient admitted to [facility] for Ertapenem infusion ambulatory in stable condition. Assessment completed. No new concerns voiced. Vital Signs:  Visit Vitals  /68 (BP 1 Location: Left arm, BP Patient Position: Sitting)   Temp 96.8 °F (36 °C)   Resp 18   LMP 05/01/2011          Single lumen PICC on right UA with positive blood return. Medications:  Medications Administered     ertapenem (INVANZ) 1 g in 0.9% sodium chloride (MBP/ADV) 50 mL     Admin Date  01/18/2020 Action  New Bag Dose  1 g Rate  100 mL/hr Route  IntraVENous Administered By  Atul Macedo RN          heparin (porcine) pf 500 Units     Admin Date  01/18/2020 Action  Given Dose  500 Units Route  IntraVENous Administered By  Atul Macedo RN          sodium chloride (NS) flush 5-10 mL     Admin Date  01/18/2020 Action  Given Dose  10 mL Route  IntraVENous Administered By  Atul Macedo, [address]  Patient tolerated treatment well. Patient discharged from [place] [address] ambulatory in no distress. Patient aware of next appointment on 1/19/20.     Future Appointments   Date Time Provider Em Magallanes   1/19/2020  8:30 AM H2 ANALILIA [facility]   1/20/2020  1:00 PM B4 PEDS [facility]   1/21/2020 10:00 AM B4 PEDS [facility]   1/22/2020  1:00 PM B4 PEDS [facility]   1/23/2020  1:00 PM B4 PEDS [facility]   1/24/2020 10:00 AM B4 PEDS [facility]   1/25/2020  8:30 AM H3 ANALILIA LAB [facility]   1/26/2020  8:30 AM H3 ANALILIA LAB [facility]   1/27/2020 11:00 AM B4 PEDS [facility]   1/28/2020 11:00 AM B4 PEDS [facility]   1/29/2020 11:00 AM A1 PEDS MED TX [facility]   1/30/2020 11:00 AM A1 PEDS MED TX [facility]   1/31/2020 11:00 AM A1 PEDS MED TX [facility] 2/1/2020  8:30 AM H3 ANALILIA LAB [facility]   2/2/2020  8:30 AM H3 ANALILIA LAB [facility]   2/3/2020 10:00 AM B4 PEDS [facility]   2/4/2020 10:00 AM B4 PEDS [facility]   2/5/2020 10:00 AM B4 PEDS [facility]   2/6/2020 10:00 AM A1 PEDS MED TX [facility]   2/7/2020 10:00 AM B4 PEDS [facility]   2/8/2020  8:30 AM H3 ANALILIA LAB [facility]   2/9/2020  8:30 AM H3 ANALILIA LAB [facility]   2/10/2020 11:00 AM B4 PEDS [facility]   2/11/2020 11:00 AM B4 PEDS [facility]   2/12/2020 11:00 AM B4 PEDS [facility]   2/13/2020 11:00 AM B4 PEDS [facility]   2/14/2020 11:00 AM B4 PEDS [facility]   2/15/2020  8:30 AM H3 ANALILIA LAB [facility]   2/16/2020  8:30 AM H3 ANALILIA LAB [facility]   2/17/2020 10:00 AM B4 PEDS [facility]   2/18/2020 10:00 AM B4 PEDS [facility]   2/19/2020 10:00 AM B4 PEDS [facility]   2/20/2020 10:00 AM B4 PEDS [facility]   2/21/2020 10:00 AM B4 PEDS [facility]   2/22/2020  8:30 AM H3 ANALILIA LAB [facility]   2/23/2020  8:30 AM H3 ANALILIA LAB [facility]   2/24/2020 11:00 AM B4 PEDS [facility]   2/25/2020 11:00 AM B4 PEDS [facility]   2/26/2020 11:00 AM B4 PEDS [facility]

## 2020-01-19 ENCOUNTER — HOSPITAL ENCOUNTER (OUTPATIENT)
Dept: INFUSION THERAPY | Age: 50
Discharge: HOME | End: 2020-01-19
Payer: MEDICAID

## 2020-01-19 VITALS
RESPIRATION RATE: 18 BRPM | TEMPERATURE: 97.6 F | HEART RATE: 71 BPM | SYSTOLIC BLOOD PRESSURE: 157 MMHG | DIASTOLIC BLOOD PRESSURE: 92 MMHG

## 2020-01-19 PROCEDURE — 74011250636 HC RX REV CODE- 250/636: Performed by: INTERNAL MEDICINE

## 2020-01-19 PROCEDURE — 74011000258 HC RX REV CODE- 258: Performed by: INTERNAL MEDICINE

## 2020-01-19 PROCEDURE — 96365 THER/PROPH/DIAG IV INF INIT: CPT

## 2020-01-19 RX ADMIN — ERTAPENEM SODIUM 1 G: 1 INJECTION, POWDER, LYOPHILIZED, FOR SOLUTION INTRAMUSCULAR; INTRAVENOUS at 08:45

## 2020-01-19 NOTE — PROGRESS NOTES
[facility] Progress Note    Date: 2020    Name: Miguel Ángel Sapp    MRN: [id number]         : 1970    Ms. Shirley Larson Arrived ambulatory and in no distress for Daily Ertapenem regimen. Assessment was completed, no acute issues at this time, no new complaints voiced. Line accessed without difficulty. Ms. Colby Lyles vitals were reviewed. Patient Vitals for the past 12 hrs:   Temp Pulse Resp BP   20 0843 97.6 °F (36.4 °C) 71 18 (!) 157/92       Pre-medications  were administered as ordered and chemotherapy was initiated. Medications Administered     ertapenem (INVANZ) 1 g in 0.9% sodium chloride (MBP/ADV) 50 mL     Admin Date  2020 Action  New Bag Dose  1 g Rate  100 mL/hr Route  IntraVENous Administered By  Mary Brady                    Ms. Shirley Larson tolerated treatment well, line flushed per protocol, patient was discharged from [place] [address] in stable condition at 6649.      Future Appointments   Date Time Provider Em Magallanes   2020  1:00 PM B4 PEDS [facility]   2020 10:00 AM B4 PEDS [facility]   2020  1:00 PM B4 PEDS [facility]   2020  1:00 PM B4 PEDS [facility]   2020 10:00 AM B4 PEDS [facility]   2020  8:30 AM H3 ANALILIA LAB [facility]   2020  8:30 AM H3 [facility]NALILIA LAB [facility]   2020 11:00 AM B4 PEDS [facility]   2020 11:00 AM B4 PEDS [facility]   2020 11:00 AM A1 PEDS  [facility]   2020 11:00 AM A1 PEDS MED [address]   2020 11:00 AM A1 PEDS MED TX [facility]   2020  8:30 AM H3 ANALILIA LAB [facility]   2020  8:30 AM H3 ANALILIA LAB [facility]   2/3/2020 10:00 AM B4 PEDS [facility]ACK [facility]   2020 10:00 AM B4 P[facility]S [facility]ACK [facility]   2020 10:00 AM B4 PEDS FASTRACK [facility]   2/6/2020 10:00 AM A1 PEDS MED TX [facility]   2/7/2020 10:00 AM B4 PEDS [facility]   2/8/2020  8:30 AM H3 ANALILIA LAB [facility]   2/9/2020  8:30 AM H3 ANALILIA LAB [facility]   2/10/2020 11:00 AM B4 PEDS [facility]   2/11/2020 11:00 AM B4 PEDS [facility]   2/12/2020 11:00 AM B4 PEDS [facility]   2/13/2020 11:00 AM B4 PEDS [facility]   2/14/2020 11:00 AM B4 PEDS [facility]   2/15/2020  8:30 AM H3 ANALILIA LAB [facility]   2/16/2020  8:30 AM H3 [facility]NALILIA LAB [facility]   2/17/2020 10:00 AM B4 PEDS [facility]   2/18/2020 10:00 AM B4 PEDS [facility]   2/19/2020 10:00 AM B4 PEDS [facility]   2/20/2020 10:00 AM B4 PEDS [facility]   2/21/2020 10:00 AM B4 PEDS [facility]   2/22/2020  8:30 AM H3 ANALILIA LAB [facility]   2/23/2020  8:30 AM H3 [facility]NALILIA LAB [facility]   2/24/2020 11:00 AM B4 PEDS [facility]   2/25/2020 11:00 AM B4 PEDS [facility]   2/26/2020 11:00 AM B4 PEDS [facility]         Sabrina Breezy  January 19, 2020

## 2020-01-20 ENCOUNTER — HOSPITAL ENCOUNTER (OUTPATIENT)
Dept: INFUSION THERAPY | Age: 50
Discharge: HOME | End: 2020-01-20
Payer: MEDICAID

## 2020-01-20 ENCOUNTER — APPOINTMENT (OUTPATIENT)
Dept: INFUSION THERAPY | Age: 50
End: 2020-01-20
Payer: MEDICAID

## 2020-01-20 VITALS
HEART RATE: 77 BPM | SYSTOLIC BLOOD PRESSURE: 178 MMHG | TEMPERATURE: 97.3 F | RESPIRATION RATE: 16 BRPM | DIASTOLIC BLOOD PRESSURE: 90 MMHG

## 2020-01-20 LAB
ANION GAP SERPL CALC-SCNC: 9 MMOL/L (ref 5–15)
BASOPHILS # BLD: 0.1 K/UL (ref 0–0.1)
BASOPHILS NFR BLD: 1 % (ref 0–1)
BUN SERPL-MCNC: 34 MG/DL (ref 6–20)
BUN/CREAT SERPL: 23 (ref 12–20)
CALCIUM SERPL-MCNC: 8.3 MG/DL (ref 8.5–10.1)
CHLORIDE SERPL-SCNC: 101 MMOL/L (ref 97–108)
CO2 SERPL-SCNC: 21 MMOL/L (ref 21–32)
CREAT SERPL-MCNC: 1.49 MG/DL (ref 0.55–1.02)
DIFFERENTIAL METHOD BLD: ABNORMAL
EOSINOPHIL # BLD: 0.1 K/UL (ref 0–0.4)
EOSINOPHIL NFR BLD: 2 % (ref 0–7)
ERYTHROCYTE [DISTWIDTH] IN BLOOD BY AUTOMATED COUNT: 17 % (ref 11.5–14.5)
ERYTHROCYTE [SEDIMENTATION RATE] IN BLOOD: >140 MM/HR (ref 0–20)
GLUCOSE SERPL-MCNC: 488 MG/DL (ref 65–100)
HCT VFR BLD AUTO: 27.8 % (ref 35–47)
HGB BLD-MCNC: 8.3 G/DL (ref 11.5–16)
IMM GRANULOCYTES # BLD AUTO: 0 K/UL (ref 0–0.04)
IMM GRANULOCYTES NFR BLD AUTO: 1 % (ref 0–0.5)
LYMPHOCYTES # BLD: 1.2 K/UL (ref 0.8–3.5)
LYMPHOCYTES NFR BLD: 15 % (ref 12–49)
MCH RBC QN AUTO: 26.1 PG (ref 26–34)
MCHC RBC AUTO-ENTMCNC: 29.9 G/DL (ref 30–36.5)
MCV RBC AUTO: 87.4 FL (ref 80–99)
MONOCYTES # BLD: 0.6 K/UL (ref 0–1)
MONOCYTES NFR BLD: 8 % (ref 5–13)
NEUTS SEG # BLD: 5.7 K/UL (ref 1.8–8)
NEUTS SEG NFR BLD: 73 % (ref 32–75)
NRBC # BLD: 0 K/UL (ref 0–0.01)
NRBC BLD-RTO: 0 PER 100 WBC
PLATELET # BLD AUTO: 465 K/UL (ref 150–400)
PMV BLD AUTO: 10.8 FL (ref 8.9–12.9)
POTASSIUM SERPL-SCNC: 4.5 MMOL/L (ref 3.5–5.1)
RBC # BLD AUTO: 3.18 M/UL (ref 3.8–5.2)
SODIUM SERPL-SCNC: 131 MMOL/L (ref 136–145)
WBC # BLD AUTO: 7.7 K/UL (ref 3.6–11)

## 2020-01-20 PROCEDURE — 36415 COLL VENOUS BLD VENIPUNCTURE: CPT

## 2020-01-20 PROCEDURE — 85652 RBC SED RATE AUTOMATED: CPT

## 2020-01-20 PROCEDURE — 80048 BASIC METABOLIC PNL TOTAL CA: CPT

## 2020-01-20 PROCEDURE — 74011250636 HC RX REV CODE- 250/636: Performed by: INTERNAL MEDICINE

## 2020-01-20 PROCEDURE — 96365 THER/PROPH/DIAG IV INF INIT: CPT

## 2020-01-20 PROCEDURE — 74011000258 HC RX REV CODE- 258: Performed by: INTERNAL MEDICINE

## 2020-01-20 PROCEDURE — 36592 COLLECT BLOOD FROM PICC: CPT

## 2020-01-20 PROCEDURE — 85025 COMPLETE CBC W/AUTO DIFF WBC: CPT

## 2020-01-20 RX ORDER — HEPARIN 100 UNIT/ML
500 SYRINGE INTRAVENOUS AS NEEDED
Status: DISCONTINUED | OUTPATIENT
Start: 2020-01-20 | End: 2020-01-21 | Stop reason: HOSPADM

## 2020-01-20 RX ORDER — SODIUM CHLORIDE 0.9 % (FLUSH) 0.9 %
5-10 SYRINGE (ML) INJECTION AS NEEDED
Status: DISCONTINUED | OUTPATIENT
Start: 2020-01-20 | End: 2020-01-21 | Stop reason: HOSPADM

## 2020-01-20 RX ADMIN — Medication 10 ML: at 13:54

## 2020-01-20 RX ADMIN — Medication 10 ML: at 13:23

## 2020-01-20 RX ADMIN — HEPARIN 500 UNITS: 100 SYRINGE at 13:54

## 2020-01-20 RX ADMIN — Medication 10 ML: at 13:24

## 2020-01-20 RX ADMIN — ERTAPENEM SODIUM 1 G: 1 INJECTION, POWDER, LYOPHILIZED, FOR SOLUTION INTRAMUSCULAR; INTRAVENOUS at 13:25

## 2020-01-20 RX ADMIN — Medication 10 ML: at 13:25

## 2020-01-20 NOTE — PROGRESS NOTES
[address] [facility] @ [facility] VISIT NOTE    3462 Patient arrives for Daily Ertapenem without acute problems. Please see connect care for complete assessment and education provided. CVC accessed per [id number] [facility]. Vital signs stable throughout and prior to discharge, Pt. Tolerated treatment well and discharged without incident. Patient is aware of next [facility] appointment on 1/21/2020. Appointment card given to patient. Medications Verified by Kathrine Vital RN via Micromedex:  1. Ertapenem 1 gram  2. Heparin 500 units    VITAL SIGNS   Patient Vitals for the past 12 hrs:   Temp Pulse Resp BP   01/20/20 1354 97.3 °F (36.3 °C) 77 16 178/90   01/20/20 1319 97.3 °F (36.3 °C) 75 18 168/74       LAB WORK Lab results pending, please see Connect Care for results. Recent Results (from the past 12 hour(s))   CBC WITH AUTOMATED DIFF    Collection Time: 01/20/20  1:20 PM   Result Value Ref Range    WBC 7.7 3.6 - 11.0 K/uL    RBC 3.18 (L) 3.80 - 5.20 M/uL    HGB 8.3 (L) 11.5 - 16.0 g/dL    HCT 27.8 (L) 35.0 - 47.0 %    MCV 87.4 80.0 - 99.0 FL    MCH 26.1 26.0 - 34.0 PG    MCHC 29.9 (L) 30.0 - 36.5 g/dL    RDW 17.0 (H) 11.5 - 14.5 %    PLATELET 296 (H) 123 - 400 K/uL    MPV 10.8 8.9 - 12.9 FL    NRBC 0.0 0  WBC    ABSOLUTE NRBC 0.00 0.00 - 0.01 K/uL    NEUTROPHILS 73 32 - 75 %    LYMPHOCYTES 15 12 - 49 %    MONOCYTES 8 5 - 13 %    EOSINOPHILS 2 0 - 7 %    BASOPHILS 1 0 - 1 %    IMMATURE GRANULOCYTES 1 (H) 0.0 - 0.5 %    ABS. NEUTROPHILS 5.7 1.8 - 8.0 K/UL    ABS. LYMPHOCYTES 1.2 0.8 - 3.5 K/UL    ABS. MONOCYTES 0.6 0.0 - 1.0 K/UL    ABS. EOSINOPHILS 0.1 0.0 - 0.4 K/UL    ABS. BASOPHILS 0.1 0.0 - 0.1 K/UL    ABS. IMM.  GRANS. 0.0 0.00 - 0.04 K/UL    DF AUTOMATED     METABOLIC PANEL, BASIC    Collection Time: 01/20/20  1:20 PM   Result Value Ref Range    Sodium 131 (L) 136 - 145 mmol/L    Potassium 4.5 3.5 - 5.1 mmol/L    Chloride 101 97 - 108 mmol/L    CO2 21 21 - 32 mmol/L    Anion gap 9 5 - 15 mmol/L    Glucose 488 (H) 65 - 100 mg/dL    BUN 34 (H) 6 - 20 MG/DL    Creatinine 1.49 (H) 0.55 - 1.02 MG/DL    BUN/Creatinine ratio 23 (H) 12 - 20      GFR est AA 45 (L) >60 ml/min/1.73m2    GFR est non-AA 37 (L) >60 ml/min/1.73m2    Calcium 8.3 (L) 8.5 - 10.1 MG/DL   SED RATE (ESR)    Collection Time: 01/20/20  1:20 PM   Result Value Ref Range    Sed rate, automated >140 (H) 0 - 20 mm/hr

## 2020-01-21 ENCOUNTER — HOSPITAL ENCOUNTER (OUTPATIENT)
Dept: INFUSION THERAPY | Age: 50
Discharge: HOME | End: 2020-01-21
Payer: MEDICAID

## 2020-01-21 NOTE — PROGRESS NOTES
1/21 @ 1530: patient called this morning at 10am to state her car broke down and she would try to come this afternoon at 2. Attempted to call patient again at 1500, no answer. 1/21 @ 1530: patient no showed for today's Invanz appointment.

## 2020-01-22 ENCOUNTER — HOSPITAL ENCOUNTER (OUTPATIENT)
Dept: INFUSION THERAPY | Age: 50
Discharge: HOME | End: 2020-01-22
Payer: MEDICAID

## 2020-01-22 VITALS
DIASTOLIC BLOOD PRESSURE: 86 MMHG | RESPIRATION RATE: 16 BRPM | SYSTOLIC BLOOD PRESSURE: 152 MMHG | TEMPERATURE: 97.1 F | HEART RATE: 62 BPM

## 2020-01-22 PROCEDURE — 74011250636 HC RX REV CODE- 250/636: Performed by: INTERNAL MEDICINE

## 2020-01-22 PROCEDURE — 74011000258 HC RX REV CODE- 258: Performed by: INTERNAL MEDICINE

## 2020-01-22 PROCEDURE — 96365 THER/PROPH/DIAG IV INF INIT: CPT

## 2020-01-22 RX ORDER — HEPARIN 100 UNIT/ML
500 SYRINGE INTRAVENOUS AS NEEDED
Status: DISCONTINUED | OUTPATIENT
Start: 2020-01-22 | End: 2020-01-23 | Stop reason: HOSPADM

## 2020-01-22 RX ORDER — SODIUM CHLORIDE 0.9 % (FLUSH) 0.9 %
5-10 SYRINGE (ML) INJECTION AS NEEDED
Status: DISCONTINUED | OUTPATIENT
Start: 2020-01-22 | End: 2020-01-23 | Stop reason: HOSPADM

## 2020-01-22 RX ADMIN — ERTAPENEM SODIUM 1 G: 1 INJECTION, POWDER, LYOPHILIZED, FOR SOLUTION INTRAMUSCULAR; INTRAVENOUS at 10:42

## 2020-01-22 RX ADMIN — Medication 10 ML: at 11:26

## 2020-01-22 RX ADMIN — HEPARIN 500 UNITS: 100 SYRINGE at 11:26

## 2020-01-22 RX ADMIN — Medication 10 ML: at 10:41

## 2020-01-22 NOTE — PROGRESS NOTES
[address] [facility] @ [facility] VISIT NOTE    1020 Patient arrives for Daily Ertapenem without acute problems. Please see connect care for complete assessment and education provided. Vital signs stable throughout and prior to discharge, Pt. Tolerated treatment well and discharged without incident. Patient/parent is aware of next [facility] appointment on 1/23/2020. Appointment card given to patient/parents. Medications Verified by Emre Lomax RN via arviem AGedex:  1. Ertapenem 1gm  2.   Heparin 500units    VITAL SIGNS   Patient Vitals for the past 12 hrs:   Temp Pulse Resp BP   01/22/20 1128 97.1 °F (36.2 °C) 62 16 152/86   01/22/20 1032 96.4 °F (35.8 °C) 84 16 147/85

## 2020-01-23 ENCOUNTER — HOSPITAL ENCOUNTER (OUTPATIENT)
Dept: INFUSION THERAPY | Age: 50
Discharge: HOME | End: 2020-01-23
Payer: MEDICAID

## 2020-01-23 VITALS
TEMPERATURE: 97.8 F | RESPIRATION RATE: 16 BRPM | DIASTOLIC BLOOD PRESSURE: 88 MMHG | SYSTOLIC BLOOD PRESSURE: 166 MMHG | HEART RATE: 78 BPM

## 2020-01-23 LAB
ANION GAP SERPL CALC-SCNC: 3 MMOL/L (ref 5–15)
BASOPHILS # BLD: 0.1 K/UL (ref 0–0.1)
BASOPHILS NFR BLD: 1 % (ref 0–1)
BUN SERPL-MCNC: 30 MG/DL (ref 6–20)
BUN/CREAT SERPL: 30 (ref 12–20)
CALCIUM SERPL-MCNC: 8.1 MG/DL (ref 8.5–10.1)
CHLORIDE SERPL-SCNC: 105 MMOL/L (ref 97–108)
CO2 SERPL-SCNC: 26 MMOL/L (ref 21–32)
CREAT SERPL-MCNC: 1.01 MG/DL (ref 0.55–1.02)
DIFFERENTIAL METHOD BLD: ABNORMAL
EOSINOPHIL # BLD: 0.3 K/UL (ref 0–0.4)
EOSINOPHIL NFR BLD: 3 % (ref 0–7)
ERYTHROCYTE [DISTWIDTH] IN BLOOD BY AUTOMATED COUNT: 17.3 % (ref 11.5–14.5)
GLUCOSE SERPL-MCNC: 245 MG/DL (ref 65–100)
HCT VFR BLD AUTO: 29.4 % (ref 35–47)
HGB BLD-MCNC: 9 G/DL (ref 11.5–16)
IMM GRANULOCYTES # BLD AUTO: 0 K/UL (ref 0–0.04)
IMM GRANULOCYTES NFR BLD AUTO: 0 % (ref 0–0.5)
LYMPHOCYTES # BLD: 1.3 K/UL (ref 0.8–3.5)
LYMPHOCYTES NFR BLD: 14 % (ref 12–49)
MCH RBC QN AUTO: 26.3 PG (ref 26–34)
MCHC RBC AUTO-ENTMCNC: 30.6 G/DL (ref 30–36.5)
MCV RBC AUTO: 86 FL (ref 80–99)
MONOCYTES # BLD: 0.5 K/UL (ref 0–1)
MONOCYTES NFR BLD: 5 % (ref 5–13)
NEUTS SEG # BLD: 7.1 K/UL (ref 1.8–8)
NEUTS SEG NFR BLD: 77 % (ref 32–75)
NRBC # BLD: 0 K/UL (ref 0–0.01)
NRBC BLD-RTO: 0 PER 100 WBC
PLATELET # BLD AUTO: 442 K/UL (ref 150–400)
PMV BLD AUTO: 10.5 FL (ref 8.9–12.9)
POTASSIUM SERPL-SCNC: 5.2 MMOL/L (ref 3.5–5.1)
RBC # BLD AUTO: 3.42 M/UL (ref 3.8–5.2)
SODIUM SERPL-SCNC: 134 MMOL/L (ref 136–145)
WBC # BLD AUTO: 9.2 K/UL (ref 3.6–11)

## 2020-01-23 PROCEDURE — 74011000258 HC RX REV CODE- 258: Performed by: INTERNAL MEDICINE

## 2020-01-23 PROCEDURE — 80048 BASIC METABOLIC PNL TOTAL CA: CPT

## 2020-01-23 PROCEDURE — 36415 COLL VENOUS BLD VENIPUNCTURE: CPT

## 2020-01-23 PROCEDURE — 77030020847 HC STATLOK BARD -A

## 2020-01-23 PROCEDURE — 74011250636 HC RX REV CODE- 250/636: Performed by: INTERNAL MEDICINE

## 2020-01-23 PROCEDURE — 96365 THER/PROPH/DIAG IV INF INIT: CPT

## 2020-01-23 PROCEDURE — 85025 COMPLETE CBC W/AUTO DIFF WBC: CPT

## 2020-01-23 PROCEDURE — 36592 COLLECT BLOOD FROM PICC: CPT

## 2020-01-23 RX ORDER — HEPARIN 100 UNIT/ML
500 SYRINGE INTRAVENOUS AS NEEDED
Status: DISCONTINUED | OUTPATIENT
Start: 2020-01-23 | End: 2020-01-24 | Stop reason: HOSPADM

## 2020-01-23 RX ORDER — SODIUM CHLORIDE 0.9 % (FLUSH) 0.9 %
5-10 SYRINGE (ML) INJECTION AS NEEDED
Status: DISCONTINUED | OUTPATIENT
Start: 2020-01-23 | End: 2020-01-24 | Stop reason: HOSPADM

## 2020-01-23 RX ADMIN — HEPARIN 500 UNITS: 100 SYRINGE at 11:02

## 2020-01-23 RX ADMIN — Medication 10 ML: at 10:28

## 2020-01-23 RX ADMIN — Medication 10 ML: at 11:01

## 2020-01-23 RX ADMIN — ERTAPENEM SODIUM 1 G: 1 INJECTION, POWDER, LYOPHILIZED, FOR SOLUTION INTRAMUSCULAR; INTRAVENOUS at 10:29

## 2020-01-23 NOTE — PROGRESS NOTES
Patient tolerated her Daily Invanz Infusion & RUE SL PICC line dressing change per sterile technique well.

## 2020-01-23 NOTE — PROGRESS NOTES
[address] [facility] VISIT NOTE     1020 Patient arrives for Daily Invanz Infusions x 6 Weeks (until 02/26/2020) without acute problems. Please see connect care for complete assessment and education provided. Vital signs stable throughout and prior to discharge, Pt. Tolerated treatment well and discharged without incident. Patient/daughter is aware of next [facility] appointment on 01/24/2020. Appointment card given to patient/daughter. Medications Verified by Deanne Ely RN & Lacho Gan RN via Sediciiedex:  1. Invanz 1g IV  2. NS SASH IV Flush via RUE SL PICC line  3. Heparin 500 units IV Flush via RUE SL PICC line. VITAL SIGNS  Patient Vitals for the past 12 hrs:   Temp Pulse Resp BP   01/23/20 1118 97.8 °F (36.6 °C) 78 16 166/88   01/23/20 1023 97.6 °F (36.4 °C) 80 16 (!) 168/91     LAB WORK  Recent Results (from the past 12 hour(s))   CBC WITH AUTOMATED DIFF    Collection Time: 01/23/20 10:27 AM   Result Value Ref Range    WBC 9.2 3.6 - 11.0 K/uL    RBC 3.42 (L) 3.80 - 5.20 M/uL    HGB 9.0 (L) 11.5 - 16.0 g/dL    HCT 29.4 (L) 35.0 - 47.0 %    MCV 86.0 80.0 - 99.0 FL    MCH 26.3 26.0 - 34.0 PG    MCHC 30.6 30.0 - 36.5 g/dL    RDW 17.3 (H) 11.5 - 14.5 %    PLATELET 084 (H) 941 - 400 K/uL    MPV 10.5 8.9 - 12.9 FL    NRBC 0.0 0  WBC    ABSOLUTE NRBC 0.00 0.00 - 0.01 K/uL    NEUTROPHILS 77 (H) 32 - 75 %    LYMPHOCYTES 14 12 - 49 %    MONOCYTES 5 5 - 13 %    EOSINOPHILS 3 0 - 7 %    BASOPHILS 1 0 - 1 %    IMMATURE GRANULOCYTES 0 0.0 - 0.5 %    ABS. NEUTROPHILS 7.1 1.8 - 8.0 K/UL    ABS. LYMPHOCYTES 1.3 0.8 - 3.5 K/UL    ABS. MONOCYTES 0.5 0.0 - 1.0 K/UL    ABS. EOSINOPHILS 0.3 0.0 - 0.4 K/UL    ABS. BASOPHILS 0.1 0.0 - 0.1 K/UL    ABS. IMM.  GRANS. 0.0 0.00 - 0.04 K/UL    DF AUTOMATED     METABOLIC PANEL, BASIC    Collection Time: 01/23/20 10:27 AM   Result Value Ref Range    Sodium 134 (L) 136 - 145 mmol/L    Potassium 5.2 (H) 3.5 - 5.1 mmol/L    Chloride 105 97 - 108 mmol/L    CO2 26 21 - 32 mmol/L    Anion gap 3 (L) 5 - 15 mmol/L    Glucose 245 (H) 65 - 100 mg/dL    BUN 30 (H) 6 - 20 MG/DL    Creatinine 1.01 0.55 - 1.02 MG/DL    BUN/Creatinine ratio 30 (H) 12 - 20      GFR est AA >60 >60 ml/min/1.73m2    GFR est non-AA 59 (L) >60 ml/min/1.73m2    Calcium 8.1 (L) 8.5 - 10.1 MG/DL

## 2020-01-24 ENCOUNTER — HOSPITAL ENCOUNTER (OUTPATIENT)
Dept: INFUSION THERAPY | Age: 50
Discharge: HOME | End: 2020-01-24
Payer: MEDICAID

## 2020-01-24 VITALS
DIASTOLIC BLOOD PRESSURE: 90 MMHG | SYSTOLIC BLOOD PRESSURE: 149 MMHG | RESPIRATION RATE: 16 BRPM | OXYGEN SATURATION: 97 % | HEART RATE: 61 BPM | TEMPERATURE: 97.5 F

## 2020-01-24 PROCEDURE — 74011250636 HC RX REV CODE- 250/636: Performed by: INTERNAL MEDICINE

## 2020-01-24 PROCEDURE — 74011000258 HC RX REV CODE- 258: Performed by: INTERNAL MEDICINE

## 2020-01-24 PROCEDURE — 96365 THER/PROPH/DIAG IV INF INIT: CPT

## 2020-01-24 RX ORDER — SODIUM CHLORIDE 0.9 % (FLUSH) 0.9 %
5-10 SYRINGE (ML) INJECTION AS NEEDED
Status: CANCELLED | OUTPATIENT
Start: 2020-01-24 | End: 2020-01-25

## 2020-01-24 RX ORDER — HEPARIN 100 UNIT/ML
500 SYRINGE INTRAVENOUS AS NEEDED
Status: CANCELLED | OUTPATIENT
Start: 2020-01-24 | End: 2020-01-25

## 2020-01-24 RX ORDER — HEPARIN 100 UNIT/ML
500 SYRINGE INTRAVENOUS AS NEEDED
Status: DISCONTINUED | OUTPATIENT
Start: 2020-01-24 | End: 2020-01-25 | Stop reason: HOSPADM

## 2020-01-24 RX ORDER — SODIUM CHLORIDE 0.9 % (FLUSH) 0.9 %
5-10 SYRINGE (ML) INJECTION AS NEEDED
Status: DISCONTINUED | OUTPATIENT
Start: 2020-01-24 | End: 2020-01-25 | Stop reason: HOSPADM

## 2020-01-24 RX ADMIN — Medication 500 UNITS: at 10:40

## 2020-01-24 RX ADMIN — ERTAPENEM SODIUM 1 G: 1 INJECTION, POWDER, LYOPHILIZED, FOR SOLUTION INTRAMUSCULAR; INTRAVENOUS at 10:08

## 2020-01-24 RX ADMIN — Medication 10 ML: at 10:08

## 2020-01-24 RX ADMIN — Medication 10 ML: at 10:40

## 2020-01-24 NOTE — PROGRESS NOTES
[address] [facility] [place] @ [facility] VISIT NOTE    1000 Patient arrives for Daily Invanz without acute problems. Please see connect care for complete assessment and education provided. Vital signs stable throughout and prior to discharge, Pt. Tolerated treatment well and discharged without incident. Patient/parent is aware of next [facility] appointment on 1/25/2020 in [facility]. Appointment card given to patient/parents. Medications Verified by Chelo Voss RN via [employer] [employer]:  1. Invanz 1 gram  2.  Heparin 500 units    VITAL SIGNS   Patient Vitals for the past 12 hrs:   Temp Pulse Resp BP SpO2   01/24/20 1043 97.5 °F (36.4 °C) 61 16 149/90    01/24/20 1001 97.6 °F (36.4 °C) 65 16 145/80 97 %

## 2020-01-25 ENCOUNTER — HOSPITAL ENCOUNTER (OUTPATIENT)
Dept: INFUSION THERAPY | Age: 50
Discharge: HOME | End: 2020-01-25
Payer: MEDICAID

## 2020-01-25 VITALS
SYSTOLIC BLOOD PRESSURE: 162 MMHG | DIASTOLIC BLOOD PRESSURE: 86 MMHG | RESPIRATION RATE: 16 BRPM | HEART RATE: 78 BPM | TEMPERATURE: 98.1 F

## 2020-01-25 PROCEDURE — 96365 THER/PROPH/DIAG IV INF INIT: CPT

## 2020-01-25 PROCEDURE — 74011000258 HC RX REV CODE- 258: Performed by: INTERNAL MEDICINE

## 2020-01-25 PROCEDURE — 74011250636 HC RX REV CODE- 250/636: Performed by: INTERNAL MEDICINE

## 2020-01-25 RX ADMIN — SODIUM CHLORIDE 1 G: 900 INJECTION, SOLUTION INTRAVENOUS at 10:05

## 2020-01-25 NOTE — PROGRESS NOTES
[facility] Progress Note    Date: 2020    Name: Laura Barber    MRN: [id number]         : 1970    Ms. Oren Sears Arrived ambulatory and in no distress for Antibiotic Regimen. Assessment was completed, no acute issues at this time, no new complaints voiced. PICC line accessed without difficulty, + blood return. Ms Carney's vitals were reviewed. Patient Vitals for the past 12 hrs:   Temp Pulse Resp BP   20 1000 98.1 °F (36.7 °C) 78 16 162/86       Medications:  Invanz IV    Ms Oren Sears tolerated treatment well and was discharged from [place] [address] in stable condition. PICC line flushed & heparinized per protocol. He is to return on 20 at 8:00 for his next appointment.     Eliel Gerber RN  2020

## 2020-01-27 ENCOUNTER — HOSPITAL ENCOUNTER (OUTPATIENT)
Dept: INFUSION THERAPY | Age: 50
Discharge: HOME | End: 2020-01-27
Payer: MEDICAID

## 2020-01-27 VITALS
HEART RATE: 61 BPM | TEMPERATURE: 97.4 F | DIASTOLIC BLOOD PRESSURE: 87 MMHG | SYSTOLIC BLOOD PRESSURE: 142 MMHG | RESPIRATION RATE: 18 BRPM

## 2020-01-27 LAB
ANION GAP SERPL CALC-SCNC: 5 MMOL/L (ref 5–15)
BASOPHILS # BLD: 0.1 K/UL (ref 0–0.1)
BASOPHILS NFR BLD: 1 % (ref 0–1)
BUN SERPL-MCNC: 26 MG/DL (ref 6–20)
BUN/CREAT SERPL: 24 (ref 12–20)
CALCIUM SERPL-MCNC: 8.1 MG/DL (ref 8.5–10.1)
CHLORIDE SERPL-SCNC: 106 MMOL/L (ref 97–108)
CO2 SERPL-SCNC: 25 MMOL/L (ref 21–32)
CREAT SERPL-MCNC: 1.1 MG/DL (ref 0.55–1.02)
DIFFERENTIAL METHOD BLD: ABNORMAL
EOSINOPHIL # BLD: 0.4 K/UL (ref 0–0.4)
EOSINOPHIL NFR BLD: 4 % (ref 0–7)
ERYTHROCYTE [DISTWIDTH] IN BLOOD BY AUTOMATED COUNT: 17.7 % (ref 11.5–14.5)
ERYTHROCYTE [SEDIMENTATION RATE] IN BLOOD: 104 MM/HR (ref 0–20)
GLUCOSE SERPL-MCNC: 279 MG/DL (ref 65–100)
HCT VFR BLD AUTO: 30.4 % (ref 35–47)
HGB BLD-MCNC: 8.9 G/DL (ref 11.5–16)
IMM GRANULOCYTES # BLD AUTO: 0.1 K/UL (ref 0–0.04)
IMM GRANULOCYTES NFR BLD AUTO: 1 % (ref 0–0.5)
LYMPHOCYTES # BLD: 1.1 K/UL (ref 0.8–3.5)
LYMPHOCYTES NFR BLD: 10 % (ref 12–49)
MCH RBC QN AUTO: 26.1 PG (ref 26–34)
MCHC RBC AUTO-ENTMCNC: 29.3 G/DL (ref 30–36.5)
MCV RBC AUTO: 89.1 FL (ref 80–99)
MONOCYTES # BLD: 0.4 K/UL (ref 0–1)
MONOCYTES NFR BLD: 4 % (ref 5–13)
NEUTS SEG # BLD: 8.6 K/UL (ref 1.8–8)
NEUTS SEG NFR BLD: 80 % (ref 32–75)
NRBC # BLD: 0 K/UL (ref 0–0.01)
NRBC BLD-RTO: 0 PER 100 WBC
PLATELET # BLD AUTO: 388 K/UL (ref 150–400)
PMV BLD AUTO: 11.1 FL (ref 8.9–12.9)
POTASSIUM SERPL-SCNC: 4.3 MMOL/L (ref 3.5–5.1)
RBC # BLD AUTO: 3.41 M/UL (ref 3.8–5.2)
SODIUM SERPL-SCNC: 136 MMOL/L (ref 136–145)
WBC # BLD AUTO: 10.7 K/UL (ref 3.6–11)

## 2020-01-27 PROCEDURE — 74011250636 HC RX REV CODE- 250/636: Performed by: INTERNAL MEDICINE

## 2020-01-27 PROCEDURE — 74011000258 HC RX REV CODE- 258: Performed by: INTERNAL MEDICINE

## 2020-01-27 PROCEDURE — 80048 BASIC METABOLIC PNL TOTAL CA: CPT

## 2020-01-27 PROCEDURE — 36415 COLL VENOUS BLD VENIPUNCTURE: CPT

## 2020-01-27 PROCEDURE — 36592 COLLECT BLOOD FROM PICC: CPT

## 2020-01-27 PROCEDURE — 96365 THER/PROPH/DIAG IV INF INIT: CPT

## 2020-01-27 PROCEDURE — 85652 RBC SED RATE AUTOMATED: CPT

## 2020-01-27 PROCEDURE — 85025 COMPLETE CBC W/AUTO DIFF WBC: CPT

## 2020-01-27 RX ORDER — SODIUM CHLORIDE 0.9 % (FLUSH) 0.9 %
5-10 SYRINGE (ML) INJECTION AS NEEDED
Status: DISCONTINUED | OUTPATIENT
Start: 2020-01-27 | End: 2020-01-28 | Stop reason: HOSPADM

## 2020-01-27 RX ORDER — HEPARIN 100 UNIT/ML
500 SYRINGE INTRAVENOUS AS NEEDED
Status: DISCONTINUED | OUTPATIENT
Start: 2020-01-27 | End: 2020-01-28 | Stop reason: HOSPADM

## 2020-01-27 RX ADMIN — Medication 10 ML: at 10:38

## 2020-01-27 RX ADMIN — Medication 10 ML: at 11:16

## 2020-01-27 RX ADMIN — ERTAPENEM SODIUM 1 G: 1 INJECTION, POWDER, LYOPHILIZED, FOR SOLUTION INTRAMUSCULAR; INTRAVENOUS at 10:40

## 2020-01-27 RX ADMIN — Medication 500 UNITS: at 11:17

## 2020-01-27 NOTE — PROGRESS NOTES
[address] [facility] @ [facility] VISIT NOTE     1020 Patient arrives for Daily Invanz Infusions x 6 Weeks (until 02/26/2020) without acute problems. Please see connect care for complete assessment and education provided. Vital signs stable throughout and prior to discharge, Pt. Tolerated treatment well and discharged without incident. Patient/daughter is aware of next [facility] appointment on 01/28/2020. Appointment card given to patient. Medications Verified by Muriel Mejía RN via [employer]:  1. Invanz 1g IV  2. NS SASH IV Flush via RUE SL PICC line  3. Heparin 500 units IV Flush via RUE SL PICC line     VITAL SIGNS  Patient Vitals for the past 12 hrs:   Temp Pulse Resp BP   01/27/20 1117 97.4 °F (36.3 °C) 61 18 142/87   01/27/20 1024 97.8 °F (36.6 °C) 70 18 155/82     LAB WORK Labs Pending, please see connect care for results. Recent Results (from the past 12 hour(s))   CBC WITH AUTOMATED DIFF    Collection Time: 01/27/20 10:31 AM   Result Value Ref Range    WBC 10.7 3.6 - 11.0 K/uL    RBC 3.41 (L) 3.80 - 5.20 M/uL    HGB 8.9 (L) 11.5 - 16.0 g/dL    HCT 30.4 (L) 35.0 - 47.0 %    MCV 89.1 80.0 - 99.0 FL    MCH 26.1 26.0 - 34.0 PG    MCHC 29.3 (L) 30.0 - 36.5 g/dL    RDW 17.7 (H) 11.5 - 14.5 %    PLATELET 681 235 - 045 K/uL    MPV 11.1 8.9 - 12.9 FL    NRBC 0.0 0  WBC    ABSOLUTE NRBC 0.00 0.00 - 0.01 K/uL    NEUTROPHILS 80 (H) 32 - 75 %    LYMPHOCYTES 10 (L) 12 - 49 %    MONOCYTES 4 (L) 5 - 13 %    EOSINOPHILS 4 0 - 7 %    BASOPHILS 1 0 - 1 %    IMMATURE GRANULOCYTES 1 (H) 0.0 - 0.5 %    ABS. NEUTROPHILS 8.6 (H) 1.8 - 8.0 K/UL    ABS. LYMPHOCYTES 1.1 0.8 - 3.5 K/UL    ABS. MONOCYTES 0.4 0.0 - 1.0 K/UL    ABS. EOSINOPHILS 0.4 0.0 - 0.4 K/UL    ABS. BASOPHILS 0.1 0.0 - 0.1 K/UL    ABS. IMM.  GRANS. 0.1 (H) 0.00 - 0.04 K/UL    DF AUTOMATED     METABOLIC PANEL, BASIC    Collection Time: 01/27/20 10:31 AM   Result Value Ref Range    Sodium 136 136 - 145 mmol/L    Potassium 4.3 3.5 - 5.1 mmol/L    Chloride 106 97 - 108 mmol/L    CO2 25 21 - 32 mmol/L    Anion gap 5 5 - 15 mmol/L    Glucose 279 (H) 65 - 100 mg/dL    BUN 26 (H) 6 - 20 MG/DL    Creatinine 1.10 (H) 0.55 - 1.02 MG/DL    BUN/Creatinine ratio 24 (H) 12 - 20      GFR est AA >60 >60 ml/min/1.73m2    GFR est non-AA 53 (L) >60 ml/min/1.73m2    Calcium 8.1 (L) 8.5 - 10.1 MG/DL

## 2020-01-29 ENCOUNTER — HOSPITAL ENCOUNTER (OUTPATIENT)
Dept: INFUSION THERAPY | Age: 50
Discharge: HOME | End: 2020-01-29
Payer: MEDICAID

## 2020-01-29 VITALS
DIASTOLIC BLOOD PRESSURE: 85 MMHG | HEART RATE: 76 BPM | OXYGEN SATURATION: 99 % | TEMPERATURE: 98.4 F | SYSTOLIC BLOOD PRESSURE: 154 MMHG | RESPIRATION RATE: 18 BRPM

## 2020-01-29 PROCEDURE — 96365 THER/PROPH/DIAG IV INF INIT: CPT

## 2020-01-29 PROCEDURE — 74011250636 HC RX REV CODE- 250/636: Performed by: INTERNAL MEDICINE

## 2020-01-29 PROCEDURE — 74011000258 HC RX REV CODE- 258: Performed by: INTERNAL MEDICINE

## 2020-01-29 RX ORDER — SODIUM CHLORIDE 0.9 % (FLUSH) 0.9 %
10 SYRINGE (ML) INJECTION AS NEEDED
Status: DISCONTINUED | OUTPATIENT
Start: 2020-01-29 | End: 2020-01-30 | Stop reason: HOSPADM

## 2020-01-29 RX ORDER — HEPARIN 100 UNIT/ML
500 SYRINGE INTRAVENOUS AS NEEDED
Status: DISCONTINUED | OUTPATIENT
Start: 2020-01-29 | End: 2020-02-02 | Stop reason: HOSPADM

## 2020-01-29 RX ORDER — SODIUM CHLORIDE 0.9 % (FLUSH) 0.9 %
SYRINGE (ML) INJECTION
Status: COMPLETED
Start: 2020-01-29 | End: 2020-01-29

## 2020-01-29 RX ADMIN — Medication 500 UNITS: at 16:32

## 2020-01-29 RX ADMIN — Medication 10 ML: at 15:59

## 2020-01-29 RX ADMIN — Medication 10 ML: at 16:31

## 2020-01-29 RX ADMIN — SODIUM CHLORIDE 1 G: 900 INJECTION, SOLUTION INTRAVENOUS at 16:00

## 2020-01-29 NOTE — PROGRESS NOTES
[address] [facility] @ [facility] VISIT NOTE     1967 Patient arrives for Daily Invanz Infusions x 6 Weeks (until 02/26/2020) without acute problems. Please see [facility] care for complete assessment and education provided. Vital signs stable throughout and prior to discharge, Pt. Tolerated treatment well and discharged without incident. Patient/daughter is aware of next [facility] appointment on 01/30/2020. Appointment card given to patient/daughter. Medications Verified by Yuly Vazquez RN via [employer]:  1. Invanz 1g IV  2. NS SASH IV Flush via RUE SL PICC line  3.  Heparin 500 units IV flush via RUE SL PICC line     VITAL SIGNS  Patient Vitals for the past 12 hrs:   Temp Pulse Resp BP SpO2   01/29/20 1628 98.4 °F (36.9 °C) 76 18 154/85    01/29/20 1549 97.3 °F (36.3 °C) 60 18 159/83 99 %

## 2020-01-30 ENCOUNTER — HOSPITAL ENCOUNTER (OUTPATIENT)
Dept: INFUSION THERAPY | Age: 50
Discharge: HOME | End: 2020-01-30
Payer: MEDICAID

## 2020-01-30 VITALS
SYSTOLIC BLOOD PRESSURE: 163 MMHG | RESPIRATION RATE: 18 BRPM | HEART RATE: 66 BPM | DIASTOLIC BLOOD PRESSURE: 84 MMHG | OXYGEN SATURATION: 97 % | TEMPERATURE: 97.3 F

## 2020-01-30 LAB
ANION GAP SERPL CALC-SCNC: 2 MMOL/L (ref 5–15)
BASOPHILS # BLD: 0.1 K/UL (ref 0–0.1)
BASOPHILS NFR BLD: 1 % (ref 0–1)
BUN SERPL-MCNC: 33 MG/DL (ref 6–20)
BUN/CREAT SERPL: 28 (ref 12–20)
CALCIUM SERPL-MCNC: 8.1 MG/DL (ref 8.5–10.1)
CHLORIDE SERPL-SCNC: 106 MMOL/L (ref 97–108)
CO2 SERPL-SCNC: 26 MMOL/L (ref 21–32)
CREAT SERPL-MCNC: 1.17 MG/DL (ref 0.55–1.02)
DIFFERENTIAL METHOD BLD: ABNORMAL
EOSINOPHIL # BLD: 0.5 K/UL (ref 0–0.4)
EOSINOPHIL NFR BLD: 5 % (ref 0–7)
ERYTHROCYTE [DISTWIDTH] IN BLOOD BY AUTOMATED COUNT: 17.8 % (ref 11.5–14.5)
GLUCOSE SERPL-MCNC: 322 MG/DL (ref 65–100)
HCT VFR BLD AUTO: 28.4 % (ref 35–47)
HGB BLD-MCNC: 8.7 G/DL (ref 11.5–16)
IMM GRANULOCYTES # BLD AUTO: 0 K/UL (ref 0–0.04)
IMM GRANULOCYTES NFR BLD AUTO: 0 % (ref 0–0.5)
LYMPHOCYTES # BLD: 1 K/UL (ref 0.8–3.5)
LYMPHOCYTES NFR BLD: 11 % (ref 12–49)
MCH RBC QN AUTO: 26.4 PG (ref 26–34)
MCHC RBC AUTO-ENTMCNC: 30.6 G/DL (ref 30–36.5)
MCV RBC AUTO: 86.3 FL (ref 80–99)
MONOCYTES # BLD: 0.5 K/UL (ref 0–1)
MONOCYTES NFR BLD: 5 % (ref 5–13)
NEUTS SEG # BLD: 7 K/UL (ref 1.8–8)
NEUTS SEG NFR BLD: 78 % (ref 32–75)
NRBC # BLD: 0 K/UL (ref 0–0.01)
NRBC BLD-RTO: 0 PER 100 WBC
PLATELET # BLD AUTO: 375 K/UL (ref 150–400)
PMV BLD AUTO: 10.6 FL (ref 8.9–12.9)
POTASSIUM SERPL-SCNC: 4.6 MMOL/L (ref 3.5–5.1)
RBC # BLD AUTO: 3.29 M/UL (ref 3.8–5.2)
SODIUM SERPL-SCNC: 134 MMOL/L (ref 136–145)
WBC # BLD AUTO: 9 K/UL (ref 3.6–11)

## 2020-01-30 PROCEDURE — 74011000258 HC RX REV CODE- 258: Performed by: INTERNAL MEDICINE

## 2020-01-30 PROCEDURE — 36592 COLLECT BLOOD FROM PICC: CPT

## 2020-01-30 PROCEDURE — 96365 THER/PROPH/DIAG IV INF INIT: CPT

## 2020-01-30 PROCEDURE — 77030020847 HC STATLOK BARD -A

## 2020-01-30 PROCEDURE — 74011250636 HC RX REV CODE- 250/636: Performed by: INTERNAL MEDICINE

## 2020-01-30 PROCEDURE — 36415 COLL VENOUS BLD VENIPUNCTURE: CPT

## 2020-01-30 PROCEDURE — 85025 COMPLETE CBC W/AUTO DIFF WBC: CPT

## 2020-01-30 PROCEDURE — 80048 BASIC METABOLIC PNL TOTAL CA: CPT

## 2020-01-30 RX ORDER — HEPARIN 100 UNIT/ML
500 SYRINGE INTRAVENOUS AS NEEDED
Status: DISCONTINUED | OUTPATIENT
Start: 2020-01-30 | End: 2020-01-31 | Stop reason: HOSPADM

## 2020-01-30 RX ORDER — SODIUM CHLORIDE 0.9 % (FLUSH) 0.9 %
5-10 SYRINGE (ML) INJECTION AS NEEDED
Status: DISCONTINUED | OUTPATIENT
Start: 2020-01-30 | End: 2020-01-31 | Stop reason: HOSPADM

## 2020-01-30 RX ADMIN — SODIUM CHLORIDE 1 G: 900 INJECTION, SOLUTION INTRAVENOUS at 10:16

## 2020-01-30 RX ADMIN — Medication 10 ML: at 10:50

## 2020-01-30 RX ADMIN — Medication 10 ML: at 10:15

## 2020-01-30 RX ADMIN — HEPARIN 500 UNITS: 100 SYRINGE at 10:51

## 2020-01-30 NOTE — PROGRESS NOTES
[address] [facility] @ [facility] VISIT NOTE     1010 Patient arrives for Daily Invanz Infusions x 6 Weeks (until 02/26/2020) without acute problems. Please see connect care for complete assessment and education provided. Vital signs stable throughout and prior to discharge, Pt. Tolerated treatment well and discharged without incident. Patient/daughter is aware of next [facility] appointment on 01/31/2020. Appointment card given to patient. Medications Verified by Damaris Ornelas RN via [employer]:  1. Invanz 1g IV  2. NS SASH IV Flush via RUE SL PICC line  3. Heparin 500 units IV flush via RUE SL PICC line     VITAL SIGNS  Patient Vitals for the past 12 hrs:   Temp Pulse Resp BP SpO2   01/30/20 1051 97.3 °F (36.3 °C) 66 18 163/84 97 %   01/30/20 1012 97.3 °F (36.3 °C) 70 18 168/81 97 %       LAB WORK  Recent Results (from the past 12 hour(s))   CBC WITH AUTOMATED DIFF    Collection Time: 01/30/20 10:11 AM   Result Value Ref Range    WBC 9.0 3.6 - 11.0 K/uL    RBC 3.29 (L) 3.80 - 5.20 M/uL    HGB 8.7 (L) 11.5 - 16.0 g/dL    HCT 28.4 (L) 35.0 - 47.0 %    MCV 86.3 80.0 - 99.0 FL    MCH 26.4 26.0 - 34.0 PG    MCHC 30.6 30.0 - 36.5 g/dL    RDW 17.8 (H) 11.5 - 14.5 %    PLATELET 836 020 - 102 K/uL    MPV 10.6 8.9 - 12.9 FL    NRBC 0.0 0  WBC    ABSOLUTE NRBC 0.00 0.00 - 0.01 K/uL    NEUTROPHILS 78 (H) 32 - 75 %    LYMPHOCYTES 11 (L) 12 - 49 %    MONOCYTES 5 5 - 13 %    EOSINOPHILS 5 0 - 7 %    BASOPHILS 1 0 - 1 %    IMMATURE GRANULOCYTES 0 0.0 - 0.5 %    ABS. NEUTROPHILS 7.0 1.8 - 8.0 K/UL    ABS. LYMPHOCYTES 1.0 0.8 - 3.5 K/UL    ABS. MONOCYTES 0.5 0.0 - 1.0 K/UL    ABS. EOSINOPHILS 0.5 (H) 0.0 - 0.4 K/UL    ABS. BASOPHILS 0.1 0.0 - 0.1 K/UL    ABS. IMM.  GRANS. 0.0 0.00 - 0.04 K/UL    DF AUTOMATED     METABOLIC PANEL, BASIC    Collection Time: 01/30/20 10:11 AM   Result Value Ref Range    Sodium 134 (L) 136 - 145 mmol/L    Potassium 4.6 3.5 - 5.1 mmol/L    Chloride 106 97 - 108 mmol/L    CO2 26 21 - 32 mmol/L    Anion gap 2 (L) 5 - 15 mmol/L    Glucose 322 (H) 65 - 100 mg/dL    BUN 33 (H) 6 - 20 MG/DL    Creatinine 1.17 (H) 0.55 - 1.02 MG/DL    BUN/Creatinine ratio 28 (H) 12 - 20      GFR est AA 60 (L) >60 ml/min/1.73m2    GFR est non-AA 49 (L) >60 ml/min/1.73m2    Calcium 8.1 (L) 8.5 - 10.1 MG/DL

## 2020-01-31 ENCOUNTER — HOSPITAL ENCOUNTER (OUTPATIENT)
Dept: INFUSION THERAPY | Age: 50
Discharge: HOME | End: 2020-01-31
Payer: MEDICAID

## 2020-01-31 VITALS
SYSTOLIC BLOOD PRESSURE: 122 MMHG | TEMPERATURE: 97.3 F | RESPIRATION RATE: 18 BRPM | HEART RATE: 67 BPM | DIASTOLIC BLOOD PRESSURE: 60 MMHG

## 2020-01-31 PROCEDURE — 96365 THER/PROPH/DIAG IV INF INIT: CPT

## 2020-01-31 PROCEDURE — 74011250636 HC RX REV CODE- 250/636: Performed by: INTERNAL MEDICINE

## 2020-01-31 PROCEDURE — 74011000258 HC RX REV CODE- 258: Performed by: INTERNAL MEDICINE

## 2020-01-31 RX ORDER — HEPARIN 100 UNIT/ML
500 SYRINGE INTRAVENOUS AS NEEDED
Status: DISCONTINUED | OUTPATIENT
Start: 2020-01-31 | End: 2020-02-01 | Stop reason: HOSPADM

## 2020-01-31 RX ORDER — SODIUM CHLORIDE 0.9 % (FLUSH) 0.9 %
5-10 SYRINGE (ML) INJECTION AS NEEDED
Status: DISCONTINUED | OUTPATIENT
Start: 2020-01-31 | End: 2020-02-01 | Stop reason: HOSPADM

## 2020-01-31 RX ADMIN — Medication 10 ML: at 10:45

## 2020-01-31 RX ADMIN — ERTAPENEM SODIUM 1 G: 1 INJECTION, POWDER, LYOPHILIZED, FOR SOLUTION INTRAMUSCULAR; INTRAVENOUS at 10:13

## 2020-01-31 RX ADMIN — Medication 500 UNITS: at 10:45

## 2020-01-31 RX ADMIN — Medication 10 ML: at 10:13

## 2020-01-31 NOTE — PROGRESS NOTES
[address] [facility] @ [facility] VISIT NOTE    1005 Patient arrives for Daily Invanz x 6 weeks without acute problems. Please see connect care for complete assessment and education provided. Vital signs stable throughout and prior to discharge, Pt. Tolerated treatment well and discharged without incident. Patient/parent is aware of next [facility] appointment on 2/1/2020. Appointment card given to patient. Medications Verified by Mary Bravo RN via [employer]:  1. Invanz 1 gram  2.  Heparin 500 units    VITAL SIGNS   Patient Vitals for the past 12 hrs:   Temp Pulse Resp BP   01/31/20 1045 97.3 °F (36.3 °C) 67 18 122/60   01/31/20 1006 97.3 °F (36.3 °C) 66 18 122/67

## 2020-02-01 ENCOUNTER — HOSPITAL ENCOUNTER (OUTPATIENT)
Dept: INFUSION THERAPY | Age: 50
Discharge: HOME | End: 2020-02-01
Payer: MEDICAID

## 2020-02-01 VITALS — DIASTOLIC BLOOD PRESSURE: 74 MMHG | SYSTOLIC BLOOD PRESSURE: 121 MMHG | RESPIRATION RATE: 18 BRPM

## 2020-02-01 PROCEDURE — 74011250636 HC RX REV CODE- 250/636: Performed by: INTERNAL MEDICINE

## 2020-02-01 PROCEDURE — 74011000258 HC RX REV CODE- 258: Performed by: INTERNAL MEDICINE

## 2020-02-01 PROCEDURE — 96365 THER/PROPH/DIAG IV INF INIT: CPT

## 2020-02-01 RX ORDER — SODIUM CHLORIDE 0.9 % (FLUSH) 0.9 %
5-10 SYRINGE (ML) INJECTION AS NEEDED
Status: DISCONTINUED | OUTPATIENT
Start: 2020-02-01 | End: 2020-02-02 | Stop reason: HOSPADM

## 2020-02-01 RX ORDER — HEPARIN 100 UNIT/ML
500 SYRINGE INTRAVENOUS AS NEEDED
Status: DISCONTINUED | OUTPATIENT
Start: 2020-02-01 | End: 2020-02-02 | Stop reason: HOSPADM

## 2020-02-01 RX ADMIN — Medication 10 ML: at 08:49

## 2020-02-01 RX ADMIN — Medication 10 ML: at 09:20

## 2020-02-01 RX ADMIN — ERTAPENEM SODIUM 1 G: 1 INJECTION, POWDER, LYOPHILIZED, FOR SOLUTION INTRAMUSCULAR; INTRAVENOUS at 08:50

## 2020-02-01 RX ADMIN — Medication 500 UNITS: at 09:20

## 2020-02-01 NOTE — PROGRESS NOTES
[facility] Progress Note    Date: 2020    Name: Virgen Jarquin    MRN: [id number]         : 1970    0845. Ms. Junior Mckeon Arrived ambulatory and in no distress for Daily Antibitoics. Assessment was completed, reports pain 5/10 to L foot. RUE PICC with + blood return, dressing CDI. Patient Vitals for the past 12 hrs:   Resp BP   20 0949 18 121/74         0920. Ms. Junior Mckeon tolerated treatment well and was discharged from [place] [address] in stable condition. She is to return on 20 for her next appointment.     Ary Mullen RN  2020

## 2020-02-02 ENCOUNTER — HOSPITAL ENCOUNTER (OUTPATIENT)
Dept: INFUSION THERAPY | Age: 50
Discharge: HOME | End: 2020-02-02
Payer: MEDICAID

## 2020-02-03 ENCOUNTER — HOSPITAL ENCOUNTER (OUTPATIENT)
Dept: INFUSION THERAPY | Age: 50
Discharge: HOME | End: 2020-02-03
Payer: MEDICAID

## 2020-02-03 VITALS
OXYGEN SATURATION: 97 % | DIASTOLIC BLOOD PRESSURE: 87 MMHG | HEART RATE: 82 BPM | RESPIRATION RATE: 18 BRPM | TEMPERATURE: 97.8 F | SYSTOLIC BLOOD PRESSURE: 181 MMHG

## 2020-02-03 LAB
ALBUMIN SERPL-MCNC: 1.9 G/DL (ref 3.5–5)
ALBUMIN/GLOB SERPL: 0.4 {RATIO} (ref 1.1–2.2)
ALP SERPL-CCNC: 213 U/L (ref 45–117)
ALT SERPL-CCNC: 12 U/L (ref 12–78)
ANION GAP SERPL CALC-SCNC: 5 MMOL/L (ref 5–15)
AST SERPL-CCNC: 13 U/L (ref 15–37)
BASOPHILS # BLD: 0 K/UL (ref 0–0.1)
BASOPHILS NFR BLD: 1 % (ref 0–1)
BILIRUB SERPL-MCNC: 0.3 MG/DL (ref 0.2–1)
BUN SERPL-MCNC: 21 MG/DL (ref 6–20)
BUN/CREAT SERPL: 21 (ref 12–20)
CALCIUM SERPL-MCNC: 8.1 MG/DL (ref 8.5–10.1)
CHLORIDE SERPL-SCNC: 109 MMOL/L (ref 97–108)
CO2 SERPL-SCNC: 25 MMOL/L (ref 21–32)
CREAT SERPL-MCNC: 0.98 MG/DL (ref 0.55–1.02)
DIFFERENTIAL METHOD BLD: ABNORMAL
EOSINOPHIL # BLD: 0.7 K/UL (ref 0–0.4)
EOSINOPHIL NFR BLD: 10 % (ref 0–7)
ERYTHROCYTE [DISTWIDTH] IN BLOOD BY AUTOMATED COUNT: 18.3 % (ref 11.5–14.5)
ERYTHROCYTE [SEDIMENTATION RATE] IN BLOOD: 63 MM/HR (ref 0–20)
GLOBULIN SER CALC-MCNC: 4.7 G/DL (ref 2–4)
GLUCOSE SERPL-MCNC: 291 MG/DL (ref 65–100)
HCT VFR BLD AUTO: 30 % (ref 35–47)
HGB BLD-MCNC: 9.1 G/DL (ref 11.5–16)
IMM GRANULOCYTES # BLD AUTO: 0 K/UL (ref 0–0.04)
IMM GRANULOCYTES NFR BLD AUTO: 0 % (ref 0–0.5)
LYMPHOCYTES # BLD: 1.1 K/UL (ref 0.8–3.5)
LYMPHOCYTES NFR BLD: 16 % (ref 12–49)
MCH RBC QN AUTO: 26.2 PG (ref 26–34)
MCHC RBC AUTO-ENTMCNC: 30.3 G/DL (ref 30–36.5)
MCV RBC AUTO: 86.5 FL (ref 80–99)
MONOCYTES # BLD: 0.5 K/UL (ref 0–1)
MONOCYTES NFR BLD: 7 % (ref 5–13)
NEUTS SEG # BLD: 4.6 K/UL (ref 1.8–8)
NEUTS SEG NFR BLD: 66 % (ref 32–75)
NRBC # BLD: 0 K/UL (ref 0–0.01)
NRBC BLD-RTO: 0 PER 100 WBC
PLATELET # BLD AUTO: 320 K/UL (ref 150–400)
PMV BLD AUTO: 10.4 FL (ref 8.9–12.9)
POTASSIUM SERPL-SCNC: 4 MMOL/L (ref 3.5–5.1)
PROT SERPL-MCNC: 6.6 G/DL (ref 6.4–8.2)
RBC # BLD AUTO: 3.47 M/UL (ref 3.8–5.2)
SODIUM SERPL-SCNC: 139 MMOL/L (ref 136–145)
WBC # BLD AUTO: 7 K/UL (ref 3.6–11)

## 2020-02-03 PROCEDURE — 96365 THER/PROPH/DIAG IV INF INIT: CPT

## 2020-02-03 PROCEDURE — 74011000258 HC RX REV CODE- 258: Performed by: INTERNAL MEDICINE

## 2020-02-03 PROCEDURE — 74011250636 HC RX REV CODE- 250/636: Performed by: INTERNAL MEDICINE

## 2020-02-03 PROCEDURE — 80053 COMPREHEN METABOLIC PANEL: CPT

## 2020-02-03 PROCEDURE — 36415 COLL VENOUS BLD VENIPUNCTURE: CPT

## 2020-02-03 PROCEDURE — 85652 RBC SED RATE AUTOMATED: CPT

## 2020-02-03 PROCEDURE — 85025 COMPLETE CBC W/AUTO DIFF WBC: CPT

## 2020-02-03 RX ORDER — SODIUM CHLORIDE 0.9 % (FLUSH) 0.9 %
5-10 SYRINGE (ML) INJECTION AS NEEDED
Status: DISCONTINUED | OUTPATIENT
Start: 2020-02-03 | End: 2020-02-04 | Stop reason: HOSPADM

## 2020-02-03 RX ORDER — HEPARIN 100 UNIT/ML
500 SYRINGE INTRAVENOUS AS NEEDED
Status: DISCONTINUED | OUTPATIENT
Start: 2020-02-03 | End: 2020-02-04 | Stop reason: HOSPADM

## 2020-02-03 RX ADMIN — Medication 10 ML: at 11:05

## 2020-02-03 RX ADMIN — Medication 500 UNITS: at 11:05

## 2020-02-03 RX ADMIN — Medication 10 ML: at 10:20

## 2020-02-03 RX ADMIN — SODIUM CHLORIDE 1 G: 900 INJECTION, SOLUTION INTRAVENOUS at 10:32

## 2020-02-03 NOTE — PROGRESS NOTES
[facility] Progress Note    Date: February 3, 2020    Name: Xiomara Lazo    MRN: [id number]         : 1970    Ms. Jackson Lara Arrived ambulatory and in no distress for Antibiotic Regimen. Assessment was completed, no acute issues at this time, no new complaints voiced, pt c/o 5/10 left foot pain. RUE PICC with + blood return, dressing CDI, labs drawn & sent for processing. Ms. Oleg Wilder vitals were reviewed. Patient Vitals for the past 12 hrs:   Temp Pulse Resp BP SpO2   20 1107  82 18     20 1022 97.8 °F (36.6 °C) 84 18 181/87 97 %     Patient has elevated BP, pt states she took BP medication immediately before this appointment. Educated pt about medication compliance and discussed symptoms of HTN. Patient verbalized understanding and had no further questions. Lab results were obtained and reviewed. Pending in CC. Medications:  Medications Administered     ertapenem (INVANZ) 1 g in 0.9% sodium chloride (MBP/ADV) 50 mL     Admin Date  2020 Action  New Bag Dose  1 g Rate  100 mL/hr Route  IntraVENous Administered By  Miki Warner RN          heparin (porcine) pf 500 Units     Admin Date  2020 Action  Given Dose  500 Units Route  IntraVENous Administered By  Miki Warner RN          sodium chloride (NS) flush 5-10 mL     Admin Date  2020 Action  Given Dose  10 mL Route  IntraVENous Administered By  Miki Warner RN           Admin Date  2020 Action  Given Dose  10 mL Route  IntraVENous Administered By  Miki Warner RN                Ms. Jackson Lara tolerated treatment well and was discharged from [place] [address] in stable condition at 1120. PICC line flushed with saline and heparin per protocol. Curos cap attached to end clave. She is to return on  at 1000 for her next appointment.     Bryn Bowers RN  February 3, 2020

## 2020-02-04 ENCOUNTER — HOSPITAL ENCOUNTER (OUTPATIENT)
Dept: INFUSION THERAPY | Age: 50
Discharge: HOME | End: 2020-02-04
Payer: MEDICAID

## 2020-02-04 VITALS
RESPIRATION RATE: 18 BRPM | HEART RATE: 87 BPM | SYSTOLIC BLOOD PRESSURE: 177 MMHG | DIASTOLIC BLOOD PRESSURE: 76 MMHG | OXYGEN SATURATION: 97 % | TEMPERATURE: 97.7 F

## 2020-02-04 PROCEDURE — 96365 THER/PROPH/DIAG IV INF INIT: CPT

## 2020-02-04 PROCEDURE — 74011000258 HC RX REV CODE- 258: Performed by: INTERNAL MEDICINE

## 2020-02-04 PROCEDURE — 74011250636 HC RX REV CODE- 250/636: Performed by: INTERNAL MEDICINE

## 2020-02-04 RX ORDER — SODIUM CHLORIDE 0.9 % (FLUSH) 0.9 %
5-10 SYRINGE (ML) INJECTION AS NEEDED
Status: DISCONTINUED | OUTPATIENT
Start: 2020-02-04 | End: 2020-02-05 | Stop reason: HOSPADM

## 2020-02-04 RX ORDER — HEPARIN 100 UNIT/ML
500 SYRINGE INTRAVENOUS AS NEEDED
Status: DISCONTINUED | OUTPATIENT
Start: 2020-02-04 | End: 2020-02-05 | Stop reason: HOSPADM

## 2020-02-04 RX ADMIN — Medication 10 ML: at 15:52

## 2020-02-04 RX ADMIN — Medication 10 ML: at 15:21

## 2020-02-04 RX ADMIN — Medication 500 UNITS: at 15:53

## 2020-02-04 RX ADMIN — ERTAPENEM SODIUM 1 G: 1 INJECTION, POWDER, LYOPHILIZED, FOR SOLUTION INTRAMUSCULAR; INTRAVENOUS at 15:22

## 2020-02-04 NOTE — PROGRESS NOTES
[address] [facility] @ [facility] VISIT NOTE     8504 Patient arrives for Daily Invanz Infusions x 6 Weeks (until 02/26/2020) without acute problems. Please see connect care for complete assessment and education provided. Vital signs stable throughout and prior to discharge, Pt. Tolerated treatment well and discharged without incident. Patient is aware of next [facility] appointment on 02/05/2020. Appointment card given to patient/parents. Medications Verified by Damaris Ornelas RN via [employer]:  1. Invanz 1g IV  2. NS SASH IV Flush via RUE SL PICC line  3.  Heparin 500 units IV Flush via RUE SL PICC line     VITAL SIGNS  Patient Vitals for the past 12 hrs:   Temp Pulse Resp BP SpO2   02/04/20 1555 97.7 °F (36.5 °C) 87 18 177/76    02/04/20 1517 97.8 °F (36.6 °C) 90 18 177/90 97 %

## 2020-02-05 ENCOUNTER — HOSPITAL ENCOUNTER (OUTPATIENT)
Dept: INFUSION THERAPY | Age: 50
Discharge: HOME | End: 2020-02-05
Payer: MEDICAID

## 2020-02-05 VITALS
DIASTOLIC BLOOD PRESSURE: 98 MMHG | OXYGEN SATURATION: 97 % | RESPIRATION RATE: 18 BRPM | TEMPERATURE: 98.3 F | HEART RATE: 78 BPM | SYSTOLIC BLOOD PRESSURE: 184 MMHG

## 2020-02-05 PROCEDURE — 96365 THER/PROPH/DIAG IV INF INIT: CPT

## 2020-02-05 PROCEDURE — 74011000258 HC RX REV CODE- 258: Performed by: INTERNAL MEDICINE

## 2020-02-05 PROCEDURE — 74011250636 HC RX REV CODE- 250/636: Performed by: INTERNAL MEDICINE

## 2020-02-05 RX ORDER — HEPARIN 100 UNIT/ML
500 SYRINGE INTRAVENOUS AS NEEDED
Status: DISCONTINUED | OUTPATIENT
Start: 2020-02-05 | End: 2020-02-06 | Stop reason: HOSPADM

## 2020-02-05 RX ORDER — SODIUM CHLORIDE 0.9 % (FLUSH) 0.9 %
10 SYRINGE (ML) INJECTION AS NEEDED
Status: DISCONTINUED | OUTPATIENT
Start: 2020-02-05 | End: 2020-02-06 | Stop reason: HOSPADM

## 2020-02-05 RX ADMIN — ERTAPENEM SODIUM 1 G: 1 INJECTION, POWDER, LYOPHILIZED, FOR SOLUTION INTRAMUSCULAR; INTRAVENOUS at 10:21

## 2020-02-05 RX ADMIN — Medication 500 UNITS: at 10:51

## 2020-02-05 RX ADMIN — Medication 10 ML: at 10:20

## 2020-02-05 RX ADMIN — Medication 10 ML: at 10:50

## 2020-02-05 NOTE — PROGRESS NOTES
[address] [facility] @ [facility] VISIT NOTE     9738 Patient arrives for Daily Invanz Infusions x 6 Weeks (until 02/26/2020) without acute problems. Please see connect care for complete assessment and education provided. Vital signs stable throughout and prior to discharge, Pt. Tolerated treatment well and discharged without incident. Patient is aware of next [facility] appointment on 02/06/2020. Appointment card given to patient/parents. Patient instructed to go to ER due to her elevated BP & 2+ lower extremity edema bilaterally. Patient refused to go to ER today & stated she has a PCP appointment this afternoon 02/05/2020. Medications Verified by Dominga Gee RN via [employer]:  1. Invanz 1g IV  2. NS SASH IV Flush via RUE SL PICC line  3.  Heparin 500 units IV Flush via RUE SL PICC line     VITAL SIGNS  Patient Vitals for the past 12 hrs:   Temp Pulse Resp BP SpO2   02/05/20 1051 98.3 °F (36.8 °C) 78 18 (!) 184/98 97 %   02/05/20 1035    (!) 185/106    02/05/20 1010 98.2 °F (36.8 °C) 89 18 (!) 183/110 97 %

## 2020-02-06 ENCOUNTER — HOSPITAL ENCOUNTER (OUTPATIENT)
Dept: INFUSION THERAPY | Age: 50
Discharge: HOME | End: 2020-02-06
Payer: MEDICAID

## 2020-02-06 VITALS
OXYGEN SATURATION: 97 % | RESPIRATION RATE: 18 BRPM | SYSTOLIC BLOOD PRESSURE: 168 MMHG | DIASTOLIC BLOOD PRESSURE: 99 MMHG | HEART RATE: 90 BPM | TEMPERATURE: 97.8 F

## 2020-02-06 LAB
ALBUMIN SERPL-MCNC: 1.8 G/DL (ref 3.5–5)
ALBUMIN/GLOB SERPL: 0.4 {RATIO} (ref 1.1–2.2)
ALP SERPL-CCNC: 223 U/L (ref 45–117)
ALT SERPL-CCNC: 12 U/L (ref 12–78)
ANION GAP SERPL CALC-SCNC: 7 MMOL/L (ref 5–15)
AST SERPL-CCNC: 12 U/L (ref 15–37)
BASOPHILS # BLD: 0.1 K/UL (ref 0–0.1)
BASOPHILS NFR BLD: 1 % (ref 0–1)
BILIRUB SERPL-MCNC: 0.2 MG/DL (ref 0.2–1)
BUN SERPL-MCNC: 24 MG/DL (ref 6–20)
BUN/CREAT SERPL: 23 (ref 12–20)
CALCIUM SERPL-MCNC: 8.1 MG/DL (ref 8.5–10.1)
CHLORIDE SERPL-SCNC: 107 MMOL/L (ref 97–108)
CO2 SERPL-SCNC: 23 MMOL/L (ref 21–32)
CREAT SERPL-MCNC: 1.03 MG/DL (ref 0.55–1.02)
DIFFERENTIAL METHOD BLD: ABNORMAL
EOSINOPHIL # BLD: 0.6 K/UL (ref 0–0.4)
EOSINOPHIL NFR BLD: 8 % (ref 0–7)
ERYTHROCYTE [DISTWIDTH] IN BLOOD BY AUTOMATED COUNT: 18.3 % (ref 11.5–14.5)
GLOBULIN SER CALC-MCNC: 4.6 G/DL (ref 2–4)
GLUCOSE SERPL-MCNC: 306 MG/DL (ref 65–100)
HCT VFR BLD AUTO: 28.3 % (ref 35–47)
HGB BLD-MCNC: 8.6 G/DL (ref 11.5–16)
IMM GRANULOCYTES # BLD AUTO: 0 K/UL (ref 0–0.04)
IMM GRANULOCYTES NFR BLD AUTO: 0 % (ref 0–0.5)
LYMPHOCYTES # BLD: 0.8 K/UL (ref 0.8–3.5)
LYMPHOCYTES NFR BLD: 12 % (ref 12–49)
MCH RBC QN AUTO: 26.6 PG (ref 26–34)
MCHC RBC AUTO-ENTMCNC: 30.4 G/DL (ref 30–36.5)
MCV RBC AUTO: 87.6 FL (ref 80–99)
MONOCYTES # BLD: 0.5 K/UL (ref 0–1)
MONOCYTES NFR BLD: 7 % (ref 5–13)
NEUTS SEG # BLD: 5.1 K/UL (ref 1.8–8)
NEUTS SEG NFR BLD: 72 % (ref 32–75)
NRBC # BLD: 0 K/UL (ref 0–0.01)
NRBC BLD-RTO: 0 PER 100 WBC
PLATELET # BLD AUTO: 257 K/UL (ref 150–400)
PMV BLD AUTO: 11 FL (ref 8.9–12.9)
POTASSIUM SERPL-SCNC: 4.2 MMOL/L (ref 3.5–5.1)
PROT SERPL-MCNC: 6.4 G/DL (ref 6.4–8.2)
RBC # BLD AUTO: 3.23 M/UL (ref 3.8–5.2)
SODIUM SERPL-SCNC: 137 MMOL/L (ref 136–145)
WBC # BLD AUTO: 7.1 K/UL (ref 3.6–11)

## 2020-02-06 PROCEDURE — 77030020847 HC STATLOK BARD -A

## 2020-02-06 PROCEDURE — 85025 COMPLETE CBC W/AUTO DIFF WBC: CPT

## 2020-02-06 PROCEDURE — 36592 COLLECT BLOOD FROM PICC: CPT

## 2020-02-06 PROCEDURE — 74011000258 HC RX REV CODE- 258: Performed by: INTERNAL MEDICINE

## 2020-02-06 PROCEDURE — 74011250636 HC RX REV CODE- 250/636: Performed by: INTERNAL MEDICINE

## 2020-02-06 PROCEDURE — 96365 THER/PROPH/DIAG IV INF INIT: CPT

## 2020-02-06 PROCEDURE — 80053 COMPREHEN METABOLIC PANEL: CPT

## 2020-02-06 PROCEDURE — 36415 COLL VENOUS BLD VENIPUNCTURE: CPT

## 2020-02-06 RX ORDER — SODIUM CHLORIDE 0.9 % (FLUSH) 0.9 %
5-10 SYRINGE (ML) INJECTION AS NEEDED
Status: DISCONTINUED | OUTPATIENT
Start: 2020-02-06 | End: 2020-02-07 | Stop reason: HOSPADM

## 2020-02-06 RX ORDER — HEPARIN 100 UNIT/ML
500 SYRINGE INTRAVENOUS AS NEEDED
Status: DISCONTINUED | OUTPATIENT
Start: 2020-02-06 | End: 2020-02-07 | Stop reason: HOSPADM

## 2020-02-06 RX ADMIN — ERTAPENEM SODIUM 1 G: 1 INJECTION, POWDER, LYOPHILIZED, FOR SOLUTION INTRAMUSCULAR; INTRAVENOUS at 13:23

## 2020-02-06 RX ADMIN — Medication 500 UNITS: at 13:53

## 2020-02-06 RX ADMIN — Medication 10 ML: at 13:15

## 2020-02-06 RX ADMIN — Medication 10 ML: at 13:53

## 2020-02-06 NOTE — PROGRESS NOTES
[address] [facility] @ [facility] VISIT NOTE     1150 Patient arrives for Daily Invanz Infusions x 6 Weeks (until 02/26/2020) without acute problems. Please see connect care for complete assessment and education provided. Vital signs stable throughout and prior to discharge, Pt. Tolerated treatment well and discharged without incident. Patient is aware of next [facility] appointment on 02/07/2020. Appointment card given to patient. Medications Verified by Lg James RN via [employer]:  1. Invanz 1g IV  2. NS SASH IV Flush via RUE SL PICC line  3. Heparin 500 units IV Flush via RUE SL PICC line     VITAL SIGNS  Patient Vitals for the past 12 hrs:   Temp Pulse Resp BP SpO2   02/06/20 1354 97.8 °F (36.6 °C) 90 18 (!) 168/99    02/06/20 1308 97.7 °F (36.5 °C) 89 18 140/90 97 %     LAB WORK  Recent Results (from the past 12 hour(s))   CBC WITH AUTOMATED DIFF    Collection Time: 02/06/20  1:13 PM   Result Value Ref Range    WBC 7.1 3.6 - 11.0 K/uL    RBC 3.23 (L) 3.80 - 5.20 M/uL    HGB 8.6 (L) 11.5 - 16.0 g/dL    HCT 28.3 (L) 35.0 - 47.0 %    MCV 87.6 80.0 - 99.0 FL    MCH 26.6 26.0 - 34.0 PG    MCHC 30.4 30.0 - 36.5 g/dL    RDW 18.3 (H) 11.5 - 14.5 %    PLATELET 877 067 - 930 K/uL    MPV 11.0 8.9 - 12.9 FL    NRBC 0.0 0  WBC    ABSOLUTE NRBC 0.00 0.00 - 0.01 K/uL    NEUTROPHILS 72 32 - 75 %    LYMPHOCYTES 12 12 - 49 %    MONOCYTES 7 5 - 13 %    EOSINOPHILS 8 (H) 0 - 7 %    BASOPHILS 1 0 - 1 %    IMMATURE GRANULOCYTES 0 0.0 - 0.5 %    ABS. NEUTROPHILS 5.1 1.8 - 8.0 K/UL    ABS. LYMPHOCYTES 0.8 0.8 - 3.5 K/UL    ABS. MONOCYTES 0.5 0.0 - 1.0 K/UL    ABS. EOSINOPHILS 0.6 (H) 0.0 - 0.4 K/UL    ABS. BASOPHILS 0.1 0.0 - 0.1 K/UL    ABS. IMM.  GRANS. 0.0 0.00 - 0.04 K/UL    DF AUTOMATED     METABOLIC PANEL, COMPREHENSIVE    Collection Time: 02/06/20  1:13 PM   Result Value Ref Range    Sodium 137 136 - 145 mmol/L    Potassium 4.2 3.5 - 5.1 mmol/L    Chloride 107 97 - 108 mmol/L    CO2 23 21 - 32 mmol/L    Anion gap 7 5 - 15 mmol/L    Glucose 306 (H) 65 - 100 mg/dL    BUN 24 (H) 6 - 20 MG/DL    Creatinine 1.03 (H) 0.55 - 1.02 MG/DL    BUN/Creatinine ratio 23 (H) 12 - 20      GFR est AA >60 >60 ml/min/1.73m2    GFR est non-AA 57 (L) >60 ml/min/1.73m2    Calcium 8.1 (L) 8.5 - 10.1 MG/DL    Bilirubin, total 0.2 0.2 - 1.0 MG/DL    ALT (SGPT) 12 12 - 78 U/L    AST (SGOT) 12 (L) 15 - 37 U/L    Alk.  phosphatase 223 (H) 45 - 117 U/L    Protein, total 6.4 6.4 - 8.2 g/dL    Albumin 1.8 (L) 3.5 - 5.0 g/dL    Globulin 4.6 (H) 2.0 - 4.0 g/dL    A-G Ratio 0.4 (L) 1.1 - 2.2

## 2020-02-07 ENCOUNTER — HOSPITAL ENCOUNTER (OUTPATIENT)
Dept: INFUSION THERAPY | Age: 50
Discharge: HOME | End: 2020-02-07
Payer: MEDICAID

## 2020-02-07 VITALS
RESPIRATION RATE: 18 BRPM | HEART RATE: 94 BPM | DIASTOLIC BLOOD PRESSURE: 93 MMHG | SYSTOLIC BLOOD PRESSURE: 168 MMHG | OXYGEN SATURATION: 97 % | TEMPERATURE: 97.3 F

## 2020-02-07 PROCEDURE — 74011000258 HC RX REV CODE- 258: Performed by: INTERNAL MEDICINE

## 2020-02-07 PROCEDURE — 74011250636 HC RX REV CODE- 250/636: Performed by: INTERNAL MEDICINE

## 2020-02-07 PROCEDURE — 96365 THER/PROPH/DIAG IV INF INIT: CPT

## 2020-02-07 RX ORDER — HEPARIN 100 UNIT/ML
500 SYRINGE INTRAVENOUS AS NEEDED
Status: DISCONTINUED | OUTPATIENT
Start: 2020-02-07 | End: 2020-02-08 | Stop reason: HOSPADM

## 2020-02-07 RX ORDER — SODIUM CHLORIDE 0.9 % (FLUSH) 0.9 %
5-10 SYRINGE (ML) INJECTION AS NEEDED
Status: DISCONTINUED | OUTPATIENT
Start: 2020-02-07 | End: 2020-02-08 | Stop reason: HOSPADM

## 2020-02-07 RX ADMIN — Medication 10 ML: at 10:10

## 2020-02-07 RX ADMIN — ERTAPENEM SODIUM 1 G: 1 INJECTION, POWDER, LYOPHILIZED, FOR SOLUTION INTRAMUSCULAR; INTRAVENOUS at 10:10

## 2020-02-07 RX ADMIN — Medication 10 ML: at 10:48

## 2020-02-07 RX ADMIN — Medication 500 UNITS: at 10:48

## 2020-02-07 NOTE — PROGRESS NOTES
[address] [facility] @ [facility] VISIT NOTE    1010 Patient arrives for Daily Invanz Infusions x 6 weeks without acute problems. Please see connect care for complete assessment and education provided. Vital signs stable throughout and prior to discharge, Pt. Tolerated treatment well and discharged without incident. Patient/parent is aware of next [facility] appointment on 2/8/2020. Appointment card given to patient/parents. Medications Verified by Suman Castanon RN via [employer]:  1. Invanz 1gm IV  2. NS flush via PICC line  3. Heparin 500 units flush via PICC line    VITAL SIGNS   Patient Vitals for the past 12 hrs:   Temp Pulse Resp BP SpO2   02/07/20 1045 97.3 °F (36.3 °C) 94 18 (!) 168/93    02/07/20 1006 97.5 °F (36.4 °C) 97 18 160/83    02/07/20 1000     97 %       LAB WORK Lab results pending, please see Connect [facility] for results. No results found for this or any previous visit (from the past 12 hour(s)).

## 2020-02-10 ENCOUNTER — HOSPITAL ENCOUNTER (OUTPATIENT)
Dept: INFUSION THERAPY | Age: 50
Discharge: HOME | End: 2020-02-10
Payer: MEDICAID

## 2020-02-10 VITALS
RESPIRATION RATE: 18 BRPM | DIASTOLIC BLOOD PRESSURE: 83 MMHG | TEMPERATURE: 97.7 F | HEART RATE: 65 BPM | OXYGEN SATURATION: 99 % | SYSTOLIC BLOOD PRESSURE: 167 MMHG

## 2020-02-10 LAB
ANION GAP SERPL CALC-SCNC: 9 MMOL/L (ref 5–15)
BACTERIA SPEC CULT: NORMAL
BASOPHILS # BLD: 0.1 K/UL (ref 0–0.1)
BASOPHILS NFR BLD: 1 % (ref 0–1)
BUN SERPL-MCNC: 23 MG/DL (ref 6–20)
BUN/CREAT SERPL: 21 (ref 12–20)
CALCIUM SERPL-MCNC: 8 MG/DL (ref 8.5–10.1)
CHLORIDE SERPL-SCNC: 106 MMOL/L (ref 97–108)
CO2 SERPL-SCNC: 23 MMOL/L (ref 21–32)
CREAT SERPL-MCNC: 1.08 MG/DL (ref 0.55–1.02)
DIFFERENTIAL METHOD BLD: ABNORMAL
EOSINOPHIL # BLD: 0.5 K/UL (ref 0–0.4)
EOSINOPHIL NFR BLD: 7 % (ref 0–7)
ERYTHROCYTE [DISTWIDTH] IN BLOOD BY AUTOMATED COUNT: 18.4 % (ref 11.5–14.5)
ERYTHROCYTE [SEDIMENTATION RATE] IN BLOOD: 65 MM/HR (ref 0–20)
GLUCOSE SERPL-MCNC: 282 MG/DL (ref 65–100)
HCT VFR BLD AUTO: 28 % (ref 35–47)
HGB BLD-MCNC: 8.6 G/DL (ref 11.5–16)
IMM GRANULOCYTES # BLD AUTO: 0 K/UL (ref 0–0.04)
IMM GRANULOCYTES NFR BLD AUTO: 0 % (ref 0–0.5)
LYMPHOCYTES # BLD: 1.1 K/UL (ref 0.8–3.5)
LYMPHOCYTES NFR BLD: 16 % (ref 12–49)
MCH RBC QN AUTO: 26.6 PG (ref 26–34)
MCHC RBC AUTO-ENTMCNC: 30.7 G/DL (ref 30–36.5)
MCV RBC AUTO: 86.7 FL (ref 80–99)
MONOCYTES # BLD: 0.5 K/UL (ref 0–1)
MONOCYTES NFR BLD: 7 % (ref 5–13)
NEUTS SEG # BLD: 4.9 K/UL (ref 1.8–8)
NEUTS SEG NFR BLD: 69 % (ref 32–75)
NRBC # BLD: 0 K/UL (ref 0–0.01)
NRBC BLD-RTO: 0 PER 100 WBC
PLATELET # BLD AUTO: 289 K/UL (ref 150–400)
PMV BLD AUTO: 10.1 FL (ref 8.9–12.9)
POTASSIUM SERPL-SCNC: 3.9 MMOL/L (ref 3.5–5.1)
RBC # BLD AUTO: 3.23 M/UL (ref 3.8–5.2)
SERVICE CMNT-IMP: NORMAL
SODIUM SERPL-SCNC: 138 MMOL/L (ref 136–145)
WBC # BLD AUTO: 7.1 K/UL (ref 3.6–11)

## 2020-02-10 PROCEDURE — 74011000258 HC RX REV CODE- 258: Performed by: INTERNAL MEDICINE

## 2020-02-10 PROCEDURE — 74011250636 HC RX REV CODE- 250/636: Performed by: INTERNAL MEDICINE

## 2020-02-10 PROCEDURE — 36592 COLLECT BLOOD FROM PICC: CPT

## 2020-02-10 PROCEDURE — 85652 RBC SED RATE AUTOMATED: CPT

## 2020-02-10 PROCEDURE — 36415 COLL VENOUS BLD VENIPUNCTURE: CPT

## 2020-02-10 PROCEDURE — 80048 BASIC METABOLIC PNL TOTAL CA: CPT

## 2020-02-10 PROCEDURE — 85025 COMPLETE CBC W/AUTO DIFF WBC: CPT

## 2020-02-10 PROCEDURE — 96365 THER/PROPH/DIAG IV INF INIT: CPT

## 2020-02-10 RX ORDER — HEPARIN 100 UNIT/ML
500 SYRINGE INTRAVENOUS AS NEEDED
Status: DISCONTINUED | OUTPATIENT
Start: 2020-02-10 | End: 2020-02-11 | Stop reason: HOSPADM

## 2020-02-10 RX ORDER — SODIUM CHLORIDE 0.9 % (FLUSH) 0.9 %
10 SYRINGE (ML) INJECTION AS NEEDED
Status: DISCONTINUED | OUTPATIENT
Start: 2020-02-10 | End: 2020-02-11 | Stop reason: HOSPADM

## 2020-02-10 RX ADMIN — ERTAPENEM SODIUM 1 G: 1 INJECTION, POWDER, LYOPHILIZED, FOR SOLUTION INTRAMUSCULAR; INTRAVENOUS at 10:24

## 2020-02-10 RX ADMIN — Medication 10 ML: at 10:55

## 2020-02-10 RX ADMIN — Medication 500 UNITS: at 10:56

## 2020-02-10 RX ADMIN — Medication 10 ML: at 10:15

## 2020-02-10 NOTE — PROGRESS NOTES
[address] [facility] @ [facility] VISIT NOTE     1005 Patient arrives for Daily Invanz Infusions x 6 Weeks (until 02/26/2020) without acute problems. Please see connect care for complete assessment and education provided. Vital signs stable throughout and prior to discharge, Pt. Tolerated treatment well and discharged without incident. Patient/daughter is aware of next [facility] appointment on 02/11/2020. Appointment card given to patient/parents. Patient reports missing both of her IV antibiotic therapy treatments this past weekend due to her having to go out of town to visit with a sick family member. Medications Verified by Mikayla Candelaria RN & Johan Coronado RN via [employer]:  1. Invanz 1g IV  2. NS SASH IV Flush via RUE SL PICC line  3. Heparin 500 units IV Flush via RUE SL PICC line     VITAL SIGNS  Patient Vitals for the past 12 hrs:   Temp Pulse Resp BP SpO2   02/10/20 1055 97.7 °F (36.5 °C) 65 18 167/83    02/10/20 1009 97.7 °F (36.5 °C) 68 18 147/76 99 %      LAB WORK Labs Pending, please see connect care for results. Recent Results (from the past 12 hour(s))   CBC WITH AUTOMATED DIFF    Collection Time: 02/10/20 10:11 AM   Result Value Ref Range    WBC 7.1 3.6 - 11.0 K/uL    RBC 3.23 (L) 3.80 - 5.20 M/uL    HGB 8.6 (L) 11.5 - 16.0 g/dL    HCT 28.0 (L) 35.0 - 47.0 %    MCV 86.7 80.0 - 99.0 FL    MCH 26.6 26.0 - 34.0 PG    MCHC 30.7 30.0 - 36.5 g/dL    RDW 18.4 (H) 11.5 - 14.5 %    PLATELET 431 622 - 290 K/uL    MPV 10.1 8.9 - 12.9 FL    NRBC 0.0 0  WBC    ABSOLUTE NRBC 0.00 0.00 - 0.01 K/uL    NEUTROPHILS 69 32 - 75 %    LYMPHOCYTES 16 12 - 49 %    MONOCYTES 7 5 - 13 %    EOSINOPHILS 7 0 - 7 %    BASOPHILS 1 0 - 1 %    IMMATURE GRANULOCYTES 0 0.0 - 0.5 %    ABS. NEUTROPHILS 4.9 1.8 - 8.0 K/UL    ABS. LYMPHOCYTES 1.1 0.8 - 3.5 K/UL    ABS. MONOCYTES 0.5 0.0 - 1.0 K/UL    ABS. EOSINOPHILS 0.5 (H) 0.0 - 0.4 K/UL    ABS. BASOPHILS 0.1 0.0 - 0.1 K/UL    ABS. IMM.  GRANS. 0.0 0.00 - 0.04 K/UL    DF AUTOMATED     METABOLIC PANEL, BASIC    Collection Time: 02/10/20 10:11 AM   Result Value Ref Range    Sodium 138 136 - 145 mmol/L    Potassium 3.9 3.5 - 5.1 mmol/L    Chloride 106 97 - 108 mmol/L    CO2 23 21 - 32 mmol/L    Anion gap 9 5 - 15 mmol/L    Glucose 282 (H) 65 - 100 mg/dL    BUN 23 (H) 6 - 20 MG/DL    Creatinine 1.08 (H) 0.55 - 1.02 MG/DL    BUN/Creatinine ratio 21 (H) 12 - 20      GFR est AA >60 >60 ml/min/1.73m2    GFR est non-AA 54 (L) >60 ml/min/1.73m2    Calcium 8.0 (L) 8.5 - 10.1 MG/DL

## 2020-02-12 ENCOUNTER — HOSPITAL ENCOUNTER (OUTPATIENT)
Dept: INFUSION THERAPY | Age: 50
Discharge: HOME | End: 2020-02-12
Payer: MEDICAID

## 2020-02-12 VITALS
TEMPERATURE: 98 F | HEART RATE: 67 BPM | RESPIRATION RATE: 18 BRPM | DIASTOLIC BLOOD PRESSURE: 85 MMHG | OXYGEN SATURATION: 97 % | SYSTOLIC BLOOD PRESSURE: 154 MMHG

## 2020-02-12 PROCEDURE — 96365 THER/PROPH/DIAG IV INF INIT: CPT

## 2020-02-12 PROCEDURE — 74011000258 HC RX REV CODE- 258: Performed by: INTERNAL MEDICINE

## 2020-02-12 PROCEDURE — 74011250636 HC RX REV CODE- 250/636: Performed by: INTERNAL MEDICINE

## 2020-02-12 RX ORDER — HEPARIN 100 UNIT/ML
500 SYRINGE INTRAVENOUS AS NEEDED
Status: DISCONTINUED | OUTPATIENT
Start: 2020-02-12 | End: 2020-02-16 | Stop reason: HOSPADM

## 2020-02-12 RX ORDER — SODIUM CHLORIDE 0.9 % (FLUSH) 0.9 %
10 SYRINGE (ML) INJECTION AS NEEDED
Status: DISCONTINUED | OUTPATIENT
Start: 2020-02-12 | End: 2020-02-13 | Stop reason: HOSPADM

## 2020-02-12 RX ADMIN — Medication 10 ML: at 10:37

## 2020-02-12 RX ADMIN — ERTAPENEM SODIUM 1 G: 1 INJECTION, POWDER, LYOPHILIZED, FOR SOLUTION INTRAMUSCULAR; INTRAVENOUS at 10:38

## 2020-02-12 RX ADMIN — SODIUM CHLORIDE, PRESERVATIVE FREE 500 UNITS: 5 INJECTION INTRAVENOUS at 11:06

## 2020-02-12 RX ADMIN — Medication 10 ML: at 11:06

## 2020-02-12 NOTE — PROGRESS NOTES
[address] [facility] @ [facility] VISIT NOTE     1030 Patient arrives for Daily Invanz Infusions x 6 Weeks (until 02/26/2020) without acute problems. Please see [facility] care for complete assessment and education provided. Vital signs stable throughout and prior to discharge, Pt. Tolerated treatment well and discharged without incident. Patient/daughter is aware of next [facility] appointment on 02/13/2020. Appointment card given to patient/parents. Medications Verified by Dominga Gee RN via [employer]:  1. Invanz 1g IV  2. NS SASH IV Flush via RUE SL PICC line  3. Heparin 500 units IV Flush via RUE SL PICC line.      VITAL SIGNS  Patient Vitals for the past 12 hrs:   Temp Pulse Resp BP SpO2   02/12/20 1105 98 °F (36.7 °C) 67 18 154/85    02/12/20 1035 97.9 °F (36.6 °C) 73 18 165/90 97 %

## 2020-02-13 ENCOUNTER — HOSPITAL ENCOUNTER (OUTPATIENT)
Dept: INFUSION THERAPY | Age: 50
Discharge: HOME | End: 2020-02-13
Payer: MEDICAID

## 2020-02-13 VITALS
DIASTOLIC BLOOD PRESSURE: 80 MMHG | TEMPERATURE: 97.9 F | SYSTOLIC BLOOD PRESSURE: 165 MMHG | RESPIRATION RATE: 16 BRPM | HEART RATE: 65 BPM

## 2020-02-13 LAB
ALBUMIN SERPL-MCNC: 1.7 G/DL (ref 3.5–5)
ALBUMIN/GLOB SERPL: 0.4 {RATIO} (ref 1.1–2.2)
ALP SERPL-CCNC: 246 U/L (ref 45–117)
ALT SERPL-CCNC: 14 U/L (ref 12–78)
ANION GAP SERPL CALC-SCNC: 6 MMOL/L (ref 5–15)
AST SERPL-CCNC: 21 U/L (ref 15–37)
BASOPHILS # BLD: 0 K/UL (ref 0–0.1)
BASOPHILS NFR BLD: 1 % (ref 0–1)
BILIRUB SERPL-MCNC: 0.2 MG/DL (ref 0.2–1)
BUN SERPL-MCNC: 29 MG/DL (ref 6–20)
BUN/CREAT SERPL: 21 (ref 12–20)
CALCIUM SERPL-MCNC: 8.2 MG/DL (ref 8.5–10.1)
CHLORIDE SERPL-SCNC: 104 MMOL/L (ref 97–108)
CO2 SERPL-SCNC: 22 MMOL/L (ref 21–32)
CREAT SERPL-MCNC: 1.4 MG/DL (ref 0.55–1.02)
DIFFERENTIAL METHOD BLD: ABNORMAL
EOSINOPHIL # BLD: 0.4 K/UL (ref 0–0.4)
EOSINOPHIL NFR BLD: 5 % (ref 0–7)
ERYTHROCYTE [DISTWIDTH] IN BLOOD BY AUTOMATED COUNT: 18.8 % (ref 11.5–14.5)
GLOBULIN SER CALC-MCNC: 4.5 G/DL (ref 2–4)
GLUCOSE SERPL-MCNC: 355 MG/DL (ref 65–100)
HCT VFR BLD AUTO: 29.6 % (ref 35–47)
HGB BLD-MCNC: 9.1 G/DL (ref 11.5–16)
IMM GRANULOCYTES # BLD AUTO: 0 K/UL (ref 0–0.04)
IMM GRANULOCYTES NFR BLD AUTO: 0 % (ref 0–0.5)
LYMPHOCYTES # BLD: 1.3 K/UL (ref 0.8–3.5)
LYMPHOCYTES NFR BLD: 17 % (ref 12–49)
MCH RBC QN AUTO: 26.6 PG (ref 26–34)
MCHC RBC AUTO-ENTMCNC: 30.7 G/DL (ref 30–36.5)
MCV RBC AUTO: 86.5 FL (ref 80–99)
MONOCYTES # BLD: 0.5 K/UL (ref 0–1)
MONOCYTES NFR BLD: 7 % (ref 5–13)
NEUTS SEG # BLD: 5.1 K/UL (ref 1.8–8)
NEUTS SEG NFR BLD: 70 % (ref 32–75)
NRBC # BLD: 0 K/UL (ref 0–0.01)
NRBC BLD-RTO: 0 PER 100 WBC
PLATELET # BLD AUTO: 295 K/UL (ref 150–400)
PMV BLD AUTO: 10.9 FL (ref 8.9–12.9)
POTASSIUM SERPL-SCNC: 4.4 MMOL/L (ref 3.5–5.1)
PROT SERPL-MCNC: 6.2 G/DL (ref 6.4–8.2)
RBC # BLD AUTO: 3.42 M/UL (ref 3.8–5.2)
SODIUM SERPL-SCNC: 132 MMOL/L (ref 136–145)
WBC # BLD AUTO: 7.3 K/UL (ref 3.6–11)

## 2020-02-13 PROCEDURE — 96365 THER/PROPH/DIAG IV INF INIT: CPT

## 2020-02-13 PROCEDURE — 74011000258 HC RX REV CODE- 258: Performed by: INTERNAL MEDICINE

## 2020-02-13 PROCEDURE — 36592 COLLECT BLOOD FROM PICC: CPT

## 2020-02-13 PROCEDURE — 74011250636 HC RX REV CODE- 250/636: Performed by: INTERNAL MEDICINE

## 2020-02-13 PROCEDURE — 85025 COMPLETE CBC W/AUTO DIFF WBC: CPT

## 2020-02-13 PROCEDURE — 36415 COLL VENOUS BLD VENIPUNCTURE: CPT

## 2020-02-13 PROCEDURE — 80053 COMPREHEN METABOLIC PANEL: CPT

## 2020-02-13 RX ORDER — SODIUM CHLORIDE 0.9 % (FLUSH) 0.9 %
10 SYRINGE (ML) INJECTION AS NEEDED
Status: DISCONTINUED | OUTPATIENT
Start: 2020-02-13 | End: 2020-02-17 | Stop reason: HOSPADM

## 2020-02-13 RX ORDER — HEPARIN 100 UNIT/ML
500 SYRINGE INTRAVENOUS AS NEEDED
Status: DISCONTINUED | OUTPATIENT
Start: 2020-02-13 | End: 2020-02-14 | Stop reason: HOSPADM

## 2020-02-13 RX ADMIN — ERTAPENEM SODIUM 1 G: 1 INJECTION, POWDER, LYOPHILIZED, FOR SOLUTION INTRAMUSCULAR; INTRAVENOUS at 10:22

## 2020-02-13 NOTE — PROGRESS NOTES
[address] [facility] @ [facility] VISIT NOTE    4871 Patient arrives for Daily Ivanz Infusions x6 weeks without acute problems. Please see connect care for complete assessment and education provided. Vital signs stable throughout and prior to discharge, Pt. Tolerated treatment well and discharged without incident. Patient/parent is aware of next [facility] appointment on 2/14/2020. Appointment card given to patient/parents. Medications Verified by Harsh Elena RN via [employer]:  1. Invanz 1gm IV  2. NS flush via PICC line  3. Heparin 500units flush via PICC line    VITAL SIGNS   Patient Vitals for the past 12 hrs:   Temp Pulse Resp BP   02/13/20 1100 97.9 °F (36.6 °C) 65 16 165/80   02/13/20 1033 98.1 °F (36.7 °C) 71 16 173/84       LAB WORK Lab results pending, please see Connect [facility] for results. Recent Results (from the past 12 hour(s))   CBC WITH AUTOMATED DIFF    Collection Time: 02/13/20 10:16 AM   Result Value Ref Range    WBC 7.3 3.6 - 11.0 K/uL    RBC 3.42 (L) 3.80 - 5.20 M/uL    HGB 9.1 (L) 11.5 - 16.0 g/dL    HCT 29.6 (L) 35.0 - 47.0 %    MCV 86.5 80.0 - 99.0 FL    MCH 26.6 26.0 - 34.0 PG    MCHC 30.7 30.0 - 36.5 g/dL    RDW 18.8 (H) 11.5 - 14.5 %    PLATELET 972 871 - 961 K/uL    MPV 10.9 8.9 - 12.9 FL    NRBC 0.0 0  WBC    ABSOLUTE NRBC 0.00 0.00 - 0.01 K/uL    NEUTROPHILS 70 32 - 75 %    LYMPHOCYTES 17 12 - 49 %    MONOCYTES 7 5 - 13 %    EOSINOPHILS 5 0 - 7 %    BASOPHILS 1 0 - 1 %    IMMATURE GRANULOCYTES 0 0.0 - 0.5 %    ABS. NEUTROPHILS 5.1 1.8 - 8.0 K/UL    ABS. LYMPHOCYTES 1.3 0.8 - 3.5 K/UL    ABS. MONOCYTES 0.5 0.0 - 1.0 K/UL    ABS. EOSINOPHILS 0.4 0.0 - 0.4 K/UL    ABS. BASOPHILS 0.0 0.0 - 0.1 K/UL    ABS. IMM.  GRANS. 0.0 0.00 - 0.04 K/UL    DF AUTOMATED     METABOLIC PANEL, COMPREHENSIVE    Collection Time: 02/13/20 10:16 AM   Result Value Ref Range    Sodium 132 (L) 136 - 145 mmol/L    Potassium 4.4 3.5 - 5.1 mmol/L    Chloride 104 97 - 108 mmol/L    CO2 22 21 - 32 mmol/L    Anion gap 6 5 - 15 mmol/L    Glucose 355 (H) 65 - 100 mg/dL    BUN 29 (H) 6 - 20 MG/DL    Creatinine 1.40 (H) 0.55 - 1.02 MG/DL    BUN/Creatinine ratio 21 (H) 12 - 20      GFR est AA 49 (L) >60 ml/min/1.73m2    GFR est non-AA 40 (L) >60 ml/min/1.73m2    Calcium 8.2 (L) 8.5 - 10.1 MG/DL    Bilirubin, total 0.2 0.2 - 1.0 MG/DL    ALT (SGPT) 14 12 - 78 U/L    AST (SGOT) 21 15 - 37 U/L    Alk.  phosphatase 246 (H) 45 - 117 U/L    Protein, total 6.2 (L) 6.4 - 8.2 g/dL    Albumin 1.7 (L) 3.5 - 5.0 g/dL    Globulin 4.5 (H) 2.0 - 4.0 g/dL    A-G Ratio 0.4 (L) 1.1 - 2.2

## 2020-02-17 ENCOUNTER — HOSPITAL ENCOUNTER (OUTPATIENT)
Dept: INFUSION THERAPY | Age: 50
Discharge: HOME | End: 2020-02-17
Payer: MEDICAID

## 2020-02-17 VITALS
HEART RATE: 86 BPM | RESPIRATION RATE: 18 BRPM | SYSTOLIC BLOOD PRESSURE: 179 MMHG | TEMPERATURE: 98 F | DIASTOLIC BLOOD PRESSURE: 92 MMHG

## 2020-02-17 LAB
ANION GAP SERPL CALC-SCNC: 7 MMOL/L (ref 5–15)
BASOPHILS # BLD: 0 K/UL (ref 0–0.1)
BASOPHILS NFR BLD: 0 % (ref 0–1)
BUN SERPL-MCNC: 22 MG/DL (ref 6–20)
BUN/CREAT SERPL: 21 (ref 12–20)
CALCIUM SERPL-MCNC: 8.1 MG/DL (ref 8.5–10.1)
CHLORIDE SERPL-SCNC: 104 MMOL/L (ref 97–108)
CO2 SERPL-SCNC: 26 MMOL/L (ref 21–32)
CREAT SERPL-MCNC: 1.04 MG/DL (ref 0.55–1.02)
DIFFERENTIAL METHOD BLD: ABNORMAL
EOSINOPHIL # BLD: 0.5 K/UL (ref 0–0.4)
EOSINOPHIL NFR BLD: 5 % (ref 0–7)
ERYTHROCYTE [DISTWIDTH] IN BLOOD BY AUTOMATED COUNT: 19 % (ref 11.5–14.5)
ERYTHROCYTE [SEDIMENTATION RATE] IN BLOOD: 67 MM/HR (ref 0–20)
GLUCOSE SERPL-MCNC: 387 MG/DL (ref 65–100)
HCT VFR BLD AUTO: 20.5 % (ref 35–47)
HGB BLD-MCNC: 6.4 G/DL (ref 11.5–16)
IMM GRANULOCYTES # BLD AUTO: 0.1 K/UL (ref 0–0.04)
IMM GRANULOCYTES NFR BLD AUTO: 1 % (ref 0–0.5)
LYMPHOCYTES # BLD: 1.2 K/UL (ref 0.8–3.5)
LYMPHOCYTES NFR BLD: 12 % (ref 12–49)
MCH RBC QN AUTO: 27.2 PG (ref 26–34)
MCHC RBC AUTO-ENTMCNC: 31.2 G/DL (ref 30–36.5)
MCV RBC AUTO: 87.2 FL (ref 80–99)
MONOCYTES # BLD: 0.8 K/UL (ref 0–1)
MONOCYTES NFR BLD: 8 % (ref 5–13)
NEUTS SEG # BLD: 7 K/UL (ref 1.8–8)
NEUTS SEG NFR BLD: 74 % (ref 32–75)
NRBC # BLD: 0 K/UL (ref 0–0.01)
NRBC BLD-RTO: 0 PER 100 WBC
PLATELET # BLD AUTO: 264 K/UL (ref 150–400)
PMV BLD AUTO: 10.5 FL (ref 8.9–12.9)
POTASSIUM SERPL-SCNC: 4.8 MMOL/L (ref 3.5–5.1)
RBC # BLD AUTO: 2.35 M/UL (ref 3.8–5.2)
RBC MORPH BLD: ABNORMAL
SODIUM SERPL-SCNC: 137 MMOL/L (ref 136–145)
WBC # BLD AUTO: 9.6 K/UL (ref 3.6–11)

## 2020-02-17 PROCEDURE — 74011000258 HC RX REV CODE- 258: Performed by: INTERNAL MEDICINE

## 2020-02-17 PROCEDURE — 80048 BASIC METABOLIC PNL TOTAL CA: CPT

## 2020-02-17 PROCEDURE — 74011250636 HC RX REV CODE- 250/636: Performed by: INTERNAL MEDICINE

## 2020-02-17 PROCEDURE — 96365 THER/PROPH/DIAG IV INF INIT: CPT

## 2020-02-17 PROCEDURE — 36415 COLL VENOUS BLD VENIPUNCTURE: CPT

## 2020-02-17 PROCEDURE — 85025 COMPLETE CBC W/AUTO DIFF WBC: CPT

## 2020-02-17 PROCEDURE — 85652 RBC SED RATE AUTOMATED: CPT

## 2020-02-17 RX ORDER — HEPARIN 100 UNIT/ML
500 SYRINGE INTRAVENOUS AS NEEDED
Status: DISCONTINUED | OUTPATIENT
Start: 2020-02-17 | End: 2020-02-18 | Stop reason: HOSPADM

## 2020-02-17 RX ORDER — SODIUM CHLORIDE 0.9 % (FLUSH) 0.9 %
10 SYRINGE (ML) INJECTION AS NEEDED
Status: DISCONTINUED | OUTPATIENT
Start: 2020-02-17 | End: 2020-02-21 | Stop reason: HOSPADM

## 2020-02-17 RX ADMIN — Medication 10 ML: at 10:15

## 2020-02-17 RX ADMIN — ERTAPENEM SODIUM 1 G: 1 INJECTION, POWDER, LYOPHILIZED, FOR SOLUTION INTRAMUSCULAR; INTRAVENOUS at 10:16

## 2020-02-17 RX ADMIN — Medication 10 ML: at 10:45

## 2020-02-17 RX ADMIN — Medication 500 UNITS: at 10:46

## 2020-02-17 NOTE — PROGRESS NOTES
[address] [facility] @ [facility] VISIT NOTE    1010 Patient arrives for Daily IV Invanz without acute problems. Please see connect [facility] for complete assessment and education provided. Vital signs stable throughout and prior to discharge, Pt. Tolerated treatment well and discharged without incident. Patient/parent is aware of next [facility] appointment on 2/18/2020. Appointment card given to patient/parents. Pt has missed the past 3 antibiotic appointments. States she was sick with the GI bug.  Reminded patient of the importance of daily treatment and keeping appts. States she understands but couldn't make it this morning. Medications Verified by Luis Kelly RN via [employer]:  1. Invanz 1gm IV   2. NS flush via PICC  3. Heparin 500units via [address] [place] [address]   Patient Vitals for the past 12 hrs:   Temp Pulse Resp BP   02/17/20 1049 98 °F (36.7 °C) 86 18 (!) 179/92   02/17/20 1010 98.1 °F (36.7 °C) 70 18 (!) 190/102       LAB WORK Lab results pending, please see Connect [facility] for results. Recent Results (from the past 12 hour(s))   CBC WITH AUTOMATED DIFF    Collection Time: 02/17/20 10:11 AM   Result Value Ref Range    WBC 9.6 3.6 - 11.0 K/uL    RBC 2.35 (L) 3.80 - 5.20 M/uL    HGB 6.4 (L) 11.5 - 16.0 g/dL    HCT 20.5 (L) 35.0 - 47.0 %    MCV 87.2 80.0 - 99.0 FL    MCH 27.2 26.0 - 34.0 PG    MCHC 31.2 30.0 - 36.5 g/dL    RDW 19.0 (H) 11.5 - 14.5 %    PLATELET 134 489 - 038 K/uL    MPV 10.5 8.9 - 12.9 FL    NRBC 0.0 0  WBC    ABSOLUTE NRBC 0.00 0.00 - 0.01 K/uL    NEUTROPHILS PENDING %    LYMPHOCYTES PENDING %    MONOCYTES PENDING %    EOSINOPHILS PENDING %    BASOPHILS PENDING %    IMMATURE GRANULOCYTES PENDING %    ABS. NEUTROPHILS PENDING K/UL    ABS. LYMPHOCYTES PENDING K/UL    ABS. MONOCYTES PENDING K/UL    ABS. EOSINOPHILS PENDING K/UL    ABS. BASOPHILS PENDING K/UL    ABS. IMM. GRANS.  PENDING K/UL    DF PENDING

## 2020-02-18 ENCOUNTER — HOSPITAL ENCOUNTER (OUTPATIENT)
Dept: INFUSION THERAPY | Age: 50
Discharge: HOME | End: 2020-02-18
Payer: MEDICAID

## 2020-02-18 VITALS
RESPIRATION RATE: 18 BRPM | HEART RATE: 71 BPM | DIASTOLIC BLOOD PRESSURE: 98 MMHG | SYSTOLIC BLOOD PRESSURE: 179 MMHG | TEMPERATURE: 97.6 F

## 2020-02-18 PROCEDURE — 96365 THER/PROPH/DIAG IV INF INIT: CPT

## 2020-02-18 PROCEDURE — 74011000258 HC RX REV CODE- 258: Performed by: INTERNAL MEDICINE

## 2020-02-18 PROCEDURE — 74011250636 HC RX REV CODE- 250/636: Performed by: INTERNAL MEDICINE

## 2020-02-18 RX ORDER — SODIUM CHLORIDE 0.9 % (FLUSH) 0.9 %
10 SYRINGE (ML) INJECTION AS NEEDED
Status: DISCONTINUED | OUTPATIENT
Start: 2020-02-18 | End: 2020-02-19 | Stop reason: HOSPADM

## 2020-02-18 RX ORDER — HEPARIN 100 UNIT/ML
500 SYRINGE INTRAVENOUS AS NEEDED
Status: DISCONTINUED | OUTPATIENT
Start: 2020-02-18 | End: 2020-02-22 | Stop reason: HOSPADM

## 2020-02-18 RX ADMIN — ERTAPENEM SODIUM 1 G: 1 INJECTION, POWDER, LYOPHILIZED, FOR SOLUTION INTRAMUSCULAR; INTRAVENOUS at 10:22

## 2020-02-18 RX ADMIN — Medication 10 ML: at 10:21

## 2020-02-18 RX ADMIN — Medication 10 ML: at 10:53

## 2020-02-18 RX ADMIN — Medication 500 UNITS: at 10:53

## 2020-02-18 NOTE — PROGRESS NOTES
[address] [facility] @ [facility] VISIT NOTE     [id number] Patient arrives for Daily Invanz Infusions x 6 Weeks (until 02/26/2020) without acute problems. Please see connect care for complete assessment and education provided. Vital signs stable throughout and prior to discharge, Pt. Tolerated treatment well and discharged without incident. Patient is aware of next [facility] appointment on 02/19/2020. Appointment card given to patient. Patient's BP continues to be elevated with patient refusing to go to ER & stating she has a follow up appointment this week with her PCP for medication evaluation due to her ongoing BP issues since surgery. Medications Verified by Yuly Vazquez RN via [employer]:  1. Invanz 1g IV  2. NS SASH IV Flush via RUE SL PICC line. 3. Heparin 500 units IV Flush via RUE SL PICC Line.      VITAL SIGNS  Patient Vitals for the past 12 hrs:   Temp Pulse Resp BP   02/18/20 1053 97.6 °F (36.4 °C) 71 18 (!) 179/98   02/18/20 1009 98.1 °F (36.7 °C) 97 18 178/90

## 2020-02-23 LAB
ACID FAST STN SPEC: NEGATIVE
MYCOBACTERIUM SPEC QL CULT: NEGATIVE
SPECIMEN PREPARATION: NORMAL
SPECIMEN SOURCE: NORMAL

## 2020-02-26 ENCOUNTER — HOSPITAL ENCOUNTER (OUTPATIENT)
Dept: INFUSION THERAPY | Age: 50
Discharge: HOME | End: 2020-02-26
Payer: MEDICAID

## 2020-02-26 VITALS
TEMPERATURE: 98 F | HEART RATE: 69 BPM | RESPIRATION RATE: 18 BRPM | SYSTOLIC BLOOD PRESSURE: 152 MMHG | DIASTOLIC BLOOD PRESSURE: 88 MMHG

## 2020-02-26 LAB
ANION GAP SERPL CALC-SCNC: 4 MMOL/L (ref 5–15)
BASOPHILS # BLD: 0 K/UL (ref 0–0.1)
BASOPHILS NFR BLD: 1 % (ref 0–1)
BUN SERPL-MCNC: 22 MG/DL (ref 6–20)
BUN/CREAT SERPL: 25 (ref 12–20)
CALCIUM SERPL-MCNC: 7.6 MG/DL (ref 8.5–10.1)
CHLORIDE SERPL-SCNC: 104 MMOL/L (ref 97–108)
CO2 SERPL-SCNC: 27 MMOL/L (ref 21–32)
CREAT SERPL-MCNC: 0.88 MG/DL (ref 0.55–1.02)
DIFFERENTIAL METHOD BLD: ABNORMAL
EOSINOPHIL # BLD: 0.5 K/UL (ref 0–0.4)
EOSINOPHIL NFR BLD: 5 % (ref 0–7)
ERYTHROCYTE [DISTWIDTH] IN BLOOD BY AUTOMATED COUNT: 19.8 % (ref 11.5–14.5)
ERYTHROCYTE [SEDIMENTATION RATE] IN BLOOD: 83 MM/HR (ref 0–20)
GLUCOSE SERPL-MCNC: 106 MG/DL (ref 65–100)
HCT VFR BLD AUTO: 27.6 % (ref 35–47)
HGB BLD-MCNC: 8.6 G/DL (ref 11.5–16)
IMM GRANULOCYTES # BLD AUTO: 0.1 K/UL (ref 0–0.04)
IMM GRANULOCYTES NFR BLD AUTO: 1 % (ref 0–0.5)
LYMPHOCYTES # BLD: 1.7 K/UL (ref 0.8–3.5)
LYMPHOCYTES NFR BLD: 19 % (ref 12–49)
MCH RBC QN AUTO: 27.2 PG (ref 26–34)
MCHC RBC AUTO-ENTMCNC: 31.2 G/DL (ref 30–36.5)
MCV RBC AUTO: 87.3 FL (ref 80–99)
MONOCYTES # BLD: 0.7 K/UL (ref 0–1)
MONOCYTES NFR BLD: 8 % (ref 5–13)
NEUTS SEG # BLD: 5.8 K/UL (ref 1.8–8)
NEUTS SEG NFR BLD: 66 % (ref 32–75)
NRBC # BLD: 0 K/UL (ref 0–0.01)
NRBC BLD-RTO: 0 PER 100 WBC
PLATELET # BLD AUTO: 311 K/UL (ref 150–400)
PMV BLD AUTO: 10.3 FL (ref 8.9–12.9)
POTASSIUM SERPL-SCNC: 4.1 MMOL/L (ref 3.5–5.1)
RBC # BLD AUTO: 3.16 M/UL (ref 3.8–5.2)
SODIUM SERPL-SCNC: 135 MMOL/L (ref 136–145)
WBC # BLD AUTO: 8.8 K/UL (ref 3.6–11)

## 2020-02-26 PROCEDURE — 74011000258 HC RX REV CODE- 258: Performed by: INTERNAL MEDICINE

## 2020-02-26 PROCEDURE — 77030020847 HC STATLOK BARD -A

## 2020-02-26 PROCEDURE — 85652 RBC SED RATE AUTOMATED: CPT

## 2020-02-26 PROCEDURE — 85025 COMPLETE CBC W/AUTO DIFF WBC: CPT

## 2020-02-26 PROCEDURE — 80048 BASIC METABOLIC PNL TOTAL CA: CPT

## 2020-02-26 PROCEDURE — 36592 COLLECT BLOOD FROM PICC: CPT

## 2020-02-26 PROCEDURE — 96365 THER/PROPH/DIAG IV INF INIT: CPT

## 2020-02-26 PROCEDURE — 36415 COLL VENOUS BLD VENIPUNCTURE: CPT

## 2020-02-26 PROCEDURE — 74011250636 HC RX REV CODE- 250/636: Performed by: INTERNAL MEDICINE

## 2020-02-26 RX ORDER — HEPARIN 100 UNIT/ML
500 SYRINGE INTRAVENOUS AS NEEDED
Status: CANCELLED | OUTPATIENT
Start: 2020-02-26 | End: 2020-02-27

## 2020-02-26 RX ORDER — SODIUM CHLORIDE 0.9 % (FLUSH) 0.9 %
5-10 SYRINGE (ML) INJECTION AS NEEDED
Status: DISCONTINUED | OUTPATIENT
Start: 2020-02-26 | End: 2020-02-27 | Stop reason: HOSPADM

## 2020-02-26 RX ORDER — SODIUM CHLORIDE 0.9 % (FLUSH) 0.9 %
5-10 SYRINGE (ML) INJECTION AS NEEDED
Status: CANCELLED | OUTPATIENT
Start: 2020-02-26 | End: 2020-02-27

## 2020-02-26 RX ORDER — HEPARIN 100 UNIT/ML
500 SYRINGE INTRAVENOUS AS NEEDED
Status: DISCONTINUED | OUTPATIENT
Start: 2020-02-26 | End: 2020-02-27 | Stop reason: HOSPADM

## 2020-02-26 RX ADMIN — SODIUM CHLORIDE 1 G: 900 INJECTION, SOLUTION INTRAVENOUS at 10:00

## 2020-02-26 RX ADMIN — Medication 10 ML: at 09:59

## 2020-02-26 RX ADMIN — Medication 10 ML: at 10:31

## 2020-02-26 RX ADMIN — Medication 500 UNITS: at 10:31

## 2020-02-26 NOTE — PROGRESS NOTES
[address] [facility] @ [facility] VISIT NOTE    6339 Patient arrives for Invanz Daily x 2 weeks (until 3/10/2020) without acute problems. Please see connect care for complete assessment and education provided. CVC dressing changed and accessed per [id number] [facility]. Vital signs stable throughout and prior to discharge, Pt. Tolerated treatment well and discharged without incident. Patient/parent is aware of next [facility] appointment on 2/27/2020. Appointment card given to patient. Medications Verified by Shelia Stapleton RN via [employer]:  1. Invanz 1 gram  2. Heparin 500 units    VITAL SIGNS   Patient Vitals for the past 12 hrs:   Temp Pulse Resp BP   02/26/20 1035 98 °F (36.7 °C) 69 18 152/88   02/26/20 0956 97.7 °F (36.5 °C) 88 18 (!) 163/99     *Labs drawn today due to patient missing the last 6 appointments. LAB WORK Lab results pending, please see Connect [facility] for results. Recent Results (from the past 12 hour(s))   CBC WITH AUTOMATED DIFF    Collection Time: 02/26/20  9:52 AM   Result Value Ref Range    WBC 8.8 3.6 - 11.0 K/uL    RBC 3.16 (L) 3.80 - 5.20 M/uL    HGB 8.6 (L) 11.5 - 16.0 g/dL    HCT 27.6 (L) 35.0 - 47.0 %    MCV 87.3 80.0 - 99.0 FL    MCH 27.2 26.0 - 34.0 PG    MCHC 31.2 30.0 - 36.5 g/dL    RDW 19.8 (H) 11.5 - 14.5 %    PLATELET 722 363 - 918 K/uL    MPV 10.3 8.9 - 12.9 FL    NRBC 0.0 0  WBC    ABSOLUTE NRBC 0.00 0.00 - 0.01 K/uL    NEUTROPHILS 66 32 - 75 %    LYMPHOCYTES 19 12 - 49 %    MONOCYTES 8 5 - 13 %    EOSINOPHILS 5 0 - 7 %    BASOPHILS 1 0 - 1 %    IMMATURE GRANULOCYTES 1 (H) 0.0 - 0.5 %    ABS. NEUTROPHILS 5.8 1.8 - 8.0 K/UL    ABS. LYMPHOCYTES 1.7 0.8 - 3.5 K/UL    ABS. MONOCYTES 0.7 0.0 - 1.0 K/UL    ABS. EOSINOPHILS 0.5 (H) 0.0 - 0.4 K/UL    ABS. BASOPHILS 0.0 0.0 - 0.1 K/UL    ABS. IMM.  GRANS. 0.1 (H) 0.00 - 0.04 K/UL    DF AUTOMATED     METABOLIC PANEL, BASIC    Collection Time: 02/26/20  9:52 AM   Result Value Ref Range    Sodium 135 (L) 136 - 145 mmol/L    Potassium 4.1 3.5 - 5.1 mmol/L    Chloride 104 97 - 108 mmol/L    CO2 27 21 - 32 mmol/L    Anion gap 4 (L) 5 - 15 mmol/L    Glucose 106 (H) 65 - 100 mg/dL    BUN 22 (H) 6 - 20 MG/DL    Creatinine 0.88 0.55 - 1.02 MG/DL    BUN/Creatinine ratio 25 (H) 12 - 20      GFR est AA >60 >60 ml/min/1.73m2    GFR est non-AA >60 >60 ml/min/1.73m2    Calcium 7.6 (L) 8.5 - 10.1 MG/DL

## 2020-03-01 ENCOUNTER — HOSPITAL ENCOUNTER (OUTPATIENT)
Dept: INFUSION THERAPY | Age: 50
Discharge: HOME | End: 2020-03-01

## 2020-03-02 ENCOUNTER — HOSPITAL ENCOUNTER (OUTPATIENT)
Dept: INFUSION THERAPY | Age: 50
Discharge: HOME | End: 2020-03-02

## 2020-03-03 ENCOUNTER — HOSPITAL ENCOUNTER (OUTPATIENT)
Dept: INFUSION THERAPY | Age: 50
Discharge: HOME | End: 2020-03-03

## 2020-03-04 ENCOUNTER — APPOINTMENT (OUTPATIENT)
Dept: INFUSION THERAPY | Age: 50
End: 2020-03-04

## 2020-03-04 ENCOUNTER — HOSPITAL ENCOUNTER (OUTPATIENT)
Dept: INFUSION THERAPY | Age: 50
Discharge: HOME | End: 2020-03-04

## 2020-03-05 ENCOUNTER — HOSPITAL ENCOUNTER (OUTPATIENT)
Dept: INFUSION THERAPY | Age: 50
Discharge: HOME | End: 2020-03-05

## 2020-03-06 ENCOUNTER — APPOINTMENT (OUTPATIENT)
Dept: INFUSION THERAPY | Age: 50
End: 2020-03-06

## 2020-03-07 ENCOUNTER — APPOINTMENT (OUTPATIENT)
Dept: INFUSION THERAPY | Age: 50
End: 2020-03-07

## 2020-03-08 ENCOUNTER — APPOINTMENT (OUTPATIENT)
Dept: INFUSION THERAPY | Age: 50
End: 2020-03-08

## 2020-03-09 ENCOUNTER — APPOINTMENT (OUTPATIENT)
Dept: INFUSION THERAPY | Age: 50
End: 2020-03-09

## 2020-03-10 ENCOUNTER — HOSPITAL ENCOUNTER (EMERGENCY)
Age: 50
Discharge: LEFT BEFORE DISCHARGE | End: 2020-03-10
Attending: EMERGENCY MEDICINE | Admitting: EMERGENCY MEDICINE
Payer: MEDICAID

## 2020-03-10 ENCOUNTER — APPOINTMENT (OUTPATIENT)
Dept: INFUSION THERAPY | Age: 50
End: 2020-03-10

## 2020-03-10 ENCOUNTER — APPOINTMENT (OUTPATIENT)
Dept: GENERAL RADIOLOGY | Age: 50
End: 2020-03-10
Attending: EMERGENCY MEDICINE
Payer: MEDICAID

## 2020-03-10 VITALS
BODY MASS INDEX: 30.37 KG/M2 | TEMPERATURE: 97.9 F | HEIGHT: 66 IN | HEART RATE: 78 BPM | WEIGHT: 189 LBS | SYSTOLIC BLOOD PRESSURE: 195 MMHG | DIASTOLIC BLOOD PRESSURE: 115 MMHG | OXYGEN SATURATION: 100 % | RESPIRATION RATE: 15 BRPM

## 2020-03-10 DIAGNOSIS — J98.11 ATELECTASIS: ICD-10-CM

## 2020-03-10 DIAGNOSIS — I16.0 HYPERTENSIVE URGENCY: ICD-10-CM

## 2020-03-10 DIAGNOSIS — E16.2 HYPOGLYCEMIA: ICD-10-CM

## 2020-03-10 DIAGNOSIS — M86.172 OTHER ACUTE OSTEOMYELITIS OF LEFT FOOT (HCC): ICD-10-CM

## 2020-03-10 DIAGNOSIS — R79.89 ELEVATED LACTIC ACID LEVEL: ICD-10-CM

## 2020-03-10 DIAGNOSIS — T68.XXXA HYPOTHERMIA, INITIAL ENCOUNTER: Primary | ICD-10-CM

## 2020-03-10 LAB
ALBUMIN SERPL-MCNC: 1.7 G/DL (ref 3.5–5)
ALBUMIN/GLOB SERPL: 0.4 {RATIO} (ref 1.1–2.2)
ALP SERPL-CCNC: 368 U/L (ref 45–117)
ALT SERPL-CCNC: 19 U/L (ref 12–78)
ANION GAP SERPL CALC-SCNC: 4 MMOL/L (ref 5–15)
APPEARANCE UR: CLEAR
AST SERPL-CCNC: 24 U/L (ref 15–37)
BACTERIA URNS QL MICRO: NEGATIVE /HPF
BASOPHILS # BLD: 0 K/UL (ref 0–0.1)
BASOPHILS NFR BLD: 0 % (ref 0–1)
BILIRUB SERPL-MCNC: 0.1 MG/DL (ref 0.2–1)
BILIRUB UR QL: NEGATIVE
BUN SERPL-MCNC: 21 MG/DL (ref 6–20)
BUN/CREAT SERPL: 20 (ref 12–20)
CALCIUM SERPL-MCNC: 8.3 MG/DL (ref 8.5–10.1)
CHLORIDE SERPL-SCNC: 107 MMOL/L (ref 97–108)
CO2 SERPL-SCNC: 27 MMOL/L (ref 21–32)
COLOR UR: ABNORMAL
COMMENT, HOLDF: NORMAL
CREAT SERPL-MCNC: 1.05 MG/DL (ref 0.55–1.02)
DIFFERENTIAL METHOD BLD: ABNORMAL
EOSINOPHIL # BLD: 0.2 K/UL (ref 0–0.4)
EOSINOPHIL NFR BLD: 3 % (ref 0–7)
EPITH CASTS URNS QL MICRO: ABNORMAL /LPF
ERYTHROCYTE [DISTWIDTH] IN BLOOD BY AUTOMATED COUNT: 21.1 % (ref 11.5–14.5)
GLOBULIN SER CALC-MCNC: 4.8 G/DL (ref 2–4)
GLUCOSE BLD STRIP.AUTO-MCNC: 99 MG/DL (ref 65–100)
GLUCOSE SERPL-MCNC: 111 MG/DL (ref 65–100)
GLUCOSE UR STRIP.AUTO-MCNC: >1000 MG/DL
HCT VFR BLD AUTO: 30.6 % (ref 35–47)
HGB BLD-MCNC: 9.2 G/DL (ref 11.5–16)
HGB UR QL STRIP: ABNORMAL
IMM GRANULOCYTES # BLD AUTO: 0.1 K/UL (ref 0–0.04)
IMM GRANULOCYTES NFR BLD AUTO: 1 % (ref 0–0.5)
KETONES UR QL STRIP.AUTO: NEGATIVE MG/DL
LACTATE SERPL-SCNC: 3.9 MMOL/L (ref 0.4–2)
LEUKOCYTE ESTERASE UR QL STRIP.AUTO: NEGATIVE
LYMPHOCYTES # BLD: 2 K/UL (ref 0.8–3.5)
LYMPHOCYTES NFR BLD: 26 % (ref 12–49)
MCH RBC QN AUTO: 28.8 PG (ref 26–34)
MCHC RBC AUTO-ENTMCNC: 30.1 G/DL (ref 30–36.5)
MCV RBC AUTO: 95.9 FL (ref 80–99)
MONOCYTES # BLD: 0.5 K/UL (ref 0–1)
MONOCYTES NFR BLD: 7 % (ref 5–13)
NEUTS SEG # BLD: 4.9 K/UL (ref 1.8–8)
NEUTS SEG NFR BLD: 63 % (ref 32–75)
NITRITE UR QL STRIP.AUTO: NEGATIVE
NRBC # BLD: 0 K/UL (ref 0–0.01)
NRBC BLD-RTO: 0 PER 100 WBC
OTHER,OTHU: ABNORMAL
PH UR STRIP: 6 [PH] (ref 5–8)
PLATELET # BLD AUTO: 330 K/UL (ref 150–400)
PMV BLD AUTO: 10.3 FL (ref 8.9–12.9)
POTASSIUM SERPL-SCNC: 4.3 MMOL/L (ref 3.5–5.1)
PROT SERPL-MCNC: 6.5 G/DL (ref 6.4–8.2)
PROT UR STRIP-MCNC: >300 MG/DL
RBC # BLD AUTO: 3.19 M/UL (ref 3.8–5.2)
RBC #/AREA URNS HPF: ABNORMAL /HPF (ref 0–5)
RBC MORPH BLD: ABNORMAL
SAMPLES BEING HELD,HOLD: NORMAL
SERVICE CMNT-IMP: NORMAL
SODIUM SERPL-SCNC: 138 MMOL/L (ref 136–145)
SP GR UR REFRACTOMETRY: 1.02 (ref 1–1.03)
UR CULT HOLD, URHOLD: NORMAL
UROBILINOGEN UR QL STRIP.AUTO: 0.2 EU/DL (ref 0.2–1)
WBC # BLD AUTO: 7.7 K/UL (ref 3.6–11)
WBC MORPH BLD: ABNORMAL
WBC URNS QL MICRO: ABNORMAL /HPF (ref 0–4)

## 2020-03-10 PROCEDURE — 71045 X-RAY EXAM CHEST 1 VIEW: CPT

## 2020-03-10 PROCEDURE — 99285 EMERGENCY DEPT VISIT HI MDM: CPT

## 2020-03-10 PROCEDURE — 85025 COMPLETE CBC W/AUTO DIFF WBC: CPT

## 2020-03-10 PROCEDURE — 82962 GLUCOSE BLOOD TEST: CPT

## 2020-03-10 PROCEDURE — 80053 COMPREHEN METABOLIC PANEL: CPT

## 2020-03-10 PROCEDURE — 36415 COLL VENOUS BLD VENIPUNCTURE: CPT

## 2020-03-10 PROCEDURE — 83605 ASSAY OF LACTIC ACID: CPT

## 2020-03-10 PROCEDURE — 81001 URINALYSIS AUTO W/SCOPE: CPT

## 2020-03-10 PROCEDURE — 87040 BLOOD CULTURE FOR BACTERIA: CPT

## 2020-03-10 PROCEDURE — 36592 COLLECT BLOOD FROM PICC: CPT

## 2020-03-10 RX ORDER — SODIUM CHLORIDE 0.9 % (FLUSH) 0.9 %
5-40 SYRINGE (ML) INJECTION EVERY 8 HOURS
Status: DISCONTINUED | OUTPATIENT
Start: 2020-03-10 | End: 2020-03-10 | Stop reason: HOSPADM

## 2020-03-10 RX ORDER — SODIUM CHLORIDE 0.9 % (FLUSH) 0.9 %
5-40 SYRINGE (ML) INJECTION AS NEEDED
Status: DISCONTINUED | OUTPATIENT
Start: 2020-03-10 | End: 2020-03-10 | Stop reason: HOSPADM

## 2020-03-10 NOTE — ED TRIAGE NOTES
Pt comes in via EMS from home for hypoglycemia. Pt was last seen normal yesterday by her daughter, was found in her urine today was BGL in the 30's. EMS gave an IM injection of glucagon and BGL was 68. BGL 99 upon arrival to ED. Pt has PICC line in R upper arm for abx for osteomyelitis in L foot.

## 2020-03-10 NOTE — DISCHARGE INSTRUCTIONS
Patient Education        Acute High Blood Pressure: Care Instructions  Your Care Instructions    Acute high blood pressure is very high blood pressure. It's a serious problem. Very high blood pressure can damage your brain, heart, eyes, and kidneys. You may have been given medicines to lower your blood pressure. You may have gotten them as pills or through a needle in one of your veins. This is called an IV. And maybe you were given other medicines too. These can be needed when high blood pressure causes other problems. To keep your blood pressure at a lower level, you may need to make healthy lifestyle changes. And you will probably need to take medicines. Be sure to follow up with your doctor about your blood pressure and what you can do about it. Follow-up care is a key part of your treatment and safety. Be sure to make and go to all appointments, and call your doctor if you are having problems. It's also a good idea to know your test results and keep a list of the medicines you take. How can you care for yourself at home? · See your doctor as often as he or she recommends. This is to make sure your blood pressure is under control. You will probably go at least 2 times a year. But it may be more often at first.  · Take your blood pressure medicine exactly as prescribed. You may take one or more types. They include diuretics, beta-blockers, ACE inhibitors, calcium channel blockers, and angiotensin II receptor blockers. Call your doctor if you think you are having a problem with your medicine. · If you take blood pressure medicine, talk to your doctor before you take decongestants or anti-inflammatory medicine, such as ibuprofen. These can raise blood pressure. · Learn how to check your blood pressure at home. Check it often. · Ask your doctor if it's okay to drink alcohol. · Talk to your doctor about lifestyle changes that can help blood pressure.  These include being active and managing your weight. · Don't smoke. Smoking increases your risk for heart attack and stroke. When should you call for help? Call  911 anytime you think you may need emergency care. This may mean having symptoms that suggest that your blood pressure is causing a serious heart or blood vessel problem. Your blood pressure may be over 180/120.   For example, call  911 if:    · You have symptoms of a heart attack. These may include:  ? Chest pain or pressure, or a strange feeling in the chest.  ? Sweating. ? Shortness of breath. ? Nausea or vomiting. ? Pain, pressure, or a strange feeling in the back, neck, jaw, or upper belly or in one or both shoulders or arms. ? Lightheadedness or sudden weakness. ? A fast or irregular heartbeat.     · You have symptoms of a stroke. These may include:  ? Sudden numbness, tingling, weakness, or loss of movement in your face, arm, or leg, especially on only one side of your body. ? Sudden vision changes. ? Sudden trouble speaking. ? Sudden confusion or trouble understanding simple statements. ? Sudden problems with walking or balance. ? A sudden, severe headache that is different from past headaches.     · You have severe back or belly pain.    Do not wait until your blood pressure comes down on its own. Get help right away.   Call your doctor now or seek immediate care if:    · Your blood pressure is much higher than normal (such as 180/120 or higher), but you don't have symptoms.     · You think high blood pressure is causing symptoms, such as:  ? Severe headache.  ? Blurry vision.    Watch closely for changes in your health, and be sure to contact your doctor if:    · Your blood pressure measures higher than your doctor recommends at least 2 times. That means the top number is higher or the bottom number is higher, or both.     · You think you may be having side effects from your blood pressure medicine. Where can you learn more?   Go to http://jesús-skye.info/. Enter Q372 in the search box to learn more about \"Acute High Blood Pressure: Care Instructions. \"  Current as of: April 9, 2019  Content Version: 12.2  © 2853-1701 [employer]. Care instructions adapted under license by [employer] (which disclaims liability or warranty for this information). If you have questions about a medical condition or this instruction, always ask your healthcare professional. [address] any warranty or liability for your use of this information. Patient Education        Learning About Low Blood Sugar (Hypoglycemia) in Diabetes  What is low blood sugar (hypoglycemia)? Hypoglycemia means that your blood sugar is low and your body (especially your brain) is not getting enough fuel. If you have diabetes, your blood sugar can go too low if you take too much of some diabetes medicines. It can also go too low if you miss a meal. And it can happen if you exercise too hard without eating enough food. Some medicines used to treat other health problems can cause low blood sugar too. What are the symptoms? Symptoms of low blood sugar can start quickly. It may take just 10 to 15 minutes. If you have had diabetes for many years, you may not realize that your blood sugar is low until it drops very low. · If your blood sugar level drops below 70 (mild low blood sugar), you may feel tired, anxious, dizzy, weak, shaky, or sweaty. You may have a fast heartbeat or blurry vision. · If your blood sugar level continues to drop (usually below 40), your behavior may change. You may feel more irritable. You may find it hard to concentrate or talk. And you may feel unsteady when you stand or walk. You may become too weak or confused to eat something with sugar to raise your blood sugar level. · If your blood sugar level drops very low (usually below 20), you may pass out (lose consciousness).  Or you may have a seizure or stroke. If you have symptoms of severe low blood sugar, you need to get medical care right away. If you had a low blood sugar level during the night, you may wake up tired or with a headache. Or you may sweat so much during the night that your pajamas or sheets are damp when you wake up. How is low blood sugar treated? You can treat low blood sugar by eating or drinking something that has 15 grams of carbohydrate. These should be quick-sugar foods. Check your blood sugar level again 15 minutes after having a quick-sugar food to make sure your level is getting back to your target range. Here are examples of quick-sugar foods that have 15 grams of carbohydrate:  · 3 to 4 glucose tablets  · 1 tube of glucose gel  · Hard candy (such as 3 Jolly Ranchers or 5 to 7 Life Savers)  · 1 tablespoon honey  · 2 tablespoons of raisins  · ½ cup to ¾ cup (4 to 6 ounces) of fruit juice or regular (not diet) soda  · 1 tablespoon of sugar  · 1 cup of fat-free milk  If you have problems with severe low blood sugar, someone else may have to give you a shot of glucagon. This is a hormone that raises blood sugar levels quickly. How can you prevent low blood sugar? You can take steps to prevent low blood sugar. · Follow your treatment plan. Take your insulin or other diabetes medicine exactly as your doctor prescribed it. Talk with your doctor if you're having low blood sugar often. Your medicine may need to be adjusted if it's causing your low blood sugar. · Check your blood sugar levels often. This helps you find early changes before an emergency happens. · Keep a quick-sugar food with you in case your blood sugar level drops low. · Eat small meals more often so that you don't get too hungry between meals. Don't skip meals. · Balance extra exercise with eating more. Check your blood sugar and learn how it changes after exercise.  If your blood sugar stays at a normal level, you may not need to eat after you exercise. · Limit how much alcohol you drink. Alcohol can make low blood sugar go even lower. Don't drink alcohol if you have problems recognizing the early signs of low blood sugar. · Keep a diary of your symptoms. This helps you learn when changes in your body may signal low blood sugar. And keep track of how often you have low blood sugar, including when you last ate and what you ate. This will help you learn what causes your blood sugar to drop. · Learn about diabetes and low blood sugar. Support groups or a diabetes education center can help you understand how medicines, diet, and exercise affect your blood sugar levels. Since low blood sugar levels can quickly become an emergency, be sure to wear medical alert jewelry, such as a medical alert bracelet. This is to let people know you have diabetes so they can get help for you. You can buy this at most drugstores. And make sure your family, friends, and coworkers know the symptoms of low blood sugar. Teach them what to do to get your sugar level up. Follow-up care is a key part of your treatment and safety. Be sure to make and go to all appointments, and call your doctor if you are having problems. It's also a good idea to know your test results and keep a list of the medicines you take. Where can you learn more? Go to http://jesús-skye.info/. Enter R805 in the search box to learn more about \"Learning About Low Blood Sugar (Hypoglycemia) in Diabetes. \"  Current as of: April 16, 2019  Content Version: 12.2  © 7152-5766 [employer]. Care instructions adapted under license by [employer] (which disclaims liability or warranty for this information). If you have questions about a medical condition or this instruction, always ask your healthcare professional. [place] [address] any warranty or liability for your use of this information.          Patient Education        Hypothermia: Care Instructions  Your Care Instructions  Hypothermia means that your body loses heat faster than it can make heat. You can get it if you spend time in cold air, water, wind, or rain. Most healthy people with mild to moderate hypothermia fully recover. And they don't have lasting problems. But babies and older or sick adults may be more at risk for hypothermia. This is because their bodies do not control temperature as well. Make sure to follow your doctor's instructions for how to get better. It's also important to learn how to protect yourself from hypothermia in the future. Follow-up care is a key part of your treatment and safety. Be sure to make and go to all appointments, and call your doctor if you are having problems. It's also a good idea to know your test results and keep a list of the medicines you take. How can you care for yourself at home? · Take your medicines exactly as prescribed. Call your doctor if you think you are having a problem with your medicine. · To prevent dehydration, drink plenty of fluids, enough so that your urine is light yellow or clear like water. Choose water and other caffeine-free clear liquids until you feel better. If you have kidney, heart, or liver disease and have to limit fluids, talk with your doctor before you increase the amount of fluids you drink. · Get a lot of rest at home, and stay warm. To prevent hypothermia  · Avoid illegal drugs and too much alcohol. They can make you more likely to get hypothermia. · Cover your head, hands, and feet in cold or wet weather. · Try not to sweat a lot if you are out in the cold. · Stay as dry as possible. · Wear layers of loose-fitting clothing. · Pack a kit in your car that has items you will need to stay warm. It may include fire-starting kits and a cigarette lighter, extra clothing, drinking water, and food. You also can bring a sleeping bag. Two people can warm up more easily by sharing the bag.   If you see symptoms in someone who has been in cold weather, keep the person warm and dry and get help quickly. Symptoms include shivering, cold and pale skin, and slurred speech. When should you call for help? Call your doctor now or seek immediate medical care if:    · You are confused or have trouble thinking.     · You are shivering and cannot stop.     · You have signs of needing more fluids. You have sunken eyes and a dry mouth, and you pass only a little dark urine.    Watch closely for changes in your health, and be sure to contact your doctor if:    · You do not get better as expected. Where can you learn more? Go to http://jesús-skye.info/. Enter N639 in the search box to learn more about \"Hypothermia: Care Instructions. \"  Current as of: June 26, 2019  Content Version: 12.2  © 4498-8419 [employer]. Care instructions adapted under license by [employer] (which disclaims liability or warranty for this information). If you have questions about a medical condition or this instruction, always ask your healthcare professional. [place] [address] any warranty or liability for your use of this information. Patient Education        Osteomyelitis: Care Instructions  Your Care Instructions  Osteomyelitis (say \"ie-yahr-[id number]-bd-wg-XN-tus\") is a bone infection. It is caused by bacteria. The bacteria can infect the bone where it has been injured, or they can be carried through the blood from another area in the body. Osteomyelitis can be a short- or long-term problem. It is treated with antibiotics. You may get the antibiotics as pills or through a needle in a vein (IV). You will probably get treatment in the hospital at first. The type of treatment depends on the type of bacteria causing the infection, the bones affected, and how bad the infection is. Sometimes people need surgery to drain pus from bone or to fix damaged bone.   Short-term osteomyelitis that is treated right away usually can be cured. But the long-term form sometimes comes back after treatment. You can help your chances of stopping the infection by taking your medicines as directed. Follow-up care is a key part of your treatment and safety. Be sure to make and go to all appointments, and call your doctor if you are having problems. It's also a good idea to know your test results and keep a list of the medicines you take. How can you care for yourself at home? · Take your antibiotics as directed. Do not stop taking them just because you feel better. You need to take the full course of antibiotics. · Take pain medicines exactly as directed. ? If the doctor gave you a prescription medicine for pain, take it as prescribed. ? If you are not taking a prescription pain medicine, ask your doctor if you can take an over-the-counter medicine. · Do mild exercise and stretching if your doctor says it is okay. This can help keep your bones and muscles healthy. Avoid strenuous work or exercise until your doctor says you can do it. · Consider physical therapy if your doctor suggests it. Physical therapy may help you have a normal range of movement. · Do not smoke. Smoking can slow healing of the infection. If you need help quitting, talk to your doctor about stop-smoking programs and medicines. These can increase your chances of quitting for good. When should you call for help? Call 911 anytime you think you may need emergency care. For example, call if:    · You have severe bone pain.    Call your doctor now or seek immediate medical care if:    · You continue to have bone pain.     · You have signs of infection, such as:  ? Increased pain, swelling, warmth, or redness. ? Red streaks leading from a wound. ? Pus draining from a wound. ? A fever.    Watch closely for changes in your health, and be sure to contact your doctor if:    · You do not get better as expected. Where can you learn more?   Go to http://jesús-skye.info/. Enter C447 in the search box to learn more about \"Osteomyelitis: Care Instructions. \"  Current as of: September 26, 2018  Content Version: 12.2  © 4712-1239 [employer] Care instructions adapted under license by [employer] (which disclaims liability or warranty for this information). If you have questions about a medical condition or this instruction, always ask your healthcare professional. [place] [address] any warranty or liability for your use of this information. Patient Education        Hypoglycemia: Care Instructions  Your Care Instructions    Hypoglycemia means that your blood sugar is low and your body is not getting enough fuel. Some people get low blood sugar from not eating often enough. Some medicines to treat diabetes can cause low blood sugar. People who have had surgery on their stomachs or intestines may get hypoglycemia. Problems with the pancreas, kidneys, or liver also can cause low blood sugar. A snack or drink with sugar in it will raise your blood sugar and should ease your symptoms right away. Your doctor may recommend that you change or stop your medicines until you can get your blood sugar levels under control. In the long run, you may need to change your diet and eating habits so that you get enough fuel for your body throughout the day. Follow-up care is a key part of your treatment and safety. Be sure to make and go to all appointments, and call your doctor if you are having problems. It's also a good idea to know your test results and keep a list of the medicines you take. How can you care for yourself at home? · Learn to recognize the early signs of low blood sugar. Signs include:  ? Nausea. ? Hunger. ? Feeling nervous, irritable, or shaky. ? Cold, clammy, wet skin. ? Sweating (when you are not exercising). ?  A fast heartbeat.  ? Numbness or tingling of the fingertips or lips.  · If you feel an episode of low blood sugar coming on, drink fruit juice or sugared (not diet) soda, or eat sugar in the form of candy, cubes, or tablets. [employer] are another [employer]. · Eat small, frequent meals so that you do not get too hungry between meals. · Balance extra exercise with eating more. · Keep a written record of your low blood sugar episodes, including when you last ate and what you ate, so that you can learn what causes your blood sugar to drop. · Make sure your family, friends, and coworkers know the symptoms of low blood sugar and know what to do to get your sugar level up. · Wear medical alert jewelry that lists your condition. You can buy this at most drugstores. When should you call for help? Call 911 anytime you think you may need emergency care. For example, call if:    · You passed out (lost consciousness).     · You are confused or cannot think clearly.     · Your blood sugar is very high or very low.    Watch closely for changes in your health, and be sure to contact your doctor if:    · Your blood sugar stays outside the level your doctor set for you.     · You have any problems. Where can you learn more? Go to http://jesús-skye.info/. Enter H621 in the search box to learn more about \"Hypoglycemia: Care Instructions. \"  Current as of: April 16, 2019  Content Version: 12.2  © 5987-2202 [employer]. Care instructions adapted under license by [employer] (which disclaims liability or warranty for this information). If you have questions about a medical condition or this instruction, always ask your healthcare professional. [address] any warranty or liability for your use of this information.

## 2020-03-10 NOTE — ED PROVIDER NOTES
HPI .  Patient has diabetes, cataracts and osteomyelitis. She also has hypertension. She reports compliance with her medications including taking her blood pressure medication this morning. Blood pressure in ER seems to be running in the 210/110 area. She took her long-acting insulin this morning 22 units. She says she has not been eating in at least 24 hours because she thinks her antibiotic for her osteomyelitis is taking her appetite away and making her nauseated. She says she has not been taking her mealtime insulin since she has not been eating meals. She is oriented to the day of the week and feels like she was okay until several hours prior to arrival.  Apparently several daughters live with the patient. She thinks 1 of her daughters found her covered in urine and with a glucose of 38. EMS gave her IM glucagon and the glucose came up to 68. Upon arrival here the glucose was 99. There was a history at one point given that no one is seen the patient since yesterday but patient denies this. Her temperature was 90.7 rectally upon arrival to the ER. Patient feels like her foot is getting better. She really is denying other complaints.     Past Medical History:   Diagnosis Date    Cataract     Cataracts, bilateral     Diabetes ([address]     Osteomyelitis ([address]     Osteomyelitis of foot, left, acute ([address] 01/14/2020    Hx: I & D x 2 with 2 other Left foot surgeries reported       Past Surgical History:   Procedure Laterality Date    HX CATARACT REMOVAL  2/2011; 5-11    right eye; left    HX TONSIL AND ADENOIDECTOMY  1985    HX TONSILLECTOMY  1984    HX TUBAL LIGATION  1997         Family History:   Problem Relation Age of Onset    Hypertension Mother     Cancer Father         prostate    Diabetes Maternal Grandmother     Hypertension Maternal Grandmother        Social History     Socioeconomic History    Marital status:      Spouse name: Not on file    Number of children: Not on file  Years of education: Not on file    Highest education level: Not on file   Occupational History    Not on file   Social Needs    Financial resource strain: Not on file    Food insecurity:     Worry: Not on file     Inability: Not on file    Transportation needs:     Medical: Not on file     Non-medical: Not on file   Tobacco Use    Smoking status: Never Smoker    Smokeless tobacco: Never Used   Substance and Sexual Activity    Alcohol use: Yes     Comment: rarely    Drug use: No    Sexual activity: Not Currently   Lifestyle    Physical activity:     Days per week: Not on file     Minutes per session: Not on file    Stress: Not on file   Relationships    Social connections:     Talks on phone: Not on file     Gets together: Not on file     Attends Denominational service: Not on file     Active member of club or organization: Not on file     Attends meetings of clubs or organizations: Not on file     Relationship status: Not on file    Intimate partner violence:     Fear of current or ex partner: Not on file     Emotionally abused: Not on file     Physically abused: Not on file     Forced sexual activity: Not on file   Other Topics Concern     Service Not Asked    Blood Transfusions Not Asked    Caffeine Concern Not Asked    Occupational Exposure Not Asked   Judene Pill Hazards Not Asked    Sleep Concern Not Asked    Stress Concern Not Asked    Weight Concern Not Asked    Special Diet Not Asked    Back Care Not Asked    Exercise Not Asked    Bike Helmet Not Asked    Seat Belt Not Asked    Self-Exams Not Asked   Social History Narrative    ** Merged History Encounter **              ALLERGIES: Chlorhexidine    Review of Systems   Constitutional: Positive for appetite change. HENT: Negative for rhinorrhea. Respiratory: Negative for shortness of breath. Gastrointestinal: Positive for nausea. Genitourinary: Negative for difficulty urinating.    Neurological: Negative for speech difficulty. Psychiatric/Behavioral: Positive for agitation. Negative for behavioral problems. The patient is not nervous/anxious. Vitals:    03/10/20 1444 03/10/20 1450 03/10/20 1510 03/10/20 1515   BP: (!) 223/81 (!) 223/81 (!) 200/115 (!) 214/115   Pulse: 87 91 86 80   Resp: 18 19 19 17   Temp: (!) 90.7 °F (32.6 °C)      SpO2: 99% 100% 100% 100%   Weight: 85.7 kg (189 lb)      Height: 5' 6\" (1.676 m)               Physical Exam  Vitals signs and nursing note reviewed. Constitutional:       Appearance: She is well-developed. HENT:      Head: Normocephalic and atraumatic. Eyes:      Pupils: Pupils are equal, round, and reactive to light. Neck:      Musculoskeletal: Normal range of motion and neck supple. Cardiovascular:      Rate and Rhythm: Normal rate and regular rhythm. Heart sounds: Normal heart sounds. No murmur. No friction rub. No gallop. Pulmonary:      Effort: Pulmonary effort is normal. No respiratory distress. Breath sounds: No wheezing or rales. Abdominal:      Palpations: Abdomen is soft. Tenderness: There is no abdominal tenderness. There is no rebound. Musculoskeletal: Normal range of motion. General: No tenderness. Comments: Left foot mildly swollen with packing coming out of a small dorsal wound   Skin:     Findings: No erythema. Neurological:      Mental Status: She is alert. Cranial Nerves: No cranial nerve deficit. Comments: Motor; symmetric   Psychiatric:         Behavior: Behavior normal.          MDM  Number of Diagnoses or Management Options  Atelectasis:   Elevated lactic acid level:   Hypertensive urgency:   Hypoglycemia:   Hypothermia, initial encounter:   Other acute osteomyelitis of left foot [facility]):   Critical Care  Total time providing critical care: 30-74 minutes      Total critical care time spent exclusive of procedures:  40 minutes      Procedures          Note: Patient is oriented and competent.   Her daughter is in the room with her. She does not have any sort of mental health issue that would allow us to TDO her. She had a very low glucose, has a very high blood pressure, elevated lactate, and upon arrival had a very low temperature. She is getting IV antibiotics for osteomyelitis of her foot at home. She has an appointment with her infectious disease doctor in 2 days. I offered to get her infectious disease doctor on the phone to discuss admission with her but she declines. I told her there was a good chance that she would become extremely ill in the next few hours or days. I told her she could die. I told her she could have a prolonged ICU admission. I warned her not to leave. Patient insisted on leaving against my advice.   Selene Rodas MD  5:27 PM

## 2020-03-15 LAB
BACTERIA SPEC CULT: NORMAL
BACTERIA SPEC CULT: NORMAL
SERVICE CMNT-IMP: NORMAL
SERVICE CMNT-IMP: NORMAL

## 2020-04-13 ENCOUNTER — VIRTUAL VISIT (OUTPATIENT)
Dept: PRIMARY CARE CLINIC | Age: 50
End: 2020-04-13

## 2020-04-13 DIAGNOSIS — E11.621 DIABETIC ULCER OF LEFT MIDFOOT ASSOCIATED WITH TYPE 2 DIABETES MELLITUS, WITH NECROSIS OF BONE (HCC): ICD-10-CM

## 2020-04-13 DIAGNOSIS — E03.9 ACQUIRED HYPOTHYROIDISM: ICD-10-CM

## 2020-04-13 DIAGNOSIS — I10 ESSENTIAL HYPERTENSION: Primary | ICD-10-CM

## 2020-04-13 DIAGNOSIS — M79.89 LEG SWELLING: ICD-10-CM

## 2020-04-13 DIAGNOSIS — L97.424 DIABETIC ULCER OF LEFT MIDFOOT ASSOCIATED WITH TYPE 2 DIABETES MELLITUS, WITH NECROSIS OF BONE (HCC): ICD-10-CM

## 2020-04-13 DIAGNOSIS — Z76.89 ENCOUNTER TO ESTABLISH CARE WITH NEW DOCTOR: ICD-10-CM

## 2020-04-13 RX ORDER — HYDROCHLOROTHIAZIDE 25 MG/1
25 TABLET ORAL DAILY
Qty: 30 TAB | Refills: 0 | Status: SHIPPED | OUTPATIENT
Start: 2020-04-13 | End: 2020-04-27 | Stop reason: SDUPTHER

## 2020-04-13 NOTE — PROGRESS NOTES
Consent: Brianna Gautam, who was seen by synchronous (real-time) audio-video technology, and/or her healthcare decision maker, is aware that this patient-initiated, Telehealth encounter on 4/13/2020 is a billable service, with coverage as determined by her insurance carrier. She is aware that she may receive a bill and has provided verbal consent to proceed: Yes. Assessment & Plan:   Diagnoses and all orders for this visit:    New patient. Unable to evaluate patient fully due to the limitation through video exam.     1. Essential hypertension  -     Start hydroCHLOROthiazide (HYDRODIURIL) 25 mg tablet; Take 1 Tab by mouth daily. Advised to cut down Amlodipine to 5 mg ( worsening leg swelling concern) and continue all other med. Lisinopril was stopped in November due to POOJA. Monitor BP daily and follow up in two weeks. Low salt diet, compression stockings, leg elevation. 2. Leg swelling  -   Same as #1    hydroCHLOROthiazide (HYDRODIURIL) 25 mg tablet; Take 1 Tab by mouth daily. May need Echo. 3. Diabetic ulcer of left midfoot associated with type 2 diabetes mellitus, with necrosis of bone ([address]      Being managed by ID and podiatrist.  4. Uncontrolled type 2 diabetes mellitus with peripheral neuropathy (HCC)       Last A1c was 11. 6. according to the chart there is a concern about compliance issues. Continue current med now. I will discuss in detail in next visit. 5. Encounter to establish care with new doctor       Same as #1,2,3,4  6. Acquired hypothyroidism        Synthroid was started in 11/2019 in hospital. Continue synthroid. Will check level in next visit.    Lab Results   Component Value Date/Time    TSH 12.70 (H) 11/10/2019 03:49 AM    T4, Free 1.0 11/10/2019 03:49 AM               Lab Results   Component Value Date/Time    Hemoglobin A1c 10.6 (H) 11/08/2019 10:05 AM         I spent at least 30 minutes with this new patient, and >50% of the time was spent counseling and/or coordinating care regarding diabetes, leg swelling  712  Subjective:   Lazara Seth is a 51 y.o. female who was seen for Establish Care; Hypertension; Diabetes; and Leg Swelling    Did not have PCP for long time. 50 y/o F is here as a new patient to establish as well as with some concerns. Her medical hx is significant for DM-2, HTN, hypothyroid, recurrent  foot infection with osteomyelitis Patient reports that she did not have insurance for quite sometimes until last September. No PCP for quite long time. She was diagnosed with osteomyelitis in September, 2019. She just finished daily infusion of IV Abx via PICC line. Patient has been following up with ID specialist regularly. Last follow up was on 3/16/202 per patient. She reports that she was told by ID specialist Dr. Samuel Lutz that infection has  resolved. DM-2: According to patient she was diagnosed with DM-2 about 10 years ago. Due to health insurance coverage issues she was unable to continue insulin therapy over the years. States that since September she has been compliant with Levemir 22 units daily as well as Humalog. Last A1c was 11.6. HTN: uncontrolled per patient. In January Hydralazine was added in hospital and continued Amlodipine 10 mg and Metoprolol. Patient claims to be compliant with meds. Leg swelling: Patient is here with a c/o BL leg swelling, more in the left leg. Reports that its been swollen since October since the surgery in her foot. Last month she received a week worth of Lasix which did not help that much. She does mention that she is sedentary, does not usually get up from chair. Lab Results   Component Value Date/Time    Creatinine 1.05 (H) 03/10/2020 02:47 PM       Hospital records reviewed. Prior to Admission medications    Medication Sig Start Date End Date Taking? Authorizing Provider   insulin detemir U-100 (LEVEMIR) 100 unit/mL injection 22 Units by SubCUTAneous route daily.    Yes Provider, Historical   hydrALAZINE (APRESOLINE) 50 mg tablet Take 2 Tabs by mouth three (3) times daily. 1/14/20  Yes Kendall Juares MD   insulin regular (NOVOLIN R REGULAR U-100 INSULN) 100 unit/mL injection Sliding scale insulin - patient has previous sliding scale that she has been using  May substitutue with pharmacy equivalent that is covered by patient insurance/formulary 1/14/20  Yes Kendall Juares MD   ferrous sulfate 325 mg (65 mg iron) cpER Take 325 mg by mouth daily. 1/14/20  Yes Kendall Juares MD   amLODIPine (NORVASC) 10 mg tablet Take 10 mg by mouth daily. Yes Provider, Historical   metoprolol tartrate (LOPRESSOR) 100 mg IR tablet Take 100 mg by mouth two (2) times a day. Yes Provider, Historical   ertapenem (INVANZ) 1 gram injection 1 g by IntraVENous route every twenty-four (24) hours. 1/14/20   Kendall Juares MD   levothyroxine (SYNTHROID) 25 mcg tablet Take 25 mcg by mouth Daily (before breakfast). Provider, Historical     Allergies   Allergen Reactions    Chlorhexidine Rash     \"Rash and bumps after using\"       Patient Active Problem List   Diagnosis Code    Diabetes mellitus type 1, uncontrolled, insulin dependent ([facility]) E10.65    Anxiety state F41.1    Unspecified essential hypertension I10    HTN (hypertension) I10    DM type 2 (diabetes mellitus, type 2) ([facility]) E11.9    Acute osteomyelitis of left foot ([facility]) M86.172    Cellulitis and abscess of toe of left foot L03.032, L02.612    Uncontrolled type 2 diabetes mellitus with peripheral neuropathy ([facility]) E11.42, E11.65    Osteomyelitis of second toe of left foot ([facility]) M86.9    Acute osteomyelitis of metatarsal bone of left foot ([address] M86.172    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 116    Abscess of bursa of left foot M71.072    Noncompliance with treatment plan Z91.11    Closed dislocation of tarsometatarsal joint, left, subsequent encounter S93.325D     Current Outpatient Medications   Medication Sig Dispense Refill    hydroCHLOROthiazide (HYDRODIURIL) 25 mg tablet Take 1 Tab by mouth daily. 30 Tab 0    insulin detemir U-100 (LEVEMIR) 100 unit/mL injection 22 Units by SubCUTAneous route daily.  hydrALAZINE (APRESOLINE) 50 mg tablet Take 2 Tabs by mouth three (3) times daily. 90 Tab 4    insulin regular (NOVOLIN R REGULAR U-100 INSULN) 100 unit/mL injection Sliding scale insulin - patient has previous sliding scale that she has been using  May substitutue with pharmacy equivalent that is covered by patient insurance/formulary 2 Vial 5    ferrous sulfate 325 mg (65 mg iron) cpER Take 325 mg by mouth daily. 30 Cap 4    amLODIPine (NORVASC) 10 mg tablet Take 10 mg by mouth daily.  metoprolol tartrate (LOPRESSOR) 100 mg IR tablet Take 100 mg by mouth two (2) times a day.  ertapenem [facility]) 1 gram injection 1 g by IntraVENous route every twenty-four (24) hours. 42 Each 0    levothyroxine (SYNTHROID) 25 mcg tablet Take 25 mcg by mouth Daily (before breakfast).        Allergies   Allergen Reactions    Chlorhexidine Rash     \"Rash and bumps after using\"     Past Medical History:   Diagnosis Date    Cataract     Cataracts, bilateral     Diabetes ([address]     [facility]     Osteomyelitis of foot, left, acute ([facility] 01/14/2020    Hx: I & D x 2 with 2 other Left foot surgeries reported     Past Surgical History:   Procedure Laterality Date    HX CATARACT REMOVAL  2/2011; 5-11    right eye; left    HX TONSIL AND ADENOIDECTOMY  1985    HX TONSILLECTOMY  1984    HX TUBAL LIGATION  1997     Family History   Problem Relation Age of Onset    Hypertension Mother     Cancer Father         prostate    Diabetes Maternal Grandmother     Hypertension Maternal Grandmother      Social History Tobacco Use    Smoking status: Never Smoker    Smokeless tobacco: Never Used   Substance Use Topics    Alcohol use: Yes     Comment: rarely       ROS        Objective:   Vital Signs: (As obtained by patient/caregiver at home)  Visit Vitals  LMP 05/01/2011        [INSTRUCTIONS:  \"[x]\" Indicates a positive item  \"[]\" Indicates a negative item  -- DELETE ALL ITEMS NOT EXAMINED]    Constitutional: [x] Appears well-developed and well-nourished [x] No apparent distress      [] Abnormal -     Mental status: [x] Alert and awake  [x] Oriented to person/place/time [x] Able to follow commands    [] Abnormal -     Eyes:   EOM    [x]  Normal    [] Abnormal -   Sclera  [x]  Normal    [] Abnormal -          Discharge [x]  None visible   [] Abnormal -     HENT: [x] Normocephalic, atraumatic  [] Abnormal -   [x] Mouth/Throat: Mucous membranes are moist Poor dentition noted. External Ears [x] Normal  [] Abnormal -    Neck: [x] No visualized mass [] Abnormal -     Pulmonary/Chest: [x] Respiratory effort normal   [x] No visualized signs of difficulty breathing or respiratory distress        [] Abnormal -      Musculoskeletal:   [] Normal gait with no signs of ataxia         [x] Normal range of motion of neck        [] Abnormal -  2 + pitting edema noted in both legs as well as left thigh. Neurological:        [x] No Facial Asymmetry (Cranial nerve 7 motor function) (limited exam due to video visit)          [x] No gaze palsy        [] Abnormal -          Skin:        [x] No significant exanthematous lesions or discoloration noted on facial skin         [] Abnormal -            Psychiatric:       [x] Normal Affect [] Abnormal -        [x] No Hallucinations    Other pertinent observable physical exam findings:-        We discussed the expected course, resolution and complications of the diagnosis(es) in detail. Medication risks, benefits, costs, interactions, and alternatives were discussed as indicated.   I advised her to contact the office if her condition worsens, changes or fails to improve as anticipated. She expressed understanding with the diagnosis(es) and plan. Brandy Wu is a 51 y.o. female being evaluated by a video visit encounter for concerns as above. A caregiver was present when appropriate. Due to this being a TeleHealth encounter (During [place] public health emergency), evaluation of the following organ systems was limited: Vitals/Constitutional/EENT/Resp/CV/GI//MS/Neuro/Skin/Heme-Lymph-Imm. Pursuant to the emergency declaration under the [facility], 1135 waiver authority and the [employer] Act, this Virtual  Visit was conducted, with patient's (and/or legal guardian's) consent, to reduce the patient's risk of exposure to COVID-19 and provide necessary medical care. Services were provided through a video synchronous discussion virtually to substitute for in-person clinic visit. Patient and provider were located at their individual homes.         Cj Johnson MD

## 2020-04-27 ENCOUNTER — VIRTUAL VISIT (OUTPATIENT)
Dept: PRIMARY CARE CLINIC | Age: 50
End: 2020-04-27

## 2020-04-27 DIAGNOSIS — M79.89 LEG SWELLING: ICD-10-CM

## 2020-04-27 DIAGNOSIS — I10 ESSENTIAL HYPERTENSION: ICD-10-CM

## 2020-04-27 RX ORDER — HYDROCHLOROTHIAZIDE 25 MG/1
25 TABLET ORAL DAILY
Qty: 90 TAB | Refills: 0 | Status: SHIPPED | OUTPATIENT
Start: 2020-04-27

## 2020-04-27 NOTE — PROGRESS NOTES
Radha Mccullough is a 51 y.o. female who was seen by synchronous (real-time) audio-video technology on 4/27/2020. Consent: Radha Mccullough, who was seen by synchronous (real-time) audio-video [employer] me technology, and/or her healthcare decision maker, is aware that this patient-initiated, Telehealth encounter on 4/27/2020 is a billable service, with coverage as determined by her insurance carrier. She is aware that she may receive a bill and has provided verbal consent to proceed: Yes. I was in the office while conducting this encounter. Assessment & Plan:     Diagnoses and all orders for this visit:    1. Essential hypertension  -  Much improved per patient. Continue   hydroCHLOROthiazide (HYDRODIURIL) 25 mg tablet; Take 1 Tab by mouth daily. Continue Amlodipine 5 mg as well as all other BP meds. Monitor BP and follow up in 2 weeks in office visit. 2. Leg swelling  -   Same as #1  Continue low salt diet, leg elevation. Encouraged to buy compression stockings. Continue   hydroCHLOROthiazide (HYDRODIURIL) 25 mg tablet; Take 1 Tab by mouth daily. Follow-up and Dispositions    · Return in about 2 weeks (around 5/11/2020) for HTN, leg swelling, DM as an office visit. .               Subjective:   Radha Mccullough is a 51 y.o. female who was seen for Leg Swelling and Hypertension    52 y/o F with medical hx is significant for DM-2, HTN, hypothyroid, recurrent  foot infection with osteomyelitis is here for two weeks follow up on leg swelling as well as follow up on  blood pressure. This will be her 2nd visit with me through tele med. In last visit I did add HCTZ 25 mg and cut down Amlodipine to 5 mg to help with her leg swelling. Patient reports that leg swelling has much improved with the med. She did not get the compression stockings as advised yet but states that she has been watching salt intake and leg elevation as well. Her lifestyle is mostly sedentary.   Denies any cough, soa, orthopnea. Did not get a chance to check BP at home as advised. Prior to Admission medications    Medication Sig Start Date End Date Taking? Authorizing Provider   hydroCHLOROthiazide (HYDRODIURIL) 25 mg tablet Take 1 Tab by mouth daily. 4/27/20  Yes Porfirio Borja MD   insulin detemir U-100 (LEVEMIR) 100 unit/mL injection 22 Units by SubCUTAneous route daily. Provider, Historical   ertapenem [facility]) 1 gram injection 1 g by IntraVENous route every twenty-four (24) hours. 1/14/20   Aleja Yarbrough MD   hydrALAZINE (APRESOLINE) 50 mg tablet Take 2 Tabs by mouth three (3) times daily. 1/14/20   Aleja Yarbrough MD   insulin regular (NOVOLIN R REGULAR U-100 INSULN) 100 unit/mL injection Sliding scale insulin - patient has previous sliding scale that she has been using  May substitutue with pharmacy equivalent that is covered by patient insurance/formulary 1/14/20   Aleja Yarbrough MD   ferrous sulfate 325 mg (65 mg iron) cpER Take 325 mg by mouth daily. 1/14/20   Aleja Yarbrough MD   amLODIPine (NORVASC) 10 mg tablet Take 5 mg by mouth daily. Provider, Historical   levothyroxine (SYNTHROID) 25 mcg tablet Take 25 mcg by mouth Daily (before breakfast). Provider, Historical   metoprolol tartrate (LOPRESSOR) 100 mg IR tablet Take 100 mg by mouth two (2) times a day.     Provider, Historical     Allergies   Allergen Reactions    Chlorhexidine Rash     \"Rash and bumps after using\"           ROS    Objective:   Vital Signs: (As obtained by patient/caregiver at home)  Visit Vitals  [facility] 05/01/2011        [INSTRUCTIONS:  \"[x]\" Indicates a positive item  \"[]\" Indicates a negative item  -- DELETE ALL ITEMS NOT EXAMINED]    Constitutional: [x] Appears well-developed and obese [x] No apparent distress      [] Abnormal -     Mental status: [x] Alert and awake  [x] Oriented to person/place/time [x] Able to follow commands    [] Abnormal -     Eyes:   EOM    [x]  Normal    [] Abnormal -   Sclera  [x]  Normal    [] Abnormal -          Discharge [x]  None visible   [] Abnormal -     HENT: [x] Normocephalic, atraumatic  [] Abnormal - Poor dentition  [x] Mouth/Throat: Mucous membranes are moist    External Ears [x] Normal  [] Abnormal -    Neck: [x] No visualized mass [] Abnormal -     Pulmonary/Chest: [x] Respiratory effort normal   [x] No visualized signs of difficulty breathing or respiratory distress        [] Abnormal -      Musculoskeletal:   [] Normal gait with no signs of ataxia         [x] Normal range of motion of neck        [] Abnormal -     Neurological:        [x] No Facial Asymmetry (Cranial nerve 7 motor function) (limited exam due to video visit)          [x] No gaze palsy        [] Abnormal -          Skin:        [x] No significant exanthematous lesions or discoloration noted on facial skin         [] Abnormal -  1 + BL leg edema. No redness noted in right leg. Psychiatric:       [x] Normal Affect [] Abnormal -        [x] No Hallucinations    Other pertinent observable physical exam findings:-        We discussed the expected course, resolution and complications of the diagnosis(es) in detail. Medication risks, benefits, costs, interactions, and alternatives were discussed as indicated. I advised her to contact the office if her condition worsens, changes or fails to improve as anticipated. She expressed understanding with the diagnosis(es) and plan. Ozzy Walton is a 51 y.o. female who was evaluated by a video visit encounter for concerns as above. Patient identification was verified prior to start of the visit. A caregiver was present when appropriate. Due to this being a TeleHealth encounter (During [place] [address] public health emergency), evaluation of the following organ systems was limited: Vitals/Constitutional/EENT/Resp/CV/GI//MS/Neuro/Skin/Heme-Lymph-Imm.   Pursuant to the emergency declaration under the [facility], [facility]5 waiver authority and the Coronavirus Preparedness and Response Supplemental Appropriations Act, this Virtual  Visit was conducted, with patient's (and/or legal guardian's) consent, to reduce the patient's risk of exposure to COVID-19 and provide necessary medical care. Services were provided through a video synchronous discussion virtually to substitute for in-person clinic visit. Patient and provider were located at their individual homes.       Danielle Colón MD

## 2020-09-05 LAB
COMMENT, HOLDF: NORMAL
SAMPLES BEING HELD,HOLD: NORMAL

## 2020-09-05 PROCEDURE — 99284 EMERGENCY DEPT VISIT MOD MDM: CPT

## 2020-09-05 PROCEDURE — 36415 COLL VENOUS BLD VENIPUNCTURE: CPT

## 2020-09-05 PROCEDURE — 83880 ASSAY OF NATRIURETIC PEPTIDE: CPT

## 2020-09-05 PROCEDURE — 96374 THER/PROPH/DIAG INJ IV PUSH: CPT

## 2020-09-05 PROCEDURE — 80048 BASIC METABOLIC PNL TOTAL CA: CPT

## 2020-09-05 PROCEDURE — 85025 COMPLETE CBC W/AUTO DIFF WBC: CPT

## 2020-09-06 ENCOUNTER — HOSPITAL ENCOUNTER (EMERGENCY)
Age: 50
Discharge: HOME | End: 2020-09-06
Attending: EMERGENCY MEDICINE | Admitting: EMERGENCY MEDICINE
Payer: MEDICAID

## 2020-09-06 VITALS
WEIGHT: 220.7 LBS | DIASTOLIC BLOOD PRESSURE: 75 MMHG | HEIGHT: 66 IN | RESPIRATION RATE: 16 BRPM | TEMPERATURE: 98 F | SYSTOLIC BLOOD PRESSURE: 154 MMHG | BODY MASS INDEX: 35.47 KG/M2 | OXYGEN SATURATION: 98 % | HEART RATE: 82 BPM

## 2020-09-06 DIAGNOSIS — R60.1 GENERALIZED EDEMA: Primary | ICD-10-CM

## 2020-09-06 DIAGNOSIS — R60.0 LOWER EXTREMITY EDEMA: ICD-10-CM

## 2020-09-06 LAB
ANION GAP SERPL CALC-SCNC: 7 MMOL/L (ref 5–15)
BASOPHILS # BLD: 0 K/UL (ref 0–0.1)
BASOPHILS NFR BLD: 0 % (ref 0–1)
BNP SERPL-MCNC: 7771 PG/ML
BUN SERPL-MCNC: 44 MG/DL (ref 6–20)
BUN/CREAT SERPL: 27 (ref 12–20)
CALCIUM SERPL-MCNC: 9.3 MG/DL (ref 8.5–10.1)
CHLORIDE SERPL-SCNC: 109 MMOL/L (ref 97–108)
CO2 SERPL-SCNC: 23 MMOL/L (ref 21–32)
CREAT SERPL-MCNC: 1.66 MG/DL (ref 0.55–1.02)
DIFFERENTIAL METHOD BLD: ABNORMAL
EOSINOPHIL # BLD: 0.2 K/UL (ref 0–0.4)
EOSINOPHIL NFR BLD: 3 % (ref 0–7)
ERYTHROCYTE [DISTWIDTH] IN BLOOD BY AUTOMATED COUNT: 15.6 % (ref 11.5–14.5)
GLUCOSE SERPL-MCNC: 74 MG/DL (ref 65–100)
HCT VFR BLD AUTO: 34.4 % (ref 35–47)
HGB BLD-MCNC: 10.6 G/DL (ref 11.5–16)
IMM GRANULOCYTES # BLD AUTO: 0 K/UL (ref 0–0.04)
IMM GRANULOCYTES NFR BLD AUTO: 0 % (ref 0–0.5)
LYMPHOCYTES # BLD: 0.6 K/UL (ref 0.8–3.5)
LYMPHOCYTES NFR BLD: 9 % (ref 12–49)
MCH RBC QN AUTO: 29.3 PG (ref 26–34)
MCHC RBC AUTO-ENTMCNC: 30.8 G/DL (ref 30–36.5)
MCV RBC AUTO: 95 FL (ref 80–99)
MONOCYTES # BLD: 0.5 K/UL (ref 0–1)
MONOCYTES NFR BLD: 8 % (ref 5–13)
NEUTS SEG # BLD: 5.4 K/UL (ref 1.8–8)
NEUTS SEG NFR BLD: 80 % (ref 32–75)
NRBC # BLD: 0 K/UL (ref 0–0.01)
NRBC BLD-RTO: 0 PER 100 WBC
PLATELET # BLD AUTO: 277 K/UL (ref 150–400)
PLATELET COMMENTS,PCOM: ABNORMAL
PMV BLD AUTO: 10.2 FL (ref 8.9–12.9)
POTASSIUM SERPL-SCNC: 4.7 MMOL/L (ref 3.5–5.1)
RBC # BLD AUTO: 3.62 M/UL (ref 3.8–5.2)
RBC MORPH BLD: ABNORMAL
RBC MORPH BLD: ABNORMAL
SODIUM SERPL-SCNC: 139 MMOL/L (ref 136–145)
WBC # BLD AUTO: 6.7 K/UL (ref 3.6–11)

## 2020-09-06 PROCEDURE — 74011000250 HC RX REV CODE- 250: Performed by: EMERGENCY MEDICINE

## 2020-09-06 RX ORDER — BUMETANIDE 0.25 MG/ML
1 INJECTION INTRAMUSCULAR; INTRAVENOUS ONCE
Status: COMPLETED | OUTPATIENT
Start: 2020-09-06 | End: 2020-09-06

## 2020-09-06 RX ADMIN — BUMETANIDE 1 MG: 0.25 INJECTION, SOLUTION INTRAMUSCULAR; INTRAVENOUS at 01:30

## 2020-09-06 NOTE — ED TRIAGE NOTES
Patient arrives to the ED with c/o bilat lower extremity edema , right arm edema, and distended abdo, patient was discharged from [facility] for DKA.

## 2020-09-06 NOTE — ED PROVIDER NOTES
53yo F d/c'd from [facility] yesterday after being treated for DKA. Pt doesn't think she should have been released. Pt here c/o swelling in stomach and arms and legs. Pt having difficulty getting around due to swelling. Pt was discharged on bumex and metolazone. No fever, cp, sob, cough. PMH sig for CKD, DM, osteomyelitis of R foot.            Past Medical History:   Diagnosis Date    Cataract     Cataracts, bilateral     Diabetes ([address]     [facility]omy[address] [address]     Osteomyelitis of foot, left, acute ([address] 01/14/2020    Hx: I & D x 2 with 2 other Left foot surgeries reported       Past Surgical History:   Procedure Laterality Date    HX CATARACT REMOVAL  2/2011; 5-11    right eye; left    HX TONSIL AND ADENOIDECTOMY  1985    HX TONSILLECTOMY  1984    HX TUBAL LIGATION  1997         Family History:   Problem Relation Age of Onset    Hypertension Mother     Cancer Father         prostate    Diabetes Maternal Grandmother     Hypertension Maternal Grandmother        Social History     Socioeconomic History    Marital status:      Spouse name: Not on file    Number of children: Not on file    Years of education: Not on file    Highest education level: Not on file   Occupational History    Not on file   Social Needs    Financial resource strain: Not on file    Food insecurity     Worry: Not on file     Inability: Not on file    Transportation needs     Medical: Not on file     Non-medical: Not on file   Tobacco Use    Smoking status: Never Smoker    Smokeless tobacco: Never Used   Substance and Sexual Activity    Alcohol use: Yes     Comment: rarely    Drug use: No    Sexual activity: Not Currently   Lifestyle    Physical activity     Days per week: Not on file     Minutes per session: Not on file    Stress: Not on file   Relationships    Social connections     Talks on phone: Not on file     Gets together: Not on file     Attends Cheondo[employer] service: Not on file     Active member of club or organization: Not on file     Attends meetings of clubs or organizations: Not on file     Relationship status: Not on file    Intimate partner violence     Fear of current or ex partner: Not on file     Emotionally abused: Not on file     Physically abused: Not on file     Forced sexual activity: Not on file   Other Topics Concern     Service Not Asked    Blood Transfusions Not Asked    Caffeine Concern Not Asked    Occupational Exposure Not Asked   Juanita Mulch Hazards Not Asked    Sleep Concern Not Asked    Stress Concern Not Asked    Weight Concern Not Asked    Special Diet Not Asked    Back Care Not Asked    Exercise Not Asked    Bike Helmet Not Asked    Seat Belt Not Asked    Self-Exams Not Asked   Social History Narrative    ** Merged History Encounter **              ALLERGIES: Chlorhexidine    Review of Systems   Constitutional: Negative for fever. HENT: Negative for facial swelling. Eyes: Negative for visual disturbance. Respiratory: Negative for chest tightness. Cardiovascular: Negative for chest pain. Gastrointestinal: Negative for abdominal pain. Genitourinary: Negative for difficulty urinating and dysuria. Musculoskeletal: Negative for arthralgias. Skin: Negative for rash. Neurological: Negative for headaches. Hematological: Negative for adenopathy. Psychiatric/Behavioral: Negative for suicidal ideas. Vitals:    09/05/20 2258   BP: 167/84   Pulse: 73   Resp: 16   Temp: 97.5 °F (36.4 °C)   SpO2: 97%   Weight: 100.1 kg (220 lb 11.2 oz)   Height: 5' 6\" (1.676 m)            Physical Exam  Vitals signs and nursing note reviewed. Constitutional:       General: She is not in acute distress. Appearance: She is well-developed. She is obese. HENT:      Head: Normocephalic and atraumatic. Eyes:      General: No scleral icterus. Conjunctiva/sclera: Conjunctivae normal.      Pupils: Pupils are equal, round, and reactive to light.    Neck: Musculoskeletal: Normal range of motion and neck supple. Cardiovascular:      Rate and Rhythm: Normal rate. Heart sounds: No murmur. Pulmonary:      Effort: Pulmonary effort is normal. No respiratory distress. Abdominal:      General: There is no distension. Musculoskeletal: Normal range of motion. General: Swelling (2+ pitting edeme b/l lower ext) present. Skin:     General: Skin is warm and dry. Findings: No rash. Neurological:      Mental Status: She is alert and oriented to person, place, and time. MDM  Number of Diagnoses or Management Options  Diagnosis management comments: Assessment: Patient here with generalized edema and swelling after being discharged from [facility] yesterday. Having been treated for DKA I presume patient received significant volume of IV fluids while in the hospital.  Combined with her deconditioning, I suspect this is the cause for her swelling. Her lungs are clear and she denies any shortness of breath, cough, fever. She was discharged on Bumex and metolazone but states that she was only able to start taking the metolazone today and she has not really been much since being discharged. Her blood work today looks stable with a slightly increased BUN and creatinine. I think it is reasonable to give her an IV dose of Bumex in the ER and discharge patient home to continue taking her prescriptions as directed from [facility].  She can follow-up with her primary care doctor with any further questions return to the ED with any new symptoms or concerns.        Amount and/or Complexity of Data Reviewed  Clinical lab tests: reviewed           Procedures

## 2020-09-06 NOTE — ED NOTES
Discharge instructions given to patient by nurse. Pt had been given counseling on medication use and verbalizes understanding. IV d/c. Pt wheeled off unit in no signs of distress.

## 2022-03-18 PROBLEM — M86.172 ACUTE OSTEOMYELITIS OF METATARSAL BONE OF LEFT FOOT (HCC): Status: ACTIVE | Noted: 2019-10-07

## 2022-03-18 PROBLEM — L08.9 LEFT FOOT INFECTION: Status: ACTIVE | Noted: 2019-11-08

## 2022-03-18 PROBLEM — M86.172 ACUTE OSTEOMYELITIS OF LEFT FOOT (HCC): Status: ACTIVE | Noted: 2019-10-07

## 2022-03-18 PROBLEM — E11.610 TYPE 2 DIABETES MELLITUS WITH CHARCOT'S JOINT OF LEFT FOOT (HCC): Status: ACTIVE | Noted: 2019-11-14

## 2022-03-19 PROBLEM — M86.9 OSTEOMYELITIS OF SECOND TOE OF LEFT FOOT (HCC): Status: ACTIVE | Noted: 2019-10-07

## 2022-03-19 PROBLEM — L97.424: Status: ACTIVE | Noted: 2019-10-07

## 2022-03-19 PROBLEM — E03.9 ACQUIRED HYPOTHYROIDISM: Status: ACTIVE | Noted: 2020-04-13

## 2022-03-19 PROBLEM — E11.621: Status: ACTIVE | Noted: 2019-10-07

## 2022-03-19 PROBLEM — S93.325D: Status: ACTIVE | Noted: 2020-01-08

## 2022-03-19 PROBLEM — M71.072 ABSCESS OF BURSA OF LEFT FOOT: Status: ACTIVE | Noted: 2020-01-08

## 2022-03-19 PROBLEM — M14.672 CHARCOT'S JOINT OF LEFT FOOT: Status: ACTIVE | Noted: 2019-11-14

## 2022-03-19 PROBLEM — E11.9 DM TYPE 2 (DIABETES MELLITUS, TYPE 2) (HCC): Status: ACTIVE | Noted: 2019-10-07

## 2022-03-19 PROBLEM — I10 HTN (HYPERTENSION): Status: ACTIVE | Noted: 2019-10-07

## 2022-03-20 PROBLEM — Z91.199 NONCOMPLIANCE WITH TREATMENT PLAN: Status: ACTIVE | Noted: 2020-01-08

## 2022-03-20 PROBLEM — L03.116 CELLULITIS OF FOOT, LEFT: Status: ACTIVE | Noted: 2020-01-07

## 2022-03-20 PROBLEM — L03.032 CELLULITIS AND ABSCESS OF TOE OF LEFT FOOT: Status: ACTIVE | Noted: 2019-10-07

## 2022-03-20 PROBLEM — L02.612 CELLULITIS AND ABSCESS OF TOE OF LEFT FOOT: Status: ACTIVE | Noted: 2019-10-07

## 2022-09-07 NOTE — PROGRESS NOTES
11/09/19 0748   Vital Signs   Temp 97.8 °F (36.6 °C)   Temp Source Oral   Pulse (Heart Rate) 86   Heart Rate Source Monitor   Resp Rate 16   O2 Sat (%) 95 %   Level of Consciousness Alert   BP (!) 180/94   MAP (Calculated) 123   BP 1 Method Automatic   BP 1 Location Right arm   BP Patient Position At rest   MEWS Score 1   Pain 1   Pain Scale 1 Numeric (0 - 10)   Pain Intensity 1 5   Patient Stated Pain Goal 0   Pain Location 1 Foot   Pain Orientation 1 Left   Pain Description 1 Aching;Constant   Pain Intervention(s) 1 Medication (see MAR)   Oxygen Therapy   O2 Device Room air   Patient Observation   Repositioned Head of bed elevated (degrees); Heels off loaded   Patient Turned Supine   Ambulate Ambulate to bathroom   Activity Eating   Mode of Transportation Stretcher;IV equipment Lot # (Optional): [id number] Lot # (Optional): [id number]

## 2023-05-15 RX ORDER — AMLODIPINE BESYLATE 10 MG/1
5 TABLET ORAL DAILY
COMMUNITY

## 2023-05-15 RX ORDER — HYDRALAZINE HYDROCHLORIDE 50 MG/1
TABLET, FILM COATED ORAL 3 TIMES DAILY
COMMUNITY
Start: 2020-01-14

## 2023-05-15 RX ORDER — METOPROLOL TARTRATE 100 MG/1
TABLET ORAL 2 TIMES DAILY
COMMUNITY

## 2023-05-15 RX ORDER — LEVOTHYROXINE SODIUM 0.03 MG/1
TABLET ORAL
COMMUNITY

## 2023-05-15 RX ORDER — HYDROCHLOROTHIAZIDE 25 MG/1
TABLET ORAL DAILY
COMMUNITY
Start: 2020-04-27

## 2023-05-15 RX ORDER — ERTAPENEM 1 G/1
INJECTION, POWDER, LYOPHILIZED, FOR SOLUTION INTRAMUSCULAR; INTRAVENOUS EVERY 24 HOURS
COMMUNITY
Start: 2020-01-14

## (undated) DEVICE — BNDG ELAS HK LOOP 4X5YD NS -- MATRIX

## (undated) DEVICE — NEEDLE BX 11GA L101MM BNE MAR ASPIR W/ ADPT JAMSH

## (undated) DEVICE — NEEDLE HYPO 25GA L1.5IN BVL ORIENTED ECLIPSE

## (undated) DEVICE — CURITY NON-ADHERENT STRIPS: Brand: CURITY

## (undated) DEVICE — GAUZE PKG STRP IODOFRM 1/4X5YD --

## (undated) DEVICE — SOLUTION IV 1000ML 0.9% SOD CHL

## (undated) DEVICE — DRAPE,EXTREMITY,89X128,STERILE: Brand: MEDLINE

## (undated) DEVICE — INFECTION CONTROL KIT SYS

## (undated) DEVICE — REM POLYHESIVE ADULT PATIENT RETURN ELECTRODE: Brand: VALLEYLAB

## (undated) DEVICE — HANDLE LT SNAP ON ULT DURABLE LENS FOR TRUMPF ALC DISPOSABLE

## (undated) DEVICE — PADDING CST CRMPD 3INX4YD NS --

## (undated) DEVICE — DISPOSABLE TOURNIQUET CUFF SINGLE BLADDER, DUAL PORT AND QUICK CONNECT CONNECTOR: Brand: COLOR CUFF

## (undated) DEVICE — SYR 10ML LUER LOK 1/5ML GRAD --

## (undated) DEVICE — STERILE POLYISOPRENE POWDER-FREE SURGICAL GLOVES WITH EMOLLIENT COATING: Brand: PROTEXIS

## (undated) DEVICE — SUTURE VCRL SZ 3-0 L27IN ABSRB UD L26MM SH 1/2 CIR J416H

## (undated) DEVICE — SPONGE GZ W4XL4IN COT 12 PLY TYP VII WVN C FLD DSGN

## (undated) DEVICE — BANDAGE COMPR 9 FTX4 IN SMOOTH COMFORTABLE SYNTH ESMRK LF

## (undated) DEVICE — SPONGE GZ W4XL4IN COT RADPQ HIGHLY ABSRB

## (undated) DEVICE — SUT ETHLN 2-0 18IN FS BLK --

## (undated) DEVICE — TRAY PREP DRY W/ PREM GLV 2 APPL 6 SPNG 2 UNDPD 1 OVERWRAP

## (undated) DEVICE — SURGICAL PROCEDURE PACK BASIN MAJ SET CUST NO CAUT

## (undated) DEVICE — SPONGE LAP 18X18IN STRL -- 5/PK

## (undated) DEVICE — TUBING SUCT 10FR MAL ALUM SHFT FN CAP VENT UNIV CONN W/ OBT

## (undated) DEVICE — AGENT HEMSTAT W2XL14IN OXIDIZED REGENERATED CELOS ABSRB FOR

## (undated) DEVICE — DRAPE,REIN 53X77,STERILE: Brand: MEDLINE

## (undated) DEVICE — PAD,ABDOMINAL,5"X9",ST,LF,25/BX: Brand: MEDLINE INDUSTRIES, INC.

## (undated) DEVICE — BANDAGE,GAUZE,BULKEE II,4.5"X4.1YD,STRL: Brand: MEDLINE

## (undated) DEVICE — STRAP,POSITIONING,KNEE/BODY,FOAM,4X60": Brand: MEDLINE

## (undated) DEVICE — SUTURE VCRL SZ 3-0 L54IN ABSRB VLT LIGAPAK REEL NDL J205G

## (undated) DEVICE — AGENT HEMSTAT W2XL3IN OXIDIZED REGENERATED CELOS ABSRB

## (undated) DEVICE — SUTURE ETHLN SZ 4-0 L18IN NONABSORBABLE BLK L19MM PS-2 3/8 1667H

## (undated) DEVICE — Z DISCONTINUED PER MEDLINE (LOW STOCK)  USE 2422770 DRAPE C ARM W54XL78IN FOR FLROSCN

## (undated) DEVICE — BANDAGE,GAUZE,CONFORMING,3"X75",STRL,LF: Brand: MEDLINE

## (undated) DEVICE — SUT ETHLN 3-0 18IN PS1 BLK --

## (undated) DEVICE — INTENDED FOR TISSUE SEPARATION, AND OTHER PROCEDURES THAT REQUIRE A SHARP SURGICAL BLADE TO PUNCTURE OR CUT.: Brand: BARD-PARKER ® CARBON RIB-BACK BLADES